# Patient Record
Sex: FEMALE | Race: WHITE | NOT HISPANIC OR LATINO | Employment: OTHER | URBAN - METROPOLITAN AREA
[De-identification: names, ages, dates, MRNs, and addresses within clinical notes are randomized per-mention and may not be internally consistent; named-entity substitution may affect disease eponyms.]

---

## 2017-01-23 ENCOUNTER — COMMUNICATION - HEALTHEAST (OUTPATIENT)
Dept: INTERNAL MEDICINE | Facility: CLINIC | Age: 82
End: 2017-01-23

## 2017-03-07 ENCOUNTER — RECORDS - HEALTHEAST (OUTPATIENT)
Dept: ADMINISTRATIVE | Facility: OTHER | Age: 82
End: 2017-03-07

## 2017-04-24 ENCOUNTER — COMMUNICATION - HEALTHEAST (OUTPATIENT)
Dept: INTERNAL MEDICINE | Facility: CLINIC | Age: 82
End: 2017-04-24

## 2017-05-24 ENCOUNTER — OFFICE VISIT - HEALTHEAST (OUTPATIENT)
Dept: INTERNAL MEDICINE | Facility: CLINIC | Age: 82
End: 2017-05-24

## 2017-05-24 DIAGNOSIS — R10.13 EPIGASTRIC PAIN: ICD-10-CM

## 2017-05-24 DIAGNOSIS — C91.10 CLL (CHRONIC LYMPHOCYTIC LEUKEMIA) (H): ICD-10-CM

## 2017-05-24 DIAGNOSIS — I48.21 ATRIAL FIBRILLATION, PERMANENT (H): ICD-10-CM

## 2017-05-24 DIAGNOSIS — E11.9 DIABETES MELLITUS TYPE 2 IN NONOBESE (H): ICD-10-CM

## 2017-05-24 LAB — HBA1C MFR BLD: 6.9 % (ref 3.5–6)

## 2017-05-24 ASSESSMENT — MIFFLIN-ST. JEOR: SCORE: 1442.63

## 2017-05-25 ENCOUNTER — COMMUNICATION - HEALTHEAST (OUTPATIENT)
Dept: NURSING | Facility: CLINIC | Age: 82
End: 2017-05-25

## 2017-05-25 DIAGNOSIS — I48.21 ATRIAL FIBRILLATION, PERMANENT (H): ICD-10-CM

## 2017-05-25 DIAGNOSIS — Z79.01 LONG TERM (CURRENT) USE OF ANTICOAGULANTS: ICD-10-CM

## 2017-05-27 ENCOUNTER — COMMUNICATION - HEALTHEAST (OUTPATIENT)
Dept: INTERNAL MEDICINE | Facility: CLINIC | Age: 82
End: 2017-05-27

## 2017-05-31 ENCOUNTER — COMMUNICATION - HEALTHEAST (OUTPATIENT)
Dept: NURSING | Facility: CLINIC | Age: 82
End: 2017-05-31

## 2017-06-05 ENCOUNTER — COMMUNICATION - HEALTHEAST (OUTPATIENT)
Dept: INTERNAL MEDICINE | Facility: CLINIC | Age: 82
End: 2017-06-05

## 2017-06-05 DIAGNOSIS — I48.21 ATRIAL FIBRILLATION, PERMANENT (H): ICD-10-CM

## 2017-06-09 ENCOUNTER — COMMUNICATION - HEALTHEAST (OUTPATIENT)
Dept: INTERNAL MEDICINE | Facility: CLINIC | Age: 82
End: 2017-06-09

## 2017-06-12 ENCOUNTER — COMMUNICATION - HEALTHEAST (OUTPATIENT)
Dept: INTERNAL MEDICINE | Facility: CLINIC | Age: 82
End: 2017-06-12

## 2017-06-12 DIAGNOSIS — I48.21 ATRIAL FIBRILLATION, PERMANENT (H): ICD-10-CM

## 2017-06-14 ENCOUNTER — RECORDS - HEALTHEAST (OUTPATIENT)
Dept: ADMINISTRATIVE | Facility: OTHER | Age: 82
End: 2017-06-14

## 2017-06-19 ENCOUNTER — COMMUNICATION - HEALTHEAST (OUTPATIENT)
Dept: NURSING | Facility: CLINIC | Age: 82
End: 2017-06-19

## 2017-06-19 DIAGNOSIS — I48.21 ATRIAL FIBRILLATION, PERMANENT (H): ICD-10-CM

## 2017-06-26 ENCOUNTER — RECORDS - HEALTHEAST (OUTPATIENT)
Dept: ADMINISTRATIVE | Facility: OTHER | Age: 82
End: 2017-06-26

## 2017-06-26 ENCOUNTER — COMMUNICATION - HEALTHEAST (OUTPATIENT)
Dept: INTERNAL MEDICINE | Facility: CLINIC | Age: 82
End: 2017-06-26

## 2017-06-26 DIAGNOSIS — I48.21 ATRIAL FIBRILLATION, PERMANENT (H): ICD-10-CM

## 2017-07-03 ENCOUNTER — COMMUNICATION - HEALTHEAST (OUTPATIENT)
Dept: NURSING | Facility: CLINIC | Age: 82
End: 2017-07-03

## 2017-07-03 DIAGNOSIS — I48.21 ATRIAL FIBRILLATION, PERMANENT (H): ICD-10-CM

## 2017-07-10 ENCOUNTER — COMMUNICATION - HEALTHEAST (OUTPATIENT)
Dept: NURSING | Facility: CLINIC | Age: 82
End: 2017-07-10

## 2017-07-10 DIAGNOSIS — I48.21 ATRIAL FIBRILLATION, PERMANENT (H): ICD-10-CM

## 2017-07-17 ENCOUNTER — COMMUNICATION - HEALTHEAST (OUTPATIENT)
Dept: INTERNAL MEDICINE | Facility: CLINIC | Age: 82
End: 2017-07-17

## 2017-07-17 DIAGNOSIS — I48.21 ATRIAL FIBRILLATION, PERMANENT (H): ICD-10-CM

## 2017-07-24 ENCOUNTER — COMMUNICATION - HEALTHEAST (OUTPATIENT)
Dept: NURSING | Facility: CLINIC | Age: 82
End: 2017-07-24

## 2017-07-24 DIAGNOSIS — I48.21 ATRIAL FIBRILLATION, PERMANENT (H): ICD-10-CM

## 2017-07-31 ENCOUNTER — COMMUNICATION - HEALTHEAST (OUTPATIENT)
Dept: NURSING | Facility: CLINIC | Age: 82
End: 2017-07-31

## 2017-07-31 DIAGNOSIS — I48.21 ATRIAL FIBRILLATION, PERMANENT (H): ICD-10-CM

## 2017-08-04 ENCOUNTER — COMMUNICATION - HEALTHEAST (OUTPATIENT)
Dept: INTERNAL MEDICINE | Facility: CLINIC | Age: 82
End: 2017-08-04

## 2017-08-07 ENCOUNTER — RECORDS - HEALTHEAST (OUTPATIENT)
Dept: ADMINISTRATIVE | Facility: OTHER | Age: 82
End: 2017-08-07

## 2017-08-07 ENCOUNTER — COMMUNICATION - HEALTHEAST (OUTPATIENT)
Dept: INTERNAL MEDICINE | Facility: CLINIC | Age: 82
End: 2017-08-07

## 2017-08-07 DIAGNOSIS — I48.21 ATRIAL FIBRILLATION, PERMANENT (H): ICD-10-CM

## 2017-08-14 ENCOUNTER — COMMUNICATION - HEALTHEAST (OUTPATIENT)
Dept: NURSING | Facility: CLINIC | Age: 82
End: 2017-08-14

## 2017-08-14 DIAGNOSIS — I48.21 ATRIAL FIBRILLATION, PERMANENT (H): ICD-10-CM

## 2017-08-21 ENCOUNTER — COMMUNICATION - HEALTHEAST (OUTPATIENT)
Dept: NURSING | Facility: CLINIC | Age: 82
End: 2017-08-21

## 2017-08-21 DIAGNOSIS — I48.21 ATRIAL FIBRILLATION, PERMANENT (H): ICD-10-CM

## 2017-08-23 ENCOUNTER — RECORDS - HEALTHEAST (OUTPATIENT)
Dept: ADMINISTRATIVE | Facility: OTHER | Age: 82
End: 2017-08-23

## 2017-08-28 ENCOUNTER — COMMUNICATION - HEALTHEAST (OUTPATIENT)
Dept: NURSING | Facility: CLINIC | Age: 82
End: 2017-08-28

## 2017-08-28 DIAGNOSIS — I48.21 ATRIAL FIBRILLATION, PERMANENT (H): ICD-10-CM

## 2017-09-01 ENCOUNTER — COMMUNICATION - HEALTHEAST (OUTPATIENT)
Dept: INTERNAL MEDICINE | Facility: CLINIC | Age: 82
End: 2017-09-01

## 2017-09-05 ENCOUNTER — COMMUNICATION - HEALTHEAST (OUTPATIENT)
Dept: NURSING | Facility: CLINIC | Age: 82
End: 2017-09-05

## 2017-09-05 DIAGNOSIS — I48.21 ATRIAL FIBRILLATION, PERMANENT (H): ICD-10-CM

## 2017-09-11 ENCOUNTER — COMMUNICATION - HEALTHEAST (OUTPATIENT)
Dept: NURSING | Facility: CLINIC | Age: 82
End: 2017-09-11

## 2017-09-11 DIAGNOSIS — I48.21 ATRIAL FIBRILLATION, PERMANENT (H): ICD-10-CM

## 2017-09-12 ENCOUNTER — COMMUNICATION - HEALTHEAST (OUTPATIENT)
Dept: INTERNAL MEDICINE | Facility: CLINIC | Age: 82
End: 2017-09-12

## 2017-09-12 DIAGNOSIS — K21.9 GERD (GASTROESOPHAGEAL REFLUX DISEASE): ICD-10-CM

## 2017-09-18 ENCOUNTER — COMMUNICATION - HEALTHEAST (OUTPATIENT)
Dept: NURSING | Facility: CLINIC | Age: 82
End: 2017-09-18

## 2017-09-18 DIAGNOSIS — I48.21 ATRIAL FIBRILLATION, PERMANENT (H): ICD-10-CM

## 2017-09-25 ENCOUNTER — COMMUNICATION - HEALTHEAST (OUTPATIENT)
Dept: NURSING | Facility: CLINIC | Age: 82
End: 2017-09-25

## 2017-09-25 DIAGNOSIS — I48.21 ATRIAL FIBRILLATION, PERMANENT (H): ICD-10-CM

## 2017-10-02 ENCOUNTER — COMMUNICATION - HEALTHEAST (OUTPATIENT)
Dept: NURSING | Facility: CLINIC | Age: 82
End: 2017-10-02

## 2017-10-02 DIAGNOSIS — I48.21 ATRIAL FIBRILLATION, PERMANENT (H): ICD-10-CM

## 2017-10-09 ENCOUNTER — COMMUNICATION - HEALTHEAST (OUTPATIENT)
Dept: NURSING | Facility: CLINIC | Age: 82
End: 2017-10-09

## 2017-10-09 DIAGNOSIS — I48.21 ATRIAL FIBRILLATION, PERMANENT (H): ICD-10-CM

## 2017-10-18 ENCOUNTER — COMMUNICATION - HEALTHEAST (OUTPATIENT)
Dept: NURSING | Facility: CLINIC | Age: 82
End: 2017-10-18

## 2017-10-18 DIAGNOSIS — I48.21 ATRIAL FIBRILLATION, PERMANENT (H): ICD-10-CM

## 2017-10-23 ENCOUNTER — COMMUNICATION - HEALTHEAST (OUTPATIENT)
Dept: NURSING | Facility: CLINIC | Age: 82
End: 2017-10-23

## 2017-10-23 DIAGNOSIS — I48.21 ATRIAL FIBRILLATION, PERMANENT (H): ICD-10-CM

## 2017-10-30 ENCOUNTER — COMMUNICATION - HEALTHEAST (OUTPATIENT)
Dept: NURSING | Facility: CLINIC | Age: 82
End: 2017-10-30

## 2017-10-30 DIAGNOSIS — I48.21 ATRIAL FIBRILLATION, PERMANENT (H): ICD-10-CM

## 2017-11-06 ENCOUNTER — COMMUNICATION - HEALTHEAST (OUTPATIENT)
Dept: NURSING | Facility: CLINIC | Age: 82
End: 2017-11-06

## 2017-11-06 DIAGNOSIS — I48.21 ATRIAL FIBRILLATION, PERMANENT (H): ICD-10-CM

## 2017-11-13 ENCOUNTER — RECORDS - HEALTHEAST (OUTPATIENT)
Dept: ADMINISTRATIVE | Facility: OTHER | Age: 82
End: 2017-11-13

## 2017-11-20 ENCOUNTER — AMBULATORY - HEALTHEAST (OUTPATIENT)
Dept: INTERNAL MEDICINE | Facility: CLINIC | Age: 82
End: 2017-11-20

## 2017-11-20 ENCOUNTER — COMMUNICATION - HEALTHEAST (OUTPATIENT)
Dept: INTERNAL MEDICINE | Facility: CLINIC | Age: 82
End: 2017-11-20

## 2017-11-20 DIAGNOSIS — I48.21 ATRIAL FIBRILLATION, PERMANENT (H): ICD-10-CM

## 2017-11-22 ENCOUNTER — COMMUNICATION - HEALTHEAST (OUTPATIENT)
Dept: NURSING | Facility: CLINIC | Age: 82
End: 2017-11-22

## 2017-11-22 DIAGNOSIS — I48.21 ATRIAL FIBRILLATION, PERMANENT (H): ICD-10-CM

## 2017-11-24 ENCOUNTER — COMMUNICATION - HEALTHEAST (OUTPATIENT)
Dept: NURSING | Facility: CLINIC | Age: 82
End: 2017-11-24

## 2017-11-24 ENCOUNTER — OFFICE VISIT - HEALTHEAST (OUTPATIENT)
Dept: INTERNAL MEDICINE | Facility: CLINIC | Age: 82
End: 2017-11-24

## 2017-11-24 DIAGNOSIS — C91.10 CLL (CHRONIC LYMPHOCYTIC LEUKEMIA) (H): ICD-10-CM

## 2017-11-24 DIAGNOSIS — E11.9 DIABETES MELLITUS TYPE 2 IN NONOBESE (H): ICD-10-CM

## 2017-11-24 DIAGNOSIS — K80.70 CHOLELITHIASIS WITH CHOLEDOCHOLITHIASIS: ICD-10-CM

## 2017-11-24 DIAGNOSIS — I48.21 ATRIAL FIBRILLATION, PERMANENT (H): ICD-10-CM

## 2017-11-24 ASSESSMENT — MIFFLIN-ST. JEOR: SCORE: 1433.56

## 2017-11-27 ENCOUNTER — COMMUNICATION - HEALTHEAST (OUTPATIENT)
Dept: NURSING | Facility: CLINIC | Age: 82
End: 2017-11-27

## 2017-11-27 DIAGNOSIS — I48.21 ATRIAL FIBRILLATION, PERMANENT (H): ICD-10-CM

## 2017-12-01 ENCOUNTER — COMMUNICATION - HEALTHEAST (OUTPATIENT)
Dept: NURSING | Facility: CLINIC | Age: 82
End: 2017-12-01

## 2017-12-01 DIAGNOSIS — I48.21 ATRIAL FIBRILLATION, PERMANENT (H): ICD-10-CM

## 2017-12-05 ENCOUNTER — COMMUNICATION - HEALTHEAST (OUTPATIENT)
Dept: NURSING | Facility: CLINIC | Age: 82
End: 2017-12-05

## 2017-12-05 DIAGNOSIS — I48.21 ATRIAL FIBRILLATION, PERMANENT (H): ICD-10-CM

## 2017-12-11 ENCOUNTER — COMMUNICATION - HEALTHEAST (OUTPATIENT)
Dept: NURSING | Facility: CLINIC | Age: 82
End: 2017-12-11

## 2017-12-11 DIAGNOSIS — I48.21 ATRIAL FIBRILLATION, PERMANENT (H): ICD-10-CM

## 2017-12-14 ENCOUNTER — RECORDS - HEALTHEAST (OUTPATIENT)
Dept: ADMINISTRATIVE | Facility: OTHER | Age: 82
End: 2017-12-14

## 2017-12-15 ENCOUNTER — COMMUNICATION - HEALTHEAST (OUTPATIENT)
Dept: NURSING | Facility: CLINIC | Age: 82
End: 2017-12-15

## 2017-12-15 DIAGNOSIS — I48.21 ATRIAL FIBRILLATION, PERMANENT (H): ICD-10-CM

## 2017-12-18 ENCOUNTER — COMMUNICATION - HEALTHEAST (OUTPATIENT)
Dept: FAMILY MEDICINE | Facility: CLINIC | Age: 82
End: 2017-12-18

## 2017-12-18 DIAGNOSIS — I48.21 ATRIAL FIBRILLATION, PERMANENT (H): ICD-10-CM

## 2017-12-26 ENCOUNTER — COMMUNICATION - HEALTHEAST (OUTPATIENT)
Dept: NURSING | Facility: CLINIC | Age: 82
End: 2017-12-26

## 2017-12-26 DIAGNOSIS — I48.21 ATRIAL FIBRILLATION, PERMANENT (H): ICD-10-CM

## 2018-01-02 ENCOUNTER — COMMUNICATION - HEALTHEAST (OUTPATIENT)
Dept: NURSING | Facility: CLINIC | Age: 83
End: 2018-01-02

## 2018-01-02 DIAGNOSIS — I48.21 ATRIAL FIBRILLATION, PERMANENT (H): ICD-10-CM

## 2018-01-02 LAB — INR PPP: 2.3 (ref 0.9–1.1)

## 2018-01-08 ENCOUNTER — COMMUNICATION - HEALTHEAST (OUTPATIENT)
Dept: NURSING | Facility: CLINIC | Age: 83
End: 2018-01-08

## 2018-01-08 DIAGNOSIS — I48.21 ATRIAL FIBRILLATION, PERMANENT (H): ICD-10-CM

## 2018-01-08 LAB — INR PPP: 2.4 (ref 0.9–1.1)

## 2018-01-15 ENCOUNTER — COMMUNICATION - HEALTHEAST (OUTPATIENT)
Dept: NURSING | Facility: CLINIC | Age: 83
End: 2018-01-15

## 2018-01-15 DIAGNOSIS — I48.21 ATRIAL FIBRILLATION, PERMANENT (H): ICD-10-CM

## 2018-01-15 LAB — INR PPP: 2.5 (ref 0.9–1.1)

## 2018-01-22 ENCOUNTER — COMMUNICATION - HEALTHEAST (OUTPATIENT)
Dept: NURSING | Facility: CLINIC | Age: 83
End: 2018-01-22

## 2018-01-22 DIAGNOSIS — I48.21 ATRIAL FIBRILLATION, PERMANENT (H): ICD-10-CM

## 2018-01-22 LAB — INR PPP: 2.3 (ref 0.9–1.1)

## 2018-01-29 ENCOUNTER — COMMUNICATION - HEALTHEAST (OUTPATIENT)
Dept: NURSING | Facility: CLINIC | Age: 83
End: 2018-01-29

## 2018-01-29 DIAGNOSIS — I48.21 ATRIAL FIBRILLATION, PERMANENT (H): ICD-10-CM

## 2018-01-29 LAB — INR PPP: 2.1 (ref 0.9–1.1)

## 2018-02-01 ENCOUNTER — COMMUNICATION - HEALTHEAST (OUTPATIENT)
Dept: INTERNAL MEDICINE | Facility: CLINIC | Age: 83
End: 2018-02-01

## 2018-02-01 DIAGNOSIS — E11.9 DIABETES (H): ICD-10-CM

## 2018-02-05 ENCOUNTER — COMMUNICATION - HEALTHEAST (OUTPATIENT)
Dept: NURSING | Facility: CLINIC | Age: 83
End: 2018-02-05

## 2018-02-05 DIAGNOSIS — I48.21 ATRIAL FIBRILLATION, PERMANENT (H): ICD-10-CM

## 2018-02-05 LAB — INR PPP: 2.7 (ref 0.9–1.1)

## 2018-02-12 ENCOUNTER — COMMUNICATION - HEALTHEAST (OUTPATIENT)
Dept: NURSING | Facility: CLINIC | Age: 83
End: 2018-02-12

## 2018-02-12 DIAGNOSIS — I48.21 ATRIAL FIBRILLATION, PERMANENT (H): ICD-10-CM

## 2018-02-12 LAB — INR PPP: 2 (ref 0.9–1.1)

## 2018-02-13 ENCOUNTER — COMMUNICATION - HEALTHEAST (OUTPATIENT)
Dept: NURSING | Facility: CLINIC | Age: 83
End: 2018-02-13

## 2018-02-13 DIAGNOSIS — I48.21 ATRIAL FIBRILLATION, PERMANENT (H): ICD-10-CM

## 2018-02-19 ENCOUNTER — COMMUNICATION - HEALTHEAST (OUTPATIENT)
Dept: NURSING | Facility: CLINIC | Age: 83
End: 2018-02-19

## 2018-02-19 DIAGNOSIS — I48.21 ATRIAL FIBRILLATION, PERMANENT (H): ICD-10-CM

## 2018-02-19 LAB — INR PPP: 2.3 (ref 0.9–1.1)

## 2018-02-26 ENCOUNTER — COMMUNICATION - HEALTHEAST (OUTPATIENT)
Dept: NURSING | Facility: CLINIC | Age: 83
End: 2018-02-26

## 2018-02-26 DIAGNOSIS — I48.21 ATRIAL FIBRILLATION, PERMANENT (H): ICD-10-CM

## 2018-02-26 LAB — INR PPP: 2.3 (ref 0.9–1.1)

## 2018-03-05 ENCOUNTER — RECORDS - HEALTHEAST (OUTPATIENT)
Dept: ADMINISTRATIVE | Facility: OTHER | Age: 83
End: 2018-03-05

## 2018-03-08 ENCOUNTER — OFFICE VISIT - HEALTHEAST (OUTPATIENT)
Dept: INTERNAL MEDICINE | Facility: CLINIC | Age: 83
End: 2018-03-08

## 2018-03-08 ENCOUNTER — COMMUNICATION - HEALTHEAST (OUTPATIENT)
Dept: NURSING | Facility: CLINIC | Age: 83
End: 2018-03-08

## 2018-03-08 DIAGNOSIS — C91.10 CLL (CHRONIC LYMPHOCYTIC LEUKEMIA) (H): ICD-10-CM

## 2018-03-08 DIAGNOSIS — I48.21 ATRIAL FIBRILLATION, PERMANENT (H): ICD-10-CM

## 2018-03-08 DIAGNOSIS — E11.9 DIABETES MELLITUS TYPE 2 IN NONOBESE (H): ICD-10-CM

## 2018-03-08 DIAGNOSIS — G60.8 PERIPHERAL SENSORY NEUROPATHY: ICD-10-CM

## 2018-03-08 LAB
ANION GAP SERPL CALCULATED.3IONS-SCNC: 10 MMOL/L (ref 5–18)
BASOPHILS # BLD AUTO: 0 THOU/UL (ref 0–0.2)
BASOPHILS NFR BLD AUTO: 1 % (ref 0–2)
BUN SERPL-MCNC: 25 MG/DL (ref 8–28)
CALCIUM SERPL-MCNC: 9.8 MG/DL (ref 8.5–10.5)
CHLORIDE BLD-SCNC: 102 MMOL/L (ref 98–107)
CO2 SERPL-SCNC: 30 MMOL/L (ref 22–31)
CREAT SERPL-MCNC: 0.83 MG/DL (ref 0.6–1.1)
EOSINOPHIL # BLD AUTO: 0.1 THOU/UL (ref 0–0.4)
EOSINOPHIL NFR BLD AUTO: 2 % (ref 0–6)
ERYTHROCYTE [DISTWIDTH] IN BLOOD BY AUTOMATED COUNT: 15.2 % (ref 11–14.5)
GFR SERPL CREATININE-BSD FRML MDRD: >60 ML/MIN/1.73M2
GLUCOSE BLD-MCNC: 132 MG/DL (ref 70–125)
HBA1C MFR BLD: 7.4 % (ref 3.5–6)
HCT VFR BLD AUTO: 40.5 % (ref 35–47)
HGB BLD-MCNC: 12.9 G/DL (ref 12–16)
INR PPP: 2.2 (ref 0.9–1.1)
LYMPHOCYTES # BLD AUTO: 1.4 THOU/UL (ref 0.8–4.4)
LYMPHOCYTES NFR BLD AUTO: 19 % (ref 20–40)
MCH RBC QN AUTO: 28.4 PG (ref 27–34)
MCHC RBC AUTO-ENTMCNC: 31.9 G/DL (ref 32–36)
MCV RBC AUTO: 89 FL (ref 80–100)
MONOCYTES # BLD AUTO: 0.5 THOU/UL (ref 0–0.9)
MONOCYTES NFR BLD AUTO: 7 % (ref 2–10)
NEUTROPHILS # BLD AUTO: 5.4 THOU/UL (ref 2–7.7)
NEUTROPHILS NFR BLD AUTO: 72 % (ref 50–70)
PLATELET # BLD AUTO: 213 THOU/UL (ref 140–440)
PMV BLD AUTO: 8.6 FL (ref 7–10)
POTASSIUM BLD-SCNC: 4 MMOL/L (ref 3.5–5)
RBC # BLD AUTO: 4.55 MILL/UL (ref 3.8–5.4)
SODIUM SERPL-SCNC: 142 MMOL/L (ref 136–145)
VIT B12 SERPL-MCNC: 339 PG/ML (ref 213–816)
WBC: 7.4 THOU/UL (ref 4–11)

## 2018-03-08 ASSESSMENT — MIFFLIN-ST. JEOR: SCORE: 1437.55

## 2018-03-10 LAB — METHYLMALONATE SERPL-SCNC: 0.21 UMOL/L (ref 0–0.4)

## 2018-03-12 ENCOUNTER — COMMUNICATION - HEALTHEAST (OUTPATIENT)
Dept: NURSING | Facility: CLINIC | Age: 83
End: 2018-03-12

## 2018-03-12 ENCOUNTER — COMMUNICATION - HEALTHEAST (OUTPATIENT)
Dept: INTERNAL MEDICINE | Facility: CLINIC | Age: 83
End: 2018-03-12

## 2018-03-12 DIAGNOSIS — I48.21 ATRIAL FIBRILLATION, PERMANENT (H): ICD-10-CM

## 2018-03-12 LAB — INR PPP: 2.4 (ref 0.9–1.1)

## 2018-03-13 ENCOUNTER — COMMUNICATION - HEALTHEAST (OUTPATIENT)
Dept: INTERNAL MEDICINE | Facility: CLINIC | Age: 83
End: 2018-03-13

## 2018-03-13 DIAGNOSIS — I48.21 ATRIAL FIBRILLATION, PERMANENT (H): ICD-10-CM

## 2018-03-13 DIAGNOSIS — K21.9 GERD (GASTROESOPHAGEAL REFLUX DISEASE): ICD-10-CM

## 2018-03-13 DIAGNOSIS — L30.9 ECZEMA: ICD-10-CM

## 2018-03-13 DIAGNOSIS — E11.9 DIABETES (H): ICD-10-CM

## 2018-03-16 ENCOUNTER — COMMUNICATION - HEALTHEAST (OUTPATIENT)
Dept: INTERNAL MEDICINE | Facility: CLINIC | Age: 83
End: 2018-03-16

## 2018-03-19 ENCOUNTER — COMMUNICATION - HEALTHEAST (OUTPATIENT)
Dept: NURSING | Facility: CLINIC | Age: 83
End: 2018-03-19

## 2018-03-19 DIAGNOSIS — I48.21 ATRIAL FIBRILLATION, PERMANENT (H): ICD-10-CM

## 2018-03-19 LAB — INR PPP: 2.3 (ref 0.9–1.1)

## 2018-03-26 ENCOUNTER — COMMUNICATION - HEALTHEAST (OUTPATIENT)
Dept: NURSING | Facility: CLINIC | Age: 83
End: 2018-03-26

## 2018-03-26 DIAGNOSIS — I48.21 ATRIAL FIBRILLATION, PERMANENT (H): ICD-10-CM

## 2018-03-26 LAB — INR PPP: 2.2 (ref 0.9–1.1)

## 2018-04-02 ENCOUNTER — COMMUNICATION - HEALTHEAST (OUTPATIENT)
Dept: ANTICOAGULATION | Facility: CLINIC | Age: 83
End: 2018-04-02

## 2018-04-02 DIAGNOSIS — I48.21 ATRIAL FIBRILLATION, PERMANENT (H): ICD-10-CM

## 2018-04-02 LAB — INR PPP: 1.9 (ref 0.9–1.1)

## 2018-04-09 ENCOUNTER — COMMUNICATION - HEALTHEAST (OUTPATIENT)
Dept: ANTICOAGULATION | Facility: CLINIC | Age: 83
End: 2018-04-09

## 2018-04-09 DIAGNOSIS — I48.21 ATRIAL FIBRILLATION, PERMANENT (H): ICD-10-CM

## 2018-04-09 LAB — INR PPP: 2.2 (ref 0.9–1.1)

## 2018-04-16 ENCOUNTER — COMMUNICATION - HEALTHEAST (OUTPATIENT)
Dept: ANTICOAGULATION | Facility: CLINIC | Age: 83
End: 2018-04-16

## 2018-04-16 DIAGNOSIS — I48.21 ATRIAL FIBRILLATION, PERMANENT (H): ICD-10-CM

## 2018-04-16 LAB — INR PPP: 2 (ref 0.9–1.1)

## 2018-04-23 ENCOUNTER — COMMUNICATION - HEALTHEAST (OUTPATIENT)
Dept: INTERNAL MEDICINE | Facility: CLINIC | Age: 83
End: 2018-04-23

## 2018-04-23 DIAGNOSIS — I48.21 ATRIAL FIBRILLATION, PERMANENT (H): ICD-10-CM

## 2018-04-23 LAB — INR PPP: 4.8 (ref 0.9–1.1)

## 2018-04-30 ENCOUNTER — COMMUNICATION - HEALTHEAST (OUTPATIENT)
Dept: ANTICOAGULATION | Facility: CLINIC | Age: 83
End: 2018-04-30

## 2018-04-30 DIAGNOSIS — I48.21 ATRIAL FIBRILLATION, PERMANENT (H): ICD-10-CM

## 2018-04-30 LAB — INR PPP: 2 (ref 0.9–1.1)

## 2018-05-07 ENCOUNTER — COMMUNICATION - HEALTHEAST (OUTPATIENT)
Dept: ANTICOAGULATION | Facility: CLINIC | Age: 83
End: 2018-05-07

## 2018-05-07 DIAGNOSIS — I48.21 ATRIAL FIBRILLATION, PERMANENT (H): ICD-10-CM

## 2018-05-07 LAB — INR PPP: 1.9 (ref 0.9–1.1)

## 2018-05-14 ENCOUNTER — COMMUNICATION - HEALTHEAST (OUTPATIENT)
Dept: ANTICOAGULATION | Facility: CLINIC | Age: 83
End: 2018-05-14

## 2018-05-14 DIAGNOSIS — I48.21 ATRIAL FIBRILLATION, PERMANENT (H): ICD-10-CM

## 2018-05-14 LAB — INR PPP: 2.8 (ref 0.9–1.1)

## 2018-05-21 ENCOUNTER — COMMUNICATION - HEALTHEAST (OUTPATIENT)
Dept: ANTICOAGULATION | Facility: CLINIC | Age: 83
End: 2018-05-21

## 2018-05-21 DIAGNOSIS — I48.21 ATRIAL FIBRILLATION, PERMANENT (H): ICD-10-CM

## 2018-05-21 LAB — INR PPP: 2 (ref 0.9–1.1)

## 2018-05-29 ENCOUNTER — COMMUNICATION - HEALTHEAST (OUTPATIENT)
Dept: ANTICOAGULATION | Facility: CLINIC | Age: 83
End: 2018-05-29

## 2018-05-29 ENCOUNTER — COMMUNICATION - HEALTHEAST (OUTPATIENT)
Dept: INTERNAL MEDICINE | Facility: CLINIC | Age: 83
End: 2018-05-29

## 2018-05-29 DIAGNOSIS — E11.9 DIABETES (H): ICD-10-CM

## 2018-05-29 DIAGNOSIS — I48.21 ATRIAL FIBRILLATION, PERMANENT (H): ICD-10-CM

## 2018-05-29 LAB — INR PPP: 2.4 (ref 0.9–1.1)

## 2018-06-04 ENCOUNTER — COMMUNICATION - HEALTHEAST (OUTPATIENT)
Dept: ANTICOAGULATION | Facility: CLINIC | Age: 83
End: 2018-06-04

## 2018-06-04 DIAGNOSIS — I48.21 ATRIAL FIBRILLATION, PERMANENT (H): ICD-10-CM

## 2018-06-04 LAB — INR PPP: 2.6 (ref 0.9–1.1)

## 2018-06-07 ENCOUNTER — RECORDS - HEALTHEAST (OUTPATIENT)
Dept: ADMINISTRATIVE | Facility: OTHER | Age: 83
End: 2018-06-07

## 2018-06-11 ENCOUNTER — COMMUNICATION - HEALTHEAST (OUTPATIENT)
Dept: ANTICOAGULATION | Facility: CLINIC | Age: 83
End: 2018-06-11

## 2018-06-11 DIAGNOSIS — I48.21 ATRIAL FIBRILLATION, PERMANENT (H): ICD-10-CM

## 2018-06-11 LAB — INR PPP: 2.3 (ref 0.9–1.1)

## 2018-06-18 ENCOUNTER — COMMUNICATION - HEALTHEAST (OUTPATIENT)
Dept: ANTICOAGULATION | Facility: CLINIC | Age: 83
End: 2018-06-18

## 2018-06-18 ENCOUNTER — COMMUNICATION - HEALTHEAST (OUTPATIENT)
Dept: INTERNAL MEDICINE | Facility: CLINIC | Age: 83
End: 2018-06-18

## 2018-06-18 DIAGNOSIS — E11.9 DIABETES (H): ICD-10-CM

## 2018-06-18 DIAGNOSIS — I48.21 ATRIAL FIBRILLATION, PERMANENT (H): ICD-10-CM

## 2018-06-18 LAB — INR PPP: 2.7 (ref 0.9–1.1)

## 2018-06-19 ENCOUNTER — RECORDS - HEALTHEAST (OUTPATIENT)
Dept: ADMINISTRATIVE | Facility: OTHER | Age: 83
End: 2018-06-19

## 2018-06-25 ENCOUNTER — COMMUNICATION - HEALTHEAST (OUTPATIENT)
Dept: ANTICOAGULATION | Facility: CLINIC | Age: 83
End: 2018-06-25

## 2018-06-25 DIAGNOSIS — I48.21 ATRIAL FIBRILLATION, PERMANENT (H): ICD-10-CM

## 2018-06-25 LAB — INR PPP: 2.6 (ref 0.9–1.1)

## 2018-07-02 ENCOUNTER — COMMUNICATION - HEALTHEAST (OUTPATIENT)
Dept: ANTICOAGULATION | Facility: CLINIC | Age: 83
End: 2018-07-02

## 2018-07-02 ENCOUNTER — COMMUNICATION - HEALTHEAST (OUTPATIENT)
Dept: INTERNAL MEDICINE | Facility: CLINIC | Age: 83
End: 2018-07-02

## 2018-07-02 DIAGNOSIS — I48.21 ATRIAL FIBRILLATION, PERMANENT (H): ICD-10-CM

## 2018-07-02 DIAGNOSIS — E11.9 DIABETES (H): ICD-10-CM

## 2018-07-02 LAB — INR PPP: 1.8 (ref 0.9–1.1)

## 2018-07-09 ENCOUNTER — COMMUNICATION - HEALTHEAST (OUTPATIENT)
Dept: ANTICOAGULATION | Facility: CLINIC | Age: 83
End: 2018-07-09

## 2018-07-09 DIAGNOSIS — I48.21 ATRIAL FIBRILLATION, PERMANENT (H): ICD-10-CM

## 2018-07-09 LAB — INR PPP: 3.4 (ref 0.9–1.1)

## 2018-07-10 LAB — INR PPP: 2.4 (ref 0.9–1.1)

## 2018-07-11 ENCOUNTER — COMMUNICATION - HEALTHEAST (OUTPATIENT)
Dept: INTERNAL MEDICINE | Facility: CLINIC | Age: 83
End: 2018-07-11

## 2018-07-12 ENCOUNTER — COMMUNICATION - HEALTHEAST (OUTPATIENT)
Dept: ANTICOAGULATION | Facility: CLINIC | Age: 83
End: 2018-07-12

## 2018-07-12 ENCOUNTER — OFFICE VISIT - HEALTHEAST (OUTPATIENT)
Dept: INTERNAL MEDICINE | Facility: CLINIC | Age: 83
End: 2018-07-12

## 2018-07-12 DIAGNOSIS — L03.90 CELLULITIS: ICD-10-CM

## 2018-07-12 DIAGNOSIS — L08.9 TRAUMATIC HEMATOMA OF LOWER LEG WITH INFECTION, INITIAL ENCOUNTER: ICD-10-CM

## 2018-07-12 DIAGNOSIS — C91.10 CLL (CHRONIC LYMPHOCYTIC LEUKEMIA) (H): ICD-10-CM

## 2018-07-12 DIAGNOSIS — S80.10XA TRAUMATIC HEMATOMA OF LOWER LEG WITH INFECTION, INITIAL ENCOUNTER: ICD-10-CM

## 2018-07-12 DIAGNOSIS — E11.9 DIABETES MELLITUS TYPE 2 IN NONOBESE (H): ICD-10-CM

## 2018-07-12 DIAGNOSIS — I48.21 ATRIAL FIBRILLATION, PERMANENT (H): ICD-10-CM

## 2018-07-12 ASSESSMENT — MIFFLIN-ST. JEOR: SCORE: 1433.38

## 2018-07-16 ENCOUNTER — COMMUNICATION - HEALTHEAST (OUTPATIENT)
Dept: ANTICOAGULATION | Facility: CLINIC | Age: 83
End: 2018-07-16

## 2018-07-16 DIAGNOSIS — I48.21 ATRIAL FIBRILLATION, PERMANENT (H): ICD-10-CM

## 2018-07-16 LAB — INR PPP: 2.7 (ref 0.9–1.1)

## 2018-07-23 ENCOUNTER — COMMUNICATION - HEALTHEAST (OUTPATIENT)
Dept: ANTICOAGULATION | Facility: CLINIC | Age: 83
End: 2018-07-23

## 2018-07-23 DIAGNOSIS — I48.21 ATRIAL FIBRILLATION, PERMANENT (H): ICD-10-CM

## 2018-07-23 LAB — INR PPP: 2.6 (ref 0.9–1.1)

## 2018-07-24 ENCOUNTER — COMMUNICATION - HEALTHEAST (OUTPATIENT)
Dept: INTERNAL MEDICINE | Facility: CLINIC | Age: 83
End: 2018-07-24

## 2018-07-24 ENCOUNTER — RECORDS - HEALTHEAST (OUTPATIENT)
Dept: ADMINISTRATIVE | Facility: OTHER | Age: 83
End: 2018-07-24

## 2018-07-25 ENCOUNTER — COMMUNICATION - HEALTHEAST (OUTPATIENT)
Dept: INTERNAL MEDICINE | Facility: CLINIC | Age: 83
End: 2018-07-25

## 2018-07-25 ENCOUNTER — OFFICE VISIT - HEALTHEAST (OUTPATIENT)
Dept: INTERNAL MEDICINE | Facility: CLINIC | Age: 83
End: 2018-07-25

## 2018-07-25 DIAGNOSIS — I48.21 ATRIAL FIBRILLATION, PERMANENT (H): ICD-10-CM

## 2018-07-25 DIAGNOSIS — S80.10XA TRAUMATIC HEMATOMA OF LOWER LEG WITH INFECTION, INITIAL ENCOUNTER: ICD-10-CM

## 2018-07-25 DIAGNOSIS — L08.9 TRAUMATIC HEMATOMA OF LOWER LEG WITH INFECTION, INITIAL ENCOUNTER: ICD-10-CM

## 2018-07-25 DIAGNOSIS — R60.0 LEG EDEMA, RIGHT: ICD-10-CM

## 2018-07-25 LAB
ALBUMIN SERPL-MCNC: 3.9 G/DL (ref 3.5–5)
ANION GAP SERPL CALCULATED.3IONS-SCNC: 10 MMOL/L (ref 5–18)
BUN SERPL-MCNC: 18 MG/DL (ref 8–28)
CALCIUM SERPL-MCNC: 10.2 MG/DL (ref 8.5–10.5)
CHLORIDE BLD-SCNC: 101 MMOL/L (ref 98–107)
CO2 SERPL-SCNC: 32 MMOL/L (ref 22–31)
CREAT SERPL-MCNC: 0.81 MG/DL (ref 0.6–1.1)
GFR SERPL CREATININE-BSD FRML MDRD: >60 ML/MIN/1.73M2
GLUCOSE BLD-MCNC: 126 MG/DL (ref 70–125)
MAGNESIUM SERPL-MCNC: 2.2 MG/DL (ref 1.8–2.6)
PHOSPHATE SERPL-MCNC: 3.2 MG/DL (ref 2.5–4.5)
POTASSIUM BLD-SCNC: 3.6 MMOL/L (ref 3.5–5)
SODIUM SERPL-SCNC: 143 MMOL/L (ref 136–145)

## 2018-07-25 ASSESSMENT — MIFFLIN-ST. JEOR: SCORE: 1433.38

## 2018-07-30 ENCOUNTER — COMMUNICATION - HEALTHEAST (OUTPATIENT)
Dept: ANTICOAGULATION | Facility: CLINIC | Age: 83
End: 2018-07-30

## 2018-07-30 DIAGNOSIS — I48.21 ATRIAL FIBRILLATION, PERMANENT (H): ICD-10-CM

## 2018-07-30 LAB — INR PPP: 2.8 (ref 0.9–1.1)

## 2018-08-06 ENCOUNTER — RECORDS - HEALTHEAST (OUTPATIENT)
Dept: ADMINISTRATIVE | Facility: OTHER | Age: 83
End: 2018-08-06

## 2018-08-06 ENCOUNTER — COMMUNICATION - HEALTHEAST (OUTPATIENT)
Dept: INTERNAL MEDICINE | Facility: CLINIC | Age: 83
End: 2018-08-06

## 2018-08-06 ENCOUNTER — COMMUNICATION - HEALTHEAST (OUTPATIENT)
Dept: ANTICOAGULATION | Facility: CLINIC | Age: 83
End: 2018-08-06

## 2018-08-06 DIAGNOSIS — E11.9 DIABETES (H): ICD-10-CM

## 2018-08-06 DIAGNOSIS — I48.21 ATRIAL FIBRILLATION, PERMANENT (H): ICD-10-CM

## 2018-08-06 LAB — INR PPP: 2.5 (ref 0.9–1.1)

## 2018-08-13 ENCOUNTER — COMMUNICATION - HEALTHEAST (OUTPATIENT)
Dept: ANTICOAGULATION | Facility: CLINIC | Age: 83
End: 2018-08-13

## 2018-08-13 DIAGNOSIS — I48.21 ATRIAL FIBRILLATION, PERMANENT (H): ICD-10-CM

## 2018-08-13 LAB — INR PPP: 2.2 (ref 0.9–1.1)

## 2018-08-20 ENCOUNTER — COMMUNICATION - HEALTHEAST (OUTPATIENT)
Dept: ANTICOAGULATION | Facility: CLINIC | Age: 83
End: 2018-08-20

## 2018-08-20 DIAGNOSIS — I48.21 ATRIAL FIBRILLATION, PERMANENT (H): ICD-10-CM

## 2018-08-20 LAB — INR PPP: 2 (ref 0.9–1.1)

## 2018-08-24 ENCOUNTER — COMMUNICATION - HEALTHEAST (OUTPATIENT)
Dept: INTERNAL MEDICINE | Facility: CLINIC | Age: 83
End: 2018-08-24

## 2018-08-24 DIAGNOSIS — I48.21 ATRIAL FIBRILLATION, PERMANENT (H): ICD-10-CM

## 2018-08-27 ENCOUNTER — COMMUNICATION - HEALTHEAST (OUTPATIENT)
Dept: ANTICOAGULATION | Facility: CLINIC | Age: 83
End: 2018-08-27

## 2018-08-27 DIAGNOSIS — I48.21 ATRIAL FIBRILLATION, PERMANENT (H): ICD-10-CM

## 2018-08-27 LAB — INR PPP: 2.8 (ref 0.9–1.1)

## 2018-09-03 ENCOUNTER — COMMUNICATION - HEALTHEAST (OUTPATIENT)
Dept: INTERNAL MEDICINE | Facility: CLINIC | Age: 83
End: 2018-09-03

## 2018-09-03 DIAGNOSIS — I48.21 ATRIAL FIBRILLATION, PERMANENT (H): ICD-10-CM

## 2018-09-04 ENCOUNTER — COMMUNICATION - HEALTHEAST (OUTPATIENT)
Dept: INTERNAL MEDICINE | Facility: CLINIC | Age: 83
End: 2018-09-04

## 2018-09-04 DIAGNOSIS — E11.9 DIABETES (H): ICD-10-CM

## 2018-09-04 LAB — INR PPP: 2.6 (ref 0.9–1.1)

## 2018-09-05 ENCOUNTER — COMMUNICATION - HEALTHEAST (OUTPATIENT)
Dept: ANTICOAGULATION | Facility: CLINIC | Age: 83
End: 2018-09-05

## 2018-09-05 DIAGNOSIS — I48.21 ATRIAL FIBRILLATION, PERMANENT (H): ICD-10-CM

## 2018-09-07 ENCOUNTER — COMMUNICATION - HEALTHEAST (OUTPATIENT)
Dept: INTERNAL MEDICINE | Facility: CLINIC | Age: 83
End: 2018-09-07

## 2018-09-07 DIAGNOSIS — K21.9 GERD (GASTROESOPHAGEAL REFLUX DISEASE): ICD-10-CM

## 2018-09-10 ENCOUNTER — COMMUNICATION - HEALTHEAST (OUTPATIENT)
Dept: ANTICOAGULATION | Facility: CLINIC | Age: 83
End: 2018-09-10

## 2018-09-10 DIAGNOSIS — I48.21 ATRIAL FIBRILLATION, PERMANENT (H): ICD-10-CM

## 2018-09-10 LAB — INR PPP: 3 (ref 0.9–1.1)

## 2018-09-12 ENCOUNTER — AMBULATORY - HEALTHEAST (OUTPATIENT)
Dept: INTERNAL MEDICINE | Facility: CLINIC | Age: 83
End: 2018-09-12

## 2018-09-12 ENCOUNTER — OFFICE VISIT - HEALTHEAST (OUTPATIENT)
Dept: INTERNAL MEDICINE | Facility: CLINIC | Age: 83
End: 2018-09-12

## 2018-09-12 DIAGNOSIS — C91.10 CLL (CHRONIC LYMPHOCYTIC LEUKEMIA) (H): ICD-10-CM

## 2018-09-12 DIAGNOSIS — E78.5 HYPERLIPIDEMIA: ICD-10-CM

## 2018-09-12 DIAGNOSIS — M85.80 OSTEOPENIA: ICD-10-CM

## 2018-09-12 DIAGNOSIS — Z95.0 HISTORY OF PERMANENT CARDIAC PACEMAKER PLACEMENT: ICD-10-CM

## 2018-09-12 DIAGNOSIS — E11.9 DIABETES MELLITUS TYPE 2 IN NONOBESE (H): ICD-10-CM

## 2018-09-12 DIAGNOSIS — D50.9 ANEMIA, IRON DEFICIENCY: ICD-10-CM

## 2018-09-12 DIAGNOSIS — E11.9 DM TYPE 2 (DIABETES MELLITUS, TYPE 2) (H): ICD-10-CM

## 2018-09-12 DIAGNOSIS — Z00.00 MEDICARE ANNUAL WELLNESS VISIT, SUBSEQUENT: ICD-10-CM

## 2018-09-12 DIAGNOSIS — E04.1 THYROID NODULE: ICD-10-CM

## 2018-09-12 DIAGNOSIS — I48.21 ATRIAL FIBRILLATION, PERMANENT (H): ICD-10-CM

## 2018-09-12 DIAGNOSIS — M85.80 LOW BONE MASS: ICD-10-CM

## 2018-09-12 DIAGNOSIS — M89.9 DISORDER OF BONE: ICD-10-CM

## 2018-09-12 DIAGNOSIS — I10 BENIGN ESSENTIAL HYPERTENSION: ICD-10-CM

## 2018-09-12 DIAGNOSIS — I65.29 CAROTID STENOSIS: ICD-10-CM

## 2018-09-12 LAB
ALBUMIN SERPL-MCNC: 3.9 G/DL (ref 3.5–5)
ALBUMIN SERPL-MCNC: 3.9 G/DL (ref 3.5–5)
ALBUMIN UR-MCNC: NEGATIVE MG/DL
ALP SERPL-CCNC: 56 U/L (ref 45–120)
ALT SERPL W P-5'-P-CCNC: 15 U/L (ref 0–45)
ANION GAP SERPL CALCULATED.3IONS-SCNC: 10 MMOL/L (ref 5–18)
APPEARANCE UR: CLEAR
AST SERPL W P-5'-P-CCNC: 15 U/L (ref 0–40)
BACTERIA #/AREA URNS HPF: ABNORMAL HPF
BASOPHILS # BLD AUTO: 0 THOU/UL (ref 0–0.2)
BASOPHILS NFR BLD AUTO: 1 % (ref 0–2)
BILIRUB DIRECT SERPL-MCNC: 0.2 MG/DL
BILIRUB SERPL-MCNC: 0.9 MG/DL (ref 0–1)
BILIRUB UR QL STRIP: NEGATIVE
BUN SERPL-MCNC: 17 MG/DL (ref 8–28)
CALCIUM SERPL-MCNC: 9.9 MG/DL (ref 8.5–10.5)
CHLORIDE BLD-SCNC: 103 MMOL/L (ref 98–107)
CHOLEST SERPL-MCNC: 178 MG/DL
CO2 SERPL-SCNC: 29 MMOL/L (ref 22–31)
COLOR UR AUTO: YELLOW
CREAT SERPL-MCNC: 0.88 MG/DL (ref 0.6–1.1)
EOSINOPHIL # BLD AUTO: 0.1 THOU/UL (ref 0–0.4)
EOSINOPHIL NFR BLD AUTO: 2 % (ref 0–6)
ERYTHROCYTE [DISTWIDTH] IN BLOOD BY AUTOMATED COUNT: 16.3 % (ref 11–14.5)
ERYTHROCYTE [SEDIMENTATION RATE] IN BLOOD BY WESTERGREN METHOD: 16 MM/HR (ref 0–20)
FASTING STATUS PATIENT QL REPORTED: YES
FERRITIN SERPL-MCNC: 26 NG/ML (ref 10–130)
GFR SERPL CREATININE-BSD FRML MDRD: >60 ML/MIN/1.73M2
GLUCOSE BLD-MCNC: 152 MG/DL (ref 70–125)
GLUCOSE UR STRIP-MCNC: NEGATIVE MG/DL
HCT VFR BLD AUTO: 40.4 % (ref 35–47)
HDLC SERPL-MCNC: 49 MG/DL
HGB BLD-MCNC: 13.2 G/DL (ref 12–16)
HGB UR QL STRIP: NEGATIVE
IRON SATN MFR SERPL: 18 % (ref 20–50)
IRON SERPL-MCNC: 76 UG/DL (ref 42–175)
KETONES UR STRIP-MCNC: NEGATIVE MG/DL
LDLC SERPL CALC-MCNC: 109 MG/DL
LEUKOCYTE ESTERASE UR QL STRIP: ABNORMAL
LYMPHOCYTES # BLD AUTO: 1.3 THOU/UL (ref 0.8–4.4)
LYMPHOCYTES NFR BLD AUTO: 20 % (ref 20–40)
MCH RBC QN AUTO: 29.4 PG (ref 27–34)
MCHC RBC AUTO-ENTMCNC: 32.6 G/DL (ref 32–36)
MCV RBC AUTO: 90 FL (ref 80–100)
MONOCYTES # BLD AUTO: 0.4 THOU/UL (ref 0–0.9)
MONOCYTES NFR BLD AUTO: 6 % (ref 2–10)
NEUTROPHILS # BLD AUTO: 4.8 THOU/UL (ref 2–7.7)
NEUTROPHILS NFR BLD AUTO: 71 % (ref 50–70)
NITRATE UR QL: NEGATIVE
PH UR STRIP: 7 [PH] (ref 5–8)
PHOSPHATE SERPL-MCNC: 3.7 MG/DL (ref 2.5–4.5)
PLATELET # BLD AUTO: 206 THOU/UL (ref 140–440)
PMV BLD AUTO: 8.1 FL (ref 7–10)
POTASSIUM BLD-SCNC: 4.1 MMOL/L (ref 3.5–5)
PROT SERPL-MCNC: 6.9 G/DL (ref 6–8)
RBC # BLD AUTO: 4.48 MILL/UL (ref 3.8–5.4)
RBC #/AREA URNS AUTO: ABNORMAL HPF
SODIUM SERPL-SCNC: 142 MMOL/L (ref 136–145)
SP GR UR STRIP: 1.02 (ref 1–1.03)
SQUAMOUS #/AREA URNS AUTO: ABNORMAL LPF
TIBC SERPL-MCNC: 429 UG/DL (ref 313–563)
TRANSFERRIN SERPL-MCNC: 344 MG/DL (ref 212–360)
TRIGL SERPL-MCNC: 98 MG/DL
TSH SERPL DL<=0.005 MIU/L-ACNC: 1.28 UIU/ML (ref 0.3–5)
UROBILINOGEN UR STRIP-ACNC: ABNORMAL
WBC #/AREA URNS AUTO: ABNORMAL HPF
WBC: 6.8 THOU/UL (ref 4–11)

## 2018-09-12 ASSESSMENT — MIFFLIN-ST. JEOR: SCORE: 1455.31

## 2018-09-13 ENCOUNTER — COMMUNICATION - HEALTHEAST (OUTPATIENT)
Dept: INTERNAL MEDICINE | Facility: CLINIC | Age: 83
End: 2018-09-13

## 2018-09-13 LAB
25(OH)D3 SERPL-MCNC: 66.9 NG/ML (ref 30–80)
BACTERIA SPEC CULT: NO GROWTH

## 2018-09-17 ENCOUNTER — COMMUNICATION - HEALTHEAST (OUTPATIENT)
Dept: ANTICOAGULATION | Facility: CLINIC | Age: 83
End: 2018-09-17

## 2018-09-17 DIAGNOSIS — I48.21 ATRIAL FIBRILLATION, PERMANENT (H): ICD-10-CM

## 2018-09-17 LAB — INR PPP: 2.5 (ref 0.9–1.1)

## 2018-09-24 ENCOUNTER — COMMUNICATION - HEALTHEAST (OUTPATIENT)
Dept: ANTICOAGULATION | Facility: CLINIC | Age: 83
End: 2018-09-24

## 2018-09-24 DIAGNOSIS — I48.21 ATRIAL FIBRILLATION, PERMANENT (H): ICD-10-CM

## 2018-09-24 LAB — INR PPP: 1.7 (ref 0.9–1.1)

## 2018-09-25 ENCOUNTER — RECORDS - HEALTHEAST (OUTPATIENT)
Dept: ADMINISTRATIVE | Facility: OTHER | Age: 83
End: 2018-09-25

## 2018-09-27 ENCOUNTER — COMMUNICATION - HEALTHEAST (OUTPATIENT)
Dept: INTERNAL MEDICINE | Facility: CLINIC | Age: 83
End: 2018-09-27

## 2018-09-27 DIAGNOSIS — E11.9 DIABETES (H): ICD-10-CM

## 2018-10-01 ENCOUNTER — COMMUNICATION - HEALTHEAST (OUTPATIENT)
Dept: ANTICOAGULATION | Facility: CLINIC | Age: 83
End: 2018-10-01

## 2018-10-01 DIAGNOSIS — I48.21 ATRIAL FIBRILLATION, PERMANENT (H): ICD-10-CM

## 2018-10-01 LAB — INR PPP: 2.4 (ref 0.9–1.1)

## 2018-10-03 ENCOUNTER — AMBULATORY - HEALTHEAST (OUTPATIENT)
Dept: INTERNAL MEDICINE | Facility: CLINIC | Age: 83
End: 2018-10-03

## 2018-10-04 ENCOUNTER — COMMUNICATION - HEALTHEAST (OUTPATIENT)
Dept: INTERNAL MEDICINE | Facility: CLINIC | Age: 83
End: 2018-10-04

## 2018-10-04 DIAGNOSIS — E11.9 DIABETES (H): ICD-10-CM

## 2018-10-08 ENCOUNTER — COMMUNICATION - HEALTHEAST (OUTPATIENT)
Dept: ANTICOAGULATION | Facility: CLINIC | Age: 83
End: 2018-10-08

## 2018-10-08 DIAGNOSIS — I48.21 ATRIAL FIBRILLATION, PERMANENT (H): ICD-10-CM

## 2018-10-08 LAB — INR PPP: 3.3 (ref 0.9–1.1)

## 2018-10-15 ENCOUNTER — RECORDS - HEALTHEAST (OUTPATIENT)
Dept: ADMINISTRATIVE | Facility: OTHER | Age: 83
End: 2018-10-15

## 2018-10-15 ENCOUNTER — COMMUNICATION - HEALTHEAST (OUTPATIENT)
Dept: ANTICOAGULATION | Facility: CLINIC | Age: 83
End: 2018-10-15

## 2018-10-15 DIAGNOSIS — I48.21 ATRIAL FIBRILLATION, PERMANENT (H): ICD-10-CM

## 2018-10-15 LAB — INR PPP: 3.1 (ref 0.9–1.1)

## 2018-10-16 ENCOUNTER — COMMUNICATION - HEALTHEAST (OUTPATIENT)
Dept: INTERNAL MEDICINE | Facility: CLINIC | Age: 83
End: 2018-10-16

## 2018-10-22 ENCOUNTER — COMMUNICATION - HEALTHEAST (OUTPATIENT)
Dept: ANTICOAGULATION | Facility: CLINIC | Age: 83
End: 2018-10-22

## 2018-10-22 ENCOUNTER — AMBULATORY - HEALTHEAST (OUTPATIENT)
Dept: NURSING | Facility: CLINIC | Age: 83
End: 2018-10-22

## 2018-10-22 DIAGNOSIS — I48.21 ATRIAL FIBRILLATION, PERMANENT (H): ICD-10-CM

## 2018-10-22 LAB — INR PPP: 1.9 (ref 0.9–1.1)

## 2018-10-29 ENCOUNTER — COMMUNICATION - HEALTHEAST (OUTPATIENT)
Dept: ANTICOAGULATION | Facility: CLINIC | Age: 83
End: 2018-10-29

## 2018-10-29 DIAGNOSIS — I48.21 ATRIAL FIBRILLATION, PERMANENT (H): ICD-10-CM

## 2018-10-29 LAB — INR PPP: 3.2 (ref 0.9–1.1)

## 2018-10-30 ENCOUNTER — COMMUNICATION - HEALTHEAST (OUTPATIENT)
Dept: INTERNAL MEDICINE | Facility: CLINIC | Age: 83
End: 2018-10-30

## 2018-11-02 ENCOUNTER — RECORDS - HEALTHEAST (OUTPATIENT)
Dept: ADMINISTRATIVE | Facility: OTHER | Age: 83
End: 2018-11-02

## 2018-11-04 ENCOUNTER — COMMUNICATION - HEALTHEAST (OUTPATIENT)
Dept: INTERNAL MEDICINE | Facility: CLINIC | Age: 83
End: 2018-11-04

## 2018-11-04 DIAGNOSIS — E11.9 DIABETES (H): ICD-10-CM

## 2018-11-05 ENCOUNTER — COMMUNICATION - HEALTHEAST (OUTPATIENT)
Dept: ANTICOAGULATION | Facility: CLINIC | Age: 83
End: 2018-11-05

## 2018-11-05 DIAGNOSIS — I48.21 ATRIAL FIBRILLATION, PERMANENT (H): ICD-10-CM

## 2018-11-05 LAB — INR PPP: 3.7 (ref 0.9–1.1)

## 2018-11-07 ENCOUNTER — COMMUNICATION - HEALTHEAST (OUTPATIENT)
Dept: INTERNAL MEDICINE | Facility: CLINIC | Age: 83
End: 2018-11-07

## 2018-11-07 DIAGNOSIS — E11.9 DIABETES (H): ICD-10-CM

## 2018-11-12 ENCOUNTER — COMMUNICATION - HEALTHEAST (OUTPATIENT)
Dept: ANTICOAGULATION | Facility: CLINIC | Age: 83
End: 2018-11-12

## 2018-11-12 DIAGNOSIS — I48.21 ATRIAL FIBRILLATION, PERMANENT (H): ICD-10-CM

## 2018-11-12 LAB — INR PPP: 2.7 (ref 0.9–1.1)

## 2018-11-19 ENCOUNTER — COMMUNICATION - HEALTHEAST (OUTPATIENT)
Dept: ANTICOAGULATION | Facility: CLINIC | Age: 83
End: 2018-11-19

## 2018-11-19 DIAGNOSIS — I48.21 ATRIAL FIBRILLATION, PERMANENT (H): ICD-10-CM

## 2018-11-19 LAB — INR PPP: 2.9 (ref 0.9–1.1)

## 2018-11-26 ENCOUNTER — COMMUNICATION - HEALTHEAST (OUTPATIENT)
Dept: ANTICOAGULATION | Facility: CLINIC | Age: 83
End: 2018-11-26

## 2018-11-26 DIAGNOSIS — I48.21 ATRIAL FIBRILLATION, PERMANENT (H): ICD-10-CM

## 2018-11-26 LAB — INR PPP: 2.9 (ref 0.9–1.1)

## 2018-12-03 ENCOUNTER — COMMUNICATION - HEALTHEAST (OUTPATIENT)
Dept: ANTICOAGULATION | Facility: CLINIC | Age: 83
End: 2018-12-03

## 2018-12-03 DIAGNOSIS — I48.21 ATRIAL FIBRILLATION, PERMANENT (H): ICD-10-CM

## 2018-12-03 LAB — INR PPP: 2.5 (ref 0.9–1.1)

## 2018-12-10 ENCOUNTER — COMMUNICATION - HEALTHEAST (OUTPATIENT)
Dept: ANTICOAGULATION | Facility: CLINIC | Age: 83
End: 2018-12-10

## 2018-12-10 DIAGNOSIS — I48.21 ATRIAL FIBRILLATION, PERMANENT (H): ICD-10-CM

## 2018-12-10 LAB — INR PPP: 2.3 (ref 0.9–1.1)

## 2018-12-12 ENCOUNTER — COMMUNICATION - HEALTHEAST (OUTPATIENT)
Dept: INTERNAL MEDICINE | Facility: CLINIC | Age: 83
End: 2018-12-12

## 2018-12-12 DIAGNOSIS — I48.21 ATRIAL FIBRILLATION, PERMANENT (H): ICD-10-CM

## 2018-12-17 ENCOUNTER — COMMUNICATION - HEALTHEAST (OUTPATIENT)
Dept: ANTICOAGULATION | Facility: CLINIC | Age: 83
End: 2018-12-17

## 2018-12-17 DIAGNOSIS — I48.21 ATRIAL FIBRILLATION, PERMANENT (H): ICD-10-CM

## 2018-12-17 LAB — INR PPP: 2.2 (ref 0.9–1.1)

## 2018-12-18 ENCOUNTER — RECORDS - HEALTHEAST (OUTPATIENT)
Dept: ADMINISTRATIVE | Facility: OTHER | Age: 83
End: 2018-12-18

## 2018-12-24 ENCOUNTER — COMMUNICATION - HEALTHEAST (OUTPATIENT)
Dept: ANTICOAGULATION | Facility: CLINIC | Age: 83
End: 2018-12-24

## 2018-12-24 DIAGNOSIS — I48.21 ATRIAL FIBRILLATION, PERMANENT (H): ICD-10-CM

## 2018-12-24 LAB — INR PPP: 2.4 (ref 0.9–1.1)

## 2018-12-31 ENCOUNTER — COMMUNICATION - HEALTHEAST (OUTPATIENT)
Dept: ANTICOAGULATION | Facility: CLINIC | Age: 83
End: 2018-12-31

## 2018-12-31 DIAGNOSIS — I48.21 ATRIAL FIBRILLATION, PERMANENT (H): ICD-10-CM

## 2018-12-31 LAB — INR PPP: 3.1 (ref 0.9–1.1)

## 2019-01-07 ENCOUNTER — COMMUNICATION - HEALTHEAST (OUTPATIENT)
Dept: ANTICOAGULATION | Facility: CLINIC | Age: 84
End: 2019-01-07

## 2019-01-07 DIAGNOSIS — I48.21 ATRIAL FIBRILLATION, PERMANENT (H): ICD-10-CM

## 2019-01-07 LAB — INR PPP: 2.7 (ref 0.9–1.1)

## 2019-01-14 ENCOUNTER — COMMUNICATION - HEALTHEAST (OUTPATIENT)
Dept: ANTICOAGULATION | Facility: CLINIC | Age: 84
End: 2019-01-14

## 2019-01-14 DIAGNOSIS — I48.21 ATRIAL FIBRILLATION, PERMANENT (H): ICD-10-CM

## 2019-01-14 LAB — INR PPP: 2.3 (ref 0.9–1.1)

## 2019-01-17 ENCOUNTER — COMMUNICATION - HEALTHEAST (OUTPATIENT)
Dept: INTERNAL MEDICINE | Facility: CLINIC | Age: 84
End: 2019-01-17

## 2019-01-17 DIAGNOSIS — E11.9 DIABETES (H): ICD-10-CM

## 2019-01-18 ENCOUNTER — COMMUNICATION - HEALTHEAST (OUTPATIENT)
Dept: ANTICOAGULATION | Facility: CLINIC | Age: 84
End: 2019-01-18

## 2019-01-18 DIAGNOSIS — I48.21 ATRIAL FIBRILLATION, PERMANENT (H): ICD-10-CM

## 2019-01-21 ENCOUNTER — COMMUNICATION - HEALTHEAST (OUTPATIENT)
Dept: ANTICOAGULATION | Facility: CLINIC | Age: 84
End: 2019-01-21

## 2019-01-21 DIAGNOSIS — I48.21 ATRIAL FIBRILLATION, PERMANENT (H): ICD-10-CM

## 2019-01-21 LAB — INR PPP: 2.1 (ref 0.9–1.1)

## 2019-01-22 ENCOUNTER — RECORDS - HEALTHEAST (OUTPATIENT)
Dept: ADMINISTRATIVE | Facility: OTHER | Age: 84
End: 2019-01-22

## 2019-01-28 ENCOUNTER — COMMUNICATION - HEALTHEAST (OUTPATIENT)
Dept: ANTICOAGULATION | Facility: CLINIC | Age: 84
End: 2019-01-28

## 2019-01-28 DIAGNOSIS — I48.21 ATRIAL FIBRILLATION, PERMANENT (H): ICD-10-CM

## 2019-01-28 LAB — INR PPP: 2.8 (ref 0.9–1.1)

## 2019-02-04 ENCOUNTER — COMMUNICATION - HEALTHEAST (OUTPATIENT)
Dept: ANTICOAGULATION | Facility: CLINIC | Age: 84
End: 2019-02-04

## 2019-02-04 DIAGNOSIS — I48.21 ATRIAL FIBRILLATION, PERMANENT (H): ICD-10-CM

## 2019-02-04 LAB — INR PPP: 2.8 (ref 0.9–1.1)

## 2019-02-07 ENCOUNTER — RECORDS - HEALTHEAST (OUTPATIENT)
Dept: ADMINISTRATIVE | Facility: OTHER | Age: 84
End: 2019-02-07

## 2019-02-12 ENCOUNTER — COMMUNICATION - HEALTHEAST (OUTPATIENT)
Dept: ANTICOAGULATION | Facility: CLINIC | Age: 84
End: 2019-02-12

## 2019-02-12 DIAGNOSIS — I48.21 ATRIAL FIBRILLATION, PERMANENT (H): ICD-10-CM

## 2019-02-12 LAB — INR PPP: 2.4 (ref 0.9–1.1)

## 2019-02-18 ENCOUNTER — COMMUNICATION - HEALTHEAST (OUTPATIENT)
Dept: ANTICOAGULATION | Facility: CLINIC | Age: 84
End: 2019-02-18

## 2019-02-18 DIAGNOSIS — I48.21 ATRIAL FIBRILLATION, PERMANENT (H): ICD-10-CM

## 2019-02-18 LAB — INR PPP: 2.6 (ref 0.9–1.1)

## 2019-02-25 ENCOUNTER — COMMUNICATION - HEALTHEAST (OUTPATIENT)
Dept: ANTICOAGULATION | Facility: CLINIC | Age: 84
End: 2019-02-25

## 2019-02-25 DIAGNOSIS — I48.21 ATRIAL FIBRILLATION, PERMANENT (H): ICD-10-CM

## 2019-02-25 LAB — INR PPP: 1.8 (ref 0.9–1.1)

## 2019-02-27 ENCOUNTER — COMMUNICATION - HEALTHEAST (OUTPATIENT)
Dept: INTERNAL MEDICINE | Facility: CLINIC | Age: 84
End: 2019-02-27

## 2019-02-27 DIAGNOSIS — E11.9 DIABETES (H): ICD-10-CM

## 2019-02-28 ENCOUNTER — RECORDS - HEALTHEAST (OUTPATIENT)
Dept: ADMINISTRATIVE | Facility: OTHER | Age: 84
End: 2019-02-28

## 2019-02-28 ENCOUNTER — COMMUNICATION - HEALTHEAST (OUTPATIENT)
Dept: INTERNAL MEDICINE | Facility: CLINIC | Age: 84
End: 2019-02-28

## 2019-03-04 ENCOUNTER — COMMUNICATION - HEALTHEAST (OUTPATIENT)
Dept: ANTICOAGULATION | Facility: CLINIC | Age: 84
End: 2019-03-04

## 2019-03-04 DIAGNOSIS — I48.21 ATRIAL FIBRILLATION, PERMANENT (H): ICD-10-CM

## 2019-03-04 LAB — INR PPP: 1.9 (ref 0.9–1.1)

## 2019-03-11 ENCOUNTER — COMMUNICATION - HEALTHEAST (OUTPATIENT)
Dept: ANTICOAGULATION | Facility: CLINIC | Age: 84
End: 2019-03-11

## 2019-03-11 DIAGNOSIS — I48.21 ATRIAL FIBRILLATION, PERMANENT (H): ICD-10-CM

## 2019-03-11 LAB — INR PPP: 3 (ref 0.9–1.1)

## 2019-03-18 ENCOUNTER — COMMUNICATION - HEALTHEAST (OUTPATIENT)
Dept: ANTICOAGULATION | Facility: CLINIC | Age: 84
End: 2019-03-18

## 2019-03-18 DIAGNOSIS — I48.21 ATRIAL FIBRILLATION, PERMANENT (H): ICD-10-CM

## 2019-03-18 LAB — INR PPP: 2.6 (ref 0.9–1.1)

## 2019-03-20 ENCOUNTER — OFFICE VISIT - HEALTHEAST (OUTPATIENT)
Dept: INTERNAL MEDICINE | Facility: CLINIC | Age: 84
End: 2019-03-20

## 2019-03-20 DIAGNOSIS — I65.29 ASYMPTOMATIC CAROTID ARTERY STENOSIS, UNSPECIFIED LATERALITY: ICD-10-CM

## 2019-03-20 DIAGNOSIS — E11.9 DIABETES MELLITUS TYPE 2 IN NONOBESE (H): ICD-10-CM

## 2019-03-20 DIAGNOSIS — I48.21 ATRIAL FIBRILLATION, PERMANENT (H): ICD-10-CM

## 2019-03-20 DIAGNOSIS — Z95.0 HISTORY OF PERMANENT CARDIAC PACEMAKER PLACEMENT: ICD-10-CM

## 2019-03-20 DIAGNOSIS — J34.89 NASAL GRANULOMA: ICD-10-CM

## 2019-03-20 DIAGNOSIS — Z01.818 PREOPERATIVE EXAMINATION: ICD-10-CM

## 2019-03-20 LAB
ANION GAP SERPL CALCULATED.3IONS-SCNC: 12 MMOL/L (ref 5–18)
ATRIAL RATE - MUSE: 88 BPM
BASOPHILS # BLD AUTO: 0.1 THOU/UL (ref 0–0.2)
BASOPHILS NFR BLD AUTO: 1 % (ref 0–2)
BUN SERPL-MCNC: 22 MG/DL (ref 8–28)
CALCIUM SERPL-MCNC: 10.2 MG/DL (ref 8.5–10.5)
CHLORIDE BLD-SCNC: 98 MMOL/L (ref 98–107)
CHOLEST SERPL-MCNC: 199 MG/DL
CO2 SERPL-SCNC: 31 MMOL/L (ref 22–31)
CREAT SERPL-MCNC: 0.95 MG/DL (ref 0.6–1.1)
DIASTOLIC BLOOD PRESSURE - MUSE: NORMAL MMHG
EOSINOPHIL # BLD AUTO: 0.3 THOU/UL (ref 0–0.4)
EOSINOPHIL NFR BLD AUTO: 3 % (ref 0–6)
ERYTHROCYTE [DISTWIDTH] IN BLOOD BY AUTOMATED COUNT: 14.4 % (ref 11–14.5)
GFR SERPL CREATININE-BSD FRML MDRD: 56 ML/MIN/1.73M2
GLUCOSE BLD-MCNC: 156 MG/DL (ref 70–125)
HBA1C MFR BLD: 6.6 % (ref 3.5–6)
HCT VFR BLD AUTO: 42.9 % (ref 35–47)
HGB BLD-MCNC: 14.6 G/DL (ref 12–16)
INTERPRETATION ECG - MUSE: NORMAL
LYMPHOCYTES # BLD AUTO: 1.5 THOU/UL (ref 0.8–4.4)
LYMPHOCYTES NFR BLD AUTO: 18 % (ref 20–40)
MCH RBC QN AUTO: 30.4 PG (ref 27–34)
MCHC RBC AUTO-ENTMCNC: 34.1 G/DL (ref 32–36)
MCV RBC AUTO: 89 FL (ref 80–100)
MONOCYTES # BLD AUTO: 0.4 THOU/UL (ref 0–0.9)
MONOCYTES NFR BLD AUTO: 5 % (ref 2–10)
NEUTROPHILS # BLD AUTO: 5.9 THOU/UL (ref 2–7.7)
NEUTROPHILS NFR BLD AUTO: 73 % (ref 50–70)
P AXIS - MUSE: 79 DEGREES
PLATELET # BLD AUTO: 202 THOU/UL (ref 140–440)
PMV BLD AUTO: 8.6 FL (ref 7–10)
POTASSIUM BLD-SCNC: 3.6 MMOL/L (ref 3.5–5)
PR INTERVAL - MUSE: NORMAL MS
QRS DURATION - MUSE: 158 MS
QT - MUSE: 402 MS
QTC - MUSE: 489 MS
R AXIS - MUSE: -57 DEGREES
RBC # BLD AUTO: 4.81 MILL/UL (ref 3.8–5.4)
SODIUM SERPL-SCNC: 141 MMOL/L (ref 136–145)
SYSTOLIC BLOOD PRESSURE - MUSE: NORMAL MMHG
T AXIS - MUSE: 106 DEGREES
VENTRICULAR RATE- MUSE: 89 BPM
WBC: 8.1 THOU/UL (ref 4–11)

## 2019-03-20 ASSESSMENT — MIFFLIN-ST. JEOR: SCORE: 1437.53

## 2019-03-22 ENCOUNTER — COMMUNICATION - HEALTHEAST (OUTPATIENT)
Dept: INTERNAL MEDICINE | Facility: CLINIC | Age: 84
End: 2019-03-22

## 2019-03-22 DIAGNOSIS — I48.21 ATRIAL FIBRILLATION, PERMANENT (H): ICD-10-CM

## 2019-04-02 ENCOUNTER — COMMUNICATION - HEALTHEAST (OUTPATIENT)
Dept: INTERNAL MEDICINE | Facility: CLINIC | Age: 84
End: 2019-04-02

## 2019-04-02 DIAGNOSIS — I48.21 ATRIAL FIBRILLATION, PERMANENT (H): ICD-10-CM

## 2019-04-04 ENCOUNTER — RECORDS - HEALTHEAST (OUTPATIENT)
Dept: ADMINISTRATIVE | Facility: OTHER | Age: 84
End: 2019-04-04

## 2019-04-09 ENCOUNTER — RECORDS - HEALTHEAST (OUTPATIENT)
Dept: ADMINISTRATIVE | Facility: OTHER | Age: 84
End: 2019-04-09

## 2019-04-11 ENCOUNTER — RECORDS - HEALTHEAST (OUTPATIENT)
Dept: ADMINISTRATIVE | Facility: OTHER | Age: 84
End: 2019-04-11

## 2019-04-12 ENCOUNTER — COMMUNICATION - HEALTHEAST (OUTPATIENT)
Dept: INTERNAL MEDICINE | Facility: CLINIC | Age: 84
End: 2019-04-12

## 2019-04-12 DIAGNOSIS — I48.21 ATRIAL FIBRILLATION, PERMANENT (H): ICD-10-CM

## 2019-04-12 LAB — INR PPP: 1.3 (ref 0.9–1.1)

## 2019-04-14 ENCOUNTER — COMMUNICATION - HEALTHEAST (OUTPATIENT)
Dept: INTERNAL MEDICINE | Facility: CLINIC | Age: 84
End: 2019-04-14

## 2019-04-14 DIAGNOSIS — E11.9 DIABETES (H): ICD-10-CM

## 2019-04-18 ENCOUNTER — COMMUNICATION - HEALTHEAST (OUTPATIENT)
Dept: ANTICOAGULATION | Facility: CLINIC | Age: 84
End: 2019-04-18

## 2019-04-18 DIAGNOSIS — I48.21 ATRIAL FIBRILLATION, PERMANENT (H): ICD-10-CM

## 2019-04-18 LAB — INR PPP: 2 (ref 0.9–1.1)

## 2019-04-22 ENCOUNTER — COMMUNICATION - HEALTHEAST (OUTPATIENT)
Dept: ANTICOAGULATION | Facility: CLINIC | Age: 84
End: 2019-04-22

## 2019-04-22 DIAGNOSIS — I48.21 ATRIAL FIBRILLATION, PERMANENT (H): ICD-10-CM

## 2019-04-22 LAB — INR PPP: 2 (ref 0.9–1.1)

## 2019-04-23 ENCOUNTER — RECORDS - HEALTHEAST (OUTPATIENT)
Dept: ADMINISTRATIVE | Facility: OTHER | Age: 84
End: 2019-04-23

## 2019-04-29 ENCOUNTER — COMMUNICATION - HEALTHEAST (OUTPATIENT)
Dept: ANTICOAGULATION | Facility: CLINIC | Age: 84
End: 2019-04-29

## 2019-04-29 DIAGNOSIS — I48.21 ATRIAL FIBRILLATION, PERMANENT (H): ICD-10-CM

## 2019-04-29 LAB — INR PPP: 2.8 (ref 0.9–1.1)

## 2019-05-05 ENCOUNTER — COMMUNICATION - HEALTHEAST (OUTPATIENT)
Dept: INTERNAL MEDICINE | Facility: CLINIC | Age: 84
End: 2019-05-05

## 2019-05-05 DIAGNOSIS — I48.21 ATRIAL FIBRILLATION, PERMANENT (H): ICD-10-CM

## 2019-05-06 LAB — INR PPP: 2.9 (ref 0.9–1.1)

## 2019-05-07 ENCOUNTER — COMMUNICATION - HEALTHEAST (OUTPATIENT)
Dept: ANTICOAGULATION | Facility: CLINIC | Age: 84
End: 2019-05-07

## 2019-05-07 ENCOUNTER — RECORDS - HEALTHEAST (OUTPATIENT)
Dept: ADMINISTRATIVE | Facility: OTHER | Age: 84
End: 2019-05-07

## 2019-05-07 DIAGNOSIS — I48.21 ATRIAL FIBRILLATION, PERMANENT (H): ICD-10-CM

## 2019-05-13 ENCOUNTER — COMMUNICATION - HEALTHEAST (OUTPATIENT)
Dept: ANTICOAGULATION | Facility: CLINIC | Age: 84
End: 2019-05-13

## 2019-05-13 DIAGNOSIS — I48.21 ATRIAL FIBRILLATION, PERMANENT (H): ICD-10-CM

## 2019-05-13 LAB — INR PPP: 3.5 (ref 0.9–1.1)

## 2019-05-20 ENCOUNTER — COMMUNICATION - HEALTHEAST (OUTPATIENT)
Dept: ANTICOAGULATION | Facility: CLINIC | Age: 84
End: 2019-05-20

## 2019-05-20 DIAGNOSIS — I48.21 ATRIAL FIBRILLATION, PERMANENT (H): ICD-10-CM

## 2019-05-20 LAB — INR PPP: 3.1 (ref 0.9–1.1)

## 2019-05-21 ENCOUNTER — RECORDS - HEALTHEAST (OUTPATIENT)
Dept: ADMINISTRATIVE | Facility: OTHER | Age: 84
End: 2019-05-21

## 2019-05-28 ENCOUNTER — COMMUNICATION - HEALTHEAST (OUTPATIENT)
Dept: ANTICOAGULATION | Facility: CLINIC | Age: 84
End: 2019-05-28

## 2019-05-28 DIAGNOSIS — I48.21 ATRIAL FIBRILLATION, PERMANENT (H): ICD-10-CM

## 2019-05-28 LAB — INR PPP: 3 (ref 0.9–1.1)

## 2019-06-03 ENCOUNTER — COMMUNICATION - HEALTHEAST (OUTPATIENT)
Dept: ANTICOAGULATION | Facility: CLINIC | Age: 84
End: 2019-06-03

## 2019-06-03 DIAGNOSIS — I48.21 ATRIAL FIBRILLATION, PERMANENT (H): ICD-10-CM

## 2019-06-03 LAB — INR PPP: 2.5 (ref 0.9–1.1)

## 2019-06-10 ENCOUNTER — COMMUNICATION - HEALTHEAST (OUTPATIENT)
Dept: ANTICOAGULATION | Facility: CLINIC | Age: 84
End: 2019-06-10

## 2019-06-10 DIAGNOSIS — I48.21 ATRIAL FIBRILLATION, PERMANENT (H): ICD-10-CM

## 2019-06-10 LAB — INR PPP: 2.8 (ref 0.9–1.1)

## 2019-06-17 ENCOUNTER — COMMUNICATION - HEALTHEAST (OUTPATIENT)
Dept: ANTICOAGULATION | Facility: CLINIC | Age: 84
End: 2019-06-17

## 2019-06-17 DIAGNOSIS — I48.21 ATRIAL FIBRILLATION, PERMANENT (H): ICD-10-CM

## 2019-06-17 LAB — INR PPP: 2.7 (ref 0.9–1.1)

## 2019-06-24 ENCOUNTER — COMMUNICATION - HEALTHEAST (OUTPATIENT)
Dept: ANTICOAGULATION | Facility: CLINIC | Age: 84
End: 2019-06-24

## 2019-06-24 DIAGNOSIS — I48.21 ATRIAL FIBRILLATION, PERMANENT (H): ICD-10-CM

## 2019-06-24 LAB — INR PPP: 2.3 (ref 0.9–1.1)

## 2019-06-26 ENCOUNTER — COMMUNICATION - HEALTHEAST (OUTPATIENT)
Dept: INTERNAL MEDICINE | Facility: CLINIC | Age: 84
End: 2019-06-26

## 2019-06-26 ENCOUNTER — OFFICE VISIT - HEALTHEAST (OUTPATIENT)
Dept: INTERNAL MEDICINE | Facility: CLINIC | Age: 84
End: 2019-06-26

## 2019-06-26 DIAGNOSIS — I65.29 ASYMPTOMATIC CAROTID ARTERY STENOSIS, UNSPECIFIED LATERALITY: ICD-10-CM

## 2019-06-26 DIAGNOSIS — I48.21 ATRIAL FIBRILLATION, PERMANENT (H): ICD-10-CM

## 2019-06-26 DIAGNOSIS — E11.9 DIABETES MELLITUS TYPE 2 IN NONOBESE (H): ICD-10-CM

## 2019-06-26 DIAGNOSIS — D18.01 NASAL HEMANGIOMA: ICD-10-CM

## 2019-06-26 DIAGNOSIS — Z95.0 HISTORY OF PERMANENT CARDIAC PACEMAKER PLACEMENT: ICD-10-CM

## 2019-06-26 LAB
ANION GAP SERPL CALCULATED.3IONS-SCNC: 10 MMOL/L (ref 5–18)
BUN SERPL-MCNC: 16 MG/DL (ref 8–28)
CALCIUM SERPL-MCNC: 10.7 MG/DL (ref 8.5–10.5)
CHLORIDE BLD-SCNC: 99 MMOL/L (ref 98–107)
CO2 SERPL-SCNC: 32 MMOL/L (ref 22–31)
CREAT SERPL-MCNC: 0.86 MG/DL (ref 0.6–1.1)
CREAT UR-MCNC: 8.9 MG/DL
GFR SERPL CREATININE-BSD FRML MDRD: >60 ML/MIN/1.73M2
GLUCOSE BLD-MCNC: 121 MG/DL (ref 70–125)
HBA1C MFR BLD: 7.8 % (ref 3.5–6)
MICROALBUMIN UR-MCNC: 0.59 MG/DL (ref 0–1.99)
MICROALBUMIN/CREAT UR: 66.3 MG/G
POTASSIUM BLD-SCNC: 4.1 MMOL/L (ref 3.5–5)
SODIUM SERPL-SCNC: 141 MMOL/L (ref 136–145)

## 2019-06-26 ASSESSMENT — MIFFLIN-ST. JEOR: SCORE: 1432.81

## 2019-07-01 ENCOUNTER — COMMUNICATION - HEALTHEAST (OUTPATIENT)
Dept: ANTICOAGULATION | Facility: CLINIC | Age: 84
End: 2019-07-01

## 2019-07-01 DIAGNOSIS — I48.21 ATRIAL FIBRILLATION, PERMANENT (H): ICD-10-CM

## 2019-07-01 LAB — INR PPP: 2.1 (ref 0.9–1.1)

## 2019-07-08 ENCOUNTER — COMMUNICATION - HEALTHEAST (OUTPATIENT)
Dept: ANTICOAGULATION | Facility: CLINIC | Age: 84
End: 2019-07-08

## 2019-07-08 DIAGNOSIS — I48.21 ATRIAL FIBRILLATION, PERMANENT (H): ICD-10-CM

## 2019-07-08 LAB — INR PPP: 2.5 (ref 0.9–1.1)

## 2019-07-11 ENCOUNTER — RECORDS - HEALTHEAST (OUTPATIENT)
Dept: ADMINISTRATIVE | Facility: OTHER | Age: 84
End: 2019-07-11

## 2019-07-15 ENCOUNTER — COMMUNICATION - HEALTHEAST (OUTPATIENT)
Dept: ANTICOAGULATION | Facility: CLINIC | Age: 84
End: 2019-07-15

## 2019-07-15 DIAGNOSIS — I48.21 ATRIAL FIBRILLATION, PERMANENT (H): ICD-10-CM

## 2019-07-15 LAB — INR PPP: 2.4 (ref 0.9–1.1)

## 2019-07-22 ENCOUNTER — COMMUNICATION - HEALTHEAST (OUTPATIENT)
Dept: ANTICOAGULATION | Facility: CLINIC | Age: 84
End: 2019-07-22

## 2019-07-22 DIAGNOSIS — I48.21 ATRIAL FIBRILLATION, PERMANENT (H): ICD-10-CM

## 2019-07-22 LAB — INR PPP: 3.2 (ref 0.9–1.1)

## 2019-07-25 ENCOUNTER — RECORDS - HEALTHEAST (OUTPATIENT)
Dept: ADMINISTRATIVE | Facility: OTHER | Age: 84
End: 2019-07-25

## 2019-07-30 ENCOUNTER — COMMUNICATION - HEALTHEAST (OUTPATIENT)
Dept: ANTICOAGULATION | Facility: CLINIC | Age: 84
End: 2019-07-30

## 2019-07-30 DIAGNOSIS — I48.21 ATRIAL FIBRILLATION, PERMANENT (H): ICD-10-CM

## 2019-07-30 LAB — INR PPP: 2.7 (ref 0.9–1.1)

## 2019-08-05 ENCOUNTER — COMMUNICATION - HEALTHEAST (OUTPATIENT)
Dept: ANTICOAGULATION | Facility: CLINIC | Age: 84
End: 2019-08-05

## 2019-08-05 DIAGNOSIS — I48.21 ATRIAL FIBRILLATION, PERMANENT (H): ICD-10-CM

## 2019-08-05 LAB — INR PPP: 2.1 (ref 0.9–1.1)

## 2019-08-12 ENCOUNTER — COMMUNICATION - HEALTHEAST (OUTPATIENT)
Dept: ANTICOAGULATION | Facility: CLINIC | Age: 84
End: 2019-08-12

## 2019-08-12 DIAGNOSIS — I48.21 ATRIAL FIBRILLATION, PERMANENT (H): ICD-10-CM

## 2019-08-12 LAB — INR PPP: 2.7 (ref 0.9–1.1)

## 2019-08-19 ENCOUNTER — COMMUNICATION - HEALTHEAST (OUTPATIENT)
Dept: ANTICOAGULATION | Facility: CLINIC | Age: 84
End: 2019-08-19

## 2019-08-19 DIAGNOSIS — I48.21 ATRIAL FIBRILLATION, PERMANENT (H): ICD-10-CM

## 2019-08-19 LAB — INR PPP: 2.5 (ref 0.9–1.1)

## 2019-08-26 ENCOUNTER — COMMUNICATION - HEALTHEAST (OUTPATIENT)
Dept: ANTICOAGULATION | Facility: CLINIC | Age: 84
End: 2019-08-26

## 2019-08-26 DIAGNOSIS — I48.21 ATRIAL FIBRILLATION, PERMANENT (H): ICD-10-CM

## 2019-08-26 LAB — INR PPP: 2.4 (ref 0.9–1.1)

## 2019-09-02 ENCOUNTER — COMMUNICATION - HEALTHEAST (OUTPATIENT)
Dept: INTERNAL MEDICINE | Facility: CLINIC | Age: 84
End: 2019-09-02

## 2019-09-02 DIAGNOSIS — I48.21 ATRIAL FIBRILLATION, PERMANENT (H): ICD-10-CM

## 2019-09-02 DIAGNOSIS — K21.9 GERD (GASTROESOPHAGEAL REFLUX DISEASE): ICD-10-CM

## 2019-09-02 DIAGNOSIS — E11.9 DIABETES (H): ICD-10-CM

## 2019-09-02 LAB — INR PPP: 2.8 (ref 0.9–1.1)

## 2019-09-03 ENCOUNTER — COMMUNICATION - HEALTHEAST (OUTPATIENT)
Dept: ANTICOAGULATION | Facility: CLINIC | Age: 84
End: 2019-09-03

## 2019-09-03 DIAGNOSIS — I48.21 ATRIAL FIBRILLATION, PERMANENT (H): ICD-10-CM

## 2019-09-09 ENCOUNTER — COMMUNICATION - HEALTHEAST (OUTPATIENT)
Dept: ANTICOAGULATION | Facility: CLINIC | Age: 84
End: 2019-09-09

## 2019-09-09 DIAGNOSIS — I48.21 ATRIAL FIBRILLATION, PERMANENT (H): ICD-10-CM

## 2019-09-09 LAB — INR PPP: 2.2 (ref 0.9–1.1)

## 2019-09-16 ENCOUNTER — COMMUNICATION - HEALTHEAST (OUTPATIENT)
Dept: ANTICOAGULATION | Facility: CLINIC | Age: 84
End: 2019-09-16

## 2019-09-16 DIAGNOSIS — I48.21 ATRIAL FIBRILLATION, PERMANENT (H): ICD-10-CM

## 2019-09-16 LAB — INR PPP: 2.6 (ref 0.9–1.1)

## 2019-09-23 ENCOUNTER — COMMUNICATION - HEALTHEAST (OUTPATIENT)
Dept: ANTICOAGULATION | Facility: CLINIC | Age: 84
End: 2019-09-23

## 2019-09-23 DIAGNOSIS — I48.21 ATRIAL FIBRILLATION, PERMANENT (H): ICD-10-CM

## 2019-09-23 LAB — INR PPP: 3.1 (ref 0.9–1.1)

## 2019-09-30 ENCOUNTER — COMMUNICATION - HEALTHEAST (OUTPATIENT)
Dept: ANTICOAGULATION | Facility: CLINIC | Age: 84
End: 2019-09-30

## 2019-09-30 DIAGNOSIS — I48.21 ATRIAL FIBRILLATION, PERMANENT (H): ICD-10-CM

## 2019-09-30 LAB — INR PPP: 2.7 (ref 0.9–1.1)

## 2019-10-07 ENCOUNTER — COMMUNICATION - HEALTHEAST (OUTPATIENT)
Dept: ANTICOAGULATION | Facility: CLINIC | Age: 84
End: 2019-10-07

## 2019-10-07 DIAGNOSIS — I48.21 ATRIAL FIBRILLATION, PERMANENT (H): ICD-10-CM

## 2019-10-07 LAB — INR PPP: 3.1 (ref 0.9–1.1)

## 2019-10-14 ENCOUNTER — COMMUNICATION - HEALTHEAST (OUTPATIENT)
Dept: ANTICOAGULATION | Facility: CLINIC | Age: 84
End: 2019-10-14

## 2019-10-14 DIAGNOSIS — I48.21 ATRIAL FIBRILLATION, PERMANENT (H): ICD-10-CM

## 2019-10-14 LAB — INR PPP: 2.7 (ref 0.9–1.1)

## 2019-10-21 ENCOUNTER — COMMUNICATION - HEALTHEAST (OUTPATIENT)
Dept: ANTICOAGULATION | Facility: CLINIC | Age: 84
End: 2019-10-21

## 2019-10-21 DIAGNOSIS — I48.21 ATRIAL FIBRILLATION, PERMANENT (H): ICD-10-CM

## 2019-10-21 LAB — INR PPP: 2.6 (ref 0.9–1.1)

## 2019-11-05 ENCOUNTER — COMMUNICATION - HEALTHEAST (OUTPATIENT)
Dept: ANTICOAGULATION | Facility: CLINIC | Age: 84
End: 2019-11-05

## 2019-11-05 DIAGNOSIS — I48.21 ATRIAL FIBRILLATION, PERMANENT (H): ICD-10-CM

## 2019-11-05 LAB — INR PPP: 2.7 (ref 0.9–1.1)

## 2019-11-11 ENCOUNTER — COMMUNICATION - HEALTHEAST (OUTPATIENT)
Dept: ANTICOAGULATION | Facility: CLINIC | Age: 84
End: 2019-11-11

## 2019-11-11 DIAGNOSIS — I48.21 ATRIAL FIBRILLATION, PERMANENT (H): ICD-10-CM

## 2019-11-11 LAB — INR PPP: 2.1 (ref 0.9–1.1)

## 2019-11-18 ENCOUNTER — COMMUNICATION - HEALTHEAST (OUTPATIENT)
Dept: ANTICOAGULATION | Facility: CLINIC | Age: 84
End: 2019-11-18

## 2019-11-18 DIAGNOSIS — I48.21 ATRIAL FIBRILLATION, PERMANENT (H): ICD-10-CM

## 2019-11-18 LAB — INR PPP: 2.2 (ref 0.9–1.1)

## 2019-11-25 ENCOUNTER — COMMUNICATION - HEALTHEAST (OUTPATIENT)
Dept: ANTICOAGULATION | Facility: CLINIC | Age: 84
End: 2019-11-25

## 2019-11-25 DIAGNOSIS — I48.21 ATRIAL FIBRILLATION, PERMANENT (H): ICD-10-CM

## 2019-11-25 LAB — INR PPP: 2.5 (ref 0.9–1.1)

## 2019-12-02 ENCOUNTER — COMMUNICATION - HEALTHEAST (OUTPATIENT)
Dept: ANTICOAGULATION | Facility: CLINIC | Age: 84
End: 2019-12-02

## 2019-12-02 DIAGNOSIS — I48.21 ATRIAL FIBRILLATION, PERMANENT (H): ICD-10-CM

## 2019-12-02 LAB — INR PPP: 3.2 (ref 0.9–1.1)

## 2019-12-03 ENCOUNTER — COMMUNICATION - HEALTHEAST (OUTPATIENT)
Dept: INTERNAL MEDICINE | Facility: CLINIC | Age: 84
End: 2019-12-03

## 2019-12-03 DIAGNOSIS — I48.21 ATRIAL FIBRILLATION, PERMANENT (H): ICD-10-CM

## 2019-12-09 ENCOUNTER — COMMUNICATION - HEALTHEAST (OUTPATIENT)
Dept: ANTICOAGULATION | Facility: CLINIC | Age: 84
End: 2019-12-09

## 2019-12-09 ENCOUNTER — COMMUNICATION - HEALTHEAST (OUTPATIENT)
Dept: INTERNAL MEDICINE | Facility: CLINIC | Age: 84
End: 2019-12-09

## 2019-12-09 DIAGNOSIS — I48.21 ATRIAL FIBRILLATION, PERMANENT (H): ICD-10-CM

## 2019-12-09 LAB — INR PPP: 3.4 (ref 0.9–1.1)

## 2019-12-15 ENCOUNTER — COMMUNICATION - HEALTHEAST (OUTPATIENT)
Dept: INTERNAL MEDICINE | Facility: CLINIC | Age: 84
End: 2019-12-15

## 2019-12-15 DIAGNOSIS — E11.9 DIABETES (H): ICD-10-CM

## 2019-12-16 ENCOUNTER — COMMUNICATION - HEALTHEAST (OUTPATIENT)
Dept: ANTICOAGULATION | Facility: CLINIC | Age: 84
End: 2019-12-16

## 2019-12-16 DIAGNOSIS — I48.21 ATRIAL FIBRILLATION, PERMANENT (H): ICD-10-CM

## 2019-12-16 LAB — INR PPP: 2.3 (ref 0.9–1.1)

## 2019-12-23 ENCOUNTER — COMMUNICATION - HEALTHEAST (OUTPATIENT)
Dept: ANTICOAGULATION | Facility: CLINIC | Age: 84
End: 2019-12-23

## 2019-12-23 DIAGNOSIS — I48.21 ATRIAL FIBRILLATION, PERMANENT (H): ICD-10-CM

## 2019-12-23 LAB — INR PPP: 2 (ref 0.9–1.1)

## 2019-12-26 ENCOUNTER — COMMUNICATION - HEALTHEAST (OUTPATIENT)
Dept: ANTICOAGULATION | Facility: CLINIC | Age: 84
End: 2019-12-26

## 2019-12-26 DIAGNOSIS — I48.21 ATRIAL FIBRILLATION, PERMANENT (H): ICD-10-CM

## 2019-12-27 ENCOUNTER — OFFICE VISIT - HEALTHEAST (OUTPATIENT)
Dept: INTERNAL MEDICINE | Facility: CLINIC | Age: 84
End: 2019-12-27

## 2019-12-27 DIAGNOSIS — C91.10 CLL (CHRONIC LYMPHOCYTIC LEUKEMIA) (H): ICD-10-CM

## 2019-12-27 DIAGNOSIS — N76.4 ABSCESS OF RIGHT GENITAL LABIA: ICD-10-CM

## 2019-12-27 DIAGNOSIS — L02.215 PERINEAL ABSCESS: ICD-10-CM

## 2019-12-27 DIAGNOSIS — E11.9 DIABETES MELLITUS TYPE 2 IN NONOBESE (H): ICD-10-CM

## 2019-12-27 ASSESSMENT — MIFFLIN-ST. JEOR: SCORE: 1423.74

## 2019-12-28 ENCOUNTER — RECORDS - HEALTHEAST (OUTPATIENT)
Dept: ADMINISTRATIVE | Facility: OTHER | Age: 84
End: 2019-12-28
Payer: MEDICARE

## 2019-12-30 ENCOUNTER — COMMUNICATION - HEALTHEAST (OUTPATIENT)
Dept: SCHEDULING | Facility: CLINIC | Age: 84
End: 2019-12-30

## 2020-01-01 LAB — INR PPP: 2 (ref 0.9–1.1)

## 2020-01-02 ENCOUNTER — COMMUNICATION - HEALTHEAST (OUTPATIENT)
Dept: ANTICOAGULATION | Facility: CLINIC | Age: 85
End: 2020-01-02

## 2020-01-02 DIAGNOSIS — I48.21 ATRIAL FIBRILLATION, PERMANENT (H): ICD-10-CM

## 2020-01-03 ENCOUNTER — COMMUNICATION - HEALTHEAST (OUTPATIENT)
Dept: INTERNAL MEDICINE | Facility: CLINIC | Age: 85
End: 2020-01-03

## 2020-01-03 ENCOUNTER — OFFICE VISIT - HEALTHEAST (OUTPATIENT)
Dept: INTERNAL MEDICINE | Facility: CLINIC | Age: 85
End: 2020-01-03

## 2020-01-03 ENCOUNTER — COMMUNICATION - HEALTHEAST (OUTPATIENT)
Dept: ANTICOAGULATION | Facility: CLINIC | Age: 85
End: 2020-01-03

## 2020-01-03 ENCOUNTER — RECORDS - HEALTHEAST (OUTPATIENT)
Dept: ANTICOAGULATION | Facility: CLINIC | Age: 85
End: 2020-01-03

## 2020-01-03 DIAGNOSIS — R60.9 EDEMA, UNSPECIFIED TYPE: ICD-10-CM

## 2020-01-03 DIAGNOSIS — I48.21 ATRIAL FIBRILLATION, PERMANENT (H): ICD-10-CM

## 2020-01-03 DIAGNOSIS — K59.01 SLOW TRANSIT CONSTIPATION: ICD-10-CM

## 2020-01-03 DIAGNOSIS — L02.215 ABSCESS OF PERINEUM: ICD-10-CM

## 2020-01-03 DIAGNOSIS — N76.2: ICD-10-CM

## 2020-01-03 LAB
ANION GAP SERPL CALCULATED.3IONS-SCNC: 12 MMOL/L (ref 5–18)
BASOPHILS # BLD AUTO: 0.1 THOU/UL (ref 0–0.2)
BASOPHILS NFR BLD AUTO: 1 % (ref 0–2)
BUN SERPL-MCNC: 15 MG/DL (ref 8–28)
CALCIUM SERPL-MCNC: 10.4 MG/DL (ref 8.5–10.5)
CHLORIDE BLD-SCNC: 98 MMOL/L (ref 98–107)
CO2 SERPL-SCNC: 31 MMOL/L (ref 22–31)
CREAT SERPL-MCNC: 0.85 MG/DL (ref 0.6–1.1)
EOSINOPHIL # BLD AUTO: 0.3 THOU/UL (ref 0–0.4)
EOSINOPHIL NFR BLD AUTO: 3 % (ref 0–6)
ERYTHROCYTE [DISTWIDTH] IN BLOOD BY AUTOMATED COUNT: 13.9 % (ref 11–14.5)
GFR SERPL CREATININE-BSD FRML MDRD: >60 ML/MIN/1.73M2
GLUCOSE BLD-MCNC: 137 MG/DL (ref 70–125)
HCT VFR BLD AUTO: 38.3 % (ref 35–47)
HGB BLD-MCNC: 12.8 G/DL (ref 12–16)
INR PPP: 1.8 (ref 0.9–1.1)
LYMPHOCYTES # BLD AUTO: 1.8 THOU/UL (ref 0.8–4.4)
LYMPHOCYTES NFR BLD AUTO: 17 % (ref 20–40)
MCH RBC QN AUTO: 31 PG (ref 27–34)
MCHC RBC AUTO-ENTMCNC: 33.3 G/DL (ref 32–36)
MCV RBC AUTO: 93 FL (ref 80–100)
MONOCYTES # BLD AUTO: 0.6 THOU/UL (ref 0–0.9)
MONOCYTES NFR BLD AUTO: 5 % (ref 2–10)
NEUTROPHILS # BLD AUTO: 7.9 THOU/UL (ref 2–7.7)
NEUTROPHILS NFR BLD AUTO: 74 % (ref 50–70)
PLATELET # BLD AUTO: 350 THOU/UL (ref 140–440)
PMV BLD AUTO: 8.1 FL (ref 7–10)
POTASSIUM BLD-SCNC: 3.9 MMOL/L (ref 3.5–5)
RBC # BLD AUTO: 4.11 MILL/UL (ref 3.8–5.4)
SODIUM SERPL-SCNC: 141 MMOL/L (ref 136–145)
WBC: 10.7 THOU/UL (ref 4–11)

## 2020-01-03 ASSESSMENT — MIFFLIN-ST. JEOR: SCORE: 1427.91

## 2020-01-06 ENCOUNTER — COMMUNICATION - HEALTHEAST (OUTPATIENT)
Dept: ANTICOAGULATION | Facility: CLINIC | Age: 85
End: 2020-01-06

## 2020-01-06 DIAGNOSIS — I48.21 ATRIAL FIBRILLATION, PERMANENT (H): ICD-10-CM

## 2020-01-06 LAB — INR PPP: 3.1 (ref 0.9–1.1)

## 2020-01-10 ENCOUNTER — COMMUNICATION - HEALTHEAST (OUTPATIENT)
Dept: ANTICOAGULATION | Facility: CLINIC | Age: 85
End: 2020-01-10

## 2020-01-10 DIAGNOSIS — I48.21 ATRIAL FIBRILLATION, PERMANENT (H): ICD-10-CM

## 2020-01-10 LAB — INR PPP: 2.3 (ref 0.9–1.1)

## 2020-01-13 ENCOUNTER — AMBULATORY - HEALTHEAST (OUTPATIENT)
Dept: INTERNAL MEDICINE | Facility: CLINIC | Age: 85
End: 2020-01-13

## 2020-01-13 ENCOUNTER — COMMUNICATION - HEALTHEAST (OUTPATIENT)
Dept: SCHEDULING | Facility: CLINIC | Age: 85
End: 2020-01-13

## 2020-01-13 ENCOUNTER — COMMUNICATION - HEALTHEAST (OUTPATIENT)
Dept: ANTICOAGULATION | Facility: CLINIC | Age: 85
End: 2020-01-13

## 2020-01-13 DIAGNOSIS — L02.215 ABSCESS OF PERINEUM: ICD-10-CM

## 2020-01-13 DIAGNOSIS — K61.2 ABSCESS OF ANAL AND RECTAL REGIONS: ICD-10-CM

## 2020-01-13 DIAGNOSIS — I48.21 ATRIAL FIBRILLATION, PERMANENT (H): ICD-10-CM

## 2020-01-13 LAB — INR PPP: 3 (ref 0.9–1.1)

## 2020-01-16 ENCOUNTER — COMMUNICATION - HEALTHEAST (OUTPATIENT)
Dept: ANTICOAGULATION | Facility: CLINIC | Age: 85
End: 2020-01-16

## 2020-01-16 DIAGNOSIS — I48.21 ATRIAL FIBRILLATION, PERMANENT (H): ICD-10-CM

## 2020-01-16 LAB — INR PPP: 2.5 (ref 0.9–1.1)

## 2020-01-23 ENCOUNTER — COMMUNICATION - HEALTHEAST (OUTPATIENT)
Dept: ANTICOAGULATION | Facility: CLINIC | Age: 85
End: 2020-01-23

## 2020-01-23 DIAGNOSIS — I48.21 ATRIAL FIBRILLATION, PERMANENT (H): ICD-10-CM

## 2020-01-23 LAB — INR PPP: 2.4 (ref 0.9–1.1)

## 2020-01-24 ENCOUNTER — RECORDS - HEALTHEAST (OUTPATIENT)
Dept: ADMINISTRATIVE | Facility: OTHER | Age: 85
End: 2020-01-24

## 2020-01-27 ENCOUNTER — COMMUNICATION - HEALTHEAST (OUTPATIENT)
Dept: ANTICOAGULATION | Facility: CLINIC | Age: 85
End: 2020-01-27

## 2020-01-27 DIAGNOSIS — I48.21 ATRIAL FIBRILLATION, PERMANENT (H): ICD-10-CM

## 2020-01-27 LAB — INR PPP: 2.2 (ref 0.9–1.1)

## 2020-02-03 ENCOUNTER — COMMUNICATION - HEALTHEAST (OUTPATIENT)
Dept: ANTICOAGULATION | Facility: CLINIC | Age: 85
End: 2020-02-03

## 2020-02-03 DIAGNOSIS — I48.21 ATRIAL FIBRILLATION, PERMANENT (H): ICD-10-CM

## 2020-02-03 LAB — INR PPP: 2.6 (ref 0.9–1.1)

## 2020-02-10 ENCOUNTER — COMMUNICATION - HEALTHEAST (OUTPATIENT)
Dept: ANTICOAGULATION | Facility: CLINIC | Age: 85
End: 2020-02-10

## 2020-02-10 ENCOUNTER — COMMUNICATION - HEALTHEAST (OUTPATIENT)
Dept: INTERNAL MEDICINE | Facility: CLINIC | Age: 85
End: 2020-02-10

## 2020-02-10 DIAGNOSIS — I48.21 ATRIAL FIBRILLATION, PERMANENT (H): ICD-10-CM

## 2020-02-10 LAB — INR PPP: 2.8 (ref 0.9–1.1)

## 2020-02-17 ENCOUNTER — COMMUNICATION - HEALTHEAST (OUTPATIENT)
Dept: ANTICOAGULATION | Facility: CLINIC | Age: 85
End: 2020-02-17

## 2020-02-17 DIAGNOSIS — I48.21 ATRIAL FIBRILLATION, PERMANENT (H): ICD-10-CM

## 2020-02-17 LAB — INR PPP: 2.7 (ref 0.9–1.1)

## 2020-02-21 ENCOUNTER — COMMUNICATION - HEALTHEAST (OUTPATIENT)
Dept: INTERNAL MEDICINE | Facility: CLINIC | Age: 85
End: 2020-02-21

## 2020-03-09 ENCOUNTER — COMMUNICATION - HEALTHEAST (OUTPATIENT)
Dept: ANTICOAGULATION | Facility: CLINIC | Age: 85
End: 2020-03-09

## 2020-03-10 ENCOUNTER — COMMUNICATION - HEALTHEAST (OUTPATIENT)
Dept: INTERNAL MEDICINE | Facility: CLINIC | Age: 85
End: 2020-03-10

## 2020-03-10 DIAGNOSIS — I48.21 ATRIAL FIBRILLATION, PERMANENT (H): ICD-10-CM

## 2020-03-10 LAB — INR PPP: 1.4 (ref 0.9–1.1)

## 2020-03-16 ENCOUNTER — COMMUNICATION - HEALTHEAST (OUTPATIENT)
Dept: ANTICOAGULATION | Facility: CLINIC | Age: 85
End: 2020-03-16

## 2020-03-16 ENCOUNTER — COMMUNICATION - HEALTHEAST (OUTPATIENT)
Dept: INTERNAL MEDICINE | Facility: CLINIC | Age: 85
End: 2020-03-16

## 2020-03-16 DIAGNOSIS — I48.21 ATRIAL FIBRILLATION, PERMANENT (H): ICD-10-CM

## 2020-03-16 LAB — INR PPP: 1.4 (ref 0.9–1.1)

## 2020-03-23 ENCOUNTER — COMMUNICATION - HEALTHEAST (OUTPATIENT)
Dept: ANTICOAGULATION | Facility: CLINIC | Age: 85
End: 2020-03-23

## 2020-03-23 DIAGNOSIS — I48.21 ATRIAL FIBRILLATION, PERMANENT (H): ICD-10-CM

## 2020-03-23 LAB — INR PPP: 2.3 (ref 0.9–1.1)

## 2020-03-30 ENCOUNTER — COMMUNICATION - HEALTHEAST (OUTPATIENT)
Dept: ANTICOAGULATION | Facility: CLINIC | Age: 85
End: 2020-03-30

## 2020-03-30 DIAGNOSIS — I48.21 ATRIAL FIBRILLATION, PERMANENT (H): ICD-10-CM

## 2020-03-30 LAB — INR PPP: 3.4 (ref 0.9–1.1)

## 2020-04-03 ENCOUNTER — OFFICE VISIT - HEALTHEAST (OUTPATIENT)
Dept: INTERNAL MEDICINE | Facility: CLINIC | Age: 85
End: 2020-04-03

## 2020-04-03 DIAGNOSIS — K21.9 GERD (GASTROESOPHAGEAL REFLUX DISEASE): ICD-10-CM

## 2020-04-03 DIAGNOSIS — E11.9 DIABETES (H): ICD-10-CM

## 2020-04-03 DIAGNOSIS — I48.21 ATRIAL FIBRILLATION, PERMANENT (H): ICD-10-CM

## 2020-04-03 DIAGNOSIS — K80.50 CHOLEDOCHOLITHIASIS: ICD-10-CM

## 2020-04-06 ENCOUNTER — COMMUNICATION - HEALTHEAST (OUTPATIENT)
Dept: ANTICOAGULATION | Facility: CLINIC | Age: 85
End: 2020-04-06

## 2020-04-06 DIAGNOSIS — I48.21 ATRIAL FIBRILLATION, PERMANENT (H): ICD-10-CM

## 2020-04-06 LAB — INR PPP: 3 (ref 0.9–1.1)

## 2020-04-13 ENCOUNTER — COMMUNICATION - HEALTHEAST (OUTPATIENT)
Dept: ANTICOAGULATION | Facility: CLINIC | Age: 85
End: 2020-04-13

## 2020-04-13 DIAGNOSIS — I48.21 ATRIAL FIBRILLATION, PERMANENT (H): ICD-10-CM

## 2020-04-13 LAB — INR PPP: 3.3 (ref 0.9–1.1)

## 2020-04-20 ENCOUNTER — COMMUNICATION - HEALTHEAST (OUTPATIENT)
Dept: ANTICOAGULATION | Facility: CLINIC | Age: 85
End: 2020-04-20

## 2020-04-20 DIAGNOSIS — I48.21 ATRIAL FIBRILLATION, PERMANENT (H): ICD-10-CM

## 2020-04-20 LAB — INR PPP: 2.3 (ref 0.9–1.1)

## 2020-04-21 ENCOUNTER — COMMUNICATION - HEALTHEAST (OUTPATIENT)
Dept: SCHEDULING | Facility: CLINIC | Age: 85
End: 2020-04-21

## 2020-04-21 ENCOUNTER — OFFICE VISIT - HEALTHEAST (OUTPATIENT)
Dept: INTERNAL MEDICINE | Facility: CLINIC | Age: 85
End: 2020-04-21

## 2020-04-21 DIAGNOSIS — L72.3 SEBACEOUS CYST: ICD-10-CM

## 2020-04-21 DIAGNOSIS — E11.9 DIABETES MELLITUS TYPE 2 IN NONOBESE (H): ICD-10-CM

## 2020-04-21 DIAGNOSIS — I10 ESSENTIAL HYPERTENSION: ICD-10-CM

## 2020-04-24 ENCOUNTER — RECORDS - HEALTHEAST (OUTPATIENT)
Dept: ADMINISTRATIVE | Facility: OTHER | Age: 85
End: 2020-04-24

## 2020-04-25 ENCOUNTER — RECORDS - HEALTHEAST (OUTPATIENT)
Dept: ADMINISTRATIVE | Facility: OTHER | Age: 85
End: 2020-04-25

## 2020-04-27 ENCOUNTER — COMMUNICATION - HEALTHEAST (OUTPATIENT)
Dept: INTERNAL MEDICINE | Facility: CLINIC | Age: 85
End: 2020-04-27

## 2020-04-27 ENCOUNTER — COMMUNICATION - HEALTHEAST (OUTPATIENT)
Dept: ANTICOAGULATION | Facility: CLINIC | Age: 85
End: 2020-04-27

## 2020-04-27 DIAGNOSIS — I48.21 ATRIAL FIBRILLATION, PERMANENT (H): ICD-10-CM

## 2020-04-27 LAB — INR PPP: 1.1 (ref 0.9–1.1)

## 2020-04-28 ENCOUNTER — OFFICE VISIT - HEALTHEAST (OUTPATIENT)
Dept: INTERNAL MEDICINE | Facility: CLINIC | Age: 85
End: 2020-04-28

## 2020-04-28 DIAGNOSIS — C91.10 CLL (CHRONIC LYMPHOCYTIC LEUKEMIA) (H): ICD-10-CM

## 2020-04-28 DIAGNOSIS — I48.21 ATRIAL FIBRILLATION, PERMANENT (H): ICD-10-CM

## 2020-04-28 DIAGNOSIS — E11.9 DIABETES MELLITUS TYPE 2 IN NONOBESE (H): ICD-10-CM

## 2020-04-28 DIAGNOSIS — L02.214 ABSCESS OF GROIN: ICD-10-CM

## 2020-04-30 ENCOUNTER — COMMUNICATION - HEALTHEAST (OUTPATIENT)
Dept: INTERNAL MEDICINE | Facility: CLINIC | Age: 85
End: 2020-04-30

## 2020-04-30 DIAGNOSIS — I48.21 ATRIAL FIBRILLATION, PERMANENT (H): ICD-10-CM

## 2020-04-30 LAB — INR PPP: 1.3 (ref 0.9–1.1)

## 2020-05-04 ENCOUNTER — COMMUNICATION - HEALTHEAST (OUTPATIENT)
Dept: ANTICOAGULATION | Facility: CLINIC | Age: 85
End: 2020-05-04

## 2020-05-04 DIAGNOSIS — I48.21 ATRIAL FIBRILLATION, PERMANENT (H): ICD-10-CM

## 2020-05-04 LAB — INR PPP: 1.8 (ref 0.9–1.1)

## 2020-05-05 ENCOUNTER — RECORDS - HEALTHEAST (OUTPATIENT)
Dept: ADMINISTRATIVE | Facility: OTHER | Age: 85
End: 2020-05-05

## 2020-05-06 ENCOUNTER — OFFICE VISIT - HEALTHEAST (OUTPATIENT)
Dept: INTERNAL MEDICINE | Facility: CLINIC | Age: 85
End: 2020-05-06

## 2020-05-06 ENCOUNTER — COMMUNICATION - HEALTHEAST (OUTPATIENT)
Dept: INTERNAL MEDICINE | Facility: CLINIC | Age: 85
End: 2020-05-06

## 2020-05-06 DIAGNOSIS — E11.9 DIABETES MELLITUS TYPE 2 IN NONOBESE (H): ICD-10-CM

## 2020-05-06 DIAGNOSIS — L02.214 GROIN ABSCESS: ICD-10-CM

## 2020-05-06 DIAGNOSIS — C91.10 CLL (CHRONIC LYMPHOCYTIC LEUKEMIA) (H): ICD-10-CM

## 2020-05-07 ENCOUNTER — COMMUNICATION - HEALTHEAST (OUTPATIENT)
Dept: INTERNAL MEDICINE | Facility: CLINIC | Age: 85
End: 2020-05-07

## 2020-05-07 ENCOUNTER — AMBULATORY - HEALTHEAST (OUTPATIENT)
Dept: INTERNAL MEDICINE | Facility: CLINIC | Age: 85
End: 2020-05-07

## 2020-05-07 DIAGNOSIS — L02.214 ABSCESS OF GROIN: ICD-10-CM

## 2020-05-08 ENCOUNTER — COMMUNICATION - HEALTHEAST (OUTPATIENT)
Dept: ADMINISTRATIVE | Facility: CLINIC | Age: 85
End: 2020-05-08

## 2020-05-11 ENCOUNTER — COMMUNICATION - HEALTHEAST (OUTPATIENT)
Dept: ANTICOAGULATION | Facility: CLINIC | Age: 85
End: 2020-05-11

## 2020-05-11 DIAGNOSIS — I48.21 ATRIAL FIBRILLATION, PERMANENT (H): ICD-10-CM

## 2020-05-11 LAB — INR PPP: 2.3 (ref 0.9–1.1)

## 2020-05-13 ENCOUNTER — OFFICE VISIT - HEALTHEAST (OUTPATIENT)
Dept: INFECTIOUS DISEASES | Facility: CLINIC | Age: 85
End: 2020-05-13

## 2020-05-13 DIAGNOSIS — A42.9 ACTINOMYCOSIS: ICD-10-CM

## 2020-05-18 ENCOUNTER — COMMUNICATION - HEALTHEAST (OUTPATIENT)
Dept: ANTICOAGULATION | Facility: CLINIC | Age: 85
End: 2020-05-18

## 2020-05-18 DIAGNOSIS — I48.21 ATRIAL FIBRILLATION, PERMANENT (H): ICD-10-CM

## 2020-05-18 LAB — INR PPP: 2.3 (ref 0.9–1.1)

## 2020-05-26 ENCOUNTER — COMMUNICATION - HEALTHEAST (OUTPATIENT)
Dept: ANTICOAGULATION | Facility: CLINIC | Age: 85
End: 2020-05-26

## 2020-05-26 DIAGNOSIS — I48.21 ATRIAL FIBRILLATION, PERMANENT (H): ICD-10-CM

## 2020-05-26 LAB — INR PPP: 2.8 (ref 0.9–1.1)

## 2020-06-01 ENCOUNTER — COMMUNICATION - HEALTHEAST (OUTPATIENT)
Dept: ANTICOAGULATION | Facility: CLINIC | Age: 85
End: 2020-06-01

## 2020-06-01 DIAGNOSIS — I48.21 ATRIAL FIBRILLATION, PERMANENT (H): ICD-10-CM

## 2020-06-01 LAB — INR PPP: 2.5 (ref 0.9–1.1)

## 2020-06-02 ENCOUNTER — RECORDS - HEALTHEAST (OUTPATIENT)
Dept: ADMINISTRATIVE | Facility: OTHER | Age: 85
End: 2020-06-02

## 2020-06-02 ENCOUNTER — COMMUNICATION - HEALTHEAST (OUTPATIENT)
Dept: INFECTIOUS DISEASES | Facility: CLINIC | Age: 85
End: 2020-06-02

## 2020-06-08 ENCOUNTER — COMMUNICATION - HEALTHEAST (OUTPATIENT)
Dept: ANTICOAGULATION | Facility: CLINIC | Age: 85
End: 2020-06-08

## 2020-06-08 DIAGNOSIS — I48.21 ATRIAL FIBRILLATION, PERMANENT (H): ICD-10-CM

## 2020-06-08 LAB — INR PPP: 2.6 (ref 0.9–1.1)

## 2020-06-15 ENCOUNTER — COMMUNICATION - HEALTHEAST (OUTPATIENT)
Dept: ANTICOAGULATION | Facility: CLINIC | Age: 85
End: 2020-06-15

## 2020-06-15 DIAGNOSIS — I48.21 ATRIAL FIBRILLATION, PERMANENT (H): ICD-10-CM

## 2020-06-15 LAB
INR PPP: 3 (ref 0.9–1.1)
INR PPP: 3 (ref 0.9–1.1)

## 2020-06-19 ENCOUNTER — COMMUNICATION - HEALTHEAST (OUTPATIENT)
Dept: INTERNAL MEDICINE | Facility: CLINIC | Age: 85
End: 2020-06-19

## 2020-06-19 DIAGNOSIS — E11.9 DIABETES (H): ICD-10-CM

## 2020-06-22 ENCOUNTER — COMMUNICATION - HEALTHEAST (OUTPATIENT)
Dept: ANTICOAGULATION | Facility: CLINIC | Age: 85
End: 2020-06-22

## 2020-06-22 DIAGNOSIS — I48.21 ATRIAL FIBRILLATION, PERMANENT (H): ICD-10-CM

## 2020-06-22 LAB — INR PPP: 3.4 (ref 0.9–1.1)

## 2020-06-24 ENCOUNTER — TRANSFERRED RECORDS (OUTPATIENT)
Dept: HEALTH INFORMATION MANAGEMENT | Facility: CLINIC | Age: 85
End: 2020-06-24

## 2020-06-24 ENCOUNTER — RECORDS - HEALTHEAST (OUTPATIENT)
Dept: ADMINISTRATIVE | Facility: OTHER | Age: 85
End: 2020-06-24

## 2020-06-24 LAB
ALT SERPL W/O P-5'-P-CCNC: 14 U/L (ref 7–40)
AST SERPL-CCNC: 18 U/L (ref 13–40)
CREAT SERPL-MCNC: 0.83 MG/DL (ref 0.5–1.2)
GFR ESTIMATE EXT - HISTORICAL: 64.5 ML/MIN/1.73M^2

## 2020-06-25 ENCOUNTER — COMMUNICATION - HEALTHEAST (OUTPATIENT)
Dept: SCHEDULING | Facility: CLINIC | Age: 85
End: 2020-06-25

## 2020-06-29 ENCOUNTER — COMMUNICATION - HEALTHEAST (OUTPATIENT)
Dept: ANTICOAGULATION | Facility: CLINIC | Age: 85
End: 2020-06-29

## 2020-06-29 DIAGNOSIS — I48.21 ATRIAL FIBRILLATION, PERMANENT (H): ICD-10-CM

## 2020-06-29 LAB — INR PPP: 2.9 (ref 0.9–1.1)

## 2020-07-06 ENCOUNTER — COMMUNICATION - HEALTHEAST (OUTPATIENT)
Dept: ANTICOAGULATION | Facility: CLINIC | Age: 85
End: 2020-07-06

## 2020-07-06 DIAGNOSIS — I48.21 ATRIAL FIBRILLATION, PERMANENT (H): ICD-10-CM

## 2020-07-06 LAB — INR PPP: 2.6 (ref 0.9–1.1)

## 2020-07-08 ENCOUNTER — OFFICE VISIT - HEALTHEAST (OUTPATIENT)
Dept: INFECTIOUS DISEASES | Facility: CLINIC | Age: 85
End: 2020-07-08

## 2020-07-08 DIAGNOSIS — A42.9 ACTINOMYCOSIS: ICD-10-CM

## 2020-07-13 ENCOUNTER — COMMUNICATION - HEALTHEAST (OUTPATIENT)
Dept: ANTICOAGULATION | Facility: CLINIC | Age: 85
End: 2020-07-13

## 2020-07-13 DIAGNOSIS — I48.21 ATRIAL FIBRILLATION, PERMANENT (H): ICD-10-CM

## 2020-07-13 LAB — INR PPP: 2.4 (ref 0.9–1.1)

## 2020-07-15 ENCOUNTER — RECORDS - HEALTHEAST (OUTPATIENT)
Dept: HEALTH INFORMATION MANAGEMENT | Facility: CLINIC | Age: 85
End: 2020-07-15

## 2020-07-20 ENCOUNTER — COMMUNICATION - HEALTHEAST (OUTPATIENT)
Dept: ANTICOAGULATION | Facility: CLINIC | Age: 85
End: 2020-07-20

## 2020-07-20 DIAGNOSIS — I48.21 ATRIAL FIBRILLATION, PERMANENT (H): ICD-10-CM

## 2020-07-20 LAB — INR PPP: 2.2 (ref 0.9–1.1)

## 2020-07-27 ENCOUNTER — RECORDS - HEALTHEAST (OUTPATIENT)
Dept: ADMINISTRATIVE | Facility: OTHER | Age: 85
End: 2020-07-27

## 2020-07-27 ENCOUNTER — COMMUNICATION - HEALTHEAST (OUTPATIENT)
Dept: ANTICOAGULATION | Facility: CLINIC | Age: 85
End: 2020-07-27

## 2020-07-27 DIAGNOSIS — I48.21 ATRIAL FIBRILLATION, PERMANENT (H): ICD-10-CM

## 2020-07-27 LAB — INR PPP: 2.2 (ref 0.9–1.1)

## 2020-08-03 ENCOUNTER — COMMUNICATION - HEALTHEAST (OUTPATIENT)
Dept: ANTICOAGULATION | Facility: CLINIC | Age: 85
End: 2020-08-03

## 2020-08-03 ENCOUNTER — RECORDS - HEALTHEAST (OUTPATIENT)
Dept: ADMINISTRATIVE | Facility: OTHER | Age: 85
End: 2020-08-03

## 2020-08-03 DIAGNOSIS — I48.21 ATRIAL FIBRILLATION, PERMANENT (H): ICD-10-CM

## 2020-08-03 LAB — INR PPP: 1.9 (ref 0.9–1.1)

## 2020-08-10 ENCOUNTER — RECORDS - HEALTHEAST (OUTPATIENT)
Dept: ADMINISTRATIVE | Facility: OTHER | Age: 85
End: 2020-08-10

## 2020-08-10 ENCOUNTER — COMMUNICATION - HEALTHEAST (OUTPATIENT)
Dept: ANTICOAGULATION | Facility: CLINIC | Age: 85
End: 2020-08-10

## 2020-08-10 DIAGNOSIS — I48.21 ATRIAL FIBRILLATION, PERMANENT (H): ICD-10-CM

## 2020-08-10 LAB — INR PPP: 2.8 (ref 0.9–1.1)

## 2020-08-14 ENCOUNTER — RECORDS - HEALTHEAST (OUTPATIENT)
Dept: ADMINISTRATIVE | Facility: OTHER | Age: 85
End: 2020-08-14

## 2020-08-17 ENCOUNTER — COMMUNICATION - HEALTHEAST (OUTPATIENT)
Dept: ANTICOAGULATION | Facility: CLINIC | Age: 85
End: 2020-08-17

## 2020-08-17 DIAGNOSIS — I48.21 ATRIAL FIBRILLATION, PERMANENT (H): ICD-10-CM

## 2020-08-17 LAB — INR PPP: 3.1 (ref 0.9–1.1)

## 2020-08-24 ENCOUNTER — COMMUNICATION - HEALTHEAST (OUTPATIENT)
Dept: ANTICOAGULATION | Facility: CLINIC | Age: 85
End: 2020-08-24

## 2020-08-24 ENCOUNTER — RECORDS - HEALTHEAST (OUTPATIENT)
Dept: ADMINISTRATIVE | Facility: OTHER | Age: 85
End: 2020-08-24

## 2020-08-24 DIAGNOSIS — I48.21 ATRIAL FIBRILLATION, PERMANENT (H): ICD-10-CM

## 2020-08-24 LAB — INR PPP: 2.3 (ref 0.9–1.1)

## 2020-08-31 ENCOUNTER — RECORDS - HEALTHEAST (OUTPATIENT)
Dept: ADMINISTRATIVE | Facility: OTHER | Age: 85
End: 2020-08-31

## 2020-09-07 ENCOUNTER — RECORDS - HEALTHEAST (OUTPATIENT)
Dept: ADMINISTRATIVE | Facility: OTHER | Age: 85
End: 2020-09-07

## 2020-09-08 ENCOUNTER — COMMUNICATION - HEALTHEAST (OUTPATIENT)
Dept: ANTICOAGULATION | Facility: CLINIC | Age: 85
End: 2020-09-08

## 2020-09-08 DIAGNOSIS — I48.21 ATRIAL FIBRILLATION, PERMANENT (H): ICD-10-CM

## 2020-09-08 LAB — INR PPP: 2.3 (ref 0.9–1.1)

## 2020-09-09 ENCOUNTER — OFFICE VISIT - HEALTHEAST (OUTPATIENT)
Dept: INFECTIOUS DISEASES | Facility: CLINIC | Age: 85
End: 2020-09-09

## 2020-09-09 DIAGNOSIS — A42.9 ACTINOMYCOSIS: ICD-10-CM

## 2020-09-14 ENCOUNTER — RECORDS - HEALTHEAST (OUTPATIENT)
Dept: ADMINISTRATIVE | Facility: OTHER | Age: 85
End: 2020-09-14

## 2020-09-14 ENCOUNTER — COMMUNICATION - HEALTHEAST (OUTPATIENT)
Dept: ANTICOAGULATION | Facility: CLINIC | Age: 85
End: 2020-09-14

## 2020-09-14 ENCOUNTER — AMBULATORY - HEALTHEAST (OUTPATIENT)
Dept: LAB | Facility: CLINIC | Age: 85
End: 2020-09-14

## 2020-09-14 DIAGNOSIS — A42.9 ACTINOMYCOSIS: ICD-10-CM

## 2020-09-14 DIAGNOSIS — I48.21 ATRIAL FIBRILLATION, PERMANENT (H): ICD-10-CM

## 2020-09-14 LAB
ALT SERPL W P-5'-P-CCNC: 13 U/L (ref 0–45)
BASOPHILS # BLD AUTO: 0.1 THOU/UL (ref 0–0.2)
BASOPHILS NFR BLD AUTO: 1 % (ref 0–2)
C REACTIVE PROTEIN LHE: 0.3 MG/DL (ref 0–0.8)
CREAT SERPL-MCNC: 0.94 MG/DL (ref 0.6–1.1)
EOSINOPHIL # BLD AUTO: 0.2 THOU/UL (ref 0–0.4)
EOSINOPHIL NFR BLD AUTO: 2 % (ref 0–6)
ERYTHROCYTE [DISTWIDTH] IN BLOOD BY AUTOMATED COUNT: 14.5 % (ref 11–14.5)
GFR SERPL CREATININE-BSD FRML MDRD: 57 ML/MIN/1.73M2
HCT VFR BLD AUTO: 44.4 % (ref 35–47)
HGB BLD-MCNC: 14.5 G/DL (ref 12–16)
INR PPP: 2.7 (ref 0.9–1.1)
LYMPHOCYTES # BLD AUTO: 2.1 THOU/UL (ref 0.8–4.4)
LYMPHOCYTES NFR BLD AUTO: 23 % (ref 20–40)
MCH RBC QN AUTO: 29.5 PG (ref 27–34)
MCHC RBC AUTO-ENTMCNC: 32.6 G/DL (ref 32–36)
MCV RBC AUTO: 91 FL (ref 80–100)
MONOCYTES # BLD AUTO: 0.5 THOU/UL (ref 0–0.9)
MONOCYTES NFR BLD AUTO: 5 % (ref 2–10)
NEUTROPHILS # BLD AUTO: 6.5 THOU/UL (ref 2–7.7)
NEUTROPHILS NFR BLD AUTO: 70 % (ref 50–70)
PLATELET # BLD AUTO: 213 THOU/UL (ref 140–440)
PMV BLD AUTO: 8.5 FL (ref 7–10)
RBC # BLD AUTO: 4.9 MILL/UL (ref 3.8–5.4)
WBC: 9.2 THOU/UL (ref 4–11)

## 2020-09-21 ENCOUNTER — RECORDS - HEALTHEAST (OUTPATIENT)
Dept: ADMINISTRATIVE | Facility: OTHER | Age: 85
End: 2020-09-21

## 2020-09-21 ENCOUNTER — COMMUNICATION - HEALTHEAST (OUTPATIENT)
Dept: ANTICOAGULATION | Facility: CLINIC | Age: 85
End: 2020-09-21

## 2020-09-21 DIAGNOSIS — I48.21 ATRIAL FIBRILLATION, PERMANENT (H): ICD-10-CM

## 2020-09-21 LAB — INR PPP: 2.3 (ref 0.9–1.1)

## 2020-09-28 ENCOUNTER — COMMUNICATION - HEALTHEAST (OUTPATIENT)
Dept: ANTICOAGULATION | Facility: CLINIC | Age: 85
End: 2020-09-28

## 2020-09-28 ENCOUNTER — RECORDS - HEALTHEAST (OUTPATIENT)
Dept: ADMINISTRATIVE | Facility: OTHER | Age: 85
End: 2020-09-28

## 2020-09-28 DIAGNOSIS — I48.21 ATRIAL FIBRILLATION, PERMANENT (H): ICD-10-CM

## 2020-09-28 LAB — INR PPP: 2.9 (ref 0.9–1.1)

## 2020-10-05 ENCOUNTER — COMMUNICATION - HEALTHEAST (OUTPATIENT)
Dept: ANTICOAGULATION | Facility: CLINIC | Age: 85
End: 2020-10-05

## 2020-10-05 DIAGNOSIS — I48.21 ATRIAL FIBRILLATION, PERMANENT (H): ICD-10-CM

## 2020-10-05 LAB — INR PPP: 3 (ref 0.9–1.1)

## 2020-10-06 ENCOUNTER — COMMUNICATION - HEALTHEAST (OUTPATIENT)
Dept: INFECTIOUS DISEASES | Facility: CLINIC | Age: 85
End: 2020-10-06

## 2020-10-12 ENCOUNTER — COMMUNICATION - HEALTHEAST (OUTPATIENT)
Dept: INTERNAL MEDICINE | Facility: CLINIC | Age: 85
End: 2020-10-12

## 2020-10-12 ENCOUNTER — COMMUNICATION - HEALTHEAST (OUTPATIENT)
Dept: ANTICOAGULATION | Facility: CLINIC | Age: 85
End: 2020-10-12

## 2020-10-12 DIAGNOSIS — I48.21 ATRIAL FIBRILLATION, PERMANENT (H): ICD-10-CM

## 2020-10-12 LAB — INR PPP: 2.6 (ref 0.9–1.1)

## 2020-10-19 ENCOUNTER — COMMUNICATION - HEALTHEAST (OUTPATIENT)
Dept: ANTICOAGULATION | Facility: CLINIC | Age: 85
End: 2020-10-19

## 2020-10-19 DIAGNOSIS — I48.21 ATRIAL FIBRILLATION, PERMANENT (H): ICD-10-CM

## 2020-10-19 LAB — INR PPP: 2.7 (ref 0.9–1.1)

## 2020-10-26 ENCOUNTER — COMMUNICATION - HEALTHEAST (OUTPATIENT)
Dept: ANTICOAGULATION | Facility: CLINIC | Age: 85
End: 2020-10-26

## 2020-10-26 DIAGNOSIS — I48.21 ATRIAL FIBRILLATION, PERMANENT (H): ICD-10-CM

## 2020-10-26 LAB — INR PPP: 2.8 (ref 0.9–1.1)

## 2020-11-02 ENCOUNTER — COMMUNICATION - HEALTHEAST (OUTPATIENT)
Dept: ANTICOAGULATION | Facility: CLINIC | Age: 85
End: 2020-11-02

## 2020-11-02 DIAGNOSIS — I48.21 ATRIAL FIBRILLATION, PERMANENT (H): ICD-10-CM

## 2020-11-02 LAB — INR PPP: 2.5 (ref 0.9–1.1)

## 2020-11-09 ENCOUNTER — COMMUNICATION - HEALTHEAST (OUTPATIENT)
Dept: ANTICOAGULATION | Facility: CLINIC | Age: 85
End: 2020-11-09

## 2020-11-09 DIAGNOSIS — I48.21 ATRIAL FIBRILLATION, PERMANENT (H): ICD-10-CM

## 2020-11-09 LAB — INR PPP: 2.7 (ref 0.9–1.1)

## 2020-11-16 ENCOUNTER — COMMUNICATION - HEALTHEAST (OUTPATIENT)
Dept: ANTICOAGULATION | Facility: CLINIC | Age: 85
End: 2020-11-16

## 2020-11-16 ENCOUNTER — RECORDS - HEALTHEAST (OUTPATIENT)
Dept: ADMINISTRATIVE | Facility: OTHER | Age: 85
End: 2020-11-16

## 2020-11-16 DIAGNOSIS — I48.21 ATRIAL FIBRILLATION, PERMANENT (H): ICD-10-CM

## 2020-11-16 LAB — INR PPP: 2.3 (ref 0.9–1.1)

## 2020-11-18 ENCOUNTER — OFFICE VISIT - HEALTHEAST (OUTPATIENT)
Dept: INFECTIOUS DISEASES | Facility: CLINIC | Age: 85
End: 2020-11-18

## 2020-11-18 DIAGNOSIS — A42.9 ACTINOMYCOSIS: ICD-10-CM

## 2020-11-23 ENCOUNTER — COMMUNICATION - HEALTHEAST (OUTPATIENT)
Dept: ANTICOAGULATION | Facility: CLINIC | Age: 85
End: 2020-11-23

## 2020-11-23 ENCOUNTER — RECORDS - HEALTHEAST (OUTPATIENT)
Dept: ADMINISTRATIVE | Facility: OTHER | Age: 85
End: 2020-11-23

## 2020-11-23 DIAGNOSIS — I48.21 ATRIAL FIBRILLATION, PERMANENT (H): ICD-10-CM

## 2020-11-23 LAB — INR PPP: 1.9 (ref 0.9–1.1)

## 2020-11-30 ENCOUNTER — RECORDS - HEALTHEAST (OUTPATIENT)
Dept: ADMINISTRATIVE | Facility: OTHER | Age: 85
End: 2020-11-30

## 2020-11-30 ENCOUNTER — COMMUNICATION - HEALTHEAST (OUTPATIENT)
Dept: ANTICOAGULATION | Facility: CLINIC | Age: 85
End: 2020-11-30

## 2020-11-30 DIAGNOSIS — I48.21 ATRIAL FIBRILLATION, PERMANENT (H): ICD-10-CM

## 2020-11-30 LAB — INR PPP: 3.1 (ref 0.9–1.1)

## 2020-12-07 ENCOUNTER — COMMUNICATION - HEALTHEAST (OUTPATIENT)
Dept: ANTICOAGULATION | Facility: CLINIC | Age: 85
End: 2020-12-07

## 2020-12-07 ENCOUNTER — RECORDS - HEALTHEAST (OUTPATIENT)
Dept: ADMINISTRATIVE | Facility: OTHER | Age: 85
End: 2020-12-07

## 2020-12-07 DIAGNOSIS — I48.21 ATRIAL FIBRILLATION, PERMANENT (H): ICD-10-CM

## 2020-12-07 LAB — INR PPP: 3.3 (ref 0.9–1.1)

## 2020-12-14 ENCOUNTER — RECORDS - HEALTHEAST (OUTPATIENT)
Dept: ADMINISTRATIVE | Facility: OTHER | Age: 85
End: 2020-12-14

## 2020-12-14 ENCOUNTER — COMMUNICATION - HEALTHEAST (OUTPATIENT)
Dept: ANTICOAGULATION | Facility: CLINIC | Age: 85
End: 2020-12-14

## 2020-12-14 DIAGNOSIS — I48.21 ATRIAL FIBRILLATION, PERMANENT (H): ICD-10-CM

## 2020-12-14 LAB — INR PPP: 2.1 (ref 0.9–1.1)

## 2020-12-17 ENCOUNTER — COMMUNICATION - HEALTHEAST (OUTPATIENT)
Dept: ANTICOAGULATION | Facility: CLINIC | Age: 85
End: 2020-12-17

## 2020-12-17 DIAGNOSIS — I48.21 ATRIAL FIBRILLATION, PERMANENT (H): ICD-10-CM

## 2020-12-17 DIAGNOSIS — Z79.01 LONG TERM (CURRENT) USE OF ANTICOAGULANTS: ICD-10-CM

## 2020-12-21 ENCOUNTER — COMMUNICATION - HEALTHEAST (OUTPATIENT)
Dept: ANTICOAGULATION | Facility: CLINIC | Age: 85
End: 2020-12-21

## 2020-12-21 DIAGNOSIS — I48.21 ATRIAL FIBRILLATION, PERMANENT (H): ICD-10-CM

## 2020-12-21 LAB — INR PPP: 2.2 (ref 0.9–1.1)

## 2020-12-28 ENCOUNTER — COMMUNICATION - HEALTHEAST (OUTPATIENT)
Dept: ANTICOAGULATION | Facility: CLINIC | Age: 85
End: 2020-12-28

## 2020-12-28 DIAGNOSIS — I48.21 ATRIAL FIBRILLATION, PERMANENT (H): ICD-10-CM

## 2020-12-28 LAB — INR PPP: 2.3 (ref 0.9–1.1)

## 2020-12-31 ENCOUNTER — RECORDS - HEALTHEAST (OUTPATIENT)
Dept: ADMINISTRATIVE | Facility: OTHER | Age: 85
End: 2020-12-31

## 2021-01-04 ENCOUNTER — RECORDS - HEALTHEAST (OUTPATIENT)
Dept: ADMINISTRATIVE | Facility: OTHER | Age: 86
End: 2021-01-04

## 2021-01-04 ENCOUNTER — COMMUNICATION - HEALTHEAST (OUTPATIENT)
Dept: ANTICOAGULATION | Facility: CLINIC | Age: 86
End: 2021-01-04

## 2021-01-04 DIAGNOSIS — I48.21 ATRIAL FIBRILLATION, PERMANENT (H): ICD-10-CM

## 2021-01-04 LAB — INR PPP: 2.3 (ref 0.9–1.1)

## 2021-01-05 ENCOUNTER — TRANSFERRED RECORDS (OUTPATIENT)
Dept: HEALTH INFORMATION MANAGEMENT | Facility: CLINIC | Age: 86
End: 2021-01-05

## 2021-01-11 ENCOUNTER — COMMUNICATION - HEALTHEAST (OUTPATIENT)
Dept: ANTICOAGULATION | Facility: CLINIC | Age: 86
End: 2021-01-11

## 2021-01-11 DIAGNOSIS — I48.21 ATRIAL FIBRILLATION, PERMANENT (H): ICD-10-CM

## 2021-01-11 LAB — INR PPP: 2.3 (ref 0.9–1.1)

## 2021-01-18 ENCOUNTER — COMMUNICATION - HEALTHEAST (OUTPATIENT)
Dept: ANTICOAGULATION | Facility: CLINIC | Age: 86
End: 2021-01-18

## 2021-01-18 DIAGNOSIS — I48.21 ATRIAL FIBRILLATION, PERMANENT (H): ICD-10-CM

## 2021-01-18 LAB — INR PPP: 2.2 (ref 0.9–1.1)

## 2021-01-25 ENCOUNTER — COMMUNICATION - HEALTHEAST (OUTPATIENT)
Dept: ANTICOAGULATION | Facility: CLINIC | Age: 86
End: 2021-01-25

## 2021-01-25 DIAGNOSIS — I48.21 ATRIAL FIBRILLATION, PERMANENT (H): ICD-10-CM

## 2021-01-25 LAB — INR PPP: 2 (ref 0.9–1.1)

## 2021-02-08 ENCOUNTER — COMMUNICATION - HEALTHEAST (OUTPATIENT)
Dept: ANTICOAGULATION | Facility: CLINIC | Age: 86
End: 2021-02-08

## 2021-02-08 DIAGNOSIS — I48.21 ATRIAL FIBRILLATION, PERMANENT (H): ICD-10-CM

## 2021-04-24 ENCOUNTER — COMMUNICATION - HEALTHEAST (OUTPATIENT)
Dept: INTERNAL MEDICINE | Facility: CLINIC | Age: 86
End: 2021-04-24

## 2021-04-24 DIAGNOSIS — I48.21 ATRIAL FIBRILLATION, PERMANENT (H): ICD-10-CM

## 2021-05-26 NOTE — TELEPHONE ENCOUNTER
Dr. Frances,  [facility] returned patient's call and she wanted to confirm what she needs to do with her warfarin prior to procedure. Updated that per PCP pre op note on 3/20 patient will hold warfarin 5 days pre op without bridging.    Patient will start holding warfarin on 3/24 thru 3/28.  Instructed to restart current warfarin doses on 3/29 after the procedure if given okay by Dr. Hunt.  Recheck INR with home monitor,one week after restarting warfarin doses.    Patient verbalized understanding and agrees to plan.    Crys Fontanez RN

## 2021-05-26 NOTE — TELEPHONE ENCOUNTER
Who is calling:  patient  Reason for Call:  Patient was calling to discuss her Warfarin.  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 Hunt from [facility].  She spoke with his staff today and was told the decision to hold her warfarin needs to come from [facility].  However she was told that [facility] normally has patient's hold for five days prior.    Patient is looking for directions from [facility]  Date of last appointment with primary care: 03/20/19 pre-op   Okay to leave a detailed message: No

## 2021-05-27 NOTE — TELEPHONE ENCOUNTER
FYI - Status Update  Who is Calling: Patient  Update: Dr. Hunt would like Reba to go back on her Warfarin  Tonight but only one pill as he may need to do another procedure on Tuesday. 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

## 2021-05-27 NOTE — TELEPHONE ENCOUNTER
Dr. Juan Daniel GIL:    ACN called and spoke with patient and she reported that she has been off warfarin   5 days prior to procedure ( 3/24-3/28), then after procedure:  excision of right intranasal mass, nasal endoscopy,nasal vestibular right lateral nasal wall reconstruction due to bleeding   - she stated that she took 2.5 mg on 3/31 only then was told by Dr Hunt to hold warfarin doses until her follow up appointment with him on 4/4    Instructed patient to updated ACN on 4/4 after her appointment with Dr. Hunt.    Crys Fontanez RN

## 2021-05-27 NOTE — TELEPHONE ENCOUNTER
ANTICOAGULATION  MANAGEMENT    Assessment     Today's INR result of 1.3 is Subtherapeutic (goal INR of 2.0-3.0)        Patient was holding warfarin for 5 days prior to procedure  done on 3/29 then had an ED visit on that day due to bleeding.    Per patient she was instructed by Dr Hunt to resume warfarin on 4/4 but take lower dose of 2.5 mg daily- she has been taking 2.5 mg daily from 4/4 up to yesterday.She only had 17.5 mg this past week versus planned 25 mg/week.     Per patient she will not have any another procedures done after follow up visit with Dr Hunt and that she can go back to her previous warfarin doses now.    No new diet changes affecting INR    No new medication/supplements affecting INR    Continues to tolerate warfarin with no reported s/s of  thromboembolism     Previous INR was Therapeutic    Plan:     Spoke with Reba regarding INR result and instructed:     Warfarin Dosing Instructions:  take one time booster dose of 5 mg today then continue current warfarin dose    2.5 mg every Thu; 3.75 mg all other days      (0 % change)    Instructed patient to follow up no later than: 4/18 with home monitor    Education provided: importance of therapeutic range, target INR goal and significance of current INR result and monitoring for clotting signs and symptoms    Reba verbalizes understanding and agrees to warfarin dosing plan.    Instructed to call the [facility] for any changes, questions or concerns. (#[phone])   ?   Crys Fontanez RN    Subjective/Objective:      Reba Calderon, a 83 y.o. female is on warfarin.     Reba reports:     Home warfarin dose: See above     Missed doses: Yes: see above     Medication changes:  No     S/S of bleeding or thromboembolism:  Per patient no recurrence of bleeding since ED visit on 3/29/19.     New Injury or illness:  No     Changes in diet or alcohol consumption:  No     Upcoming surgery, procedure or cardioversion:  No    Anticoagulation Episode Summary      Current INR goal:   2.0-3.0   TTR:   82.6 % (1.9 y)   Next INR check:   4/18/2019   INR from last check:   1.30! (4/12/2019)   Weekly max warfarin dose:      Target end date:   Indefinite   INR check location:      Preferred lab:      Send INR reminders to:   ANTICOAGULATION POOL C (DTN,VAD,CGR,GAV)    Indications    Atrial fibrillation  permanent (H) [I48.2]           Comments:            Anticoagulation Care Providers     Provider Role Specialty Phone number    Bertin Frances MD Referring Internal Medicine [phone]

## 2021-05-27 NOTE — TELEPHONE ENCOUNTER
ACN called and spoke with patient and she reported that Dr Hunt instructed her to restart warfarin  today but only  to take 2.5 mg daily due to she might have another procedure done after 4/9/follow up visit.    Anticoagulation calendar updated. Instructed patient to call and update ACN on Tuesday after her appointment for update.    Crys Fontanez RN

## 2021-05-27 NOTE — TELEPHONE ENCOUNTER
FYI - Status Update  Who is Calling: Patient  Update: Patient states she needs to talk to her ACN about her recent surgery. She said she needs to be off her medications longer due to complications   Okay to leave a detailed message?:  Yes

## 2021-05-27 NOTE — TELEPHONE ENCOUNTER
FYI - Status Update  Who is Calling: WISETIVI  Update: 1.3  Okay to leave a detailed message?:  No return call needed    No other info is known.

## 2021-05-27 NOTE — TELEPHONE ENCOUNTER
Refill Approved    Rx renewed per Medication Renewal Policy. Medication was last renewed on 3/20/19.    Radha Ba, [facility] Triage/Med Refill 4/16/2019     Requested Prescriptions   Pending Prescriptions Disp Refills     blood glucose test (CONTOUR TEST STRIPS) strips [Pharmacy Med Name: CONTOUR TEST STRIP] 100 strip 0     Sig: TEST ONCE DAILY AS DIRECTED       Diabetic Supplies Refill Protocol Passed - 4/14/2019 11:36 AM        Passed - Visit with PCP or prescribing provider visit in last 6 months     Last office visit with prescriber/PCP: 7/25/2018 Bertin Frances MD OR same dept: 7/25/2018 Bertin Frances MD OR same specialty: 7/25/2018 Bertin Frances MD  Last physical: 3/20/2019 Last MTM visit: Visit date not found   Next visit within 3 mo: Visit date not found  Next physical within 3 mo: Visit date not found  Prescriber OR PCP: Bertin Frances MD  Last diagnosis associated with med order: 1. Diabetes (H)  - CONTOUR TEST STRIPS strips [Pharmacy Med Name: CONTOUR TEST STRIP]; TEST ONCE DAILY AS DIRECTED  Dispense: 100 strip; Refill: 0    If protocol passes may refill for 12 months if within 3 months of last provider visit (or a total of 15 months).             Passed - A1C in last 6 months     Hemoglobin A1c   Date Value Ref Range Status   03/20/2019 6.6 (H) 3.5 - 6.0 % Final

## 2021-05-27 NOTE — TELEPHONE ENCOUNTER
Received a faxed INR result for Reba Calderon  From [facility] [facility]    INR result dated 4/18/2019 is 2.0

## 2021-05-27 NOTE — TELEPHONE ENCOUNTER
ANTICOAGULATION  MANAGEMENT    Assessment     Today's INR result of 2.0 is Therapeutic (goal INR of 2.0-3.0)        Warfarin taken as previously instructed    No new diet changes affecting INR    No new medication/supplements affecting INR    Continues to tolerate warfarin with no reported s/s of bleeding or thromboembolism     Previous INR was Subtherapeutic - patient confirmed that she took the 5 mg booster dose as instructed on 4/12/19.    Patient reported that she has a follow up appointment with ENT on 4/23/19.    Plan:     Spoke with Reba regarding INR result and instructed:     Warfarin Dosing Instructions:  Continue current warfarin dose    2.5 mg every Thu; 3.75 mg all other days        (0 % change)    Instructed patient to follow up no later than: patient is planning to go back checking INR's on Mondays with home monitor.    Education provided: importance of therapeutic range and target INR goal and significance of current INR result    Reba verbalizes understanding and agrees to warfarin dosing plan.    Instructed to call the [facility] for any changes, questions or concerns. (#[phone])   ?   Crys Fontanez RN    Subjective/Objective:      Reba Wilsonon, a 83 y.o. female is on warfarin.     Reba reports:     Home warfarin dose: verbally confirmed home dose with Reba and updated on anticoagulation calendar     Missed doses: No     Medication changes:  No     S/S of bleeding or thromboembolism:  No     New Injury or illness:  No.     Changes in diet or alcohol consumption:  No     Upcoming surgery, procedure or cardioversion:  No    Anticoagulation Episode Summary     Current INR goal:   2.0-3.0   TTR:   81.9 % (1.9 y)   Next INR check:   4/26/2019   INR from last check:   2.00 (4/18/2019)   Weekly max warfarin dose:      Target end date:   Indefinite   INR check location:      Preferred lab:      Send INR reminders to:   ANTICOAGULATION POOL C (DTN,VAD,CGR,GAV)    Indications    Atrial  fibrillation  permanent (H) [I48.2]           Comments:            Anticoagulation Care Providers     Provider Role Specialty Phone number    Bertin Frances MD Referring Internal Medicine [phone]

## 2021-05-28 NOTE — TELEPHONE ENCOUNTER
Received a faxed INR result for Reba Calderon  From [facility]    INR result dated 5/6/2019 is 2.9

## 2021-05-28 NOTE — TELEPHONE ENCOUNTER
ANTICOAGULATION  MANAGEMENT-Patient Home Monitoring Result    Assessment     Therapeutic INR result of 2.9 (goal INR of 2.0-3.0) received via fax from [employer] home INR monitoring company.        Previous INR was Therapeutic    Reba Calderon last contacted by phone: 4/18/19    Plan     Per home monitoring agreement with patient, patient was NOT contacted regarding therapeutic result today.  Patient is to continue current dose and continue to checking INR with home monitor every 1 week(s) per protocol.  ?   Crys Fontanez RN    Subjective/Objective:      Reba Calderon, a 83 y.o. female  is established on warfarin.     Reba Calderon is using a home INR monitor. Home INR result received via fax today.     Anticoagulation Episode Summary     Current INR goal:   2.0-3.0   TTR:   82.4 % (1.9 y)   Next INR check:   5/13/2019   INR from last check:   2.90 (5/6/2019)   Weekly max warfarin dose:      Target end date:   Indefinite   INR check location:      Preferred lab:      Send INR reminders to:   ANTICOAGULATION POOL C (DTN,VAD,CGR,GAV)    Indications    Atrial fibrillation  permanent (H) [I48.2]           Comments:            Anticoagulation Care Providers     Provider Role Specialty Phone number    Bertin Frances MD Referring Internal Medicine [phone]

## 2021-05-28 NOTE — TELEPHONE ENCOUNTER
Refill Approved: [facility]    Rx renewed per Medication Renewal Policy. Medication was last renewed on 9/4/2018 with 2 refills.   Last office visit: 3/20/2019 with PCP Dr BUTCH Frances.    Melvi Patel, [facility] Triage/Med Refill 5/5/2019     Requested Prescriptions   Pending Prescriptions Disp Refills     warfarin (COUMADIN/JANTOVEN) 2.5 MG tablet [Pharmacy Med Name: WARFARIN SODIUM 2.5 MG TABLET] 140 tablet 1     Sig: TAKE 1-1.5 TABLETS BY MOUTH DAILY AS DIRECTED. ADJUST DOSE PER INR RESULTS AS INSTRUCTED.       Warfarin Refill Protocol  Failed - 5/5/2019  3:11 PM        Failed -  Route to appropriate pool/provider     Last Anticoagulation Summary:   Anticoagulation Episode Summary     Current INR goal:   2.0-3.0   TTR:   82.2 % (1.9 y)   Next INR check:   5/6/2019   INR from last check:   2.80 (4/29/2019)   Weekly max warfarin dose:      Target end date:   Indefinite   INR check location:      Preferred lab:      Send INR reminders to:   ANTICOAGULATION POOL C (DTN,VAD,CGR,GAV)    Indications    Atrial fibrillation  permanent (H) [I48.2]           Comments:            Anticoagulation Care Providers     Provider Role Specialty Phone number    Bertin Frances MD Referring Internal Medicine [phone]                Passed - Provider visit in last year     Last office visit with prescriber/PCP: 7/25/2018 Bertin Frances MD OR same dept: 7/25/2018 Bertin Frances MD OR same specialty: 7/25/2018 Bertin Frances MD  Last physical: 3/20/2019 Last MTM visit: Visit date not found    Next appt within 3 mo: Visit date not found Next physical within 3 mo: Visit date not found  Prescriber OR PCP: Bertin Frances MD  Last diagnosis associated with med order: 1. Atrial fibrillation, permanent (H)  - warfarin (COUMADIN/JANTOVEN) 2.5 MG tablet [Pharmacy Med Name: WARFARIN SODIUM 2.5 MG TABLET]; TAKE 1-1.5 TABLETS BY MOUTH DAILY AS DIRECTED. ADJUST DOSE PER INR RESULTS AS INSTRUCTED.  Dispense: 140 tablet; Refill: 1  -  KLOR-CON 10 10 mEq CR tablet [Pharmacy Med Name: KLOR-CON 10 MEQ TABLET]; TAKE 1 TABLET (10 MEQ TOTAL) BY MOUTH DAILY.  Dispense: 90 tablet; Refill: 2    If protocol passes may refill for 6 months if within 3 months of last provider visit (or a total of 9 months).          KLOR-CON 10 10 mEq CR tablet [Pharmacy Med Name: KLOR-CON 10 MEQ TABLET] 90 tablet 2     Sig: TAKE 1 TABLET (10 MEQ TOTAL) BY MOUTH DAILY.       Potassium Supplements Refill Protocol Passed - 5/5/2019  3:11 PM        Passed - PCP or prescribing provider visit in past 12 months       Last office visit with prescriber/PCP: 7/25/2018 Bertin Frances MD OR same dept: 7/25/2018 Bertin Frances MD OR same specialty: 7/25/2018 Bertin Frances MD  Last physical: 3/20/2019 Last MTM visit: Visit date not found   Next visit within 3 mo: Visit date not found  Next physical within 3 mo: Visit date not found  Prescriber OR PCP: Bertin Frances MD  Last diagnosis associated with med order: 1. Atrial fibrillation, permanent (H)  - warfarin (COUMADIN/JANTOVEN) 2.5 MG tablet [Pharmacy Med Name: WARFARIN SODIUM 2.5 MG TABLET]; TAKE 1-1.5 TABLETS BY MOUTH DAILY AS DIRECTED. ADJUST DOSE PER INR RESULTS AS INSTRUCTED.  Dispense: 140 tablet; Refill: 1  - KLOR-CON 10 10 mEq CR tablet [Pharmacy Med Name: KLOR-CON 10 MEQ TABLET]; TAKE 1 TABLET (10 MEQ TOTAL) BY MOUTH DAILY.  Dispense: 90 tablet; Refill: 2    If protocol passes may refill for 12 months if within 3 months of last provider visit (or a total of 15 months).             Passed - Potassium level in last 12 months     Lab Results   Component Value Date    Potassium 3.6 03/20/2019

## 2021-05-28 NOTE — TELEPHONE ENCOUNTER
ANTICOAGULATION  MANAGEMENT-Patient Home Monitoring Result    Assessment     Therapeutic INR result of 2.8 (goal INR of 2.0-3.0) received via fax from [employer] home INR monitoring company.        Previous INR was Therapeutic    Reba Calderon last contacted by phone: 4/18/19    Plan     Per home monitoring agreement with patient, patient was NOT contacted regarding therapeutic result today.  Patient is to continue current dose and continue to checking INR with home monitor every 1 week(s) per protocol.  ?   Crys Fontanez RN    Subjective/Objective:      Reba Calderon, a 83 y.o. female  is established on warfarin.     Reba Calderon is using a home INR monitor. Home INR result received via fax today.     Anticoagulation Episode Summary     Current INR goal:   2.0-3.0   TTR:   82.2 % (1.9 y)   Next INR check:   5/6/2019   INR from last check:   2.80 (4/29/2019)   Weekly max warfarin dose:      Target end date:   Indefinite   INR check location:      Preferred lab:      Send INR reminders to:   ANTICOAGULATION POOL C (DTN,VAD,CGR,GAV)    Indications    Atrial fibrillation  permanent (H) [I48.2]           Comments:            Anticoagulation Care Providers     Provider Role Specialty Phone number    Bertin Frances MD Referring Internal Medicine [phone]

## 2021-05-28 NOTE — TELEPHONE ENCOUNTER
Incoming fax from [employer] include 234 beds at the [facility] home monitoring     INR date: 4/22    See attached document

## 2021-05-28 NOTE — TELEPHONE ENCOUNTER
ANTICOAGULATION  MANAGEMENT    Assessment     Today's INR result of 3.5 is Supratherapeutic (goal INR of 2.0-3.0)        Warfarin taken as previously instructed    Busy schedule the past week  may be affecting diet and INR    No new medication/supplements affecting INR    Continues to tolerate warfarin with no reported s/s of bleeding or thromboembolism     Previous INR was Therapeutic    Plan:     Spoke with Reba regarding INR result and instructed:     Warfarin Dosing Instructions:  take 2.5 mg dose today then continue current warfarin dose    2.5 mg every Thu; 3.75 mg all other days     (0 % change)    Instructed patient to follow up no later than: one week with home monitor.    Education provided: importance of consistent vitamin K intake, impact of vitamin K foods on INR, importance of therapeutic range and target INR goal and significance of current INR result    Reba verbalizes understanding and agrees to warfarin dosing plan.    Instructed to call the [facility] for any changes, questions or concerns. (#[phone])   ?   Crys Fontanez RN    Subjective/Objective:      Reba Calderon, a 83 y.o. female is on warfarin.     Reba reports:     Home warfarin dose: verbally confirmed home dose with Reba and updated on anticoagulation calendar     Missed doses: No     Medication changes:  No     S/S of bleeding or thromboembolism:  No     New Injury or illness:  No     Changes in diet or alcohol consumption:  Yes: Too much going on the past week may be affecting INR.     Upcoming surgery, procedure or cardioversion:  No    Anticoagulation Episode Summary     Current INR goal:   2.0-3.0   TTR:   81.7 % (1.9 y)   Next INR check:   5/20/2019   INR from last check:   3.50! (5/13/2019)   Weekly max warfarin dose:      Target end date:   Indefinite   INR check location:      Preferred lab:      Send INR reminders to:   ANTICOAGULATION POOL C (DTN,VAD,CGR,GAV)    Indications    Atrial fibrillation  permanent (H)  [I48.2]           Comments:            Anticoagulation Care Providers     Provider Role Specialty Phone number    Bertin Frances MD Referring Internal Medicine [phone]

## 2021-05-28 NOTE — TELEPHONE ENCOUNTER
Incoming fax from [employer] [facility] home monitoring     INR date: 4/29    See attached document

## 2021-05-28 NOTE — TELEPHONE ENCOUNTER
Received a faxed INR result for Reba Calderon  From "[employer] [facility]    INR result dated 5/20/2019 is 3.1

## 2021-05-28 NOTE — TELEPHONE ENCOUNTER
ANTICOAGULATION  MANAGEMENT    Assessment     Today's INR result of 3.1 is Supratherapeutic (goal INR of 2.0-3.0)        Warfarin taken as previously instructed    Decreased greens/vitamin K intake may be affecting INR    No new medication/supplements affecting INR    Continues to tolerate warfarin with no reported s/s of bleeding or thromboembolism     Previous INR was Supratherapeutic    Plan:     Spoke with Reba regarding INR result and instructed:     Warfarin Dosing Instructions:  Continue current warfarin dose    2.5 mg every Thu; 3.75 mg all other days     (0 % change)    Instructed patient to follow up no later than: 5/28 with home monitor due to [facility] is closed on 5/27/19.    Education provided: importance of consistent vitamin K intake, impact of vitamin K foods on INR, importance of therapeutic range and target INR goal and significance of current INR result    Reba verbalizes understanding and agrees to warfarin dosing plan.    Instructed to call the [facility] for any changes, questions or concerns. (#[phone])   ?   Crys Fontanez RN    Subjective/Objective:      Reba Calderon, a 83 y.o. female is on warfarin.     Reba reports:     Home warfarin dose: verbally confirmed home dose with Reba and updated on anticoagulation calendar     Missed doses: No     Medication changes:  No     S/S of bleeding or thromboembolism:  No     New Injury or illness:  No     Changes in diet or alcohol consumption:  Yes: less greens/vit k stated that she normally eats salad on Sat but did not have it this past weekend.     Upcoming surgery, procedure or cardioversion:  No    Anticoagulation Episode Summary     Current INR goal:   2.0-3.0   TTR:   80.9 % (2 y)   Next INR check:   5/28/2019   INR from last check:   3.10! (5/20/2019)   Weekly max warfarin dose:      Target end date:   Indefinite   INR check location:      Preferred lab:      Send INR reminders to:   [facility]    Indications    Atrial  fibrillation  permanent (H) [I48.2]           Comments:            Anticoagulation Care Providers     Provider Role Specialty Phone number    Bertin Frances MD Referring Internal Medicine [phone]

## 2021-05-28 NOTE — TELEPHONE ENCOUNTER
ANTICOAGULATION  MANAGEMENT-Patient Home Monitoring Result    Assessment     Therapeutic INR result of 2.0 (goal INR of 2.0-3.0) received via fax from [employer] home INR monitoring company.        Previous INR was Therapeutic    Reba Calderon last contacted by phone: 4/18/19    Plan     Per home monitoring agreement with patient, patient was NOT contacted regarding therapeutic result today.  Patient is to continue current dose and continue to checking INR with home monitor every 1 week(s) per protocol.  ?   Crys Fontanez RN    Subjective/Objective:      Reba Calderon, a 83 y.o. female  is established on warfarin.     Reba Calderon is using a home INR monitor. Home INR result received via fax today.     Anticoagulation Episode Summary     Current INR goal:   2.0-3.0   TTR:   82.0 % (1.9 y)   Next INR check:   4/29/2019   INR from last check:   2.00 (4/22/2019)   Weekly max warfarin dose:      Target end date:   Indefinite   INR check location:      Preferred lab:      Send INR reminders to:   ANTICOAGULATION POOL C (DTN,VAD,CGR,GAV)    Indications    Atrial fibrillation  permanent (H) [I48.2]           Comments:            Anticoagulation Care Providers     Provider Role Specialty Phone number    Bertin Frances MD Referring Internal Medicine [phone]

## 2021-05-28 NOTE — TELEPHONE ENCOUNTER
Received a faxed INR result for Reba Calderon  From [facility]  INR result dated 5/13/2019 is 3.5

## 2021-05-29 NOTE — TELEPHONE ENCOUNTER
Received a faxed INR result for Reba Calderon  From [facility]    INR result dated 6/3/2019 is 2.5

## 2021-05-29 NOTE — TELEPHONE ENCOUNTER
ANTICOAGULATION  MANAGEMENT-Patient Home Monitoring Result    Assessment     Therapeutic INR result of 2.8 (goal INR of 2.0-3.0) received via fax from [employer] home INR monitoring company.        Previous INR was Therapeutic    Reba Calderon last contacted by phone: 5/28/19    Plan     Per home monitoring agreement with patient, patient was NOT contacted regarding therapeutic result today.  Patient is to continue current dose and continue to check INR with home monitor per protocol.  ?   Crys Fontanez RN    Subjective/Objective:      Reba Calderon, a 83 y.o. female  is established on warfarin using a home INR monitor.    Anticoagulation Episode Summary     Current INR goal:   2.0-3.0   TTR:   80.4 % (2 y)   Next INR check:   6/17/2019   INR from last check:   2.80 (6/10/2019)   Weekly max warfarin dose:      Target end date:   Indefinite   INR check location:      Preferred lab:      Send INR reminders to:   [facility]    Indications    Atrial fibrillation  permanent (H) [I48.2]           Comments:            Anticoagulation Care Providers     Provider Role Specialty Phone number    Bertin Frances MD Referring Internal Medicine [phone]

## 2021-05-29 NOTE — TELEPHONE ENCOUNTER
ANTICOAGULATION  MANAGEMENT    Assessment     Today's INR result of 3.0 is Therapeutic (goal INR of 2.0-3.0)        Warfarin taken as previously instructed    No new diet changes affecting INR    No new medication/supplements affecting INR    Continues to tolerate warfarin with no reported s/s of bleeding or thromboembolism     Previous INR was Supratherapeutic    Plan:     Spoke with Reba regarding INR result and instructed:     Warfarin Dosing Instructions:  Continue current warfarin dose    2.5 mg every Thu; 3.75 mg all other days      (0 % change)    Instructed patient to follow up no later than: one week with home monitor.    Education provided: importance of consistent vitamin K intake, importance of therapeutic range and target INR goal and significance of current INR result    Reba verbalizes understanding and agrees to warfarin dosing plan.    Instructed to call the [facility] for any changes, questions or concerns. (#[phone])   ?   Crys Fontanez RN    Subjective/Objective:      Reba PRABHAKAR Calderon, a 83 y.o. female is on warfarin.     Reba reports:     Home warfarin dose: verbally confirmed home dose with Reba and updated on anticoagulation calendar     Missed doses: No     Medication changes:  No     S/S of bleeding or thromboembolism:  No     New Injury or illness:  No     Changes in diet or alcohol consumption:  No     Upcoming surgery, procedure or cardioversion:  No    Anticoagulation Episode Summary     Current INR goal:   2.0-3.0   TTR:   80.0 % (2 y)   Next INR check:   6/3/2019   INR from last check:   3.00 (5/28/2019)   Weekly max warfarin dose:      Target end date:   Indefinite   INR check location:      Preferred lab:      Send INR reminders to:   [facility]    Indications    Atrial fibrillation  permanent (H) [I48.2]           Comments:            Anticoagulation Care Providers     Provider Role Specialty Phone number    Bertin Frances MD Referring Internal Medicine  [phone]

## 2021-05-29 NOTE — TELEPHONE ENCOUNTER
ANTICOAGULATION  MANAGEMENT-Patient Home Monitoring Result    Assessment     Therapeutic INR result of 2.7 (goal INR of 2.0-3.0) received via fax from [employer] home INR monitoring company.        Previous INR was Therapeutic    Reba Calderon last contacted by phone: 5/28/19.    Plan     Per home monitoring agreement with patient, patient was NOT contacted regarding therapeutic result today.  Patient is to continue current dose and continue to check INR with home monitor per protocol.  ?   Crys Fontanez RN    Subjective/Objective:      Reba Calderon, a 83 y.o. female  is established on warfarin using a home INR monitor.    Anticoagulation Episode Summary     Current INR goal:   2.0-3.0   TTR:   80.6 % (2 y)   Next INR check:   6/24/2019   INR from last check:   2.70 (6/17/2019)   Weekly max warfarin dose:      Target end date:   Indefinite   INR check location:      Preferred lab:      Send INR reminders to:   [facility]    Indications    Atrial fibrillation  permanent (H) [I48.2]           Comments:            Anticoagulation Care Providers     Provider Role Specialty Phone number    Bertin Frances MD Referring Internal Medicine [phone]

## 2021-05-29 NOTE — TELEPHONE ENCOUNTER
Received a faxed [facility] for Reba Calderon  [facility]    INR result dated 6/24/2019 is 2.3

## 2021-05-29 NOTE — TELEPHONE ENCOUNTER
ANTICOAGULATION  MANAGEMENT-Patient Home Monitoring Result    Assessment     Therapeutic INR result of 2.5 (goal INR of 2.0-3.0) received via fax from [employer]   home INR monitoring company.        Previous INR was Therapeutic    Reba Calderon last contacted by phone: 5/28/19    Plan     Per home monitoring agreement with patient, patient was NOT contacted regarding therapeutic result today.  Patient is to continue current dose and continue to check INR with home monitor per protocol.  ?   Crys Fontanez RN    Subjective/Objective:      Reba Calderon, a 83 y.o. female  is established on warfarin using a home INR monitor.    Anticoagulation Episode Summary     Current INR goal:   2.0-3.0   TTR:   80.2 % (2 y)   Next INR check:   6/10/2019   INR from last check:   2.50 (6/3/2019)   Weekly max warfarin dose:      Target end date:   Indefinite   INR check location:      Preferred lab:      Send INR reminders to:   [facility]    Indications    Atrial fibrillation  permanent (H) [I48.2]           Comments:            Anticoagulation Care Providers     Provider Role Specialty Phone number    Bertin Frances MD Referring Internal Medicine [phone]

## 2021-05-29 NOTE — TELEPHONE ENCOUNTER
Received [facility] for [facility]ba Calder[facility]  [facility]    INR result dated 6/17/2019 is 2.7

## 2021-05-29 NOTE — TELEPHONE ENCOUNTER
ANTICOAGULATION  MANAGEMENT-Patient Home Monitoring Result    Assessment     Therapeutic INR result of 2.3 (goal INR of 2.0-3.0) received via fax from "[employer] home INR monitoring company.        Previous INR was Therapeutic    Reba Calderon last contacted by phone: 5/28/19    Plan     Per home monitoring agreement with patient, patient was NOT contacted regarding therapeutic result today.  Patient is to continue current dose and continue to check INR with home monitor per protocol.  ?   Crys Fontanez RN    Subjective/Objective:      Reba Calderon, a 83 y.o. female  is established on warfarin using a home INR monitor.    Anticoagulation Episode Summary     Current INR goal:   2.0-3.0   TTR:   80.7 % (2.1 y)   Next INR check:   7/1/2019   INR from last check:   2.30 (6/24/2019)   Weekly max warfarin dose:      Target end date:   Indefinite   INR check location:      Preferred lab:      Send INR reminders to:   [facility]    Indications    Atrial fibrillation  permanent (H) [I48.2]           Comments:            Anticoagulation Care Providers     Provider Role Specialty Phone number    Bertin Frances MD Referring Internal Medicine [phone]

## 2021-05-29 NOTE — TELEPHONE ENCOUNTER
Received a faxed INR result for Reba Calderon  From [facility]    INR result dated 6/10/2019 is 2.8

## 2021-05-29 NOTE — TELEPHONE ENCOUNTER
Received a faxed INR result for Reba Calderon  From [facility]  INR result dated 5/28/2019 is 3.0

## 2021-05-30 NOTE — TELEPHONE ENCOUNTER
Received a faxed INR result for Reba Calderon  From [facility]    INR result dated 7/8/2019 is 2.5

## 2021-05-30 NOTE — TELEPHONE ENCOUNTER
ANTICOAGULATION  MANAGEMENT-Patient Home Monitoring Result    Assessment     Therapeutic INR result of 2.5 (goal INR of 2.0-3.0) received via fax from [employer] home INR monitoring company.        Previous INR was Therapeutic    Reba Calderon last contacted by phone: 5/28/19    Plan     Per home monitoring agreement with patient, patient was NOT contacted regarding therapeutic result today.  Patient is to continue current dose and continue to check INR with home monitor per protocol.  ?   Crys Fontanez RN    Subjective/Objective:      Reba Calderon, a 83 y.o. female  is established on warfarin using a home INR monitor.    Anticoagulation Episode Summary     Current INR goal:   2.0-3.0   TTR:   81.1 % (2.1 y)   Next INR check:   7/15/2019   INR from last check:   2.50 (7/8/2019)   Weekly max warfarin dose:      Target end date:   Indefinite   INR check location:      Preferred lab:      Send INR reminders to:   [facility]    Indications    Atrial fibrillation  permanent (H) [I48.2]           Comments:            Anticoagulation Care Providers     Provider Role Specialty Phone number    Bertin Frances MD Referring Internal Medicine [phone]

## 2021-05-30 NOTE — TELEPHONE ENCOUNTER
[facility]    INR result dated 7/15/2019 is 2.4

## 2021-05-30 NOTE — TELEPHONE ENCOUNTER
ANTICOAGULATION  MANAGEMENT    Assessment     Today's INR result of 2.7 is Therapeutic (goal INR of 2.0-3.0)        Warfarin taken as previously instructed    No new diet changes affecting INR    No new medication/supplements affecting INR    Continues to tolerate warfarin with no reported s/s of bleeding or thromboembolism     Previous INR was Supratherapeutic    Plan:     Spoke with Reba regarding INR result and instructed:     Warfarin Dosing Instructions:  Continue current warfarin dose 2.5 mg daily on Thu; and 3.75 mg daily rest of week  (0 % change)    Instructed patient to follow up no later than: 1 week    Education provided: target INR goal and significance of current INR result, importance of notifying clinic for changes in medications and importance of notifying clinic for diarrhea, nausea/vomiting, reduced intake and/or illness    Reba verbalizes understanding and agrees to warfarin dosing plan.    Instructed to call the [facility] for any changes, questions or concerns. (#[phone])   ?   Maine Danielson RN    Subjective/Objective:      Reba Calderon, a 83 y.o. female is on warfarin.     Reba reports:     Home warfarin dose: verbally confirmed home dose with Reba and updated on anticoagulation calendar     Missed doses: No     Medication changes:  No     S/S of bleeding or thromboembolism:  No     New Injury or illness:  No     Changes in diet or alcohol consumption:  No     Upcoming surgery, procedure or cardioversion:  No    Anticoagulation Episode Summary     Current INR goal:   2.0-3.0   TTR:   81.0 % (2.2 y)   Next INR check:   8/6/2019   INR from last check:   2.70 (7/30/2019)   Weekly max warfarin dose:      Target end date:   Indefinite   INR check location:      Preferred lab:      Send INR reminders to:   [facility]    Indications    Atrial fibrillation  permanent (H) [I48.2]           Comments:            Anticoagulation Care Providers     Provider Role Specialty Phone  number    Bertin Frances MD [facility] Internal Medicine [phone]

## 2021-05-30 NOTE — PROGRESS NOTES
OFFICE VISIT NOTE  Reba Calderon   83 y.o. female            Assessment/Plan for  Reba Calderon is a 83 y.o. female.  No Patient Care Coordination Note on file.       1. Atrial fibrillation, permanent (H)  Doing well.  Rate control strategy.  No bleeding issues on Coumadin    2. Diabetes mellitus type 2 in nonobese (H)  Good control.  Recent episodes of higher sugars due to stress-dealing with her Sister Ayla's dementia/illness/insurance troubles    3. Nasal hemangioma  With crusting and bleeding.  Obstruction resolved.  Followed by Dr. Ta Hunt at ENT    4. Asymptomatic carotid artery stenosis, unspecified laterality  Doing well.  Is on statin Coumadin    5. History of permanent cardiac pacemaker placement  Functioning well    6.  History of non-Hodgkin's lymphoma without evidence of recurrence    Plan:  Continue current Rx  BMP UA microalbumin, A1c  His nose throat follow-up   clinic follow-up 6 months  Routine cardiology follow-up      There are no Patient Instructions on file for this visit.    Diagnoses and all orders for this visit:    Atrial fibrillation, permanent (H)    Diabetes mellitus type 2 in nonobese (H)    Nasal hemangioma    Asymptomatic carotid artery stenosis, unspecified laterality    History of permanent cardiac pacemaker placement        Medications after visit  Current Outpatient Medications   Medication Sig Dispense Refill     ammonium lactate (AMLACTIN) 12 % cream Apply topically as needed for dry skin.       ascorbic acid/vitamin E/biotin (HAIR, SKIN, NAILS WITH BIOTIN ORAL) Take 2,500 mcg by mouth every morning.       atorvastatin (LIPITOR) 10 MG tablet TAKE 1 TABLET BY MOUTH EVERY DAY 90 tablet 1     b complex vitamins capsule Take 1 capsule by mouth daily. 1000mcg       blood glucose test (CONTOUR TEST STRIPS) strips TEST ONCE DAILY AS DIRECTED 100 strip 3     calcium carbonate-vitamin D2 500 mg(1,250mg) -200 unit tablet Take 1 tablet by mouth daily.       cholecalciferol,  vitamin D3, 1,000 unit tablet Take 2,000 Units by mouth daily.       folic acid (FOLVITE) 1 MG tablet Take 1 mg by mouth daily.       glucosamine-chondroitin 500-400 mg tablet Take 1 tablet by mouth 2 (two) times a day.       lisinopril (PRINIVIL,ZESTRIL) 10 MG tablet TAKE 1 TABLET BY MOUTH EVERY DAY 90 tablet 3     magnesium chloride (SLOW-MAG) 64 mg TbEC delayed-release tablet Take 64 mg by mouth daily.       magnesium oxide (TAYLOR) 500 mg Tab Take by mouth at bedtime.       metFORMIN (GLUCOPHAGE) 500 MG tablet Take 1 tablet (500 mg total) by mouth 2 (two) times a day. 180 tablet 3     nitroglycerin (NITROSTAT) 0.4 MG SL tablet Place 0.4 mg under the tongue every 5 (five) minutes as needed for chest pain.       pantoprazole (PROTONIX) 40 MG tablet Take 1 tablet (40 mg total) by mouth daily. 90 tablet 3     potassium chloride (KLOR-CON 10) 10 MEQ CR tablet Take 1 tablet (10 mEq total) by mouth daily. 90 tablet 3     warfarin (COUMADIN/JANTOVEN) 2.5 MG tablet TAKE 1-1.5 TABLETS BY MOUTH DAILY AS DIRECTED. ADJUST DOSE PER INR RESULTS AS INSTRUCTED. 140 tablet 1     furosemide (LASIX) 40 MG tablet Take 2 tablets (80 mg total) by mouth daily. (Patient taking differently: Take 40 mg by mouth daily.       ) 180 tablet 3     No current facility-administered medications for this visit.                       Bertin Frances MD  Internal medicine  [facility]  [phone]  [email or web address]    Much or all of the text in this note was generated through the use of Dragon Dictate voice-to-text software. Errors in spelling or words which seem out of context are unintentional.   Sound alike errors, in particular, may have escaped editing.                 Subjective:   Chief Complaint:  Diabetes; Hypertension; Follow-up (Routine 3m visit); and Medication Question Or Clarification (Discuss furosemide dose/directions)    Patient is stressed  Sister is ill  Worsening dementia  At assisted  living unable to really care for self  There are insurance issues which she and her daughter now are dealing with  Very stressful  Sugars are up in the 140s to 160s now back down to 120s with her daughter helping out.    No polydipsia polyuria  Last A1c no clinical hypoglycemia    Atrial Fibrillation    -patient has no cardiovascular symptoms such as palpitations, PND, orthopnea, dyspnea, dizziness, syncope.  Patient, weakness, hemiparesis, dysarthria, ataxia.  No embolic symptomatology such as leg pain or cardiac/neurologic symptoms.  Coumadin monitoring-the patient is compliant with taking their Coumadin as directed.  There is no problem with bleeding. There is no excessive bruising, epistaxis, gum bleeding, hemoptysis, melena, hematochezia, hematuria.  The schedule of Coumadin is controlled through ambulatory anticoagulation clinic.  INR is noted in the chart and reviewed      Crusting irritation from removal of right nares hemangioma.  No more obstructive issues.  Breathing okay.  Does see ENT for debridement.  She is applying moisturizing cream.    Review of Systems:     Extensive 10-point review of systems was performed. Please see the HPI for problem specific pertinent review of systems.     Patient does note her appetite is good.  Digestion is normal      Otherwise, the following systems are noncontributory including constitutional, eyes, ears, nose and throat, cardiovascular, respiratory, gastrointestinal, genitourinary, musculoskeletal,neurological, skin and/or breast, endocrine, hematologic/lymph, allergic/immunologic and psychiatric.              Medications:  Current Outpatient Medications on File Prior to Visit   Medication Sig     ammonium lactate (AMLACTIN) 12 % cream Apply topically as needed for dry skin.     ascorbic acid/vitamin E/biotin (HAIR, SKIN, NAILS WITH BIOTIN ORAL) Take 2,500 mcg by mouth every morning.     atorvastatin (LIPITOR) 10 MG tablet TAKE 1 TABLET BY MOUTH EVERY DAY     b  complex vitamins capsule Take 1 capsule by mouth daily. 1000mcg     blood glucose test (CONTOUR TEST STRIPS) strips TEST ONCE DAILY AS DIRECTED     calcium carbonate-vitamin D2 500 mg(1,250mg) -200 unit tablet Take 1 tablet by mouth daily.     cholecalciferol, vitamin D3, 1,000 unit tablet Take 2,000 Units by mouth daily.     folic acid (FOLVITE) 1 MG tablet Take 1 mg by mouth daily.     glucosamine-chondroitin 500-400 mg tablet Take 1 tablet by mouth 2 (two) times a day.     lisinopril (PRINIVIL,ZESTRIL) 10 MG tablet TAKE 1 TABLET BY MOUTH EVERY DAY     magnesium chloride (SLOW-MAG) 64 mg TbEC delayed-release tablet Take 64 mg by mouth daily.     magnesium oxide (TAYLOR) 500 mg Tab Take by mouth at bedtime.     metFORMIN (GLUCOPHAGE) 500 MG tablet Take 1 tablet (500 mg total) by mouth 2 (two) times a day.     nitroglycerin (NITROSTAT) 0.4 MG SL tablet Place 0.4 mg under the tongue every 5 (five) minutes as needed for chest pain.     pantoprazole (PROTONIX) 40 MG tablet Take 1 tablet (40 mg total) by mouth daily.     potassium chloride (KLOR-CON 10) 10 MEQ CR tablet Take 1 tablet (10 mEq total) by mouth daily.     warfarin (COUMADIN/JANTOVEN) 2.5 MG tablet TAKE 1-1.5 TABLETS BY MOUTH DAILY AS DIRECTED. ADJUST DOSE PER INR RESULTS AS INSTRUCTED.     furosemide (LASIX) 40 MG tablet Take 2 tablets (80 mg total) by mouth daily. (Patient taking differently: Take 40 mg by mouth daily.       )     No current facility-administered medications on file prior to visit.             Allergies:  Allergies   Allergen Reactions     Amiodarone Unknown     Dyspnea, O2 dependency     Beta-Blockers (Beta-Adrenergic Blocking Agts) Unknown     Lower extremity edema     Oxycodone-Acetaminophen Anaphylaxis     Atenolol Other (See Comments)     adverse reaction, ,      Meperidine Nausea And Vomiting     Metoprolol Succinate Unknown     SOB and joint pain     Pioglitazone Hcl Unknown     Legs swell, Comment: intolerance, Description:  "Actos, Comment: intolerance, Description: Actos       PSFHx: Tobacco Status:  She  reports that she has never smoked. She has never used smokeless tobacco.   Alcohol Status:    Social History     Substance and Sexual Activity   Alcohol Use Yes    Comment: occasional       reports that she has never smoked. She has never used smokeless tobacco. She reports that she drinks alcohol. Her drug history is not on file.    Objective:    /76   Pulse 83   Ht 5' 6\" (1.676 m)   Wt 214 lb 1.3 oz (97.1 kg)   LMP 08/24/1994 (Exact Date)   SpO2 97%   Breastfeeding? No   BMI 34.55 kg/m    Weight:   Wt Readings from Last 3 Encounters:   06/26/19 214 lb 1.3 oz (97.1 kg)   03/20/19 215 lb 1.9 oz (97.6 kg)   09/12/18 219 lb 0.6 oz (99.4 kg)     BP Readings from Last 10 Encounters:   06/26/19 160/76   03/20/19 126/70   09/12/18 150/70   07/25/18 138/62   07/12/18 126/66   03/08/18 138/80   11/24/17 116/60   05/24/17 132/72   08/24/16 118/64     Vitals:    06/26/19 1059 06/26/19 1128   BP: 146/70 160/76   Patient Site: Left Arm    Patient Position: Sitting    Cuff Size: Adult Regular    Pulse: 83    SpO2: 97%    Weight: 214 lb 1.3 oz (97.1 kg)    Height: 5' 6\" (1.676 m)          General-appears well, no acute distress.  Pulse irregular  Lungs clear  Nontender pacemaker device left infraclavicular  Lungs clear  Cardiac irregular  No murmur        Diabetic foot exam-unremarkable      Review of clinical lab tests  Lab Results   Component Value Date    WBC 8.1 03/20/2019    HGB 14.6 03/20/2019    HCT 42.9 03/20/2019     03/20/2019    CHOL 199 03/20/2019    TRIG 98 09/12/2018    HDL 49 (L) 09/12/2018    ALT 15 09/12/2018    AST 15 09/12/2018     03/20/2019    K 3.6 03/20/2019    CL 98 03/20/2019    CREATININE 0.95 03/20/2019    BUN 22 03/20/2019    CO2 31 03/20/2019    TSH 1.28 09/12/2018    INR 2.30 (!) 06/24/2019    HGBA1C 6.6 (H) 03/20/2019       Glucose   Date/Time Value Ref Range Status   03/20/2019 09:28  " (H) 70 - 125 mg/dL Final   09/12/2018 10:02  (H) 70 - 125 mg/dL Final   07/25/2018 03:30  (H) 70 - 125 mg/dL Final   03/08/2018 10:21  (H) 70 - 125 mg/dL Final   11/24/2017 09:08  (H) 70 - 125 mg/dL Final   05/24/2017 03:05  (H) 70 - 125 mg/dL Final     No results found for this or any previous visit (from the past 24 hour(s)).    RADIOLOGY: No results found.

## 2021-05-30 NOTE — TELEPHONE ENCOUNTER
ANTICOAGULATION  MANAGEMENT-Patient Home Monitoring Result    Assessment     Therapeutic INR result of 2.4 (goal INR of 2.0-3.0) received via fax from [employer]   home INR monitoring company.        Previous INR was Therapeutic    Reba Calderon last contacted by phone: 5/28/19    Plan     Per home monitoring agreement with patient, patient was NOT contacted regarding therapeutic result today.  Patient is to continue current dose and continue to check INR with home monitor per protocol.  ?   Crys Fontanez RN    Subjective/Objective:      Reba Calderon, a 83 y.o. female  is established on warfarin using a home INR monitor.    Anticoagulation Episode Summary     Current INR goal:   2.0-3.0   TTR:   81.3 % (2.1 y)   Next INR check:   7/15/2019   INR from last check:   2.40 (7/15/2019)   Weekly max warfarin dose:      Target end date:   Indefinite   INR check location:      Preferred lab:      Send INR reminders to:   [facility]    Indications    Atrial fibrillation  permanent (H) [I48.2]           Comments:            Anticoagulation Care Providers     Provider Role Specialty Phone number    Bertin Frances MD Referring Internal Medicine [phone]

## 2021-05-30 NOTE — TELEPHONE ENCOUNTER
Received a [facility] IN[facility] for Reba Calderon  [facility]    INR result dated 7/22/2019 is 3.2

## 2021-05-30 NOTE — TELEPHONE ENCOUNTER
ANTICOAGULATION  MANAGEMENT-Patient Home Monitoring Result    Assessment     Therapeutic INR result of 2.1 (goal INR of 2.0-3.0) received via fax from [employer]   home INR monitoring company.        Previous INR was Therapeutic    Reba Calderon last contacted by phone: 5/28/19    Plan     Per home monitoring agreement with patient, patient was NOT contacted regarding therapeutic result today.  Patient is to continue current dose and continue to check INR with home monitor per protocol.  ?   Crys Fontanez RN    Subjective/Objective:      Reba Calderon, a 83 y.o. female  is established on warfarin using a home INR monitor.    Anticoagulation Episode Summary     Current INR goal:   2.0-3.0   TTR:   81.1 % (2.1 y)   Next INR check:   7/15/2019   INR from last check:   2.50 (7/8/2019)   Weekly max warfarin dose:      Target end date:   Indefinite   INR check location:      Preferred lab:      Send INR reminders to:   [facility]    Indications    Atrial fibrillation  permanent (H) [I48.2]           Comments:            Anticoagulation Care Providers     Provider Role Specialty Phone number    Bertin Frances MD Referring Internal Medicine [phone]

## 2021-05-30 NOTE — TELEPHONE ENCOUNTER
ANTICOAGULATION  MANAGEMENT    Assessment     Today's INR result of 3.2 is Supratherapeutic (goal INR of 2.0-3.0)        Warfarin taken as previously instructed    Sister past away  may be affecting diet and INR    No new medication/supplements affecting INR    Continues to tolerate warfarin with no reported s/s of bleeding or thromboembolism     Previous INR was Therapeutic    Plan:     Spoke with Reba regarding INR result and instructed:     Warfarin Dosing Instructions:  take one time lower dose of 2.5 mg dose today then continue current warfarin dose    2.5 mg every Thu; 3.75 mg all other days     (0 % change)    Instructed patient to follow up no later than: one week with home monitor.    Education provided: importance of therapeutic range and target INR goal and significance of current INR result    Reba verbalizes understanding and agrees to warfarin dosing plan.    Instructed to call the [facility] for any changes, questions or concerns. (#[phone])   ?   Crys Fontanez RN    Subjective/Objective:      Reba Calderon, a 83 y.o. female is on warfarin.     Reba reports:     Home warfarin dose: verbally confirmed home dose with Reba and updated on anticoagulation calendar     Missed doses: No     Medication changes:  No     S/S of bleeding or thromboembolism:  No     New Injury or illness:  No     Changes in diet or alcohol consumption:  Yes: stress maybe affecting diet.     Upcoming surgery, procedure or cardioversion:  No    Anticoagulation Episode Summary     Current INR goal:   2.0-3.0   TTR:   81.2 % (2.1 y)   Next INR check:   7/29/2019   INR from last check:   3.20! (7/22/2019)   Weekly max warfarin dose:      Target end date:   Indefinite   INR check location:      Preferred lab:      Send INR reminders to:   [facility]    Indications    Atrial fibrillation  permanent (H) [I48.2]           Comments:            Anticoagulation Care Providers     Provider Role Specialty Phone  number    Bertin Frances MD [facility] Internal Medicine [phone]

## 2021-05-30 NOTE — TELEPHONE ENCOUNTER
Received a faxed INR result for Reba Calderon  From [employer] [facility]    INR result dated 7/1/2019 is 2.1

## 2021-05-31 VITALS — BODY MASS INDEX: 34.57 KG/M2 | WEIGHT: 215.12 LBS | HEIGHT: 66 IN

## 2021-05-31 VITALS — HEIGHT: 66 IN | BODY MASS INDEX: 34.89 KG/M2 | WEIGHT: 217.12 LBS

## 2021-05-31 NOTE — TELEPHONE ENCOUNTER
ANTICOAGULATION  MANAGEMENT-Patient Home Monitoring Result    Assessment     Therapeutic INR result of 2.5 (goal INR of 2.0-3.0) received via fax from [employer] home INR monitoring company.        Previous INR was Therapeutic    Reba Calderon last contacted by phone: 7/30/19    Plan     Per home monitoring agreement with patient, patient was NOT contacted regarding therapeutic result today.  Patient is to continue current dose and continue to check INR with home monitor per protocol.  ?   Crys Fontanez RN    Subjective/Objective:      Reba Calderon, a 83 y.o. female  is established on warfarin using a home INR monitor.    Anticoagulation Episode Summary     Current INR goal:   2.0-3.0   TTR:   81.5 % (2.2 y)   Next INR check:   8/26/2019   INR from last check:   2.50 (8/19/2019)   Weekly max warfarin dose:      Target end date:   Indefinite   INR check location:      Preferred lab:      Send INR reminders to:   [facility]    Indications    Atrial fibrillation  permanent (H) [I48.2]           Comments:            Anticoagulation Care Providers     Provider Role Specialty Phone number    Bertin Frances MD Referring Internal Medicine [phone]

## 2021-05-31 NOTE — TELEPHONE ENCOUNTER
Received a faxed INR result for Reba Calderon  From [employer] [facility]    INR result dated 8/19/2019 is 2.5

## 2021-05-31 NOTE — TELEPHONE ENCOUNTER
ANTICOAGULATION  MANAGEMENT-Patient Home Monitoring Result    Assessment     Therapeutic INR result of 2.8 (goal INR of 2.0-3.0) received via fax from [employer] home INR monitoring company.        Previous INR was Therapeutic    Reba Calderon last contacted by phone: 7/30/19    Plan     Per home monitoring agreement with patient, patient was NOT contacted regarding therapeutic result today.  Patient is to continue current dose and continue to check INR with home monitor per protocol.  ?   Crys Fontanez RN    Subjective/Objective:      Reba Calderon, a 83 y.o. female  is established on warfarin using a home INR monitor.    Anticoagulation Episode Summary     Current INR goal:   2.0-3.0   TTR:   76.1 %   Next INR check:   9/9/2019   INR from last check:   2.80 (9/2/2019)   Weekly max warfarin dose:      Target end date:   Indefinite   INR check location:      Preferred lab:      Send INR reminders to:   [facility]    Indications    Atrial fibrillation  permanent (H) [I48.2]           Comments:            Anticoagulation Care Providers     Provider Role Specialty Phone number    Bertin Frances MD Referring Internal Medicine [phone]

## 2021-05-31 NOTE — TELEPHONE ENCOUNTER
ANTICOAGULATION  MANAGEMENT-Patient Home Monitoring Result    Assessment     Therapeutic INR result of 2.4 (goal INR of 2.0-3.0) received via fax from [employer] home INR monitoring company.        Previous INR was Therapeutic    Reba Calderon last contacted by phone: 7/30    Plan     Per home monitoring agreement with patient, patient was NOT contacted regarding therapeutic result today.  Patient is to continue current dose and continue to check INR with home monitor per protocol.  ?   Monica Sanford RN    Subjective/Objective:      Reba Calderon, a 83 y.o. female  is established on warfarin using a home INR monitor.    Anticoagulation Episode Summary     Current INR goal:   2.0-3.0   TTR:   81.6 % (2.2 y)   Next INR check:   9/3/2019   INR from last check:   2.40 (8/26/2019)   Weekly max warfarin dose:      Target end date:   Indefinite   INR check location:      Preferred lab:      Send INR reminders to:   [facility]    Indications    Atrial fibrillation  permanent (H) [I48.2]           Comments:            Anticoagulation Care Providers     Provider Role Specialty Phone number    Bertin Frances MD Referring Internal Medicine [phone]

## 2021-05-31 NOTE — TELEPHONE ENCOUNTER
Received a faxed INR result for Reba Calderon  From [facility]    INR result dated 9/2/2019 is 2.8

## 2021-05-31 NOTE — TELEPHONE ENCOUNTER
Refill Approved    Rx renewed per Medication Renewal Policy. Medication was last renewed on 8/24/2018.         Brennen Mensah, [facility] Connection Triage/Med Refill 9/2/2019     Requested Prescriptions   Pending Prescriptions Disp Refills     lisinopril (PRINIVIL,ZESTRIL) 10 MG tablet [Pharmacy Med Name: LISINOPRIL 10 MG TABLET] 90 tablet 3     Sig: TAKE 1 TABLET BY MOUTH EVERY DAY       Ace Inhibitors Refill Protocol Passed - 9/2/2019 12:57 AM        Passed - PCP or prescribing provider visit in past 12 months       Last office visit with prescriber/PCP: 6/26/2019 Bertin Frances MD OR same dept: 6/26/2019 Bertin Frances MD OR same specialty: 6/26/2019 Bertin Frances MD  Last physical: 3/20/2019 Last MTM visit: Visit date not found   Next visit within 3 mo: Visit date not found  Next physical within 3 mo: Visit date not found  Prescriber OR PCP: Bertin Frances MD  Last diagnosis associated with med order: 1. Atrial fibrillation, permanent (H)  - lisinopril (PRINIVIL,ZESTRIL) 10 MG tablet [Pharmacy Med Name: LISINOPRIL 10 MG TABLET]; TAKE 1 TABLET BY MOUTH EVERY DAY  Dispense: 90 tablet; Refill: 3  - furosemide (LASIX) 40 MG tablet [Pharmacy Med Name: FUROSEMIDE 40 MG TABLET]; Take 2 tablets (80 mg total) by mouth daily.  Dispense: 180 tablet; Refill: 3    2. GERD (gastroesophageal reflux disease)  - pantoprazole (PROTONIX) 40 MG tablet [Pharmacy Med Name: PANTOPRAZOLE SOD DR 40 MG TAB]; TAKE 1 TABLET BY MOUTH EVERY DAY  Dispense: 90 tablet; Refill: 3    3. Diabetes (H)  - atorvastatin (LIPITOR) 10 MG tablet [Pharmacy Med Name: ATORVASTATIN 10 MG TABLET]; TAKE 1 TABLET BY MOUTH EVERY DAY  Dispense: 90 tablet; Refill: 1    If protocol passes may refill for 12 months if within 3 months of last provider visit (or a total of 15 months).             Passed - Serum Potassium in past 12 months     Lab Results   Component Value Date    Potassium 4.1 06/26/2019             Passed - Blood pressure filed in past 12 months      BP Readings from Last 1 Encounters:   06/26/19 160/76             Passed - Serum Creatinine in past 12 months     Creatinine   Date Value Ref Range Status   06/26/2019 0.86 0.60 - 1.10 mg/dL Final             furosemide (LASIX) 40 MG tablet [Pharmacy Med Name: FUROSEMIDE 40 MG TABLET] 180 tablet 3     Sig: TAKE 2 TABLETS (80 MG TOTAL) BY MOUTH DAILY.       Diuretics/Combination Diuretics Refill Protocol  Passed - 9/2/2019 12:57 AM        Passed - Visit with PCP or prescribing provider visit in past 12 months     Last office visit with prescriber/PCP: 6/26/2019 Bertin Frances MD OR same dept: 6/26/2019 Bertin Frances MD OR same specialty: 6/26/2019 Bertin Frances MD  Last physical: 3/20/2019 Last MTM visit: Visit date not found   Next visit within 3 mo: Visit date not found  Next physical within 3 mo: Visit date not found  Prescriber OR PCP: Bertin Frances MD  Last diagnosis associated with med order: 1. Atrial fibrillation, permanent (H)  - lisinopril (PRINIVIL,ZESTRIL) 10 MG tablet [Pharmacy Med Name: LISINOPRIL 10 MG TABLET]; TAKE 1 TABLET BY MOUTH EVERY DAY  Dispense: 90 tablet; Refill: 3  - furosemide (LASIX) 40 MG tablet [Pharmacy Med Name: FUROSEMIDE 40 MG TABLET]; Take 2 tablets (80 mg total) by mouth daily.  Dispense: 180 tablet; Refill: 3    2. GERD (gastroesophageal reflux disease)  - pantoprazole (PROTONIX) 40 MG tablet [Pharmacy Med Name: PANTOPRAZOLE SOD DR 40 MG TAB]; TAKE 1 TABLET BY MOUTH EVERY DAY  Dispense: 90 tablet; Refill: 3    3. Diabetes (H)  - atorvastatin (LIPITOR) 10 MG tablet [Pharmacy Med Name: ATORVASTATIN 10 MG TABLET]; TAKE 1 TABLET BY MOUTH EVERY DAY  Dispense: 90 tablet; Refill: 1    If protocol passes may refill for 12 months if within 3 months of last provider visit (or a total of 15 months).             Passed - Serum Potassium in past 12 months      Lab Results   Component Value Date    Potassium 4.1 06/26/2019             Passed - Serum Sodium in past 12 months       Lab Results   Component Value Date    Sodium 141 06/26/2019             Passed - Blood pressure on file in past 12 months     BP Readings from Last 1 Encounters:   06/26/19 160/76             Passed - Serum Creatinine in past 12 months      Creatinine   Date Value Ref Range Status   06/26/2019 0.86 0.60 - 1.10 mg/dL Final             pantoprazole (PROTONIX) 40 MG tablet [Pharmacy Med Name: PANTOPRAZOLE SOD DR 40 MG TAB] 90 tablet 3     Sig: TAKE 1 TABLET BY MOUTH EVERY DAY       GI Medications Refill Protocol Passed - 9/2/2019 12:57 AM        Passed - PCP or prescribing provider visit in last 12 or next 3 months.     Last office visit with prescriber/PCP: 6/26/2019 Bertin Frances MD OR same dept: 6/26/2019 Bertin Frances MD OR same specialty: 6/26/2019 Bertin Frances MD  Last physical: 3/20/2019 Last MTM visit: Visit date not found   Next visit within 3 mo: Visit date not found  Next physical within 3 mo: Visit date not found  Prescriber OR PCP: Bertin Frances MD  Last diagnosis associated with med order: 1. Atrial fibrillation, permanent (H)  - lisinopril (PRINIVIL,ZESTRIL) 10 MG tablet [Pharmacy Med Name: LISINOPRIL 10 MG TABLET]; TAKE 1 TABLET BY MOUTH EVERY DAY  Dispense: 90 tablet; Refill: 3  - furosemide (LASIX) 40 MG tablet [Pharmacy Med Name: FUROSEMIDE 40 MG TABLET]; Take 2 tablets (80 mg total) by mouth daily.  Dispense: 180 tablet; Refill: 3    2. GERD (gastroesophageal reflux disease)  - pantoprazole (PROTONIX) 40 MG tablet [Pharmacy Med Name: PANTOPRAZOLE SOD DR 40 MG TAB]; TAKE 1 TABLET BY MOUTH EVERY DAY  Dispense: 90 tablet; Refill: 3    3. Diabetes (H)  - atorvastatin (LIPITOR) 10 MG tablet [Pharmacy Med Name: ATORVASTATIN 10 MG TABLET]; TAKE 1 TABLET BY MOUTH EVERY DAY  Dispense: 90 tablet; Refill: 1    If protocol passes may refill for 12 months if within 3 months of last provider visit (or a total of 15 months).             atorvastatin (LIPITOR) 10 MG tablet [Pharmacy Med Name:  ATORVASTATIN 10 MG TABLET] 90 tablet 1     Sig: TAKE 1 TABLET BY MOUTH EVERY DAY       Statins Refill Protocol (Hmg CoA Reductase Inhibitors) Passed - 9/2/2019 12:57 AM        Passed - PCP or prescribing provider visit in past 12 months      Last office visit with prescriber/PCP: 6/26/2019 Bertin Frances MD OR same dept: 6/26/2019 Bertin Frances MD OR same specialty: 6/26/2019 Bertin Frances MD  Last physical: 3/20/2019 Last MTM visit: Visit date not found   Next visit within 3 mo: Visit date not found  Next physical within 3 mo: Visit date not found  Prescriber OR PCP: Bertin Frances MD  Last diagnosis associated with med order: 1. Atrial fibrillation, permanent (H)  - lisinopril (PRINIVIL,ZESTRIL) 10 MG tablet [Pharmacy Med Name: LISINOPRIL 10 MG TABLET]; TAKE 1 TABLET BY MOUTH EVERY DAY  Dispense: 90 tablet; Refill: 3  - furosemide (LASIX) 40 MG tablet [Pharmacy Med Name: FUROSEMIDE 40 MG TABLET]; Take 2 tablets (80 mg total) by mouth daily.  Dispense: 180 tablet; Refill: 3    2. GERD (gastroesophageal reflux disease)  - pantoprazole (PROTONIX) 40 MG tablet [Pharmacy Med Name: PANTOPRAZOLE SOD DR 40 MG TAB]; TAKE 1 TABLET BY MOUTH EVERY DAY  Dispense: 90 tablet; Refill: 3    3. Diabetes (H)  - atorvastatin (LIPITOR) 10 MG tablet [Pharmacy Med Name: ATORVASTATIN 10 MG TABLET]; TAKE 1 TABLET BY MOUTH EVERY DAY  Dispense: 90 tablet; Refill: 1    If protocol passes may refill for 12 months if within 3 months of last provider visit (or a total of 15 months).

## 2021-05-31 NOTE — TELEPHONE ENCOUNTER
RN cannot approve Refill Request    RN can NOT refill this medication  medication     . Last office visit: 2019 Bertin Frances MD Last Physical: 3/20/2019 Last MTM visit: Visit date not found Last visit same specialty: 2019 Bertin Frances MD.  Next visit within 3 mo: Visit date not found  Next physical within 3 mo: Visit date not found      Brennen Mensah, [facility] Connection Triage/Med Refill 2019    Requested Prescriptions   Pending Prescriptions Disp Refills     furosemide (LASIX) 40 MG tablet [Pharmacy Med Name: FUROSEMIDE 40 MG TABLET] 180 tablet 3     Sig: TAKE 2 TABLETS (80 MG TOTAL) BY MOUTH DAILY.       Diuretics/Combination Diuretics Refill Protocol  Passed - 2019 12:57 AM        Passed - Visit with PCP or prescribing provider visit in past 12 months     Last office visit with prescriber/PCP: 2019 Bertin Frances MD OR same dept: 2019 Bertin Frances MD OR same specialty: 2019 Bertin Frances MD  Last physical: 3/20/2019 Last MTM visit: Visit date not found   Next visit within 3 mo: Visit date not found  Next physical within 3 mo: Visit date not found  Prescriber OR PCP: Bertin Frances MD  Last diagnosis associated with med order: 1. Atrial fibrillation, permanent (H)  - lisinopril (PRINIVIL,ZESTRIL) 10 MG tablet; TAKE 1 TABLET BY MOUTH EVERY DAY  Dispense: 90 tablet; Refill: 3  - furosemide (LASIX) 40 MG tablet [Pharmacy Med Name: FUROSEMIDE 40 MG TABLET]; TAKE 2 TABLETS (80 MG TOTAL) BY MOUTH DAILY.  Dispense: 180 tablet; Refill: 3    2. GERD (gastroesophageal reflux disease)  - pantoprazole (PROTONIX) 40 MG tablet; TAKE 1 TABLET BY MOUTH EVERY DAY  Dispense: 90 tablet; Refill: 3    3. Diabetes (H)  - atorvastatin (LIPITOR) 10 MG tablet; TAKE 1 TABLET BY MOUTH EVERY DAY  Dispense: 90 tablet; Refill: 3    If protocol passes may refill for 12 months if within 3 months of last provider visit (or a total of 15 months).             Passed - Serum Potassium in past  12 months      Lab Results   Component Value Date    Potassium 4.1 06/26/2019             Passed - Serum Sodium in past 12 months      Lab Results   Component Value Date    Sodium 141 06/26/2019             Passed - Blood pressure on file in past 12 months     BP Readings from Last 1 Encounters:   06/26/19 160/76             Passed - Serum Creatinine in past 12 months      Creatinine   Date Value Ref Range Status   06/26/2019 0.86 0.60 - 1.10 mg/dL Final           Signed Prescriptions Disp Refills    lisinopril (PRINIVIL,ZESTRIL) 10 MG tablet 90 tablet 3     Sig: TAKE 1 TABLET BY MOUTH EVERY DAY       Ace Inhibitors Refill Protocol Passed - 9/2/2019 12:57 AM        Passed - PCP or prescribing provider visit in past 12 months       Last office visit with prescriber/PCP: 6/26/2019 Bertin Frances MD OR same dept: 6/26/2019 Bertin Frances MD OR same specialty: 6/26/2019 Bertin Frances MD  Last physical: 3/20/2019 Last MTM visit: Visit date not found   Next visit within 3 mo: Visit date not found  Next physical within 3 mo: Visit date not found  Prescriber OR PCP: Bertin Frances MD  Last diagnosis associated with med order: 1. Atrial fibrillation, permanent (H)  - lisinopril (PRINIVIL,ZESTRIL) 10 MG tablet; TAKE 1 TABLET BY MOUTH EVERY DAY  Dispense: 90 tablet; Refill: 3  - furosemide (LASIX) 40 MG tablet [Pharmacy Med Name: FUROSEMIDE 40 MG TABLET]; TAKE 2 TABLETS (80 MG TOTAL) BY MOUTH DAILY.  Dispense: 180 tablet; Refill: 3    2. GERD (gastroesophageal reflux disease)  - pantoprazole (PROTONIX) 40 MG tablet; TAKE 1 TABLET BY MOUTH EVERY DAY  Dispense: 90 tablet; Refill: 3    3. Diabetes (H)  - atorvastatin (LIPITOR) 10 MG tablet; TAKE 1 TABLET BY MOUTH EVERY DAY  Dispense: 90 tablet; Refill: 3    If protocol passes may refill for 12 months if within 3 months of last provider visit (or a total of 15 months).             Passed - Serum Potassium in past 12 months     Lab Results   Component Value Date     Potassium 4.1 06/26/2019             Passed - Blood pressure filed in past 12 months     BP Readings from Last 1 Encounters:   06/26/19 160/76             Passed - Serum Creatinine in past 12 months     Creatinine   Date Value Ref Range Status   06/26/2019 0.86 0.60 - 1.10 mg/dL Final            pantoprazole (PROTONIX) 40 MG tablet 90 tablet 3     Sig: TAKE 1 TABLET BY MOUTH EVERY DAY       GI Medications Refill Protocol Passed - 9/2/2019 12:57 AM        Passed - PCP or prescribing provider visit in last 12 or next 3 months.     Last office visit with prescriber/PCP: 6/26/2019 Bertin Frances MD OR same dept: 6/26/2019 Bertin Frances MD OR same specialty: 6/26/2019 Bertin Frances MD  Last physical: 3/20/2019 Last MTM visit: Visit date not found   Next visit within 3 mo: Visit date not found  Next physical within 3 mo: Visit date not found  Prescriber OR PCP: Bertin Frances MD  Last diagnosis associated with med order: 1. Atrial fibrillation, permanent (H)  - lisinopril (PRINIVIL,ZESTRIL) 10 MG tablet; TAKE 1 TABLET BY MOUTH EVERY DAY  Dispense: 90 tablet; Refill: 3  - furosemide (LASIX) 40 MG tablet [Pharmacy Med Name: FUROSEMIDE 40 MG TABLET]; TAKE 2 TABLETS (80 MG TOTAL) BY MOUTH DAILY.  Dispense: 180 tablet; Refill: 3    2. GERD (gastroesophageal reflux disease)  - pantoprazole (PROTONIX) 40 MG tablet; TAKE 1 TABLET BY MOUTH EVERY DAY  Dispense: 90 tablet; Refill: 3    3. Diabetes (H)  - atorvastatin (LIPITOR) 10 MG tablet; TAKE 1 TABLET BY MOUTH EVERY DAY  Dispense: 90 tablet; Refill: 3    If protocol passes may refill for 12 months if within 3 months of last provider visit (or a total of 15 months).            atorvastatin (LIPITOR) 10 MG tablet 90 tablet 3     Sig: TAKE 1 TABLET BY MOUTH EVERY DAY       Statins Refill Protocol (Hmg CoA Reductase Inhibitors) Passed - 9/2/2019 12:57 AM        Passed - PCP or prescribing provider visit in past 12 months      Last office visit with prescriber/PCP: 6/26/2019  Bertin Frances MD OR same dept: 6/26/2019 Bertin Frances MD OR same specialty: 6/26/2019 Bertin Frances MD  Last physical: 3/20/2019 Last MTM visit: Visit date not found   Next visit within 3 mo: Visit date not found  Next physical within 3 mo: Visit date not found  Prescriber OR PCP: Bertin Frances MD  Last diagnosis associated with med order: 1. Atrial fibrillation, permanent (H)  - lisinopril (PRINIVIL,ZESTRIL) 10 MG tablet; TAKE 1 TABLET BY MOUTH EVERY DAY  Dispense: 90 tablet; Refill: 3  - furosemide (LASIX) 40 MG tablet [Pharmacy Med Name: FUROSEMIDE 40 MG TABLET]; TAKE 2 TABLETS (80 MG TOTAL) BY MOUTH DAILY.  Dispense: 180 tablet; Refill: 3    2. GERD (gastroesophageal reflux disease)  - pantoprazole (PROTONIX) 40 MG tablet; TAKE 1 TABLET BY MOUTH EVERY DAY  Dispense: 90 tablet; Refill: 3    3. Diabetes (H)  - atorvastatin (LIPITOR) 10 MG tablet; TAKE 1 TABLET BY MOUTH EVERY DAY  Dispense: 90 tablet; Refill: 3    If protocol passes may refill for 12 months if within 3 months of last provider visit (or a total of 15 months).

## 2021-05-31 NOTE — TELEPHONE ENCOUNTER
ANTICOAGULATION  MANAGEMENT-Patient Home Monitoring Result    Assessment     Therapeutic INR result of 2.7 (goal INR of 2.0-3.0) received via fax from [employer] home INR monitoring company.        Previous INR was Therapeutic    Reba Calderon last contacted by phone: 7/30/19    Plan     Per home monitoring agreement with patient, patient was NOT contacted regarding therapeutic result today.  Patient is to continue current dose and continue to check INR with home monitor per protocol.  ?   Crys Fontanez RN    Subjective/Objective:      Reba Calderon, a 83 y.o. female  is established on warfarin using a home INR monitor.    Anticoagulation Episode Summary     Current INR goal:   2.0-3.0   TTR:   81.1 % (2.2 y)   Next INR check:   8/12/2019   INR from last check:   2.10 (8/5/2019)   Weekly max warfarin dose:      Target end date:   Indefinite   INR check location:      Preferred lab:      Send INR reminders to:   [facility] [place]    Indications    Atrial fibrillation  permanent (H) [I48.2]           Comments:            Anticoagulation Care Providers     Provider Role Specialty Phone number    Bertin Frances MD Referring Internal Medicine [phone]

## 2021-05-31 NOTE — TELEPHONE ENCOUNTER
Received a faxed INR result for Reba Calderon  From [facility]  INR result dated 8/5/2019 is 2.1

## 2021-05-31 NOTE — TELEPHONE ENCOUNTER
ANTICOAGULATION  MANAGEMENT-Patient Home Monitoring Result    Assessment     Therapeutic INR result of 2.1 (goal INR of 2.0-3.0) received via fax from [employer] home INR monitoring company.        Previous INR was Therapeutic    Reba Calderon last contacted by phone: 7/30    Plan     Per home monitoring agreement with patient, patient was NOT contacted regarding therapeutic result today.  Patient is to continue current dose and continue to check INR with home monitor per protocol.  ?   Mansi Nolan RN    Subjective/Objective:      Reba Calderon, a 83 y.o. female  is established on warfarin using a home INR monitor.    Anticoagulation Episode Summary     Current INR goal:   2.0-3.0   TTR:   81.1 % (2.2 y)   Next INR check:   8/12/2019   INR from last check:   2.10 (8/5/2019)   Weekly max warfarin dose:      Target end date:   Indefinite   INR check location:      Preferred lab:      Send INR reminders to:   [facility]    Indications    Atrial fibrillation  permanent (H) [I48.2]           Comments:            Anticoagulation Care Providers     Provider Role Specialty Phone number    Bertin Frances MD Referring Internal Medicine [phone]

## 2021-06-01 ENCOUNTER — RECORDS - HEALTHEAST (OUTPATIENT)
Dept: ADMINISTRATIVE | Facility: CLINIC | Age: 86
End: 2021-06-01

## 2021-06-01 VITALS — BODY MASS INDEX: 34.57 KG/M2 | HEIGHT: 66 IN | WEIGHT: 215.08 LBS

## 2021-06-01 VITALS — HEIGHT: 66 IN | BODY MASS INDEX: 34.72 KG/M2 | WEIGHT: 216 LBS

## 2021-06-01 NOTE — TELEPHONE ENCOUNTER
Incoming fax from [employer] [facility] home monitoring     INR date: 9/30    See attached document

## 2021-06-01 NOTE — TELEPHONE ENCOUNTER
ANTICOAGULATION  MANAGEMENT    Assessment     Today's INR result of 2.7 is Therapeutic (goal INR of 2.0-3.0)        Warfarin taken as previously instructed    Increased greens/vitamin K intake may be affecting INR    No new medication/supplements affecting INR    Continues to tolerate warfarin with no reported s/s of bleeding or thromboembolism     Previous INR was Supratherapeutic    Plan:     Spoke with Reba regarding INR result and instructed:     Warfarin Dosing Instructions:  Continue current warfarin dose 2.5 mg daily on Thurs; and 3.75 mg daily rest of week  (0 % change)    Instructed patient to follow up no later than: one week    Education provided: importance of therapeutic range and target INR goal and significance of current INR result    Reba verbalizes understanding and agrees to warfarin dosing plan.    Instructed to call the [facility] for any changes, questions or concerns. (#[phone])   ?   Mansi Nolan RN    Subjective/Objective:      Reba AKI Kvng, a 83 y.o. female is on warfarin.     Reba reports:     Home warfarin dose: verbally confirmed correct dose taken     Missed doses: No     Medication changes:  No     S/S of bleeding or thromboembolism:  No     New Injury or illness:  No     Changes in diet or alcohol consumption:  No     Upcoming surgery, procedure or cardioversion:  No    Anticoagulation Episode Summary     Current INR goal:   2.0-3.0   TTR:   76.8 %   Next INR check:   10/7/2019   INR from last check:   2.70 (9/30/2019)   Weekly max warfarin dose:      Target end date:   Indefinite   INR check location:      Preferred lab:      Send INR reminders to:   [facility]    Indications    Atrial fibrillation  permanent (H) [I48.2]           Comments:            Anticoagulation Care Providers     Provider Role Specialty Phone number    Bertin Frances MD Referring Internal Medicine [phone]

## 2021-06-01 NOTE — TELEPHONE ENCOUNTER
ANTICOAGULATION  MANAGEMENT    Assessment     Today's INR result of 3.1 is Supratherapeutic (goal INR of 2.0-3.0)        Warfarin taken as previously instructed    Decreased greens/vitamin K intake may be affecting INR    No new medication/supplements affecting INR    Continues to tolerate warfarin with no reported s/s of bleeding or thromboembolism     Previous INR was Therapeutic    Plan:     Spoke with Reba regarding INR result and instructed:     Warfarin Dosing Instructions:  Continue current warfarin dose 2.5 mg daily on thurs; and 3.75 mg daily rest of week  (0 % change)    Instructed patient to follow up no later than: one week    Education provided: impact of vitamin K foods on INR, importance of therapeutic range and target INR goal and significance of current INR result  Reba plans on going back to her normal amount of greens.   Reba verbalizes understanding and agrees to warfarin dosing plan.    Instructed to call the [facility] for any changes, questions or concerns. (#[phone])   ?   Mansi Nolan RN    Subjective/Objective:      Reba Calderon, a 83 y.o. female is on warfarin.     Reba reports:     Home warfarin dose: verbally confirmed correct dose taken with Reba     Missed doses: No     Medication changes:  No     S/S of bleeding or thromboembolism:  No     New Injury or illness:  No     Changes in diet or alcohol consumption:  No     Upcoming surgery, procedure or cardioversion:  No    Anticoagulation Episode Summary     Current INR goal:   2.0-3.0   TTR:   76.2 %   Next INR check:   9/30/2019   INR from last check:   3.10! (9/23/2019)   Weekly max warfarin dose:      Target end date:   Indefinite   INR check location:      Preferred lab:      Send INR reminders to:   [facility]    Indications    Atrial fibrillation  permanent (H) [I48.2]           Comments:            Anticoagulation Care Providers     Provider Role Specialty Phone number    Bertin Frances MD  [facility] Internal Medicine [phone]

## 2021-06-01 NOTE — TELEPHONE ENCOUNTER
ANTICOAGULATION  MANAGEMENT-Patient Home Monitoring Result    Assessment     Therapeutic INR result of 2.6 (goal INR of 2.0-3.0) received via fax from [employer] home INR monitoring company.        Previous INR was Therapeutic    Reba Calderon last contacted by phone: 7/30/19    Plan     Per home monitoring agreement with patient, patient was NOT contacted regarding therapeutic result today.  Patient is to continue current dose and continue to check INR with home monitor per protocol.  ?   Mansi Nolan RN    Subjective/Objective:      Reba Calderon, a 83 y.o. female  is established on warfarin using a home INR monitor.    Anticoagulation Episode Summary     Current INR goal:   2.0-3.0   TTR:   76.2 %   Next INR check:   9/23/2019   INR from last check:   2.60 (9/16/2019)   Weekly max warfarin dose:      Target end date:   Indefinite   INR check location:      Preferred lab:      Send INR reminders to:   [facility]    Indications    Atrial fibrillation  permanent (H) [I48.2]           Comments:            Anticoagulation Care Providers     Provider Role Specialty Phone number    Bertin Frances MD Referring Internal Medicine [phone]

## 2021-06-01 NOTE — TELEPHONE ENCOUNTER
ANTICOAGULATION  MANAGEMENT-Patient Home Monitoring Result    Assessment     Therapeutic INR result of 2.2 (goal INR of 2.0-3.0) received via fax from [employer] home INR monitoring company.        Previous INR was Therapeutic    Reba Calderon last contacted by phone: 7/30/19    Plan     Per home monitoring agreement with patient, patient was NOT contacted regarding therapeutic result today.  Patient is to continue current dose and continue to check INR with home monitor per protocol.  ?   Crys Fontanez RN    Subjective/Objective:      Reba Calderon, a 83 y.o. female  is established on warfarin using a home INR monitor.    Anticoagulation Episode Summary     Current INR goal:   2.0-3.0   TTR:   76.2 %   Next INR check:   9/16/2019   INR from last check:   2.20 (9/9/2019)   Weekly max warfarin dose:      Target end date:   Indefinite   INR check location:      Preferred lab:      Send INR reminders to:   [facility]    Indications    Atrial fibrillation  permanent (H) [I48.2]           Comments:            Anticoagulation Care Providers     Provider Role Specialty Phone number    Bertin Frances MD Referring Internal Medicine [phone]

## 2021-06-01 NOTE — TELEPHONE ENCOUNTER
Incoming fax from [employer] [facility] home monitoring     INR date: 9/23    See attached document

## 2021-06-01 NOTE — TELEPHONE ENCOUNTER
Received a faxed INR result for Reba Calderon  From [facility]    INR result dated 9/9/2019 is 2.2

## 2021-06-02 VITALS — WEIGHT: 215.12 LBS | BODY MASS INDEX: 34.57 KG/M2 | HEIGHT: 66 IN

## 2021-06-02 VITALS — HEIGHT: 66 IN | WEIGHT: 219.04 LBS | BODY MASS INDEX: 35.2 KG/M2

## 2021-06-02 NOTE — TELEPHONE ENCOUNTER
ANTICOAGULATION  MANAGEMENT    Assessment     Today's INR result of 3.1 is Supratherapeutic (goal INR of 2.0-3.0)        Warfarin taken as previously instructed    No new diet changes affecting INR    No new medication/supplements affecting INR    Continues to tolerate warfarin with no reported s/s of bleeding or thromboembolism     Previous INR was Therapeutic    Plan:     Spoke with Reba regarding INR result and instructed:     Warfarin Dosing Instructions:  Continue current warfarin dose    2.5 mg every Thu; 3.75 mg all other days     (0 % change)    Instructed patient to follow up no later than: one week with home monitor    Education provided: impact of vitamin K foods on INR, importance of therapeutic range and target INR goal and significance of current INR result    Reba verbalizes understanding and agrees to warfarin dosing plan.    Instructed to call the [facility] for any changes, questions or concerns. (#[phone])   ?   Crys Fontanez RN    Subjective/Objective:      Reba PRABHAKAR Calderon, a 83 y.o. female is on warfarin.     Reba reports:     Home warfarin dose: verbally confirmed home dose with Reba and updated on anticoagulation calendar     Missed doses: No     Medication changes:  No     S/S of bleeding or thromboembolism:  No     New Injury or illness:  No     Changes in diet or alcohol consumption:  No     Upcoming surgery, procedure or cardioversion:  No    Anticoagulation Episode Summary     Current INR goal:   2.0-3.0   TTR:   76.7 %   Next INR check:   10/14/2019   INR from last check:   3.10! (10/7/2019)   Weekly max warfarin dose:      Target end date:   Indefinite   INR check location:      Preferred lab:      Send INR reminders [facility] [place]    Indications    Atrial fibrillation  permanent [I48.21]           Comments:            Anticoagulation Care Providers     Provider Role Specialty Phone number    Bertin Frances MD Referring Internal Medicine [phone]

## 2021-06-02 NOTE — TELEPHONE ENCOUNTER
ANTICOAGULATION  MANAGEMENT-Patient Home Monitoring Result    Assessment     Therapeutic INR result of 2.6 (goal INR of 2.0-3.0) received via fax from [employer]   home INR monitoring company.        Previous INR was Therapeutic    Reba Calderon last contacted by phone: 10/14/19    10/15 ED visit for right lower extremity pain - US result negative for DVT ( per [place] via care everywhere)    Plan     Per home monitoring agreement with patient, patient was NOT contacted regarding therapeutic result today.  Patient is to continue current dose and continue to check INR with home monitor per protocol.  ?   Crys Fontanez RN    Subjective/Objective:      Reba Calderon, a 83 y.o. female  is established on warfarin using a home INR monitor.    Anticoagulation Episode Summary     Current INR goal:   2.0-3.0   TTR:   78.2 %   Next INR check:   11/4/2019   INR from last check:   2.70 (10/14/2019)   Weekly max warfarin dose:      Target end date:   Indefinite   INR check location:      Preferred lab:      Send INR reminders to:   [facility]    Indications    Atrial fibrillation  permanent [I48.21]           Comments:            Anticoagulation Care Providers     Provider Role Specialty Phone number    Bertin Frances MD Referring Internal Medicine [phone]

## 2021-06-02 NOTE — TELEPHONE ENCOUNTER
Received a faxed INR result for Reba Calderon  From [facility]    INR result dated 10/14/2019 is 2.7

## 2021-06-02 NOTE — TELEPHONE ENCOUNTER
Incoming fax from [employer] [facility] home monitoring     INR date: 10/21    See attached document

## 2021-06-02 NOTE — TELEPHONE ENCOUNTER
Received a faxed INR result for Reba Calderon  From [facility]    INR result dated 10/7/2019 is 3.1

## 2021-06-02 NOTE — TELEPHONE ENCOUNTER
ANTICOAGULATION  MANAGEMENT    Assessment     Today's INR result of 2.7 is Therapeutic (goal INR of 2.0-3.0)        Warfarin taken as previously instructed    No new diet changes affecting INR    No new medication/supplements affecting INR    Continues to tolerate warfarin with no reported s/s of bleeding or thromboembolism     Previous INR was Supratherapeutic     Going on a trip ( driving ) 10/24-11/7.    Plan:     Spoke with Reba regarding INR result and instructed:     Warfarin Dosing Instructions:  Continue current warfarin dose    2.5 mg every Thu; 3.75 mg all other days       (0 % change)    Instructed patient to follow up no later than: one week with home monitor.  Made aware that she can check INR 1-2 weeks thereafter if INR is therapeutic again.    Education provided: importance of therapeutic range, target INR goal and significance of current INR result, importance of following up for INR monitoring at instructed interval and travel related clotting risk and prevention    Reba verbalizes understanding and agrees to warfarin dosing plan.    Instructed to call the [facility] for any changes, questions or concerns. (#[phone])   ?   Crys Fontanez RN    Subjective/Objective:      Reba PRABHAKAR Calderon, a 83 y.o. female is on warfarin.     Reba reports:     Home warfarin dose: verbally confirmed home dose with Reba and updated on anticoagulation calendar     Missed doses: No     Medication changes:  No     S/S of bleeding or thromboembolism:  No     New Injury or illness:  No     Changes in diet or alcohol consumption:  No     Upcoming surgery, procedure or cardioversion:  No    Anticoagulation Episode Summary     Current INR goal:   2.0-3.0   TTR:   78.2 %   Next INR check:   11/4/2019   INR from last check:   2.70 (10/14/2019)   Weekly max warfarin dose:      Target end date:   Indefinite   INR check location:      Preferred lab:      Send INR reminders to:   [facility]     Atrial fibrillation  permanent [I48.21]           Comments:            Anticoagulation Care Providers     Provider Role Specialty Phone number    Bertin Frances MD Referring Internal Medicine [phone]

## 2021-06-03 ENCOUNTER — COMMUNICATION - HEALTHEAST (OUTPATIENT)
Dept: INTERNAL MEDICINE | Facility: CLINIC | Age: 86
End: 2021-06-03

## 2021-06-03 VITALS — WEIGHT: 214.08 LBS | HEIGHT: 66 IN | BODY MASS INDEX: 34.4 KG/M2

## 2021-06-03 NOTE — TELEPHONE ENCOUNTER
Refill Approved    Rx renewed per Medication Renewal Policy. Medication was last renewed on 9/2/19.    Yusra Aragon, [facility] Connection Triage/Med Refill 12/3/2019     Requested Prescriptions   Pending Prescriptions Disp Refills     furosemide (LASIX) 40 MG tablet [Pharmacy Med Name: FUROSEMIDE 40 MG TABLET] 90 tablet 0     Sig: TAKE 1 TABLET BY MOUTH EVERY DAY       Diuretics/Combination Diuretics Refill Protocol  Passed - 12/3/2019 11:58 AM        Passed - Visit with PCP or prescribing provider visit in past 12 months     Last office visit with prescriber/PCP: 6/26/2019 Bertin Frances MD OR same dept: 6/26/2019 Bertin Frances MD OR same specialty: 6/26/2019 Bertin Frances MD  Last physical: 3/20/2019 Last MTM visit: Visit date not found   Next visit within 3 mo: Visit date not found  Next physical within 3 mo: Visit date not found  Prescriber OR PCP: Bertin Frances MD  Last diagnosis associated with med order: 1. Atrial fibrillation, permanent  - furosemide (LASIX) 40 MG tablet [Pharmacy Med Name: FUROSEMIDE 40 MG TABLET]; TAKE 1 TABLET BY MOUTH EVERY DAY  Dispense: 90 tablet; Refill: 0    If protocol passes may refill for 12 months if within 3 months of last provider visit (or a total of 15 months).             Passed - Serum Potassium in past 12 months      Lab Results   Component Value Date    Potassium 4.1 06/26/2019             Passed - Serum Sodium in past 12 months      Lab Results   Component Value Date    Sodium 141 06/26/2019             Passed - Blood pressure on file in past 12 months     BP Readings from Last 1 Encounters:   06/26/19 160/76             Passed - Serum Creatinine in past 12 months      Creatinine   Date Value Ref Range Status   06/26/2019 0.86 0.60 - 1.10 mg/dL Final

## 2021-06-03 NOTE — TELEPHONE ENCOUNTER
Incoming fax from [employer] [facility] home monitoring     INR date: 11/25    See attached document

## 2021-06-03 NOTE — TELEPHONE ENCOUNTER
ANTICOAGULATION  MANAGEMENT-Patient Home Monitoring Result    Assessment     Therapeutic INR result of 2.5 (goal INR of 2.0-3.0) received via fax from [employer] home INR monitoring company.        Previous INR was Therapeutic    Reba Calderon last contacted by phone: 10/14/19    Plan     Per home monitoring agreement with patient, patient was NOT contacted regarding therapeutic result today.  Patient is to continue current dose and continue to check INR with home monitor per protocol.  ?   Crys Fontanez RN    Subjective/Objective:      Reba Calderon, a 83 y.o. female  is established on warfarin using a home INR monitor.    Anticoagulation Episode Summary     Current INR goal:   2.0-3.0   TTR:   82.5 % (1 y)   Next INR check:   12/2/2019   INR from last check:   2.50 (11/25/2019)   Weekly max warfarin dose:      Target end date:   Indefinite   INR check location:      Preferred lab:      Send INR reminders to:   [facility]    Indications    Atrial fibrillation  permanent [I48.21]           Comments:            Anticoagulation Care Providers     Provider Role Specialty Phone number    Bertin Frances MD Referring Internal Medicine [phone]

## 2021-06-03 NOTE — TELEPHONE ENCOUNTER
Incoming fax from [employer] [facility] home monitoring     INR date: 12/2    See attached document

## 2021-06-03 NOTE — TELEPHONE ENCOUNTER
Received a faxed INR result for Reba Calderon  From [facility]  INR result dated 11/5/2019 is 2.7

## 2021-06-03 NOTE — TELEPHONE ENCOUNTER
ANTICOAGULATION  MANAGEMENT-Patient Home Monitoring Result    Assessment     Therapeutic INR result of 2.7 (goal INR of 2.0-3.0) received via fax from [employer] home INR monitoring company.        Previous INR was Therapeutic    Reba Calderon last contacted by phone: 10/14/19    Plan     Per home monitoring agreement with patient, patient was NOT contacted regarding therapeutic result today.  Patient is to continue current dose and continue to check INR with home monitor per protocol.  ?   Crys Fontanez RN    Subjective/Objective:      Reba Calderon, a 83 y.o. female  is established on warfarin using a home INR monitor.    Anticoagulation Episode Summary     Current INR goal:   2.0-3.0   TTR:   81.1 %   Next INR check:   11/19/2019   INR from last check:   2.70 (11/5/2019)   Weekly max warfarin dose:      Target end date:   Indefinite   INR check location:      Preferred lab:      Send INR reminders to:   [facility]    Indications    Atrial fibrillation  permanent [I48.21]           Comments:            Anticoagulation Care Providers     Provider Role Specialty Phone number    Bertin Frances MD Referring Internal Medicine [phone]

## 2021-06-03 NOTE — TELEPHONE ENCOUNTER
ANTICOAGULATION  MANAGEMENT    Assessment     Today's INR result of 3.2 is Supratherapeutic (goal INR of 2.0-3.0)        Warfarin taken as previously instructed    Decreased greens/vitamin K intake may be affecting INR    No new medication/supplements affecting INR    Continues to tolerate warfarin with no reported s/s of bleeding or thromboembolism     Previous INR was Therapeutic    Plan:     Spoke with Reba regarding INR result and instructed:     Warfarin Dosing Instructions:  Continue current warfarin dose    2.5 mg every Thu; 3.75 mg all other days     (0 % change)  Patient stated that she has broccoli today to help bring INR down.    Instructed patient to follow up no later than: one week with home monitor    Education provided: impact of vitamin K foods on INR, importance of therapeutic range and target INR goal and significance of current INR result    Reba verbalizes understanding and agrees to warfarin dosing plan.    Instructed to call the [facility] for any changes, questions or concerns. (#[phone])   ?   Crys Fontanez RN    Subjective/Objective:      Reba PRABHAKAR Calderon, a 84 y.o. female is on warfarin.     Reba reports:     Home warfarin dose: verbally confirmed home dose with Reba and updated on anticoagulation calendar     Missed doses: No     Medication changes:  No     S/S of bleeding or thromboembolism:  No     New Injury or illness:  No     Changes in diet or alcohol consumption:  Yes: ate less greens due to holiday but is planning to go back to routine.     Upcoming surgery, procedure or cardioversion:  No    Anticoagulation Episode Summary     Current INR goal:   2.0-3.0   TTR:   81.9 % (1 y)   Next INR check:   12/9/2019   INR from last check:   3.20! (12/2/2019)   Weekly max warfarin dose:      Target end date:   Indefinite   INR check location:      Preferred lab:      Send INR reminders to:   [facility]    Indications    Atrial fibrillation  permanent [I48.21]            Comments:            Anticoagulation Care Providers     Provider Role Specialty Phone number    Bertin Frances MD Referring Internal Medicine [phone]

## 2021-06-03 NOTE — TELEPHONE ENCOUNTER
ANTICOAGULATION  MANAGEMENT-Patient Home Monitoring Result    Assessment     Therapeutic INR result of 2.2 (goal INR of 2.0-3.0) received via fax from [employer] home INR monitoring company.        Previous INR was Therapeutic    Reba Calderon last contacted by phone: 10/14/19    Plan     Per home monitoring agreement with patient, patient was NOT contacted regarding therapeutic result today.  Patient is to continue current dose and continue to check INR with home monitor per protocol.  ?   Crys Fontanez RN    Subjective/Objective:      Reba Calderon, a 83 y.o. female  is established on warfarin using a home INR monitor.    Anticoagulation Episode Summary     Current INR goal:   2.0-3.0   TTR:   82.5 %   Next INR check:   11/25/2019   INR from last check:   2.20 (11/18/2019)   Weekly max warfarin dose:      Target end date:   Indefinite   INR check location:      Preferred lab:      Send INR reminders to:   [facility]    Indications    Atrial fibrillation  permanent [I48.21]           Comments:            Anticoagulation Care Providers     Provider Role Specialty Phone number    Bertin Frances MD Referring Internal Medicine [phone]

## 2021-06-03 NOTE — TELEPHONE ENCOUNTER
ANTICOAGULATION  MANAGEMENT-Patient Home Monitoring Result    Assessment     Therapeutic INR result of 2.1 (goal INR of 2.0-3.0) received via fax from [employer] home INR monitoring company.        Previous INR was Therapeutic    Reba Calderon last contacted by phone: 10/14/19    Plan     Per home monitoring agreement with patient, patient was NOT contacted regarding therapeutic result today.  Patient is to continue current dose and continue to check INR with home monitor per protocol.  ?   Crys Fontanez RN    Subjective/Objective:      Reba Calderon, a 83 y.o. female  is established on warfarin using a home INR monitor.    Anticoagulation Episode Summary     Current INR goal:   2.0-3.0   TTR:   82.5 %   Next INR check:   11/25/2019   INR from last check:   2.10 (11/11/2019)   Weekly max warfarin dose:      Target end date:   Indefinite   INR check location:      Preferred lab:      Send INR reminders to:   [facility]    Indications    Atrial fibrillation  permanent [I48.21]           Comments:            Anticoagulation Care Providers     Provider Role Specialty Phone number    Bertin Frances MD Referring Internal Medicine [phone]

## 2021-06-04 VITALS
HEIGHT: 66 IN | OXYGEN SATURATION: 98 % | SYSTOLIC BLOOD PRESSURE: 122 MMHG | DIASTOLIC BLOOD PRESSURE: 62 MMHG | BODY MASS INDEX: 34.23 KG/M2 | HEART RATE: 63 BPM | WEIGHT: 213 LBS

## 2021-06-04 VITALS
HEART RATE: 87 BPM | DIASTOLIC BLOOD PRESSURE: 74 MMHG | OXYGEN SATURATION: 97 % | WEIGHT: 212.08 LBS | SYSTOLIC BLOOD PRESSURE: 152 MMHG | BODY MASS INDEX: 34.08 KG/M2 | HEIGHT: 66 IN

## 2021-06-04 NOTE — TELEPHONE ENCOUNTER
Susie Hurd for refill furosemide 40 mg two times a day?  Patient reports that she is taking 80 mg due to increased swelling.  Prescription from Dr. Gomez was never sent to the patient's pharmacy.  Please advise.  Juana LARSON CMA/BONI....................11:38 AM

## 2021-06-04 NOTE — PROGRESS NOTES
"OFFICE VISIT NOTE  Reba Calderon   84 y.o. female            Assessment/Plan for  Reba Calderon is a 84 y.o. female.  No Patient Care Coordination Note on file.       1. Atrial fibrillation, permanent  Stable.  Monitor INR.  She is doing this at home with home.  2.4.  - furosemide (LASIX) 40 MG tablet; Take 2 tablets (80 mg total) by mouth daily.  Dispense: 180 tablet; Refill: 3    2. Slow transit constipation  On MiraLAX  - polyethylene glycol (GLYCOLAX) 17 gram/dose powder; Take 17 g by mouth daily.  Dispense: 1700 g; Refill: 3    3. Phlegmonous vulvitis  detention through her Augmentin.  Reviewed culture report.  Staph coag negative sensitive to TMX sulfa.  Multiple resistance pattern    4. Abscess of perineum  Status post I&D hospitalization.       I am adding a second biotic biotic-Bactrim  She is to continue the Augmentin  She is to eat yogurt and probiotic  We talked about diarrhea and the risk of C. difficile  If she worsens she needs to go to back to the hospital for IV antibiotics.  She still has significant phlegmasia's inflammation    Plan:  Add Bactrim  Caution regarding bowel.  Recheck CBC BMP INR  Contact colorectal regarding appropriate duration of soaking-I think she is over soaking  Ring cushion to sit    Patient Instructions       Care Everywhere Result Report  AEROBIC BACTERIAL CULTURE, STAINResulted: 1/2/2020 2:56 PM  [facility]  Component Name Value Ref Range   CULTURE RESULT (A)     CULTURE 1+ Staphylococcus coagulase negative     CULTURE 1+ Streptococcus anginosus     CULTURE 1+ Actinomyces species   Comment: \"Corrected\"     GRAM STAIN                2+ PMNs     GRAM STAIN                1+ RBCs     GRAM STAIN                No organisms seen   Comment: Corrected result: Previously reported as Gram stain performed by [place], [place] on 12/27/2019 at 1304 CST.     GRAM STAIN                Gram stain performed by [place]   "   Specimen Collected on   Other - Other 12/27/2019 11:03 AM   Result Narrative   Actinomyces species previously reported as Gram positive bacilli resembling Corynebacterium     Organism Antibiotic Method Susceptibility   Staphylococcus coagulase negative OXACILLIN   >=4: R    ERYTHROMYCIN   >=8: R    CLINDAMYCIN   >=8: R    TETRACYCLINE   >=16: R    CEFAZOLIN   R    VANCOMYCIN   2: S    LEVOFLOXACIN   <=0.12: S    TRIMETHOPRIM/SULF   <=0.5/9.5: S   Streptococcus anginosus PENICILLIN   0.12: S    CEFTRIAXONE   0.5: S    ERYTHROMYCIN   2: R    CLINDAMYCIN   <=0.25: S    VANCOMYCIN   0.5: S    AMPICILLIN   <=0.25: S    CLARITHROMYCIN   R   Actinomyces species PENICILLIN   <=0.06: S    CLINDAMYCIN   <=0.06: S    CEFOXITIN   0.25: S    IMIPENEM   <=0.06: S    METRONIDAZOLE   256: R            Diagnoses and all orders for this visit:    Abscess of perineum  -     HM1(CBC and Differential)  -     Basic Metabolic Panel  -     INR  -     sulfamethoxazole-trimethoprim (BACTRIM DS) 800-160 mg per tablet; Take 1 tablet by mouth 2 (two) times a day for 10 days.  Dispense: 20 tablet; Refill: 0  -     HM1 (CBC with Diff)    Atrial fibrillation, permanent  -     furosemide (LASIX) 40 MG tablet; Take 2 tablets (80 mg total) by mouth daily.  Dispense: 180 tablet; Refill: 3    Slow transit constipation  -     polyethylene glycol (GLYCOLAX) 17 gram/dose powder; Take 17 g by mouth daily.  Dispense: 1700 g; Refill: 3    Phlegmonous vulvitis    Edema, unspecified type   -     INR        Medications after visit  Current Outpatient Medications   Medication Sig Dispense Refill     ammonium lactate (AMLACTIN) 12 % cream Apply topically as needed for dry skin.       amoxicillin-clavulanate (AUGMENTIN) 875-125 mg per tablet Take 1 tablet by mouth 2 (two) times a day.        ascorbic acid/vitamin E/biotin (HAIR, SKIN, NAILS WITH BIOTIN ORAL) Take 2,500 mcg by mouth every morning.       atorvastatin (LIPITOR) 10 MG tablet TAKE 1 TABLET BY MOUTH  EVERY DAY 90 tablet 3     b complex vitamins capsule Take 1 capsule by mouth daily. 1000mcg       blood glucose test (CONTOUR TEST STRIPS) strips TEST ONCE DAILY AS DIRECTED 100 strip 3     calcium carbonate-vitamin D2 500 mg(1,250mg) -200 unit tablet Take 1 tablet by mouth daily.       cholecalciferol, vitamin D3, 1,000 unit tablet Take 2,000 Units by mouth daily.       doxycycline (VIBRAMYCIN) 100 MG capsule Take 100 mg by mouth 2 (two) times a day.        folic acid (FOLVITE) 1 MG tablet Take 1 mg by mouth daily.       furosemide (LASIX) 40 MG tablet Take 2 tablets (80 mg total) by mouth daily. 180 tablet 3     glucosamine-chondroitin 500-400 mg tablet Take 1 tablet by mouth 2 (two) times a day.       HYDROcodone-acetaminophen 5-325 mg per tablet Take 1 tablet by mouth.       lisinopril (PRINIVIL,ZESTRIL) 10 MG tablet TAKE 1 TABLET BY MOUTH EVERY DAY 90 tablet 3     magnesium chloride (SLOW-MAG) 64 mg TbEC delayed-release tablet Take 64 mg by mouth daily.       magnesium oxide (TAYLOR) 500 mg Tab Take by mouth at bedtime.       metFORMIN (GLUCOPHAGE) 500 MG tablet TAKE 1 TABLET BY MOUTH TWICE A  tablet 3     nitroglycerin (NITROSTAT) 0.4 MG SL tablet Place 0.4 mg under the tongue every 5 (five) minutes as needed for chest pain.       pantoprazole (PROTONIX) 40 MG tablet TAKE 1 TABLET BY MOUTH EVERY DAY 90 tablet 3     polyethylene glycol (GLYCOLAX) 17 gram/dose powder Take 17 g by mouth daily. 1700 g 3     potassium chloride (KLOR-CON 10) 10 MEQ CR tablet Take 1 tablet (10 mEq total) by mouth daily. 90 tablet 3     warfarin (COUMADIN/JANTOVEN) 2.5 MG tablet TAKE 1-1.5 TABLETS BY MOUTH DAILY AS DIRECTED. ADJUST DOSE PER INR RESULTS AS INSTRUCTED. 140 tablet 1     sulfamethoxazole-trimethoprim (BACTRIM DS) 800-160 mg per tablet Take 1 tablet by mouth 2 (two) times a day for 10 days. 20 tablet 0     No current facility-administered medications for this visit.                       Bertin Frances,  MD  Internal medicine  [facility]  [phone]  [email or web address]    Much or all of the text in this note was generated through the use of Dragon Dictate voice-to-text software. Errors in spelling or words which seem out of context are unintentional.   Sound alike errors, in particular, may have escaped editing.                 Subjective:   Chief Complaint:  Hospital Visit Follow Up ([facility] Rectal abcess) and Medication Refill  Still with discomfort.  Discomfort with sitting.  He is sitting in a whirlpool up to an hour at a time    No fever chills or sweats  Having BM  No constipation on MiraLAX  On Augmentin  No fever chills sweats  No rash  She is eating.           Hospital Course:   Reba Calderon is a 84 y.o. female with a history of atrial fibrillation s/p PPM on warfarin, DM, HTN, MATILDE, non-Hodgkins Lymphoma (SLL/CLL) followed by oncology and not currently on treatment and DM  who was admitted 12/27/2019 with perirectal abscess/phlegmon.     Patient has had the abscess for about one week. She was seen in Urgent Care 12/23/19 where this was drained at bedside. Unfortunately her symptoms failed to improve (discharged on oral doxycycline). She was seen by primary care physician on 12/27 and was sent to the ED for further evaluation.     In ER, attempts were made to further open this abscess at bedside without success. Culture sent. A CT was performed that showed a small abscess in right lower ischiorectal fat. Patient was started on IV antibiotics and seen in consultation by colorectal surgery. She was taken to OR for I&D on 12/28/2019 with Dr. Dugan. Intra-operative finding of an abscess cavity, but this contained minimal pus - appeared to be mostly a phlegmon. Cultures finalized growing coag negative staph, Streptococcus anginosus and Gram positive bacilli resembling Corynebacterium. Requested Micro lab to run sensitivity which can take up to one week.  "Antibiotic transitioned to Augmentin X 10 more days at discharge. Final sensitivity result should be followed after discharge and adjust antibiotic as appropriate.     Pain controlled with Norco. She was cleared for discharge from colorectal surgery with outpatient Follow up in 2-3 wks.     Patient was discharged in stable condition.    Addendum: Lab called 12/31/2019 with corrected culture result: \"Actinomyces species previously reported as Gram positive bacilli resembling Corynebacterium\". Final sensitivity result pending. Continue Augmentin, should cover actinomyces (discussed with pharmacist).   Gail Navarro MD .................... 12/31/2019 2:33 PM           Review of Systems:     Extensive 10-point review of systems was performed. Please see the HPI for problem specific pertinent review of systems.     Patient does note no diarrhea    Otherwise, the following systems are noncontributory including constitutional, eyes, ears, nose and throat, cardiovascular, respiratory, gastrointestinal, genitourinary, musculoskeletal,neurological, skin and/or breast, endocrine, hematologic/lymph, allergic/immunologic and psychiatric.              Medications:  Current Outpatient Medications on File Prior to Visit   Medication Sig     ammonium lactate (AMLACTIN) 12 % cream Apply topically as needed for dry skin.     amoxicillin-clavulanate (AUGMENTIN) 875-125 mg per tablet Take 1 tablet by mouth 2 (two) times a day.      ascorbic acid/vitamin E/biotin (HAIR, SKIN, NAILS WITH BIOTIN ORAL) Take 2,500 mcg by mouth every morning.     atorvastatin (LIPITOR) 10 MG tablet TAKE 1 TABLET BY MOUTH EVERY DAY     b complex vitamins capsule Take 1 capsule by mouth daily. 1000mcg     blood glucose test (CONTOUR TEST STRIPS) strips TEST ONCE DAILY AS DIRECTED     calcium carbonate-vitamin D2 500 mg(1,250mg) -200 unit tablet Take 1 tablet by mouth daily.     cholecalciferol, vitamin D3, 1,000 unit tablet Take 2,000 Units by mouth " daily.     doxycycline (VIBRAMYCIN) 100 MG capsule Take 100 mg by mouth 2 (two) times a day.      folic acid (FOLVITE) 1 MG tablet Take 1 mg by mouth daily.     glucosamine-chondroitin 500-400 mg tablet Take 1 tablet by mouth 2 (two) times a day.     HYDROcodone-acetaminophen 5-325 mg per tablet Take 1 tablet by mouth.     lisinopril (PRINIVIL,ZESTRIL) 10 MG tablet TAKE 1 TABLET BY MOUTH EVERY DAY     magnesium chloride (SLOW-MAG) 64 mg TbEC delayed-release tablet Take 64 mg by mouth daily.     magnesium oxide (TAYLOR) 500 mg Tab Take by mouth at bedtime.     metFORMIN (GLUCOPHAGE) 500 MG tablet TAKE 1 TABLET BY MOUTH TWICE A DAY     nitroglycerin (NITROSTAT) 0.4 MG SL tablet Place 0.4 mg under the tongue every 5 (five) minutes as needed for chest pain.     pantoprazole (PROTONIX) 40 MG tablet TAKE 1 TABLET BY MOUTH EVERY DAY     potassium chloride (KLOR-CON 10) 10 MEQ CR tablet Take 1 tablet (10 mEq total) by mouth daily.     warfarin (COUMADIN/JANTOVEN) 2.5 MG tablet TAKE 1-1.5 TABLETS BY MOUTH DAILY AS DIRECTED. ADJUST DOSE PER INR RESULTS AS INSTRUCTED.     [DISCONTINUED] furosemide (LASIX) 40 MG tablet Take 2 tablets (80 mg total) by mouth daily.     [DISCONTINUED] polyethylene glycol (GLYCOLAX) 17 gram/dose powder Take 17 g by mouth.     No current facility-administered medications on file prior to visit.             Allergies:  Allergies   Allergen Reactions     Amiodarone Unknown     Dyspnea, O2 dependency     Beta-Blockers (Beta-Adrenergic Blocking Agts) Unknown     Lower extremity edema     Oxycodone-Acetaminophen Anaphylaxis     Atenolol Other (See Comments)     adverse reaction, ,      Meperidine Nausea And Vomiting     Metoprolol Succinate Unknown     SOB and joint pain     Pioglitazone Hcl Unknown     Legs swell, Comment: intolerance, Description: Actos, Comment: intolerance, Description: Actos       PSFHx: Tobacco Status:  She  reports that she has never smoked. She has never used smokeless  "tobacco.   Alcohol Status:    Social History     Substance and Sexual Activity   Alcohol Use Yes    Comment: occasional       reports that she has never smoked. She has never used smokeless tobacco. She reports current alcohol use. No history on file for drug.    Objective:    /62 (Patient Site: Left Arm, Patient Position: Sitting, Cuff Size: Adult Large)   Pulse 63   Ht 5' 6\" (1.676 m)   Wt 213 lb (96.6 kg)   LMP 08/24/1994 (Exact Date)   SpO2 98%   Breastfeeding No   BMI 34.38 kg/m    Weight:   Wt Readings from Last 3 Encounters:   01/03/20 213 lb (96.6 kg)   12/27/19 212 lb 1.3 oz (96.2 kg)   06/26/19 214 lb 1.3 oz (97.1 kg)     BP Readings from Last 10 Encounters:   01/03/20 122/62   12/27/19 152/74   06/26/19 160/76   03/20/19 126/70   09/12/18 150/70   07/25/18 138/62   07/12/18 126/66   03/08/18 138/80   11/24/17 116/60   05/24/17 132/72       Color is good  Does not appear toxic  Afebrile  General-appears well, no acute distress.  Swollen right vulvar area firm without fluctuance  Open incision with some serosanguineous material.  Non-odiferous        Review of clinical lab tests  Lab Results   Component Value Date    WBC 10.7 01/03/2020    HGB 12.8 01/03/2020    HCT 38.3 01/03/2020     01/03/2020    CHOL 199 03/20/2019    TRIG 98 09/12/2018    HDL 49 (L) 09/12/2018    ALT 15 09/12/2018    AST 15 09/12/2018     06/26/2019    K 4.1 06/26/2019    CL 99 06/26/2019    CREATININE 0.86 06/26/2019    BUN 16 06/26/2019    CO2 32 (H) 06/26/2019    TSH 1.28 09/12/2018    INR 1.80 (H) 01/03/2020    HGBA1C 7.8 (H) 06/26/2019    MICROALBUR 0.59 06/26/2019       Glucose   Date/Time Value Ref Range Status   06/26/2019 11:49  70 - 125 mg/dL Final   03/20/2019 09:28  (H) 70 - 125 mg/dL Final   09/12/2018 10:02  (H) 70 - 125 mg/dL Final   07/25/2018 03:30  (H) 70 - 125 mg/dL Final   03/08/2018 10:21  (H) 70 - 125 mg/dL Final   11/24/2017 09:08  (H) 70 - 125 mg/dL " Final   05/24/2017 03:05  (H) 70 - 125 mg/dL Final     Recent Results (from the past 24 hour(s))   INR   Result Value Ref Range    INR 1.80 (H) 0.90 - 1.10   HM1 (CBC with Diff)   Result Value Ref Range    WBC 10.7 4.0 - 11.0 thou/uL    RBC 4.11 3.80 - 5.40 mill/uL    Hemoglobin 12.8 12.0 - 16.0 g/dL    Hematocrit 38.3 35.0 - 47.0 %    MCV 93 80 - 100 fL    MCH 31.0 27.0 - 34.0 pg    MCHC 33.3 32.0 - 36.0 g/dL    RDW 13.9 11.0 - 14.5 %    Platelets 350 140 - 440 thou/uL    MPV 8.1 7.0 - 10.0 fL    Neutrophils % 74 (H) 50 - 70 %    Lymphocytes % 17 (L) 20 - 40 %    Monocytes % 5 2 - 10 %    Eosinophils % 3 0 - 6 %    Basophils % 1 0 - 2 %    Neutrophils Absolute 7.9 (H) 2.0 - 7.7 thou/uL    Lymphocytes Absolute 1.8 0.8 - 4.4 thou/uL    Monocytes Absolute 0.6 0.0 - 0.9 thou/uL    Eosinophils Absolute 0.3 0.0 - 0.4 thou/uL    Basophils Absolute 0.1 0.0 - 0.2 thou/uL       RADIOLOGY: No results found.    Review of recent consultation-reviewed [facility] record

## 2021-06-04 NOTE — TELEPHONE ENCOUNTER
ANTICOAGULATION  MANAGEMENT    Assessment     Today's INR result of 2.3 is Therapeutic (goal INR of 2.0-3.0)        Previous INR was Supratherapeutic    Plan:     Left a detailed message for Reba regarding INR result and instructed:     Warfarin Dosing Instructions:  Continue current warfarin dose    2.5 mg every Mon, Thu; 3.75 mg all other days     (0 % change)    Instructed patient to follow up no later than: one week with home monitor.    Education provided: importance of therapeutic range        Instructed to call the [facility] for any changes, questions or concerns. (#[phone])   ?   Crys Fontanez RN    Subjective/Objective:      Reba Calderon, a 84 y.o. female is on warfarin.     Reba reports:         Anticoagulation Episode Summary     Current INR goal:   2.0-3.0   TTR:   79.3 % (1 y)   Next INR check:   12/23/2019   INR from last check:   2.30 (12/16/2019)   Weekly max warfarin dose:      Target end date:   Indefinite   INR check location:      Preferred lab:      Send INR reminders to:   [facility]    Indications    Atrial fibrillation  permanent [I48.21]           Comments:            Anticoagulation Care Providers     Provider Role Specialty Phone number    Bertin Frances MD Referring Internal Medicine [phone]

## 2021-06-04 NOTE — PROGRESS NOTES
OFFICE VISIT NOTE            Assessment/Plan for  Reba Calderon is a 84 y.o. female.  No Patient Care Coordination Note on file.       Worsening abscess in diabetic patient with CLL and on coumadin  -nonresponsive to outpatient antibiotic and drainage      Plan:  Needs iv/im antibiotic and probable admission  [facility]    I would image with CT.  Exclude pelvic abscess/perirectal abscess in a diabetic patient.  For follow-up needs to see colorectal or general surgeon    When I went to talk to the patient and her  they had already left for the ER  We contacted the ER patient is present there at this time.  Diagnoses and all orders for this visit:    Perineal abscess    Abscess of right genital labia    CLL (chronic lymphocytic leukemia) (H)    Diabetes mellitus type 2 in nonobese (H)        This provider spent greater than 25 min. face-to-face time with the patient and/or his family.  More than half this time was spent in counseling, discussing, and or coordination of care which was consistent with the nature of this patient's problems which are listed and described in the assessment and plan.    Reviewed emergency room note.    Bertin Frances MD  Internal medicine  [facility]  [phone]  [email or web address]    This is an electronically verified report by Bertin Frances M.D.  (Note created with Dragon voice recognition and unintended spelling errors and word substitutions may occur)        Reba comes with perineal pain  She was seen in urgency room 12/23  I reviewed this  She had IND performed.  She was placed on doxycycline.  She did not receive any IM antibiotic    She has a history of this.  Is always resolved.    Her pain is certainly no better.  It might be worse.  She is having trouble moving.    There are no fever chills or sweats  It is painful to have a bowel movement.        Objective.  Vitals:    12/27/19 0906   BP: 152/74  "  Patient Site: Right Arm   Patient Position: Sitting   Cuff Size: Adult Regular   Pulse: 87   SpO2: 97%   Weight: 212 lb 1.3 oz (96.2 kg)   Height: 5' 6\" (1.676 m)     Afebrile.  Pulse regular.  Lungs clear.      Perineum-indurated area about 8 x 10 cm with a pointed slightly fluctuant tip.  Significant swelling of right labia majora/labia minora  Rectal not performed  "

## 2021-06-04 NOTE — TELEPHONE ENCOUNTER
Medication Question or Clarification  Who is calling: Patient  What medication are you calling about? (include dose and sig) fursomeide 40 mg daily  Who prescribed the medication?: Dr Frances  What is your question/concern?: Patient states she now takes 80 mg daily due to swelling.  She is almost out of medication and will be tottaly out on Wednesday 12/11/19. Please advise.   Pharmacy: [facility]  Okay to leave a detailed message?: Yes [phone]    Site CMT - Please call the pharmacy to obtain any additional needed information.

## 2021-06-04 NOTE — TELEPHONE ENCOUNTER
Received a faxed INR result for Reba Calderon  From [facility]    INR result dated 12/9/2019 is 3.4

## 2021-06-04 NOTE — TELEPHONE ENCOUNTER
ANTICOAGULATION  MANAGEMENT    Assessment     Today's INR result of 1.8 is Subtherapeutic (goal INR of 2.0-3.0)        Warfarin taken as previously instructed    No new diet changes affecting INR    Potential interaction between Bactrium DS and warfarin which may affect subsequent INRs    Continues to tolerate warfarin with no reported s/s of bleeding or thromboembolism     Previous INR was Therapeutic    Plan:     Spoke with Reba regarding INR result and instructed:     Warfarin Dosing Instructions:  Continue current warfarin dose 2.5 mg daily on Mondays and Thursdays; and 3.75 mg daily rest of week  (0 % change)    Instructed patient to follow up no later than: 1/6/20    Education provided: importance of therapeutic range, importance of following up for INR monitoring at instructed interval, importance of taking warfarin as instructed and potential interaction between warfarin and Bactrium DS    Reba verbalizes understanding and agrees to warfarin dosing plan.    Instructed to call the [facility] for any changes, questions or concerns. (#[phone])   ?   Heena Hogan RN    Subjective/Objective:      Reba Calderon, a 84 y.o. female is on warfarin.     Reba reports:     Home warfarin dose: verbally confirmed home dose with Reba and updated on anticoagulation calendar     Missed doses: No     Medication changes:  Yes: starting Bactrim today.     S/S of bleeding or thromboembolism:  No     New Injury or illness:  Yes: Abcess     Changes in diet or alcohol consumption:  No     Upcoming surgery, procedure or cardioversion:  No    Anticoagulation Episode Summary     Current INR goal:   2.0-3.0   TTR:   79.4 % (1 y)   Next INR check:   1/6/2020   INR from last check:   1.80! (1/3/2020)   Weekly max warfarin dose:      Target end date:   Indefinite   INR check location:      Preferred lab:      Send INR reminders to:   [facility]    Indications    Atrial fibrillation  permanent [I48.21]            Comments:            Anticoagulation Care Providers     Provider Role Specialty Phone number    Bertin Frances MD Referring Internal Medicine [phone]

## 2021-06-04 NOTE — TELEPHONE ENCOUNTER
ANTICOAGULATION  MANAGEMENT-Patient Home Monitoring Result    Assessment   Incoming fax from [employer].   Therapeutic INR result of 2.0 (goal INR of 2.0-3.0) received via fax from [employer] home INR monitoring company.        Previous INR was Therapeutic    Reba Calderon last contacted by phone: 12/16    Plan     Per home monitoring agreement with patient, patient was NOT contacted regarding therapeutic result today.  Patient is to continue current dose and continue to check INR with home monitor per protocol.  ?   Mansi Nolan RN    Subjective/Objective:      Reba Calderon, a 84 y.o. female  is established on warfarin using a home INR monitor.    Anticoagulation Episode Summary     Current INR goal:   2.0-3.0   TTR:   79.3 % (1 y)   Next INR check:   12/30/2019   INR from last check:   2.00 (12/23/2019)   Weekly max warfarin dose:      Target end date:   Indefinite   INR check location:      Preferred lab:      Send INR reminders to:   [facility]    Indications    Atrial fibrillation  permanent [I48.21]           Comments:            Anticoagulation Care Providers     Provider Role Specialty Phone number    Bertin Frances MD Referring Internal Medicine [phone]

## 2021-06-04 NOTE — TELEPHONE ENCOUNTER
Susie Alvarenga for refill requested at the increased dose?  Please advise and I can set up for you to review.  Juana LARSON, CMA/CMT....................12:33 PM

## 2021-06-04 NOTE — TELEPHONE ENCOUNTER
Who is calling:  Patient   Reason for Call:  Patient is calling to check the status of the message below with her concern regarding Furosemide.     Patient reported she is out of her medication and needs this to be addressed as soon as possible.    Patient is asking for a call back with an update or when this has been handled so she may then follow up with her pharmacy.   Date of last appointment with primary care: N/a  Okay to leave a detailed message: Yes

## 2021-06-04 NOTE — PROGRESS NOTES
Post Discharge Medication Reconciliation Status: discharge medications reconciled and changed, per note/orders (see AVS)

## 2021-06-04 NOTE — TELEPHONE ENCOUNTER
ANTICOAGULATION  MANAGEMENT    Assessment   1/1 INR result of 2.0 is Therapeutic (goal INR of 2.0-3.0)    Admitted at [facility] 12/27-12/30 and had I& D procedure done for perirectal abscess on 12/28 and was discharged on Augmentin for 10 more days      Warfarin taken as previously instructed    Appetite still not good may be affecting diet and INR     Augmentin started on 12/30 and will be taking for total of 10 days.    Taking Vicodin as needed for pain.    Interaction between Augmentin, Vicodin and warfarin may be affecting INR    Continues to tolerate warfarin with no reported s/s of bleeding or thromboembolism     Previous INR was Therapeutic    Plan:     Spoke with Reba regarding INR result and instructed:     Warfarin Dosing Instructions:  Continue current warfarin dose    2.5 mg every Mon, Thu; 3.75 mg all other days     (0 % change)    Instructed patient to follow up no later than: 1/6/2020 with home monitor, per patient PCP will most likely check INR again with OV tomorrow.    Education provided: importance of therapeutic range, target INR goal and significance of current INR result and potential interaction between warfarin and Augmentin,Vicodin    Reba verbalizes understanding and agrees to warfarin dosing plan.    Instructed to call the [facility] for any changes, questions or concerns. (#[phone])   ?   Crys Fontanez RN    Subjective/Objective:      Reba Calderon, a 84 y.o. female is on warfarin.     Reba reports:     Home warfarin dose: verbally confirmed home dose with Reba and updated on anticoagulation calendar     Missed doses: No     Medication changes:  Yes, Augmentin started 12/30 and has 7 more days left to complete 10 days course.     S/S of bleeding or thromboembolism:  No     New Injury or illness:  Yes: recently admitted and had procedure done on 12/28     Changes in diet or alcohol consumption:  Yes: still not eating per normal post hospital discharge.     Upcoming  surgery, procedure or cardioversion:  No    Anticoagulation Episode Summary     Current INR goal:   2.0-3.0   TTR:   80.0 % (1 y)   Next INR check:   1/8/2020   INR from last check:   2.00 (1/1/2020)   Weekly max warfarin dose:      Target end date:   Indefinite   INR check location:      Preferred lab:      Send INR reminders to:   [facility]    Indications    Atrial fibrillation  permanent [I48.21]           Comments:            Anticoagulation Care Providers     Provider Role Specialty Phone number    Bertin Frances MD Referring Internal Medicine [phone]

## 2021-06-04 NOTE — TELEPHONE ENCOUNTER
Anticoagulation Annual Referral Renewal Review    Reba AKI Calderon's chart reviewed for annual renewal of referral to anticoagulation monitoring.        Criteria for anticoagulation nurse and/or pharmacist renewal met   Warfarin indication: Atrial Fibrillation Yes, per indication   Current with INR monitoring/compliant Yes Yes   Date of last office visit 6/26/19 Yes, had office visit within last year   Time in Therapeutic Range (TTR) 79 % Yes, TTR > 60%       Reba Calderon met all criteria for anticoagulation management program initiated renewal.  New INR standing orders and anticoagulation referral renewal placed.      Crys Fontanez RN  3:01 PM

## 2021-06-04 NOTE — TELEPHONE ENCOUNTER
Left message informing patient that PCP can see her on Friday 01/03/20 at 10:20. If this time works, please schedule or send to the clinic for the CMT to schedule. If this does not work for patient, please help her schedule with another provider.    Glenis Newell, [facility]

## 2021-06-04 NOTE — PATIENT INSTRUCTIONS - HE
"  Care Everywhere Result Report  AEROBIC BACTERIAL CULTURE, STAINResulted: 1/2/2020 2:56 PM  [facility]  Component Name Value Ref Range   CULTURE RESULT (A)     CULTURE 1+ Staphylococcus coagulase negative     CULTURE 1+ Streptococcus anginosus     CULTURE 1+ Actinomyces species   Comment: \"Corrected\"     GRAM STAIN                2+ PMNs     GRAM STAIN                1+ RBCs     GRAM STAIN                No organisms seen   Comment: Corrected result: Previously reported as Gram stain performed by [place], [place] on 12/27/2019 at 1304 CST.     GRAM STAIN                Gram stain performed by [place], [place]     Specimen Collected on   Other - Other 12/27/2019 11:03 AM   Result Narrative   Actinomyces species previously reported as Gram positive bacilli resembling Corynebacterium     Organism Antibiotic Method Susceptibility   Staphylococcus coagulase negative OXACILLIN   >=4: R    ERYTHROMYCIN   >=8: R    CLINDAMYCIN   >=8: R    TETRACYCLINE   >=16: R    CEFAZOLIN   R    VANCOMYCIN   2: S    LEVOFLOXACIN   <=0.12: S    TRIMETHOPRIM/SULF   <=0.5/9.5: S   Streptococcus anginosus PENICILLIN   0.12: S    CEFTRIAXONE   0.5: S    ERYTHROMYCIN   2: R    CLINDAMYCIN   <=0.25: S    VANCOMYCIN   0.5: S    AMPICILLIN   <=0.25: S    CLARITHROMYCIN   R   Actinomyces species PENICILLIN   <=0.06: S    CLINDAMYCIN   <=0.06: S    CEFOXITIN   0.25: S    IMIPENEM   <=0.06: S    METRONIDAZOLE   256: R        "

## 2021-06-04 NOTE — TELEPHONE ENCOUNTER
ANTICOAGULATION  MANAGEMENT    Assessment     Today's INR result of 3.4 is Supratherapeutic (goal INR of 2.0-3.0)        Warfarin taken as previously instructed    No new diet changes affecting INR    No new medication/supplements affecting INR    Continues to tolerate warfarin with no reported s/s of bleeding or thromboembolism     Previous INR was Supratherapeutic    Plan:     Spoke with Reba regarding INR result and instructed:     Warfarin Dosing Instructions:  Change warfarin dose to    2.5 mg every Mon, Thu; 3.75 mg all other days       (5 % change)    Instructed patient to follow up no later than: one week with home monitor    Education provided: importance of therapeutic range and target INR goal and significance of current INR result    Reba verbalizes understanding and agrees to warfarin dosing plan.    Instructed to call the [facility] for any changes, questions or concerns. (#[phone])   ?   Crys Fontanez RN    Subjective/Objective:      Reba Calderon, a 84 y.o. female is on warfarin.     Reba reports:     Home warfarin dose: verbally confirmed home dose with Reba and updated on anticoagulation calendar     Missed doses: No     Medication changes:  No     S/S of bleeding or thromboembolism:  No     New Injury or illness:  No     Changes in diet or alcohol consumption:  No     Upcoming surgery, procedure or cardioversion:  No    Anticoagulation Episode Summary     Current INR goal:   2.0-3.0   TTR:   80.0 % (1 y)   Next INR check:   12/16/2019   INR from last check:   3.40! (12/9/2019)   Weekly max warfarin dose:      Target end date:   Indefinite   INR check location:      Preferred lab:      Send INR reminders to:   [facility]    Indications    Atrial fibrillation  permanent [I48.21]           Comments:            Anticoagulation Care Providers     Provider Role Specialty Phone number    Bertin Frances MD Referring Internal Medicine [phone]

## 2021-06-04 NOTE — TELEPHONE ENCOUNTER
Patient Returning Call  Reason for call:  Appointment  Information relayed to patient:  n/a  Patient has additional questions:  Yes  If YES, what are your questions/concerns:  Patient stated Friday 01/03/2020  At 10:20 am will work to see Dr. Frances, please call her on Thursday to confirm this.     Okay to leave a detailed message?: Yes  Please call patient back on the home number listed.

## 2021-06-04 NOTE — TELEPHONE ENCOUNTER
Refill has been set up for Dr. Gomez to review per message below.      Juana LARSON, CMA/CMT....................1:33 PM

## 2021-06-04 NOTE — TELEPHONE ENCOUNTER
Refill Approved    Rx renewed per Medication Renewal Policy. Medication was last renewed on   furosemide (LASIX) 40 MG tablet 180 tablet 3 1/3/2020 2/2/2020 No   Sig - Route: Take 2 tablets (80 mg total) by mouth daily. - Oral   Sent to pharmacy as: furosemide 40 mg tablet (LASIX)   E-Prescribing Status: Receipt confirmed by pharmacy (1/3/2020 11:07 AM CST)     Yusra Aragon, [facility] Triage/Med Refill 1/4/2020     Requested Prescriptions   Pending Prescriptions Disp Refills     furosemide (LASIX) 40 MG tablet [Pharmacy Med Name: FUROSEMIDE 40 MG TABLET] 60 tablet 0     Sig: TAKE 2 TABLETS BY MOUTH EVERY DAY       Diuretics/Combination Diuretics Refill Protocol  Passed - 1/3/2020  9:38 AM        Passed - Visit with PCP or prescribing provider visit in past 12 months     Last office visit with prescriber/PCP: 12/27/2019 Bertin Frances MD OR same dept: 12/27/2019 Bertin Frances MD OR same specialty: 12/27/2019 Bertin Frances MD  Last physical: 3/20/2019 Last MTM visit: Visit date not found   Next visit within 3 mo: 1/3/2020 Bertin Frances MD  Next physical within 3 mo: Visit date not found  Prescriber OR PCP: Bertin Frances MD  Last diagnosis associated with med order: 1. Atrial fibrillation, permanent  - furosemide (LASIX) 40 MG tablet [Pharmacy Med Name: FUROSEMIDE 40 MG TABLET]; TAKE 2 TABLETS BY MOUTH EVERY DAY  Dispense: 60 tablet; Refill: 0    If protocol passes may refill for 12 months if within 3 months of last provider visit (or a total of 15 months).             Passed - Serum Potassium in past 12 months      Lab Results   Component Value Date    Potassium 3.9 01/03/2020             Passed - Serum Sodium in past 12 months      Lab Results   Component Value Date    Sodium 141 01/03/2020             Passed - Blood pressure on file in past 12 months     BP Readings from Last 1 Encounters:   01/03/20 122/62             Passed - Serum Creatinine in past 12 months      Creatinine   Date Value  Ref Range Status   01/03/2020 0.85 0.60 - 1.10 mg/dL Final

## 2021-06-05 VITALS
WEIGHT: 211 LBS | HEART RATE: 96 BPM | SYSTOLIC BLOOD PRESSURE: 138 MMHG | DIASTOLIC BLOOD PRESSURE: 88 MMHG | TEMPERATURE: 97.9 F | BODY MASS INDEX: 34.06 KG/M2

## 2021-06-05 NOTE — TELEPHONE ENCOUNTER
Received a faxed INR result for Reba Calderon  From [facility]  INR result dated 1/23/2020 is 2.4      Last INR from 1/16/20 was 2.5

## 2021-06-05 NOTE — TELEPHONE ENCOUNTER
ANTICOAGULATION  MANAGEMENT    Assessment     Today's INR result of 2.3 is Therapeutic (goal INR of 2.0-3.0)        Warfarin taken as previously instructed - lower doses taken as instructed due to bactrim.    No new diet changes affecting INR     Completed Augmentin 10 days course yesterday    Will complete 10 days course of Bactrim on 1/13     Interaction between Augmentin, Bactrim and warfarin may be affecting INR    Continues to tolerate warfarin with no reported s/s of bleeding or thromboembolism     Previous INR was Supratherapeutic    Plan:     Spoke with Reba regarding INR result and instructed:     Warfarin Dosing Instructions:  Continue current warfarin dose    2.5 mg every Mon, Thu; 3.75 mg all other days     (0 % change)    Instructed patient to follow up no later than: 1/13 with home monitor.    Education provided: importance of therapeutic range, target INR goal and significance of current INR result and potential interaction between warfarin and augmentin,bactrim    Reba verbalizes understanding and agrees to warfarin dosing plan.    Instructed to call the [facility] for any changes, questions or concerns. (#[phone])   ?   Crys Fontanez RN    Subjective/Objective:      Reba Calderon, a 84 y.o. female is on warfarin.     Reba reports:     Home warfarin dose: verbally confirmed home dose with Reba and updated on anticoagulation calendar     Missed doses: No     Medication changes:  Yes: see above     S/S of bleeding or thromboembolism:  No     New Injury or illness:  No     Changes in diet or alcohol consumption:  No     Upcoming surgery, procedure or cardioversion:  No    Anticoagulation Episode Summary     Current INR goal:   2.0-3.0   TTR:   79.1 % (1 y)   Next INR check:   1/13/2020   INR from last check:   2.30 (1/10/2020)   Weekly max warfarin dose:      Target end date:   Indefinite   INR check location:      Preferred lab:      Send INR reminders to:   [facility]     Indications    Atrial fibrillation  permanent [I48.21]           Comments:            Anticoagulation Care Providers     Provider Role Specialty Phone number    Bertin Frances MD Referring Internal Medicine [phone]

## 2021-06-05 NOTE — TELEPHONE ENCOUNTER
ANTICOAGULATION  MANAGEMENT    Assessment     Today's INR result of 3.0 is Therapeutic (goal INR of 2.0-3.0)        Warfarin taken as previously instructed    No new diet changes affecting INR     Will complete 10 days course of Bactrim today but noted that PCP sent another prescription for Bactrim 800-160 two times a day for 7 days will start first dose tonight.    Interaction between Bactrim and warfarin may be affecting INR    Continues to tolerate warfarin with no reported s/s of bleeding or thromboembolism     Previous INR was Therapeutic    Plan:     Spoke with Reba regarding INR result and instructed:     Warfarin Dosing Instructions:  take scheduled 2.5 today then lower doses of 2.5 mg tomorrow and wednesday then continue current warfarin dose    2.5 mg every Mon, Thu; 3.75 mg all other days      (0 % change)    Instructed patient to follow up no later than: 1/16 or 1/17 with home monitor.    Education provided: impact of vitamin K foods on INR, importance of therapeutic range, target INR goal and significance of current INR result, potential interaction between warfarin and bactrim and monitoring for bleeding signs and symptoms    Reba verbalizes understanding and agrees to warfarin dosing plan.    Instructed to call the [facility] for any changes, questions or concerns. (#[phone])   ?   Crys Fontanez RN    Subjective/Objective:      Reba Calderon, a 84 y.o. female is on warfarin.     Reba reports:     Home warfarin dose: verbally confirmed home dose with Reba and updated on anticoagulation calendar     Missed doses: No     Medication changes:  No- will take last dose of first round of Bactrim today then will start new prescription tonight and will take for total of 7 days.     S/S of bleeding or thromboembolism:  No     New Injury or illness:  Yes: still having pain on [erirectal abscess - see triage call today.     Changes in diet or alcohol consumption:  No     Upcoming surgery, procedure or  cardioversion:  No    Anticoagulation Episode Summary     Current INR goal:   2.0-3.0   TTR:   79.1 % (1 y)   Next INR check:   1/17/2020   INR from last check:   3.00 (1/13/2020)   Weekly max warfarin dose:      Target end date:   Indefinite   INR check location:      Preferred lab:      Send INR reminders to:   [facility]    Indications    Atrial fibrillation  permanent [I48.21]           Comments:            Anticoagulation Care Providers     Provider Role Specialty Phone number    Bertin Frances MD Referring Internal Medicine [phone]

## 2021-06-05 NOTE — TELEPHONE ENCOUNTER
ANTICOAGULATION  MANAGEMENT-Patient Home Monitoring Result    Assessment     Therapeutic INR result of 2.6 (goal INR of 2.0-3.0) received via fax from [employer] home INR monitoring company.        Previous INR was Therapeutic    Reba Calderon last contacted by phone: 1/16/2020    Plan     Per home monitoring agreement with patient, patient was NOT contacted regarding therapeutic result today.  Patient is to continue current dose and continue to check INR with home monitor per protocol.  ?   Crys Fontanez RN    Subjective/Objective:      Reba Calderon, a 84 y.o. female  is established on warfarin using a home INR monitor.    Anticoagulation Episode Summary     Current INR goal:   2.0-3.0   TTR:   79.1 % (1 y)   Next INR check:   2/10/2020   INR from last check:   2.60 (2/3/2020)   Weekly max warfarin dose:      Target end date:   Indefinite   INR check location:      Preferred lab:      Send INR reminders to:   [facility]    Indications    Atrial fibrillation  permanent [I48.21]           Comments:            Anticoagulation Care Providers     Provider Role Specialty Phone number    Bertin Frances MD Referring Internal Medicine [phone]

## 2021-06-05 NOTE — TELEPHONE ENCOUNTER
Received a faxed INR result for Reba Calderon  From [facility]    INR result dated 1/10/2020 is 2.3

## 2021-06-05 NOTE — TELEPHONE ENCOUNTER
ANTICOAGULATION  MANAGEMENT-Patient Home Monitoring Result    Assessment     Therapeutic INR result of 2.4 (goal INR of 2.0-3.0) received via fax from [employer] home INR monitoring company.        Previous INR was Therapeutic    Reba Calderon last contacted by phone: 1/16/2020    Plan     Per home monitoring agreement with patient, patient was NOT contacted regarding therapeutic result today.  Patient is to continue current dose and continue to check INR with home monitor per protocol.  ?   Crys Fontanez RN    Subjective/Objective:      Reba Calderon, a 84 y.o. female  is established on warfarin using a home INR monitor.    Anticoagulation Episode Summary     Current INR goal:   2.0-3.0   TTR:   79.1 % (1 y)   Next INR check:   1/30/2020   INR from last check:   2.40 (1/23/2020)   Weekly max warfarin dose:      Target end date:   Indefinite   INR check location:      Preferred lab:      Send INR reminders to:   [facility]    Indications    Atrial fibrillation  permanent [I48.21]           Comments:            Anticoagulation Care Providers     Provider Role Specialty Phone number    Bertin Frances MD Referring Internal Medicine [phone]

## 2021-06-05 NOTE — TELEPHONE ENCOUNTER
ANTICOAGULATION  MANAGEMENT-Patient Home Monitoring Result    Assessment     Therapeutic INR result of 2.8 (goal INR of 2.0-3.0) received via fax from [employer]   home INR monitoring company.        Previous INR was Therapeutic    Reab Calderon last contacted by phone: 1/16/2020    Plan     Per home monitoring agreement with patient, patient was NOT contacted regarding therapeutic result today.  Patient is to continue current dose and continue to check INR with home monitor per protocol.  ?   Crys Fontanez RN    Subjective/Objective:      Reba Calderon, a 84 y.o. female  is established on warfarin using a home INR monitor.    Anticoagulation Episode Summary     Current INR goal:   2.0-3.0   TTR:   79.1 % (1 y)   Next INR check:   2/17/2020   INR from last check:   2.80 (2/10/2020)   Weekly max warfarin dose:      Target end date:   Indefinite   INR check location:      Preferred lab:      Send INR reminders to:   [facility]    Indications    Atrial fibrillation  permanent [I48.21]           Comments:            Anticoagulation Care Providers     Provider Role Specialty Phone number    Bertin Frances MD Referring Internal Medicine [phone]

## 2021-06-05 NOTE — TELEPHONE ENCOUNTER
Spoke with the patient and relayed message below from Dr. Gomez.  She verbalized understanding and had no further questions at this time.  Juana LARSON, HAIM/CMT....................4:11 PM

## 2021-06-05 NOTE — TELEPHONE ENCOUNTER
ANTICOAGULATION  MANAGEMENT-Patient Home Monitoring Result    Assessment     Therapeutic INR result of 2.2 (goal INR of 2.0-3.0) received via fax from [employer] home INR monitoring company.        Previous INR was Therapeutic    Reba Calderon last contacted by phone: 1/16/2020    Plan     Per home monitoring agreement with patient, patient was NOT contacted regarding therapeutic result today.  Patient is to continue current dose and continue to check INR with home monitor per protocol.  ?   Crys Fontanez RN    Subjective/Objective:      Reba Calderon, a 84 y.o. female  is established on warfarin using a home INR monitor.    Anticoagulation Episode Summary     Current INR goal:   2.0-3.0   TTR:   79.1 % (1 y)   Next INR check:   2/3/2020   INR from last check:   2.20 (1/27/2020)   Weekly max warfarin dose:      Target end date:   Indefinite   INR check location:      Preferred lab:      Send INR reminders to:   [facility]    Indications    Atrial fibrillation  permanent [I48.21]           Comments:            Anticoagulation Care Providers     Provider Role Specialty Phone number    Bertin Frances MD Referring Internal Medicine [phone]

## 2021-06-05 NOTE — TELEPHONE ENCOUNTER
Received a faxed INR result for Reba Calderon  From [facility]    INR result dated 1/27/2020 is 2.2

## 2021-06-05 NOTE — TELEPHONE ENCOUNTER
Probably nothing to worry about but I have extended the bactrim for another week till she sees surgeon .

## 2021-06-05 NOTE — TELEPHONE ENCOUNTER
Dr. Gomez,  Spoke with the patient and relayed message below.  She verbalized understanding, but was asking about the other antibiotic that she was on to treat the other 2 bacteria.  She states that she was taking amoxicillin 875 mg, 1 tab two times a day for 7 days.  Okay for this prescription as well?    Patient states that she was being treated with 2 antibiotics to treat three different bacteria strains.  Please advise.  Thank you.  Juana LARSON CMA/BONI....................2:58 PM

## 2021-06-05 NOTE — TELEPHONE ENCOUNTER
Received a faxed INR result for Reba Calderon  From [employer] [facility]  INR result dated 1/16/2020 is 2.5

## 2021-06-05 NOTE — TELEPHONE ENCOUNTER
Incoming fax from [employer] [facility] home monitoring     INR date: 2/10    See attached document

## 2021-06-05 NOTE — TELEPHONE ENCOUNTER
Received a faxed INR result for Reba Calderon  From [employer] [facility]    INR result dated 2/3/2020 is 2.6

## 2021-06-05 NOTE — TELEPHONE ENCOUNTER
ANTICOAGULATION  MANAGEMENT    Assessment     Today's INR result of 3.1 is Supratherapeutic (goal INR of 2.0-3.0)        Warfarin taken as previously instructed    No new diet changes affecting INR     Day 4/10 Bactrim doses also taking Augmentin day 8/10    Interaction between , augmentin,protonix and warfarin may be affecting INR    Continues to tolerate warfarin with no reported s/s of bleeding or thromboembolism     Previous INR was Subtherapeutic    Plan:     Spoke with Reba regarding INR result and instructed:     Warfarin Dosing Instructions:  take scheduled dose of 2.5 mg today and Thurs then lower doses of 2.5 mg on Tues and Wed     Instructed patient to follow up no later than: 1/10 with home monitor.    Education provided: impact of vitamin K foods on INR, potential interaction between warfarin and augmentin,bactrim,protonix and monitoring for bleeding signs and symptoms    Reba verbalizes understanding and agrees to warfarin dosing plan.    Instructed to call the [facility] for any changes, questions or concerns. (#[phone])   ?   Crys Fontanez RN    Subjective/Objective:      Reba Calderon, a 84 y.o. female is on warfarin.     Reba reports:     Home warfarin dose: verbally confirmed home dose with Reba and updated on anticoagulation calendar     Missed doses: No     Medication changes:  Yes: Bactrim, Augmentin ( for perirectal abscess ) and Protonix to prevent yeast infection     S/S of bleeding or thromboembolism:  No     New Injury or illness:  No     Changes in diet or alcohol consumption:  No     Upcoming surgery, procedure or cardioversion:  No    Anticoagulation Episode Summary     Current INR goal:   2.0-3.0   TTR:   79.2 % (1 y)   Next INR check:   1/10/2020   INR from last check:   3.10! (1/6/2020)   Weekly max warfarin dose:      Target end date:   Indefinite   INR check location:      Preferred lab:      Send INR reminders to:   [facility]    Indications    Atrial  fibrillation  permanent [I48.21]           Comments:            Anticoagulation Care Providers     Provider Role Specialty Phone number    Bertin Frances MD Referring Internal Medicine [phone]

## 2021-06-05 NOTE — TELEPHONE ENCOUNTER
RN Triage:    Spoke with 84 yr old Reba who c/o:    Hospitalized for perirectal abscess 12/27/19 ; I & D and IVAB.    Taking oral AB after discharge.  Took the last dose of bactrim today.    Still has 2 large lumps in the rectal area; 2 inches and 2.4 inches long.      Lumps are tender; rates pain a 1-2 on a 0-10 pain scale.    Reports a slight amount of light bloody/watery drainage at times and wears a pad.    Denies fever.    Pt is wondering if she should still be on an AB with these continued symptoms?    Follow up is 1/20/20 with the surgeon.    PLAN:  Will consult with PCP regarding level of care and Rx advice for symptoms.  Please advise.    Samira Walter RN   [facility] RN Triage      Reason for Disposition    Rectal area looks infected (e.g., draining sore, spreading redness)     Pt had surgery for perirectal abscess and 2 tender lumps remain with slight amount of serous drainage requiring patient to wear a pad.  Pt is unable to see if infection is present.  Denies fever.    Protocols used: RECTAL SYMPTOMS-A-AH

## 2021-06-05 NOTE — TELEPHONE ENCOUNTER
ANTICOAGULATION  MANAGEMENT    Assessment     Today's INR result of 2.5 is Therapeutic (goal INR of 2.0-3.0)    Patient confirmed  INR result today due to it was not clear on the faxed result from [employer].      Warfarin taken as previously instructed    No new diet changes affecting INR     Day 4 of 7 of second round of Bactrim doses    Interaction between Bactrim and warfarin may be affecting INR    Continues to tolerate warfarin with no reported s/s of bleeding or thromboembolism     Previous INR was Therapeutic    Plan:     Spoke with Reba regarding INR result and instructed:     Warfarin Dosing Instructions:  take scheduled dose of 2.5 mg today, lower dose of 2.5 mg tomorrow then continue current warfarin dose    2.5 mg every Mon, Thu; 3.75 mg all other days       (0 % change)    Instructed patient to follow up no later than: one week with home monitor.  Instructed to check sooner if signs and symptoms of bleeding is noted.    Education provided: importance of therapeutic range and potential interaction between warfarin and bactrim    Reba verbalizes understanding and agrees to warfarin dosing plan.    Instructed to call the [facility] for any changes, questions or concerns. (#[phone])   ?   Crys Fontanez RN    Subjective/Objective:      Reba Calderon, a 84 y.o. female is on warfarin.     Reba reports:     Home warfarin dose: verbally confirmed home dose with Reba and updated on anticoagulation calendar     Missed doses: No     Medication changes:  Yes: day 4 of 7 second round if bactrim doses.     S/S of bleeding or thromboembolism:  No     New Injury or illness:  No     Changes in diet or alcohol consumption:  No     Upcoming surgery, procedure or cardioversion:  No    Anticoagulation Episode Summary     Current INR goal:   2.0-3.0   TTR:   79.1 % (1 y)   Next INR check:   1/23/2020   INR from last check:   2.50 (1/16/2020)   Weekly max warfarin dose:      Target end date:   Indefinite   INR check  location:      Preferred lab:      Send INR reminders to:   [facility]    Indications    Atrial fibrillation  permanent [I48.21]           Comments:            Anticoagulation Care Providers     Provider Role Specialty Phone number    Bertin Frances MD Referring Internal Medicine [phone]

## 2021-06-06 NOTE — TELEPHONE ENCOUNTER
ANTICOAGULATION  MANAGEMENT-Patient Home Monitoring Result    Assessment     Therapeutic INR result of 2.7 (goal INR of 2.0-3.0) received via fax from [employer] home INR monitoring company.        Previous INR was Therapeutic    Reba Calderon last contacted by phone: 1/16    Plan     Per home monitoring agreement with patient, patient was NOT contacted regarding therapeutic result today.  Patient is to continue current dose and continue to check INR with home monitor per protocol.  ?   Monica Sanford RN    Subjective/Objective:      Reba Calderon, a 84 y.o. female  is established on warfarin using a home INR monitor.    Anticoagulation Episode Summary     Current INR goal:   2.0-3.0   TTR:   79.1 % (1 y)   Next INR check:   2/24/2020   INR from last check:   2.70 (2/17/2020)   Weekly max warfarin dose:      Target end date:   Indefinite   INR check location:      Preferred lab:      Send INR reminders to:   [facility]    Indications    Atrial fibrillation  permanent [I48.21]           Comments:            Anticoagulation Care Providers     Provider Role Specialty Phone number    Bertin Frances MD Referring Internal Medicine [phone]

## 2021-06-06 NOTE — TELEPHONE ENCOUNTER
New Appointment Needed  What is the reason for the visit:    Inpatient/ED Follow Up Appt Request  At what hospital or facility were you seen?: [facility]  What is the reason you were seen?: Stones in liver bile ducts  What date were you admitted?: date: 3/7/20  What date were you discharged?: date: 3/10/20  What was the recommended timeframe for your follow up appointment?: 5/7  Provider Preference: PCP only  How soon do you need to be seen?: within 7 days  Waitlist offered?: No  Okay to leave a detailed message:  Yes

## 2021-06-06 NOTE — TELEPHONE ENCOUNTER
Who is calling:  Patient     Reason for Call: Pt will like to inform Crys flores that she is going out of town and will not return until the second week in March. She will schedule next INR when she returns.    Please call patient with any questions     Date of last appointment with primary care: 1/03/2020  Okay to leave a detailed message: Yes

## 2021-06-06 NOTE — TELEPHONE ENCOUNTER
Spoke with the patient and helped her to schedule a hospital follow up appointment with Dr. Muro for Monday, 3/16/2020 at 10:40 am.  She had no further questions at this time.  Juana LARSON CMA/BONI....................12:13 PM

## 2021-06-06 NOTE — TELEPHONE ENCOUNTER
Who is calling:  Edmundo  Reason for Call:  Edmundo is calling in patient INR today 3/16 as 1.4. Please advise the patient.  Okay to leave a detailed message: No

## 2021-06-06 NOTE — TELEPHONE ENCOUNTER
FYI - Status Update  Who is Calling: Harmony- did not catch the facility she works at   Update: Called to report a critical INR result of 1.4. Harmony stated that she does not need a call back so she did not leave a call back number. She stated that they would need to call the patient.   Okay to leave a detailed message?:  Yes

## 2021-06-06 NOTE — TELEPHONE ENCOUNTER
Lab Results   Component Value Date    INR 2.80 (!) 02/10/2020    INR 2.60 (!) 02/03/2020    INR 2.20 (!) 01/27/2020       Patient's current Warfarin doses:    2.5 mg every Mon, Thu; 3.75 mg all other days           Next INR check is on 2/17/2020      Patient's last OV with PCP was on 1/3/2020    Warfarin prescription 3 month supply and one refill sent to patient's pharmacy today.    Crys Fontanez RN

## 2021-06-06 NOTE — TELEPHONE ENCOUNTER
ANTICOAGULATION  MANAGEMENT PROGRAM    Reba Calderon is overdue for INR check.     Left message to check INR with home meter and call results to home monitoring company as soon as possible.      Crys Fontanez RN

## 2021-06-06 NOTE — TELEPHONE ENCOUNTER
Received a faxed INR result for Reba Calderon  From [employer] [facility]    INR result dated 3/16/2020 is 1.4

## 2021-06-06 NOTE — TELEPHONE ENCOUNTER
Routed to the anticoagulation pool    Meredith Martin, RN     [facility] Medication Refill and Triage Nurse  2:00 PM  2/11/2020

## 2021-06-07 NOTE — TELEPHONE ENCOUNTER
ANTICOAGULATION  MANAGEMENT    Assessment     Today's INR result of 1.80 is Subtherapeutic (goal INR of 2.0-3.0)        Warfarin taken as previously instructed    No new diet changes affecting INR    No new medication/supplements affecting INR    Continues to tolerate warfarin with no reported s/s of bleeding or thromboembolism     Previous INR was Subtherapeutic at 1.30 on 4/30/20.    Plan:     Spoke with Reba regarding INR result and instructed:     Warfarin Dosing Instructions:   (evening. has 2.5mg tabs)   - Continue current warfarin dose 2.5 mg daily on Mon/Wed/Fri; and 3.75 mg daily rest of week.    Instructed patient to follow up no later than:  One wk.  Scheduled to check on 5/11/20.   - checks INR's every 1-2 wks wit home monitor thru [employer].    Education provided: target INR goal and significance of current INR result    Reba verbalizes understanding and agrees to warfarin dosing plan.    Instructed to call the [facility] for any changes, questions or concerns. (#[phone])   ?   Dimple Sam RN    Subjective/Objective:      Reba Calderon, a 84 y.o. female is on warfarin.     Reba reports:     Home warfarin dose: verbally confirmed home dose with Reba and updated on anticoagulation calendar     Missed doses: No     Medication changes:  Yes:  Reported taking one time booster with 5mg warfairn dose 4 days ago, and taking new increased warfarin dose, as instructed.     S/S of bleeding or thromboembolism:  No     New Injury or illness:  No     Changes in diet or alcohol consumption:  No     Upcoming surgery, procedure or cardioversion:  No    Anticoagulation Episode Summary     Current INR goal:   2.0-3.0   TTR:   72.8 % (1 y)   Next INR check:   5/11/2020   INR from last check:   1.80! (5/4/2020)   Weekly max warfarin dose:      Target end date:   Indefinite   INR check location:      Preferred lab:      Send INR reminders to:   [facility]    Indications    Atrial  fibrillation  permanent [I48.21]           Comments:            Anticoagulation Care Providers     Provider Role Specialty Phone number    Bertin Frances MD Referring Internal Medicine [phone]

## 2021-06-07 NOTE — TELEPHONE ENCOUNTER
Who is calling:  patient  Reason for Call:  Patient calling back stating she just missed a call from some one. At time of patient no notation as to whom called.  Date of last appointment with primary care:   Okay to leave a detailed message: Yes

## 2021-06-07 NOTE — TELEPHONE ENCOUNTER
ANTICOAGULATION  MANAGEMENT-Patient Home Monitoring Result    Assessment     Therapeutic INR result of 2.30 (goal INR of 2.0-3.0) received via fax from [employer] home INR monitoring company.        Previous INR was Supratherapeutic at 3.30 on 4/13/20.    Reba Calderon last contacted by phone: 4/13/20    Plan     Per home monitoring agreement with patient, patient was NOT contacted regarding therapeutic result today.  Patient is to continue current dose and continue to check INR with home monitor per protocol.  ?   Dimple Sam RN    Subjective/Objective:      Reba Calderon, a 84 y.o. female  is established on warfarin using a home INR monitor.    Anticoagulation Episode Summary     Current INR goal:   2.0-3.0   TTR:   76.2 % (1 y)   Next INR check:   4/27/2020   INR from last check:   2.30 (4/20/2020)   Weekly max warfarin dose:      Target end date:   Indefinite   INR check location:      Preferred lab:      Send INR reminders to:   [facility]    Indications    Atrial fibrillation  permanent [I48.21]           Comments:            Anticoagulation Care Providers     Provider Role Specialty Phone number    Bertin Frances MD Referring Internal Medicine [phone]

## 2021-06-07 NOTE — TELEPHONE ENCOUNTER
ANTICOAGULATION  MANAGEMENT    Assessment     Today's INR result of 1.10 is Subtherapeutic (goal INR of 2.0-3.0)        Warfarin recently held as instructed which may be affecting INR    - resumed warfarin on 4/25/20.    No new diet changes affecting INR    Potential interaction between Augmentin / Hydrocodone-Acetaminophen and warfarin which may affect subsequent INRs    Continues to tolerate warfarin with no reported s/s of bleeding or thromboembolism     Previous INR was Therapeutic at 2.30 on 4/20/20.    S/p Incision and drainage of right groin abscess with debridement of wound on 4/25/20 @ [place].    Plan:     Spoke with Reba regarding INR result and instructed:     Warfarin Dosing Instructions:  (evening. has 2.5mg tabs)   - tonight, advised one time booster with 5mg warfarin dose, then continue current warfarin dose 3.75 mg daily on Tues/Sat; and 2.5 mg daily rest of week.    Instructed patient to follow up no later than:  Advised to check INR status this Thurs. 4/30/20 with home monitor.   - INR's checked every 1-2 wks with home monitor.    Education provided: target INR goal and significance of current INR result, potential interaction between warfarin and Augmentin / Hydrocodone-Acetaminophen and importance of notifying clinic for changes in medications    Reba verbalizes understanding and agrees to warfarin dosing plan.    Instructed to call the [facility] for any changes, questions or concerns. (#[phone])   ?   Dimple Sam RN    Subjective/Objective:      Rebajavad Wilsonon, a 84 y.o. female is on warfarin.     Reba reports:     Home warfarin dose: verbally confirmed home dose with Reba and updated on anticoagulation calendar     Missed doses: Yes:  Warfarin held on 4/24 and resumed on 4/25/20 post surgery.   - reported INR was reversed with Vitamin-K on 4/25/20.     Medication changes:  Yes:  Augmentin 875/25mg two times a day for 7 days, from 4/25-5/2/20 and Hydrocodone-Acet 5/325mg one tab  every 6 hrs PRN for pain.   - was not bridged while off warfarin. (see notes of admission on 4/24/20 per cardiology).     S/S of bleeding or thromboembolism:  No     New Injury or illness:  Yes: S/p Incision and drainage of right groin abscess with debridement of wound on 4/25/20 @ [place].     Changes in diet or alcohol consumption:  No     Upcoming surgery, procedure or cardioversion:  No    Anticoagulation Episode Summary     Current INR goal:   2.0-3.0   TTR:   74.7 % (1 y)   Next INR check:   5/4/2020   INR from last check:   1.10! (4/27/2020)   Weekly max warfarin dose:      Target end date:   Indefinite   INR check location:      Preferred lab:      Send INR reminders to:   [facility]    Indications    Atrial fibrillation  permanent [I48.21]           Comments:            Anticoagulation Care Providers     Provider Role Specialty Phone number    Bertin Frances MD Referring Internal Medicine [phone]

## 2021-06-07 NOTE — TELEPHONE ENCOUNTER
Received a faxed INR result for Reba Calderon  From [facility]  INR result dated 4/27/2020 is 1.10

## 2021-06-07 NOTE — TELEPHONE ENCOUNTER
FYI - Status Update  Who is Calling: Cassy carney [facility]  Update: Calling in a INR critical value for patient,  1.3 taken on 04.30.20. Please call the patient.  Okay to leave a detailed message?:  Yes

## 2021-06-07 NOTE — TELEPHONE ENCOUNTER
ANTICOAGULATION  MANAGEMENT    Assessment     Today's INR result of 1.3 is Subtherapeutic (goal INR of 2.0-3.0)        Warfarin taken as previously instructed    No new diet changes affecting INR    No new medication/supplements affecting INR    Continues to tolerate warfarin with no reported s/s of bleeding or thromboembolism     Previous INR was Subtherapeutic    Plan:     Spoke with Reba regarding INR result and instructed:     Warfarin Dosing Instructions:  have 5 mg tonight to boost then change warfarin dose to 2.5 mg daily on mon/wed/fri; and 3.75 mg daily rest of week  (12 % change)    Instructed patient to follow up no later than: Monday with home monitor    Reba verbalizes understanding and agrees to warfarin dosing plan.    Instructed to call the [facility] for any changes, questions or concerns. (#[phone])   ?   Giselle Kelly RN    Subjective/Objective:      Reba Calderon, a 84 y.o. female is on warfarin.     Reba reports:     Home warfarin dose: as updated on anticoagulation calendar per template     Missed doses: No     Medication changes:  No     S/S of bleeding or thromboembolism:  No     New Injury or illness:  No     Changes in diet or alcohol consumption:  No     Upcoming surgery, procedure or cardioversion:  No    Anticoagulation Episode Summary     Current INR goal:   2.0-3.0   TTR:   73.9 % (1 y)   Next INR check:   5/4/2020   INR from last check:   1.30! (4/30/2020)   Weekly max warfarin dose:      Target end date:   Indefinite   INR check location:      Preferred lab:      Send INR reminders to:   [facility]    Indications    Atrial fibrillation  permanent [I48.21]           Comments:            Anticoagulation Care Providers     Provider Role Specialty Phone number    Bertin Frances MD Referring Internal Medicine [phone]

## 2021-06-07 NOTE — PROGRESS NOTES
"Reba Calderon is a 84 y.o. female who is being evaluated via a billable telephone visit.      The patient has been notified of following:     \"This telephone visit will be conducted via a call between you and your physician/provider. We have found that certain health care needs can be provided without the need for a physical exam.  This service lets us provide the care you need with a short phone conversation.  If a prescription is necessary we can send it directly to your pharmacy.  If lab work is needed we can place an order for that and you can then stop by our lab to have the test done at a later time.    Telephone visits are billed at different rates depending on your insurance coverage. During this emergency period, for some insurers they may be billed the same as an in-person visit.  Please reach out to your insurance provider with any questions.    If during the course of the call the physician/provider feels a telephone visit is not appropriate, you will not be charged for this service.\"    Patient has given verbal consent to a Telephone visit? Yes    Patient would like to receive their AVS by AVS Preference: Mail a copy.    Additional provider notes: Telephone call was placed to this patient.  Recently was diagnosed with an epidermal cyst that abscessed in the groin.  She was treated as an outpatient with oral Augmentin but the abscess got worse and she had to go to [facility] emergency room.  She was admitted overnight on IV antibiotics.  She was then taken to surgery and I&D of the abscess was done.  She was discharged later that day.  Currently has a dressing in place.  She still taking oral Augmentin.  There is no fevers or chills.  She is feeling better each day.  Blood sugars are running in the 1 20-1 40 range.  No hypoglycemia.  No dyspnea on exertion no orthopnea.  No rapid heartbeat.    Assessment/Plan:  1 groin abscess.  This is been surgically drained.  She is now using dressing changes daily.  " She is following up with surgery later today.  She continues on oral Augmentin.  2 diabetes mellitus is under good control she will continue her same meds.  3 history of atrial fibrillation.  Patient currently stable.  No rapid heartbeat.  No orthopnea or increasing dyspnea on exertion.      Phone call duration:  15 minutes    Arminda Aranda CMA

## 2021-06-07 NOTE — TELEPHONE ENCOUNTER
Received a faxed INR result for Reba Calderon  From [employer] [facility]    INR result dated 4/13/2020 is 3.3

## 2021-06-07 NOTE — TELEPHONE ENCOUNTER
Who is calling:  Patient   Reason for Call:  Patient stated that she had cyst surgery on right side groin area. Patient stated the area is infected. Patient stated that the surgeon stated that it may takes months of antibiotics and that it would be best to see infection doctor to further evaluate this. Patient stated that the surgeon had already faxed over this information. Patient stated that she would like to speak to Vivien Whalen MD about this. Patient declined nurse triage.  Date of last appointment with primary care: n/a  Okay to leave a detailed message: Yes  [phone] cell phone

## 2021-06-07 NOTE — TELEPHONE ENCOUNTER
ANTICOAGULATION  MANAGEMENT    Assessment     Today's INR result of 3.3 is Supratherapeutic (goal INR of 2.0-3.0)        Warfarin taken as previously instructed    No new diet changes affecting INR    No new medication/supplements affecting INR    Continues to tolerate warfarin with no reported s/s of bleeding or thromboembolism     Previous INR was high Therapeutic    Plan:     Spoke with Reba regarding INR result and instructed:     Warfarin Dosing Instructions:  take scheduled 2.5 mg today then one time lower dose of 2.5 tomorrow then change warfarin dose to    3.75 mg every Sun, Tue, Thu; 2.5 mg all other days      (6 % change)    Instructed patient to follow up no later than: one week with home monitor.    Education provided: importance of therapeutic range and target INR goal and significance of current INR result    Reba verbalizes understanding and agrees to warfarin dosing plan.    Instructed to call the [facility] for any changes, questions or concerns. (#[phone])   ?   Crys Fontanez RN    Subjective/Objective:      Reba Calderon, a 84 y.o. female is on warfarin.     Reba reports:     Home warfarin dose: verbally confirmed home dose with Reba and updated on anticoagulation calendar     Missed doses: No     Medication changes:  No     S/S of bleeding or thromboembolism:  No     New Injury or illness:  No     Changes in diet or alcohol consumption:  No     Upcoming surgery, procedure or cardioversion:  No    Anticoagulation Episode Summary     Current INR goal:   2.0-3.0   TTR:   75.6 % (1 y)   Next INR check:   4/20/2020   INR from last check:   3.30! (4/13/2020)   Weekly max warfarin dose:      Target end date:   Indefinite   INR check location:      Preferred lab:      Send INR reminders to:   [facility]    Indications    Atrial fibrillation  permanent [I48.21]           Comments:            Anticoagulation Care Providers     Provider Role Specialty Phone number    Bertin Frances  MD [facility] Internal Medicine [phone]

## 2021-06-07 NOTE — TELEPHONE ENCOUNTER
Received a faxed INR result for Reba Calderon  From [facility]  INR result dated 5/4/2020 is 1.80      Last INR from 4/30/20 was 1.30

## 2021-06-07 NOTE — TELEPHONE ENCOUNTER
Called and scheduled with Dr. King for a VV today     LM @ [facility] to fax most recent note here- patient states there is one more recent than the 4/24 one in chart.

## 2021-06-07 NOTE — TELEPHONE ENCOUNTER
Received a faxed INR result for Reba Calderon  From [facility]  INR result dated 3/23/2020 is 2.3

## 2021-06-07 NOTE — TELEPHONE ENCOUNTER
Called her and changed to 10:30 with Dr King on 4/28    Bre Adams CMA ([facility])

## 2021-06-07 NOTE — PROGRESS NOTES
"Reba Calderon is a 84 y.o. female who is being evaluated via a billable telephone visit.      The patient has been notified of following:     \"This telephone visit will be conducted via a call between you and your physician/provider. We have found that certain health care needs can be provided without the need for a physical exam.  This service lets us provide the care you need with a short phone conversation.  If a prescription is necessary we can send it directly to your pharmacy.  If lab work is needed we can place an order for that and you can then stop by our lab to have the test done at a later time.    If during the course of the call the physician/provider feels a telephone visit is not appropriate, you will not be charged for this service.\"     Patient has given verbal consent to a Telephone visit? Yes    Reba Calderon complains of    Chief Complaint   Patient presents with     Follow-up     Hospital follow up an diabetic check up       I have reviewed and updated the patient's Past Medical History, Social History, Family History and Medication List.    ALLERGIES  Amiodarone; Beta-blockers (beta-adrenergic blocking agts); Oxycodone-acetaminophen; Atenolol; Meperidine; Metoprolol succinate; and Pioglitazone hcl    Additional provider notes:         Patient admitted for choledocholithiasis.  Underwent ERCP.  Had cholecystectomy 2014 this was the third event she had.  She did not have an identifiable stone at this time-she had sludge.  She has had a sphincterotomy.  She has never heard of Actigall    She has underlying CLL.  She is using appropriate social distancing    Atrial Fibrillation    -patient has no cardiovascular symptoms such as palpitations, PND, orthopnea, dyspnea, dizziness, syncope.  Patient, weakness, hemiparesis, dysarthria, ataxia.  No embolic symptomatology such as leg pain or cardiac/neurologic symptoms.  Coumadin monitoring-the patient is compliant with taking their Coumadin as " directed.  There is no problem with bleeding. There is no excessive bruising, epistaxis, gum bleeding, hemoptysis, melena, hematochezia, hematuria.  The schedule of Coumadin is controlled through ambulatory anticoagulation clinic.  INR is noted in the chart and reviewed          Diabetes mellitus type 2-reviewed sugars in hospital  Component Name  3/10/2020 3/9/2020 3/9/2020 3/9/2020 3/9/2020 3/9/2020 3/8/2020 3/8/2020 3/8/2020 3/8/2020 3/8/2020 3/7/2020 12/30/2019 12/30/2019 12/29/2019 12/29/2019 12/29/2019 12/29/2019 12/28/2019 12/28/2019 12/28/2019 12/28/2019 12/28/2019 12/27/2019 12/27/2019 3/29/2019 3/29/2019 3/29/2019 11/19/2017 11/19/2017 11/18/2017 11/18/2017 11/18/2017 11/18/2017 11/18/2017 11/17/2017 11/17/2017 11/17/2017 3/8/2017 3/7/2017 3/7/2017 3/7/2017 11/4/2016 12/15/2014 12/15/2014 12/14/2014 11/12/2014 11/12/2014 11/11/2014 11/11/2014       202 (H) 243 (H) 216 (H) 125 (H) 143 (H) 138 (H) 119 (H) 122 (H) 130 (         Assessment/Plan:   Phone call duration:  15 minutes               Assessment/Plan for  Reba Calderon is a 84 y.o. female.  No Patient Care Coordination Note on file.       1.  Choledocholithiasis-she is a good candidate for Actigall.  She will contact Dr. Bon Curiel at [facility] who did her recent ERCP to discuss this  2.  Chronic A. fib asymptomatic  3.  Diabetes mellitus-on Metformin adequate control  4.  Chronic GERD on PPI    Plan:  Same meds  Actigall-discussed with Dr. Bon Curiel MD        Diagnoses and all orders for this visit:    Choledocholithiasis    Atrial fibrillation, permanent  -     potassium chloride (KLOR-CON 10) 10 MEQ CR tablet; Take 1 tablet (10 mEq total) by mouth daily.  Dispense: 90 tablet; Refill: 3  -     warfarin ANTICOAGULANT (COUMADIN/JANTOVEN) 2.5 MG tablet; Take 1-1.5 tablets (2.5-3.75 mg total) by mouth daily. TAKE 1 TO 1 AND ONE-HALF TABLETS BY MOUTH DAILY AS DIRECTED. ADJUST DOSE AS DIRECTED  Dispense: 140 tablet; Refill: 1  -     lisinopriL  (PRINIVIL,ZESTRIL) 10 MG tablet; Take 1 tablet (10 mg total) by mouth daily.  Dispense: 90 tablet; Refill: 3  -     furosemide (LASIX) 40 MG tablet; Take 2 tablets (80 mg total) by mouth daily.  Dispense: 180 tablet; Refill: 3    GERD (gastroesophageal reflux disease)  -     pantoprazole (PROTONIX) 40 MG tablet; Take 1 tablet (40 mg total) by mouth daily.  Dispense: 90 tablet; Refill: 3    Diabetes (H)  -     metFORMIN (GLUCOPHAGE) 500 MG tablet; Take 1 tablet (500 mg total) by mouth 2 (two) times a day.  Dispense: 180 tablet; Refill: 3  -     atorvastatin (LIPITOR) 10 MG tablet; Take 1 tablet (10 mg total) by mouth daily.  Dispense: 90 tablet; Refill: 3        Bertin Frances MD  Internal medicine  [facility]  [phone]  [email or web address]    This is an electronically verified report by Bertin Frances M.D.  (Note created with Dragon voice recognition and unintended spelling errors and word substitutions may occur)                 Sunshine Ames, [facility]

## 2021-06-07 NOTE — TELEPHONE ENCOUNTER
FYI - Status Update  Who is Calling: Lorena Gaxiola  Update: INR on 4/27/20 was 1.1  Okay to leave a detailed message?:  No return call needed

## 2021-06-07 NOTE — PROGRESS NOTES
"Reba Calderon is a 84 y.o. female who is being evaluated via a billable video visit.      The patient has been notified of following:     \"This video visit will be conducted via a call between you and your physician/provider. We have found that certain health care needs can be provided without the need for an in-person physical exam.  This service lets us provide the care you need with a video conversation.  If a prescription is necessary we can send it directly to your pharmacy.  If lab work is needed we can place an order for that and you can then stop by our lab to have the test done at a later time.    Video visits are billed at different rates depending on your insurance coverage. Please reach out to your insurance provider with any questions.    If during the course of the call the physician/provider feels a video visit is not appropriate, you will not be charged for this service.\"    Patient has given verbal consent to a Video visit? Yes    Patient would like to receive their AVS by AVS Preference: Mail a copy.    Patient would like the video invitation sent by:   Will anyone else be joining your video visit? No        Video Start Time: 1 pm    Additional provider notes: Video visit was made today.  Patient has been dealing with a groin abscess.  This required operating room time with incision and drainage of the abscess.  She was then placed on Augmentin.  She has been doing well.  She did see the surgeon yesterday who still has a dressing in place.  She was told she had a bad infection may need to take antibiotics for several months.  Also told that she might need to see an infectious disease specialist.  Check blood cultures shows the bacteria culture growing Enterococcus faecalis sensitive to amoxicillin.  Also 1+ actinomyces.  Clinically, this abscess continues to improve after I&D and current antibiotic regimen.  Currently feels well.  Much less pain and swelling at the abscess site.  No fevers or " chills.  Impression is groin abscess.  Status post incision and drainage.  Still on Augmentin.  We will contact surgery and find out exactly what organism was cultured in the abscess.  Decision can be made regarding necessary length of antibiotics.  We can make a decision if she needs to see infectious disease.  Overall, she is feeling quite a bit better.  Management of her diabetes, hypertension, CLL, remain the same.  She will do a follow-up appointment with her hematologist oncologist.  Video-Visit Details    Type of service:  Video Visit    Video End Time (time video stopped): 1:19pm  Originating Location (pt. Location): Home    Distant Location (provider location):  [facility] INTERNAL MEDICINE     Platform used for Video Visit: [employer]den [employer]      Louis King MD

## 2021-06-07 NOTE — PROGRESS NOTES
Reba Calderon is a 84 y.o. female who is being evaluated via a billable video visit.        Video visit   Reba Calderon   84 y.o. female    Date of Visit: 4/21/2020    Chief Complaint   Patient presents with     Mass     Right groin for years- was size of pea and now the size of a golf ball, bloody drainage since last Thursday        Assessment and Plan   1. Sebaceous cyst  I was able to see this due to the video visit today.  It is infected.  I am going to start her on Augmentin.  She is reluctant due to the pandemic to come to a clinic to have it drained.  We did discuss that if it starts to worsen even on antibiotics or its not resolving then she will need to come into an urgent care to have it drained.  Hopefully it will resolve with antibiotics.  She is in agreement with this plan.  - amoxicillin-clavulanate (AUGMENTIN) 875-125 mg per tablet; Take 1 tablet by mouth 2 (two) times a day for 10 days.  Dispense: 20 tablet; Refill: 0    2. Diabetes mellitus type 2 in nonobese (H)  She is going to be due for a recheck of her A1c in a couple of months and I will go ahead and schedule that along with all of her other routine labs.  - HM2(CBC w/o Differential); Future  - Glycosylated Hemoglobin A1c; Future  - Comprehensive Metabolic Panel; Future  - Microalbumin, Random Urine; Future  - Thyroid Cascade; Future  - Lipid Cascade; Future    3. Essential hypertension  - Comprehensive Metabolic Panel; Future  - Thyroid Cascade; Future  - Lipid Cascade; Future       Return in about 2 months (around 7/6/2020) for Annual physical.     History of Present Illness   This 84 y.o. old female was evaluated with a virtual video visit today.  She has a history of type 2 diabetes mellitus.  Over the last week she has noted increased redness, warmth and pain on a sebaceous cyst in her groin area.  She has had the small cyst there for a long time but now it is becoming infected.  It is painful.  She has not had any fevers or chills or  other systemic symptoms.  She is otherwise been feeling pretty well.  She has type 2 diabetes mellitus but has been pretty well controlled.  She has not had any problems with cardiac concerns recently.  She has no other acute concerns today.    Review of Systems: As above, systems otherwise reviewed and negative.     Medications, Allergies and Problem List   Patient Active Problem List   Diagnosis     Atrial fibrillation, permanent     Diabetes mellitus type 2 in nonobese (H)     CLL (chronic lymphocytic leukemia) (H)     Peripheral sensory neuropathy     Cardiac pacemaker in situ     Chronic diastolic heart failure (H)     HTN (hypertension)     MATILDE (obstructive sleep apnea)     Current Outpatient Medications   Medication Sig Dispense Refill     ammonium lactate (AMLACTIN) 12 % cream Apply topically as needed for dry skin.       ascorbic acid/vitamin E/biotin (HAIR, SKIN, NAILS WITH BIOTIN ORAL) Take 2,500 mcg by mouth every morning.       atorvastatin (LIPITOR) 10 MG tablet Take 1 tablet (10 mg total) by mouth daily. 90 tablet 3     b complex vitamins capsule Take 1 capsule by mouth daily. 1000mcg       blood glucose test (CONTOUR TEST STRIPS) strips TEST ONCE DAILY AS DIRECTED 100 strip 3     calcium carbonate-vitamin D2 500 mg(1,250mg) -200 unit tablet Take 1 tablet by mouth daily.       cholecalciferol, vitamin D3, 1,000 unit tablet Take 2,000 Units by mouth daily.       folic acid (FOLVITE) 1 MG tablet Take 1 mg by mouth daily.       furosemide (LASIX) 40 MG tablet Take 2 tablets (80 mg total) by mouth daily. 180 tablet 3     glucosamine-chondroitin 500-400 mg tablet Take 1 tablet by mouth 2 (two) times a day.       lisinopriL (PRINIVIL,ZESTRIL) 10 MG tablet Take 1 tablet (10 mg total) by mouth daily. 90 tablet 3     magnesium chloride (SLOW-MAG) 64 mg TbEC delayed-release tablet Take 64 mg by mouth daily.       metFORMIN (GLUCOPHAGE) 500 MG tablet Take 1 tablet (500 mg total) by mouth 2 (two) times a day. 180  "tablet 3     nitroglycerin (NITROSTAT) 0.4 MG SL tablet Place 0.4 mg under the tongue every 5 (five) minutes as needed for chest pain.       pantoprazole (PROTONIX) 40 MG tablet Take 1 tablet (40 mg total) by mouth daily. 90 tablet 3     polyethylene glycol (GLYCOLAX) 17 gram/dose powder Take 17 g by mouth daily. 1700 g 3     potassium chloride (KLOR-CON 10) 10 MEQ CR tablet Take 1 tablet (10 mEq total) by mouth daily. 90 tablet 3     warfarin ANTICOAGULANT (COUMADIN/JANTOVEN) 2.5 MG tablet Take 1-1.5 tablets (2.5-3.75 mg total) by mouth daily. TAKE 1 TO 1 AND ONE-HALF TABLETS BY MOUTH DAILY AS DIRECTED. ADJUST DOSE AS DIRECTED 140 tablet 1     amoxicillin-clavulanate (AUGMENTIN) 875-125 mg per tablet Take 1 tablet by mouth 2 (two) times a day for 10 days. 20 tablet 0     No current facility-administered medications for this visit.      Allergies   Allergen Reactions     Amiodarone Unknown     Dyspnea, O2 dependency     Beta-Blockers (Beta-Adrenergic Blocking Agts) Unknown     Lower extremity edema     Oxycodone-Acetaminophen Anaphylaxis     Atenolol Other (See Comments)     adverse reaction, ,      Meperidine Nausea And Vomiting     Metoprolol Succinate Unknown     SOB and joint pain     Pioglitazone Hcl Unknown     Legs swell, Comment: intolerance, Description: Actos, Comment: intolerance, Description: Actos          Physical Exam       General: This is an alert female, sitting comfortably, no apparent distress.  Skin: There is a large abscess in the groin area with surrounding redness.     Additional Information   Social History     Tobacco Use     Smoking status: Never Smoker     Smokeless tobacco: Never Used   Substance Use Topics     Alcohol use: Yes     Comment: occasional     Drug use: Not on file            Vivien Whalen MD      The patient has been notified of following:     \"This video visit will be conducted via a call between you and your physician/provider. We have found that certain health care " "needs can be provided without the need for an in-person physical exam.  This service lets us provide the care you need with a video conversation.  If a prescription is necessary we can send it directly to your pharmacy.  If lab work is needed we can place an order for that and you can then stop by our lab to have the test done at a later time.    Video visits are billed at different rates depending on your insurance coverage. Please reach out to your insurance provider with any questions.    If during the course of the call the physician/provider feels a video visit is not appropriate, you will not be charged for this service.\"    Patient has given verbal consent to a Video visit? Yes    Patient would like to receive their AVS by AVS Preference: [employer].    Patient would like the video invitation sent by: Text to cell phone: [phone]    Will anyone else be joining your video visit? No        Video Start Time: 1:41 pm          Video-Visit Details    Type of service:  Video Visit    Video End Time (time video stopped): 1:52 pm    Originating Location (pt. Location): Home    Distant Location (provider location):  [facility] INTERNAL MEDICINE     Mode of Communication:  Video Conference via [employer]      Vivien Whalen MD  "

## 2021-06-07 NOTE — TELEPHONE ENCOUNTER
Pt calls reporting about her cyst in crease of leg and genital/crotch area. Says she has had this cyst for 30 years and she suspects it is now infected.     Reports swelling the size of ping pong ball, and has redness with foul odor. Bloody drainage. Rates pain 3.5/10. Denies vaginal bleeding or pain urination.   Denies fever. Has tried sitz bath and other home management with no relief.     Care advice reviewed. Scheduled telephone visit made with provider for today per symptoms. Advised to call back if symptoms worsen.         Reason for Disposition    Genital area looks infected (e.g., draining sore, spreading redness)    Protocols used: VULVAR SYMPTOMS-A-JULIO Phan RN

## 2021-06-07 NOTE — TELEPHONE ENCOUNTER
Pt is scheduled with Dr Worthington tomorrow for a follow up from [place]     Please help re-schedule pt to Dr Luna schedule or covering provider for DNT     Thank you!

## 2021-06-08 NOTE — TELEPHONE ENCOUNTER
Her cultures grew out enterococcus and actinomyces.  The enterococcus is sensitive to ampicillin so Augmentin is perfect.  Actinomyces responds to penicillin or ampicillin and again Augmentin is appropriate.  I am getting infectious disease involved per colorectal surgery's wishes and for her reassurance.

## 2021-06-08 NOTE — TELEPHONE ENCOUNTER
Received a faxed INR result for Reba Calderon  From [facility]    INR result dated 6/1/2020 is 2.5

## 2021-06-08 NOTE — TELEPHONE ENCOUNTER
Received a faxed INR result for Reba Calderon  From [facility]  INR result dated 5/18/2020 is 2.3

## 2021-06-08 NOTE — TELEPHONE ENCOUNTER
ANTICOAGULATION  MANAGEMENT-Patient Home Monitoring Result    Assessment     Therapeutic INR result of 3.0 (goal INR of 2.0-3.0) received via fax from [employer] home INR monitoring company.        Previous INR was Therapeutic    Reba Calderon last contacted by phone: 5/18/2020    Plan     Per home monitoring agreement with patient, patient was NOT contacted regarding therapeutic result today.  Patient is to continue current dose and continue to check INR with home monitor per protocol.  ?   Crys Fontanez RN    Subjective/Objective:      Reba Calderon, a 84 y.o. female  is established on warfarin using a home INR monitor.    Anticoagulation Episode Summary     Current INR goal:   2.0-3.0   TTR:   77.7 % (1 y)   Next INR check:   6/22/2020   INR from last check:   3.00 (6/15/2020)   Weekly max warfarin dose:      Target end date:   Indefinite   INR check location:      Preferred lab:      Send INR reminders to:   [facility]    Indications    Atrial fibrillation  permanent (H) [I48.21]           Comments:            Anticoagulation Care Providers     Provider Role Specialty Phone number    Bertin Frances MD Referring Internal Medicine [phone]

## 2021-06-08 NOTE — TELEPHONE ENCOUNTER
Received a faxed INR result for Reba Calderon  From [facility]  INR result dated 5/11/2020 is 2.30      Last INR from 5/4/20 was 1.80

## 2021-06-08 NOTE — TELEPHONE ENCOUNTER
She can stay on the topical if that is helping -- powder preferred over cream. If that doesn't work I can prescribe fluconazole. Yes antibiotics can lead to this.     Catracho Chaparro

## 2021-06-08 NOTE — TELEPHONE ENCOUNTER
Please advise on when and how to schedule.    Diagnosis: Abscess of groin [L02.214]   Comments   Video/telephone consult to infectious disease patient was hospitalized last week for I&D of skin abscess with cultures growing enterococcus and actinomyces. Need help with antibiotic regimen.

## 2021-06-08 NOTE — TELEPHONE ENCOUNTER
ANTICOAGULATION  MANAGEMENT-Patient Home Monitoring Result    Assessment     Therapeutic INR result of 2.60 (goal INR of 2.0-3.0) received via fax from [employer] home INR monitoring company.        Previous INR was Therapeutic at 2.50 on 6/1/20.    Reba Calderon last contacted by phone: 5/18/20.    Plan     Per home monitoring agreement with patient, patient was NOT contacted regarding therapeutic result today.  Patient is to continue current dose and continue to check INR with home monitor per protocol.  ?   Dimple Sam RN    Subjective/Objective:      Reba Calderon, a 84 y.o. female  is established on warfarin using a home INR monitor.    Anticoagulation Episode Summary     Current INR goal:   2.0-3.0   TTR:   77.7 % (1 y)   Next INR check:   6/15/2020   INR from last check:   2.60 (6/8/2020)   Weekly max warfarin dose:      Target end date:   Indefinite   INR check location:      Preferred lab:      Send INR reminders to:   [facility]    Indications    Atrial fibrillation  permanent [I48.21]           Comments:            Anticoagulation Care Providers     Provider Role Specialty Phone number    Bertin Frances MD Referring Internal Medicine [phone]

## 2021-06-08 NOTE — TELEPHONE ENCOUNTER
Provider Communication  [facility]   [facility] in which [facility] is [facility]  Reason for call:  PA was calling to discuss patient with pcp or dr. King. States she would like to touch base with them about patient's current condition and referrals.  Urgency for return call:  as available  Okay to leave detailed message?:  Yes

## 2021-06-08 NOTE — TELEPHONE ENCOUNTER
ANTICOAGULATION  MANAGEMENT    Assessment     Today's INR result of 2.30 is Therapeutic (goal INR of 2.0-3.0)        Warfarin taken as previously instructed    No new diet changes affecting INR    No new medication/supplements affecting INR    - continues with Augmentin since 5/5 (treatment for months / longterm).    Continues to tolerate warfarin with no reported s/s of bleeding or thromboembolism     Previous INR was Subtherapeutic at 1.80 on 5/4/20.    RIGHT Groin Abscess - s/p Incision / drainage on 4/25/20.    Plan:     Spoke with Reba regarding INR result and instructed:     Warfarin Dosing Instructions:  (evening. has 2.5mg tabs)   - Continue current warfarin dose 2.5 mg daily on Mon/Wed/Fri; and 3.75 mg daily rest of week.    Instructed patient to follow up no later than:  1-2 wks.   - checks INR's with home monitor thru [employer].    Education provided: importance of consistent vitamin K intake, target INR goal and significance of current INR result, potential interaction between warfarin and Augmentin and importance of notifying clinic for changes in medications    Reba verbalizes understanding and agrees to warfarin dosing plan.    Instructed to call the [facility] for any changes, questions or concerns. (#[phone])   ?   Dimple Sam RN    Subjective/Objective:      Reba Calderon, a 84 y.o. female is on warfarin.     Reba reports:     Home warfarin dose: verbally confirmed home dose with Reba and updated on anticoagulation calendar     Missed doses: No     Medication changes:  No     S/S of bleeding or thromboembolism:  No     New Injury or illness:  No     Changes in diet or alcohol consumption:  No     Upcoming surgery, procedure or cardioversion:  No    Anticoagulation Episode Summary     Current INR goal:   2.0-3.0   TTR:   73.4 % (1 y)   Next INR check:   5/18/2020   INR from last check:   2.30 (5/11/2020)   Weekly max warfarin dose:      Target end date:   Indefinite   INR check location:       Preferred lab:      Send INR reminders to:   [facility]    Indications    Atrial fibrillation  permanent [I48.21]           Comments:            Anticoagulation Care Providers     Provider Role Specialty Phone number    Bertin Frances MD Referring Internal Medicine [phone]

## 2021-06-08 NOTE — TELEPHONE ENCOUNTER
Received a faxed INR result for Reba Calderon  From [facility]  INR result dated 6/15/2020 is 3.00      Last INR from 6/8/20 was 2.60

## 2021-06-08 NOTE — TELEPHONE ENCOUNTER
Who is calling:  patient  Reason for Call:  Calling for medication change, patient states she should be on penicillin instead of sulfa antibiotic she's taking . Would like a call back asap  Date of last appointment with primary care:   Okay to leave a detailed message: Yes

## 2021-06-08 NOTE — PROGRESS NOTES
"Reba Calderon is a 84 y.o. female who is being evaluated via a billable video visit.      The patient has been notified of following:     \"This video visit will be conducted via a call between you and your physician/provider. We have found that certain health care needs can be provided without the need for an in-person physical exam.  This service lets us provide the care you need with a video conversation.  If a prescription is necessary we can send it directly to your pharmacy.  If lab work is needed we can place an order for that and you can then stop by our lab to have the test done at a later time.    Video visits are billed at different rates depending on your insurance coverage. Please reach out to your insurance provider with any questions.    If during the course of the call the physician/provider feels a video visit is not appropriate, you will not be charged for this service.\"    Patient has given verbal consent to a Video visit? Yes    Patient would like to receive their AVS by AVS Preference: Lori.    Patient would like the video invitation sent by: Text to cell phone: [phone]    Will anyone else be joining your video visit? No        Video Start Time: 11:36am    Additional provider notes: see below for note template.       Video-Visit Details    Type of service:  Video Visit    Video End Time (time video stopped): 12:05pm  Originating Location (pt. 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 Culture December with strep anginosus (a known abscess culprit) and actinomyces. Repeat I+D " with actinomyces and enterococcus. Actinomyces can be found in polymicrobial infections, and with the first episode I would have implicated the strep, with treatment of short course of antibiotics. 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 Actinomycosis causes chronic infections, that cross through tissue planes -- jaw, chest, abdomen are foci. We do not have report of sulfur granules (no pathology sent with either surgery) which would confirm the diagnosis of Actinomycosis, but clinically it fits enough to warrant longer course of antibiotic. Why she had this is not clear, but diabetes and NHL may affect her immune system enough to lead to this.     PLAN:  Amoxicillin around 6 months from surgery in April. Start once Augmentin runs out  Labs through Dr. Coats in July, return visit with me after that.       Catracho Chaparro MD  [facility]   Clinic phone: [phone]   Clinic fax: [phone]    ______________________________________________________________________    HISTORY OF PRESENT ILLNESS: Reba Calderon is a 84 y.o. woman who is referred for evaluation of groin abscess.     Recalls symptoms started in December. After abscess started a lancing was attempted in Urgent care, ended up being admitted late December. Had been on amoxicillin previously, remains on Augmentin, has another prescription. Recalls hospital stay in December, attempted lancing in urgency care, had seen Dr. Frances, was admitted from there, tthen had surgery, she was told it was like fibers that were infected, about twice size of thumb in thickness, was on IV antibiotics 4 days, the oral short course. Culture with strep anginosus, actinomyces, coag neg staph.      Still had some induration following this, seemed to not go away in between hospital stays. It drained some  "spontaneously, describes that there was fiber type material. Was back in hospital in April for same problem, had another surgery -- culture grew actinomyces and enterococcus. Discharged on Augmentin, has about a week remaining of this. Now induration is almost gone. Area of infection is perineal/groin.     Has NHL, follows with Dr. Coats. Has been staying at home since 3/10. Has pacemaker, has had AV node ablation. She has appointment with Dr. Coats in July. Checking INR at home -- 2.3 last checked.  this am. Sees Dr. Barrett next week. [place], [place].     Had ERCP between hospital stays    Discharge summary from April, discharged on 7 days of Augmentin:  \"hypertension, diabetes melliltus type II, atrial fibrillation S/P AV jerzy ablation on warfarin who is a direct admit from Colorectal Surgery office upon the request for Dr Shyam Barrett for right groin abscess/possible perirectal abscess and for possible I&D, exam anesthesia 4/25/20.     One week prior to admission she was seen in virtual visit for right groin swelling, pain and bloody drainage. There has been a peasized lump there for years but now is golf ball size. She was started on augmentin which she took but the swelling and drainage persisted. She was seen by Dr Barrett in clinic, advised admission for possible surgery.     Drainage of right groin abscess done 4/25/20. Discharge discussed with Dr Barrett: augmentin x 7 days, pain medications and follow up in clinic after a week.    Patient was off warfarin and bridged with IV heparin per Cardiology consult. Per Dr Barrett, patient can resume warfarin tonight 4/25/2020.     Per Cardiology: The patient has previously undergone procedures without bridging. She states that she has been off AC for as long as 3 weeks without complication. At this time the risk-benefit ratio favors not bridging the patient. She should be placed back on her warfarin when it is felt to be safe to do so postsurgically. I would " "recommend continuing the remainder of her cardiac medications unchanged.\"    Surgical report  \"brought to the operating room.   She had sequential compression devices placed on and activated.  She was placed under general endotracheal anesthesia.  I was allowed in the room 20 minutes after intubation and compliance with coronavirus procedures.  The patient was positioned in the supine position with both of her arms out.  The hair in the surrounding area was clipped.  She was prepped and draped in the usual sterile fashion.  A timeout confirming the patient's identity, problem, and planned procedure was conducted.  After all team members were in agreement we proceeded.     The area around the abscess was infiltrated with 40 mL of 0.25% bupivacaine with epinephrine.  A 2 cm x 1 cm elliptical incision was made in the superficial skin of the right groin.  The punctum of the cyst cavity was excised.  There was a rush of purulent fluid and a culture swab was taken.  This excised skin was removed and the abscess cavity was opened.  A finger was used to break up any loculations.  The wound was copiously irrigated.  Meticulous hemostasis was gained with electrocautery.     Hemostasis was confirmed. A surgicel was placed into the wound as a packing.  This was then covered with gauze fluffs and mesh underwear.      All sponge, needle and instrument counts were correct x 2 at the conclusion of the case.      The patient was awakened from anesthesia and extubated in the operating room. The patient tolerated the procedure well and was transferred to the PACU in stable condition. There were no immediate apparent complications.\"      Past Medical History:   Diagnosis Date     Carotid stenosis, asymptomatic      CLL (chronic lymphocytic leukemia) (H)      DMII (diabetes mellitus, type 2) (H)      GERD (gastroesophageal reflux disease)      Hyperlipidemia      Hypertension      Pacemaker      Permanent atrial fibrillation      Small " B-cell lymphoma (H)        Past Surgical History:   Procedure Laterality Date     BREAST BIOPSY       CATARACT EXTRACTION       CHOLECYSTECTOMY  08/24/2013     DILATION AND CURETTAGE OF UTERUS       ERCP W/ SPHINCTEROTOMY AND BALLOON DILATION  11/2017, 10/27/2014     HEMANGIOMA EXCISION  03/29/2019    Nasal     KNEE ARTHROSCOPY       NASAL ENDOSCOPY  03/29/2019     nasal mucosal flap reconstruction  03/29/2019     pacemaker implantation       REPAIR RECTOCELE       TONSILLECTOMY         Social History     Socioeconomic History     Marital status:      Spouse name: Not on file     Number of children: Not on file     Years of education: Not on file     Highest education level: Not on file   Occupational History     Not on file   Social Needs     Financial resource strain: Not on file     Food insecurity     Worry: Not on file     Inability: Not on file     Transportation needs     Medical: Not on file     Non-medical: Not on file   Tobacco Use     Smoking status: Never Smoker     Smokeless tobacco: Never Used   Substance and Sexual Activity     Alcohol use: Yes     Comment: occasional     Drug use: Not on file     Sexual activity: Not on file   Lifestyle     Physical activity     Days per week: Not on file     Minutes per session: Not on file     Stress: Not on file   Relationships     Social connections     Talks on phone: Not on file     Gets together: Not on file     Attends [employer] service: Not on file     Active member of club or organization: Not on file     Attends meetings of clubs or organizations: Not on file     Relationship status: Not on file     Intimate partner violence     Fear of current or ex partner: Not on file     Emotionally abused: Not on file     Physically abused: Not on file     Forced sexual activity: Not on file   Other Topics Concern     Not on file   Social History Narrative           Immunization History   Administered Date(s) Administered     Influenza high  dose,seasonal,PF, 65+ yrs 10/29/2014, 11/07/2015, 11/05/2016, 11/06/2017     Influenza, inj, historic,unspecified 10/11/2018, 11/26/2019     Pneumo Polysac 23-V 11/14/2008, 08/26/2013     ZOSTER, LIVE 09/01/2013     ZOSTER, RECOMBINANT, IM 11/30/2019        SH: rare alcohol, non smoker. [place] end of February. Lives .     ROS: All other systems negative except as listed above.      Current Outpatient Medications:      ammonium lactate (AMLACTIN) 12 % cream, Apply topically as needed for dry skin., Disp: , Rfl:      amoxicillin-clavulanate (AUGMENTIN) 875-125 mg per tablet, , Disp: , Rfl:      ascorbic acid/vitamin E/biotin (HAIR, SKIN, NAILS WITH BIOTIN ORAL), Take 2,500 mcg by mouth every morning., Disp: , Rfl:      atorvastatin (LIPITOR) 10 MG tablet, Take 1 tablet (10 mg total) by mouth daily., Disp: 90 tablet, Rfl: 3     b complex vitamins capsule, Take 1 capsule by mouth daily. 1000mcg, Disp: , Rfl:      blood glucose test (CONTOUR TEST STRIPS) strips, TEST ONCE DAILY AS DIRECTED, Disp: 100 strip, Rfl: 3     calcium carbonate-vitamin D2 500 mg(1,250mg) -200 unit tablet, Take 1 tablet by mouth daily., Disp: , Rfl:      cholecalciferol, vitamin D3, 1,000 unit tablet, Take 2,000 Units by mouth daily., Disp: , Rfl:      folic acid (FOLVITE) 1 MG tablet, Take 1 mg by mouth daily., Disp: , Rfl:      furosemide (LASIX) 40 MG tablet, Take 2 tablets (80 mg total) by mouth daily., Disp: 180 tablet, Rfl: 3     glucosamine-chondroitin 500-400 mg tablet, Take 1 tablet by mouth 2 (two) times a day., Disp: , Rfl:      HYDROcodone-acetaminophen 5-325 mg per tablet, Take 1 tablet by mouth., Disp: , Rfl:      lisinopriL (PRINIVIL,ZESTRIL) 10 MG tablet, Take 1 tablet (10 mg total) by mouth daily., Disp: 90 tablet, Rfl: 3     magnesium chloride (SLOW-MAG) 64 mg TbEC delayed-release tablet, Take 64 mg by mouth daily., Disp: , Rfl:      metFORMIN (GLUCOPHAGE) 500 MG tablet, Take 1 tablet (500 mg total) by mouth 2 (two) times  a day., Disp: 180 tablet, Rfl: 3     nitroglycerin (NITROSTAT) 0.4 MG SL tablet, Place 0.4 mg under the tongue every 5 (five) minutes as needed for chest pain., Disp: , Rfl:      pantoprazole (PROTONIX) 40 MG tablet, Take 1 tablet (40 mg total) by mouth daily., Disp: 90 tablet, Rfl: 3     polyethylene glycol (GLYCOLAX) 17 gram/dose powder, Take 17 g by mouth daily., Disp: 1700 g, Rfl: 3     potassium chloride (KLOR-CON 10) 10 MEQ CR tablet, Take 1 tablet (10 mEq total) by mouth daily., Disp: 90 tablet, Rfl: 3     warfarin ANTICOAGULANT (COUMADIN/JANTOVEN) 2.5 MG tablet, Take 1-1.5 tablets (2.5-3.75 mg total) by mouth daily. TAKE 1 TO 1 AND ONE-HALF TABLETS BY MOUTH DAILY AS DIRECTED. ADJUST DOSE AS DIRECTED (Patient taking differently: Take 2.5-3.75 mg by mouth daily. TAKE 1 TO 1 AND ONE-HALF TABLETS BY MOUTH DAILY AS DIRECTED. ADJUST DOSE AS DIRECTED MWF 1 tab and rest of days 1.5 tab daily), Disp: 140 tablet, Rfl: 1      OBJECTIVE:  There were no vitals filed for this visit.      GEN: No acute distress. She appears comfortable  Normal respiratory effort.           Pertinent labs:        Lab Results   Component Value Date    ALT 15 09/12/2018    AST 15 09/12/2018    ALKPHOS 56 09/12/2018    BILITOT 0.9 09/12/2018         MICROBIOLOGY DATA:  Reviewed from [facility] record see above

## 2021-06-08 NOTE — TELEPHONE ENCOUNTER
It is my understanding that she is on Augmentin and not a sulfa antibiotic.  That is the correct antibiotic to take.  I spoke with colorectal surgery.  I agree with their recommendation for her to see infectious disease and I have placed referral.  Infectious disease can perform a video/telephone consult that does not require her coming into the office.

## 2021-06-08 NOTE — TELEPHONE ENCOUNTER
ANTICOAGULATION  MANAGEMENT-Patient Home Monitoring Result    Assessment     Therapeutic INR result of 2.50 (goal INR of 2.0-3.0) received via fax from [employer] home INR monitoring company.        Previous INR was Therapeutic at 2.80 on 5/26/20.    Reba Calderon last contacted by phone: 5/18/20.    Plan     Per home monitoring agreement with patient, patient was NOT contacted regarding therapeutic result today.  Patient is to continue current dose and continue to check INR with home monitor per protocol.  ?   Dimple Sam RN    Subjective/Objective:      Reba Calderon, a 84 y.o. female  is established on warfarin using a home INR monitor.    Anticoagulation Episode Summary     Current INR goal:   2.0-3.0   TTR:   77.7 % (1 y)   Next INR check:   6/15/2020   INR from last check:   2.50 (6/1/2020)   Weekly max warfarin dose:      Target end date:   Indefinite   INR check location:      Preferred lab:      Send INR reminders to:   [facility]    Indications    Atrial fibrillation  permanent [I48.21]           Comments:            Anticoagulation Care Providers     Provider Role Specialty Phone number    Bertin Frances MD Referring Internal Medicine [phone]

## 2021-06-08 NOTE — TELEPHONE ENCOUNTER
Are you able to speak with this person as Dr King is out until  Monday and you have seen her or should the on call take it? Thanks

## 2021-06-08 NOTE — TELEPHONE ENCOUNTER
ANTICOAGULATION  MANAGEMENT-Patient Home Monitoring Result    Assessment     Therapeutic INR result of 3.00 (goal INR of 2.0-3.0) received via fax from [email or web address] home INR monitoring company.        Previous INR was Therapeutic at 2.60 on 6/8/20.    Reba Calderon last contacted by phone: 5/18/20.    Plan     Per home monitoring agreement with patient, patient was NOT contacted regarding therapeutic result today.  Patient is to continue current dose and continue to check INR with home monitor per protocol.  ?   Dimple Sam RN    Subjective/Objective:      Reba Calderon, a 84 y.o. female  is established on warfarin using a home INR monitor.    Anticoagulation Episode Summary     Current INR goal:   2.0-3.0   TTR:   77.7 % (1 y)   Next INR check:   6/22/2020   INR from last check:   3.00 (6/15/2020)   Weekly max warfarin dose:      Target end date:   Indefinite   INR check location:      Preferred lab:      Send INR reminders to:   [facility]    Indications    Atrial fibrillation  permanent (H) [I48.21]           Comments:            Anticoagulation Care Providers     Provider Role Specialty Phone number    Bertin Frances MD Referring Internal Medicine [phone]

## 2021-06-08 NOTE — TELEPHONE ENCOUNTER
Received a faxed INR result for Reba Calderon  From [facility]  INR result dated 6/15/2020 is 3.00      Last INR from 6/8/20 was 2.60

## 2021-06-08 NOTE — TELEPHONE ENCOUNTER
ANTICOAGULATION  MANAGEMENT    Assessment     Today's INR result of 2.30 is Therapeutic (goal INR of 2.0-3.0)        Warfarin taken as previously instructed    No new diet changes affecting INR    Potential interaction between Amoxicillin and warfarin which may affect subsequent INRs    Continues to tolerate warfarin with no reported s/s of bleeding or thromboembolism.    Previous INR was Therapeutic at 2.30 on 5/11/20.    RIGHT Groin Abscess - s/p Incision / drainage on 4/25/20.    Right groin abscess improving.  Has f/u visit with ID on 5/19.       Plan:     Spoke with Reba regarding INR result and instructed:     Warfarin Dosing Instructions: (evening. has 2.5mg tabs)   - Continue current warfarin dose 2.5 mg daily on Mon/Wed/Fri; and 3.75 mg daily rest of week.    Instructed patient to follow up no later than:  1-2 wks.  (advised to check in one wk, d/t ABX change).   - checks INR's with home monitor thru [employer].    Education provided: target INR goal and significance of current INR result, potential interaction between warfarin and Amoxicillin and importance of notifying clinic for changes in medications    Reba verbalizes understanding and agrees to warfarin dosing plan.    Instructed to call the [facility] for any changes, questions or concerns. (#[phone])   ?   Dimple Sam RN    Subjective/Objective:      Reba AKI Calderon, a 84 y.o. female is on warfarin.     Reba reports:     Home warfarin dose: verbally confirmed home dose with Reba and updated on anticoagulation calendar     Missed doses: No     Medication changes:  Yes:  On 5/13/20 changed from Augmentin to Amoxicillin 500mg three times a day for Actinomycosis, for approximately 6 months from surgery on 4/25/20.   - was on Augmentin 875/125mg for right groin abscess.     S/S of bleeding or thromboembolism:  No     New Injury or illness:  No     Changes in diet or alcohol consumption:  No     Upcoming surgery, procedure or cardioversion:   No    Anticoagulation Episode Summary     Current INR goal:   2.0-3.0   TTR:   75.3 % (1 y)   Next INR check:   5/25/2020   INR from last check:   2.30 (5/18/2020)   Weekly max warfarin dose:      Target end date:   Indefinite   INR check location:      Preferred lab:      Send INR reminders to:   [facility]    Indications    Atrial fibrillation  permanent [I48.21]           Comments:            Anticoagulation Care Providers     Provider Role Specialty Phone number    Bertin Frances MD Referring Internal Medicine [phone]

## 2021-06-08 NOTE — TELEPHONE ENCOUNTER
Dr. Chaparro patient    Patient called. She was informed by her surgeon that she now has a fungal infection, and it could be from taking the antibiotic that was prescribed by Dr. Chaparro.  Please advise on what to do.    She is also wondering if she should still her Oncologist or if she should just see her PCP in July.    Reba @ [phone]

## 2021-06-08 NOTE — TELEPHONE ENCOUNTER
Called patient- advised her that the Augmentin is the correct abx that she is on and she does not need to be on the BActrim again. She is quite upset about this- wants it changed again.     Did try to reassure that Dr. Garcia did discuss with [facility] Rect[facility] and this is what has been agreed upon. Advised that infectious disease order has been placed and they will take over the abx once she has her virtual visit with them.

## 2021-06-08 NOTE — TELEPHONE ENCOUNTER
Please send to the provider covering Dr. King today.  Thank you.  I just did one brief visit with the patient.  BLUE

## 2021-06-08 NOTE — PATIENT INSTRUCTIONS - HE
I have sent amoxicillin to your pharmacy -- you can start this after your current antibiotic runs out.     This is to treat Actinomycosis, which can cause chronic infections.     Please have labs from Dr. Coats faxed to me at [phone].     Stay on amoxicillin until I tell you to stop. Plan is for 4-6 months.     Return in July after you have had labs    Please let me know if you have concerns or questions in the meantime. Clinic number is .     Catracho Chaparro

## 2021-06-08 NOTE — TELEPHONE ENCOUNTER
I called the pt, she states that she was seen by Dr Barrett today who stated she has a fungal infection in her groin. The pt states that she noticed the area to be red about 1 week ago. The pt denies any drainage. The pt states that she is using tinactin, which stings when it is applied. She does believe this is helping. The pt also states that she is taking a probiotic. The pt is looking for tx for this and is wondering if it is from the abx she is on.   I informed the pt she is to have labs with her Oncologist in July. Pt understands.

## 2021-06-09 NOTE — TELEPHONE ENCOUNTER
ANTICOAGULATION  MANAGEMENT-Patient Home Monitoring Result    Assessment     Therapeutic INR result of 2.2 (goal INR of 2.0-3.0) received via fax from [employer] home INR monitoring company.        Previous INR was Therapeutic    Reba Calderon last contacted by phone: 6/22    Plan     Per home monitoring agreement with patient, patient was NOT contacted regarding therapeutic result today.  Patient is to continue current dose and continue to check INR with home monitor per protocol.  ?   Mansi Nolan RN    Subjective/Objective:      Reba Calderon, a 84 y.o. female  is established on warfarin using a home INR monitor.    Anticoagulation Episode Summary     Current INR goal:   2.0-3.0   TTR:   74.5 % (1 y)   Next INR check:   7/27/2020   INR from last check:   2.20 (7/20/2020)   Weekly max warfarin dose:      Target end date:   Indefinite   INR check location:      Preferred lab:      Send INR reminders to:   [facility]    Indications    Atrial fibrillation  permanent (H) [I48.21]           Comments:            Anticoagulation Care Providers     Provider Role Specialty Phone number    Bertin Frances MD Referring Internal Medicine [phone]    Vivien Whalen MD Responsible Internal Medicine [phone]

## 2021-06-09 NOTE — PATIENT INSTRUCTIONS - HE
I am encouraged with how you are doing.     Continue the amoxicillin, likely into later October, which will be 6 months beyond surgery.     Return in 2 months. Please let me know if you have concerns or questions in the meantime.     Catracho Chaparro

## 2021-06-09 NOTE — TELEPHONE ENCOUNTER
Received a faxed INR result for Reba Calderon  From [facility]    INR result dated 7/20/2020 is 2.2

## 2021-06-09 NOTE — TELEPHONE ENCOUNTER
ANTICOAGULATION  MANAGEMENT-Patient Home Monitoring Result    Assessment     Therapeutic INR result of 2.40 (goal INR of 2.0-3.0) received via fax from [employer] home INR monitoring company.        Previous INR was Therapeutic at 2.60 on 7/6/20.    Reba Calderon last contacted by phone: 6/22/20.    Plan     Per home monitoring agreement with patient, patient was NOT contacted regarding therapeutic result today.  Patient is to continue current dose and continue to check INR with home monitor per protocol.  ?   Dimple Sam RN    Subjective/Objective:      Reba Calderon, a 84 y.o. female  is established on warfarin using a home INR monitor.    Anticoagulation Episode Summary     Current INR goal:   2.0-3.0   TTR:   74.3 % (1 y)   Next INR check:   7/20/2020   INR from last check:   2.40 (7/13/2020)   Weekly max warfarin dose:      Target end date:   Indefinite   INR check location:      Preferred lab:      Send INR reminders to:   [facility]    Indications    Atrial fibrillation  permanent (H) [I48.21]           Comments:            Anticoagulation Care Providers     Provider Role Specialty Phone number    Bertin Frances MD Referring Internal Medicine [phone]    Vivien Whalen MD Responsible Internal Medicine [phone]

## 2021-06-09 NOTE — TELEPHONE ENCOUNTER
RN cannot approve Refill Request    RN can NOT refill this medication Protocol failed and NO refill given.      Radha Ba, [facility] Triage/Med Refill 6/19/2020    Requested Prescriptions   Pending Prescriptions Disp Refills     blood glucose test (CONTOUR TEST STRIPS) strips [Pharmacy Med Name: CONTOUR TEST STRIP] 100 strip 3     Sig: TEST ONCE DAILY AS DIRECTED       Diabetic Supplies Refill Protocol Failed - 6/19/2020 12:13 AM        Failed - A1C in last 6 months     Hemoglobin A1c   Date Value Ref Range Status   06/26/2019 7.8 (H) 3.5 - 6.0 % Final               Passed - Visit with PCP or prescribing provider visit in last 6 months     Last office visit with prescriber/PCP: 1/3/2020 Bertin Frances MD OR same dept: 1/3/2020 Bertin Frances MD OR same specialty: 1/3/2020 Bertin Frances MD  Last physical: 3/20/2019 Last MTM visit: Visit date not found   Next visit within 3 mo: Visit date not found  Next physical within 3 mo: Visit date not found  Prescriber OR PCP: Bertin Frances MD  Last diagnosis associated with med order: 1. Diabetes (H)  - CONTOUR TEST STRIPS strips [Pharmacy Med Name: CONTOUR TEST STRIP]; TEST ONCE DAILY AS DIRECTED  Dispense: 100 strip; Refill: 3    If protocol passes may refill for 12 months if within 3 months of last provider visit (or a total of 15 months).

## 2021-06-09 NOTE — TELEPHONE ENCOUNTER
Received a faxed INR result for Reba Calderon  From [facility]  INR result dated 7/6/2020 is 2.60      Last INR from 6/29/20 was 2.90

## 2021-06-09 NOTE — TELEPHONE ENCOUNTER
ANTICOAGULATION  MANAGEMENT-Patient Home Monitoring Result    Assessment     Therapeutic INR result of 2.60 (goal INR of 2.0-3.0) received via fax from [employer] home INR monitoring company.        Previous INR was Therapeutic at 2.90 on 6/29/20.    Reba Calderon last contacted by phone: 6/22/20    Plan     Per home monitoring agreement with patient, patient was NOT contacted regarding therapeutic result today.  Patient is to continue current dose and continue to check INR with home monitor per protocol.  ?   Dimple Sam RN    Subjective/Objective:      Reba Calderon, a 84 y.o. female  is established on warfarin using a home INR monitor.    Anticoagulation Episode Summary     Current INR goal:   2.0-3.0   TTR:   74.3 % (1 y)   Next INR check:   7/13/2020   INR from last check:   2.60 (7/6/2020)   Weekly max warfarin dose:      Target end date:   Indefinite   INR check location:      Preferred lab:      Send INR reminders to:   [facility]    Indications    Atrial fibrillation  permanent (H) [I48.21]           Comments:            Anticoagulation Care Providers     Provider Role Specialty Phone number    Bertin Frances MD Referring Internal Medicine [phone]    Vivien Whalen MD Responsible Internal Medicine [phone]

## 2021-06-09 NOTE — TELEPHONE ENCOUNTER
ANTICOAGULATION  MANAGEMENT-Patient Home Monitoring Result    Assessment     Therapeutic INR result of 2.90 (goal INR of 2.0-3.0) received via fax from [employer] home INR monitoring company.        Previous INR was Supratherapeutic at 3.40 on 6/22/20.    Reba Calderon last contacted by phone: 6/22/20.    Plan     Per home monitoring agreement with patient, patient was NOT contacted regarding therapeutic result today.  Patient is to continue current dose and continue to check INR with home monitor per protocol.  ?   Dimple Sam RN    Subjective/Objective:      Reba Calderon, a 84 y.o. female  is established on warfarin using a home INR monitor.    Anticoagulation Episode Summary     Current INR goal:   2.0-3.0   TTR:   74.3 % (1 y)   Next INR check:   7/6/2020   INR from last check:   2.90 (6/29/2020)   Weekly max warfarin dose:      Target end date:   Indefinite   INR check location:      Preferred lab:      Send INR reminders to:   [facility]    Indications    Atrial fibrillation  permanent (H) [I48.21]           Comments:            Anticoagulation Care Providers     Provider Role Specialty Phone number    Bertin Frances MD Referring Internal Medicine [phone]    Vivien Whalen MD Responsible Internal Medicine [phone]

## 2021-06-09 NOTE — TELEPHONE ENCOUNTER
Received a faxed INR result for Reba Calderon  From [facility]  INR result dated 6/22/2020 is 3.40      Last INR from 6/15/20 was 3.00

## 2021-06-09 NOTE — TELEPHONE ENCOUNTER
Patient calling to cancel appointment.  Call sent to nurse triage , do not know why.  No current reason for triage.    Cancelled her lab and pcp appt by her request as she thinks it is double duty as she is seeing Dr. Chaparro on 7/8 for same thing.    Fern Holguin, RN [facility] Triage Nurse Advisor

## 2021-06-09 NOTE — TELEPHONE ENCOUNTER
Received a faxed INR result for Reba Calderon  From [facility]    INR result dated 6/29/2020 is 2.9

## 2021-06-09 NOTE — TELEPHONE ENCOUNTER
ANTICOAGULATION  MANAGEMENT    Assessment     Today's INR result of 3.40 is Supratherapeutic (goal INR of 2.0-3.0)        Warfarin taken as previously instructed    diarrhea / not much of appetite that day may be affecting diet and INR    Potential interaction between continues with Amoxicillin for 6 months and warfarin which may affect subsequent INRs    Continues to tolerate warfarin with no reported s/s of bleeding or thromboembolism     Previous INR was Therapeutic at 3.00 on 6/15/20.  (lasdt 5 INR's therapeutic).    Plan:     Spoke with Reba regarding INR result and instructed:    1.  Advised to continue check INR every wk due to taking Amoxicillin for 6 months.    Warfarin Dosing Instructions:  (evening. has 2.5mg tabs)   - today, advised one time lower dose with 1.25mg warfarin, then continue current warfarin dose 2.5 mg daily on Mon/Wed/Fri; and 3.75 mg daily rest of week.    Instructed patient to follow up no later than:  One wk.   - checks INR's wkly with home monitor thru [employer].    Education provided: importance of consistent vitamin K intake, target INR goal and significance of current INR result, monitoring for bleeding signs and symptoms and importance of notifying clinic for diarrhea, nausea/vomiting, reduced intake and/or illness    Reba verbalizes understanding and agrees to warfarin dosing plan.    Instructed to call the [facility] for any changes, questions or concerns. (#[phone])   ?   Dimple Sam RN    Subjective/Objective:      Reba Wilsonon, a 84 y.o. female is on warfarin.     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  Reported diarrhea resolved on  6/17 with no recurrent episodes.  Will have flare-up occasionally.     Changes in diet or alcohol consumption:  Yes:  Reported on 6/17, had diarrhea and no appetite to eat.     Upcoming surgery, procedure or cardioversion:  No    Anticoagulation Episode Summary     Current INR goal:   2.0-3.0   TTR:   75.8 % (1 y)   Next INR check:   6/29/2020   INR from last check:   3.40! (6/22/2020)   Weekly max warfarin dose:      Target end date:   Indefinite   INR check location:      Preferred lab:      Send INR reminders to:   [facility]    Indications    Atrial fibrillation  permanent (H) [I48.21]           Comments:            Anticoagulation Care Providers     Provider Role Specialty Phone number    Bertin Frances MD Referring Internal Medicine [phone]

## 2021-06-09 NOTE — PROGRESS NOTES
"Reba Calderon is a 84 y.o. female who is being evaluated via a billable video visit.      The patient has been notified of following:     \"This video visit will be conducted via a call between you and your physician/provider. We have found that certain health care needs can be provided without the need for an in-person physical exam.  This service lets us provide the care you need with a video conversation.  If a prescription is necessary we can send it directly to your pharmacy.  If lab work is needed we can place an order for that and you can then stop by our lab to have the test done at a later time.    Video visits are billed at different rates depending on your insurance coverage. Please reach out to your insurance provider with any questions.    If during the course of the call the physician/provider feels a video visit is not appropriate, you will not be charged for this service.\"    Patient has given verbal consent to a Video visit? Yes  How would you like to obtain your AVS? AVS Preference: [employer].  Patient would like the video invitation sent by: [employer]  Will anyone else be joining your video visit? No      Video Start Time: 10:35 AM    Additional provider notes:   She had blood work through Dr. Coats, and this was reviewed after today's visit.     She followed up with Dr. Barrett in person on 6/2/20, noted at that time that wound was down to 2mm without cellulitis or induration.     Wound has now completely healed. There is still area of tender that is next to rectum, this is about 2 inches long, this seems to be improved. She notices it when she urinates mainly.     Closest friend has had COVID, and still testing positive. They would like to get together but she is nervous about this. Fever resolved in her friend, first diagnosed about 3.5 weeks ago.    Taking amoxicillin three times a day, tolerating this fine other than some mild GI up set at times.     Had yeast infection in groin, resolved.  "     Current Outpatient Medications on File Prior to Visit   Medication Sig Dispense Refill     amoxicillin (AMOXIL) 500 MG capsule Take 1 capsule (500 mg total) by mouth 3 (three) times a day. 90 capsule 5     ascorbic acid/vitamin E/biotin (HAIR, SKIN, NAILS WITH BIOTIN ORAL) Take 2,500 mcg by mouth every morning.       atorvastatin (LIPITOR) 10 MG tablet Take 1 tablet (10 mg total) by mouth daily. 90 tablet 3     b complex vitamins capsule Take 1 capsule by mouth daily. 1000mcg       blood glucose test (CONTOUR TEST STRIPS) strips TEST ONCE DAILY AS DIRECTED 100 strip 3     calcium carbonate-vitamin D2 500 mg(1,250mg) -200 unit tablet Take 1 tablet by mouth daily.       cholecalciferol, vitamin D3, 1,000 unit tablet Take 2,000 Units by mouth daily.       folic acid (FOLVITE) 1 MG tablet Take 1 mg by mouth daily.       glucosamine-chondroitin 500-400 mg tablet Take 1 tablet by mouth 2 (two) times a day.       lisinopriL (PRINIVIL,ZESTRIL) 10 MG tablet Take 1 tablet (10 mg total) by mouth daily. 90 tablet 3     magnesium chloride (SLOW-MAG) 64 mg TbEC delayed-release tablet Take 64 mg by mouth daily.       metFORMIN (GLUCOPHAGE) 500 MG tablet Take 1 tablet (500 mg total) by mouth 2 (two) times a day. 180 tablet 3     nitroglycerin (NITROSTAT) 0.4 MG SL tablet Place 0.4 mg under the tongue every 5 (five) minutes as needed for chest pain.       pantoprazole (PROTONIX) 40 MG tablet Take 1 tablet (40 mg total) by mouth daily. 90 tablet 3     polyethylene glycol (GLYCOLAX) 17 gram/dose powder Take 17 g by mouth daily. 1700 g 3     potassium chloride (KLOR-CON 10) 10 MEQ CR tablet Take 1 tablet (10 mEq total) by mouth daily. 90 tablet 3     warfarin ANTICOAGULANT (COUMADIN/JANTOVEN) 2.5 MG tablet Take 1-1.5 tablets (2.5-3.75 mg total) by mouth daily. TAKE 1 TO 1 AND ONE-HALF TABLETS BY MOUTH DAILY AS DIRECTED. ADJUST DOSE AS DIRECTED (Patient taking differently: Take 2.5-3.75 mg by mouth daily. TAKE 1 TO 1 AND ONE-HALF  TABLETS BY MOUTH DAILY AS DIRECTED. ADJUST DOSE AS DIRECTED  MWF 1 tab and rest of days 1.5 tab daily) 140 tablet 1     furosemide (LASIX) 40 MG tablet Take 2 tablets (80 mg total) by mouth daily. 180 tablet 3     HYDROcodone-acetaminophen 5-325 mg per tablet Take 1 tablet by mouth.       No current facility-administered medications on file prior to visit.       Exam:  She is in no distress, breathing is non-labored, neurologically intact.     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 Culture December with strep anginosus (a known abscess culprit) and actinomyces. Repeat I+D with actinomyces and enterococcus. Actinomyces can be found in polymicrobial infections, and with the first episode I would have implicated the strep, with treatment of short course of antibiotics. 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 Actinomycosis causes chronic infections, that cross through tissue planes -- jaw, chest, abdomen are foci. We do not have report of sulfur granules (no pathology sent with either surgery) which would confirm the diagnosis of Actinomycosis, but clinically it fits enough to warrant longer course of antibiotic. Why she had this is not clear, but diabetes and NHL may affect her immune system enough to lead to this.     She is doing well now on oral antibiotic since last debridement in late April.     PLAN:  Amoxicillin around 6 months from surgery in April.  Return 2 months.     Video-Visit Details    Type of service:  Video Visit    Video End Time (time video stopped): 10:49 AM  Originating Location (pt. Location): Home    Distant Location (provider location):   [facility]     Platform used for Video Visit: Kailash Chaparro MD

## 2021-06-10 NOTE — TELEPHONE ENCOUNTER
ANTICOAGULATION  MANAGEMENT-Patient Home Monitoring Result    Assessment     Therapeutic INR result of 2.2 (goal INR of 2.0-3.0) received via fax from [employer] home INR monitoring company.        Previous INR was Therapeutic    Reba Calderon last contacted by phone: 6/22/2020.    Plan     Per home monitoring agreement with patient, patient was NOT contacted regarding therapeutic result today.  Patient is to continue current dose and continue to check INR with home monitor per protocol.  ?   Tomás Houston RN    Subjective/Objective:      Reba Calderon, a 84 y.o. female  is established on warfarin using a home INR monitor.    Anticoagulation Episode Summary     Current INR goal:   2.0-3.0   TTR:   75.6 % (1 y)   Next INR check:   8/3/2020   INR from last check:   2.20 (7/27/2020)   Weekly max warfarin dose:      Target end date:   Indefinite   INR check location:      Preferred lab:      Send INR reminders to:   [facility]    Indications    Atrial fibrillation  permanent (H) [I48.21]           Comments:            Anticoagulation Care Providers     Provider Role Specialty Phone number    Bertin Frances MD Referring Internal Medicine [phone]    Vivien Whalen MD Responsible Internal Medicine [phone]

## 2021-06-10 NOTE — TELEPHONE ENCOUNTER
ANTICOAGULATION  MANAGEMENT    Assessment     Today's INR result of 1.90 is Subtherapeutic (goal INR of 2.0-3.0)        Missed dose(s) may be affecting INR    - reported missing dose on 7/31.  However, she took 1.25 warfarin dose the following Saturday morning,    No new diet changes affecting INR    No new medication/supplements affecting INR    Continues to tolerate warfarin with no reported s/s of bleeding or thromboembolism     Previous INR was Therapeutic at 2.20 on 7/27/20.    Continues with Amoxicillin three times a day for a total of 6 months till April - at least till lat Oct 2020, for Actinomycosis bacterial infection.    Plan:     Spoke with Reba regarding INR result and instructed:     Warfarin Dosing Instructions:  (evening. has 2.5mg tabs)   - she already took 1.25mg warfarin dose early morning on 8/1.   - then continue current warfarin dose 2.5 mg daily on Mon/Wed/Fri; and 3.75 mg daily rest of week.    Instructed patient to follow up no later than:  One wk.   - check INRs with home monitor thru [employer]    Education provided: target INR goal and significance of current INR result and importance of taking warfarin as instructed    Reba verbalizes understanding and agrees to warfarin dosing plan.    Instructed to call the [facility] for any changes, questions or concerns. (#[phone])   ?   Dimple Sam RN    Subjective/Objective:      Reba Calderon, a 84 y.o. female is on warfarin.     Reba reports:     Home warfarin dose: verbally confirmed home dose with Reba and updated on anticoagulation calendar     Missed doses: Yes: reported missing warfarin dose on 7/31, and took only 1.25mg warfarin dose the following morning.     Medication changes:  Yes:  On Amoxicillin three times a day for 6 months, from April - October 2020.     S/S of bleeding or thromboembolism:  No     New Injury or illness:  No     Changes in diet or alcohol consumption:  No     Upcoming surgery, procedure or cardioversion:   No    Anticoagulation Episode Summary     Current INR goal:   2.0-3.0   TTR:   75.0 % (1 y)   Next INR check:   8/10/2020   INR from last check:   1.90! (8/3/2020)   Weekly max warfarin dose:      Target end date:   Indefinite   INR check location:      Preferred lab:      Send INR reminders to:   [facility]    Indications    Atrial fibrillation  permanent (H) [I48.21]           Comments:            Anticoagulation Care Providers     Provider Role Specialty Phone number    Bertin Frances MD Referring Internal Medicine [phone]    Vivien Whalen MD Responsible Internal Medicine [phone]

## 2021-06-10 NOTE — TELEPHONE ENCOUNTER
ANTICOAGULATION  MANAGEMENT-Patient Home Monitoring Result    Assessment     Therapeutic INR result of 2.80 (goal INR of 2.0-3.0) received via fax from [employer] home INR monitoring company.        Previous INR was Subtherapeutic at 1.90 on 8/3/20.    Reba Calderon last contacted by phone: 8/3/20.    Plan     Per home monitoring agreement with patient, patient was NOT contacted regarding therapeutic result today.  Patient is to continue current dose and continue to check INR with home monitor per protocol.  ?   Dimple Sam RN    Subjective/Objective:      Reba Calderon, a 84 y.o. female  is established on warfarin using a home INR monitor.    Anticoagulation Episode Summary     Current INR goal:   2.0-3.0   TTR:   74.8 % (1 y)   Next INR check:   8/17/2020   INR from last check:   2.80 (8/10/2020)   Weekly max warfarin dose:      Target end date:   Indefinite   INR check location:      Preferred lab:      Send INR reminders to:   [facility]    Indications    Atrial fibrillation  permanent (H) [I48.21]           Comments:            Anticoagulation Care Providers     Provider Role Specialty Phone number    Bertin Frances MD Referring Internal Medicine [phone]    Vivien Whalen MD Responsible Internal Medicine [phone]

## 2021-06-10 NOTE — TELEPHONE ENCOUNTER
Received a faxed INR result for Reba Calderon  From [facility]  INR result dated 8/17/2020 is 3.1

## 2021-06-10 NOTE — TELEPHONE ENCOUNTER
Received a faxed INR result for Reba Calderon  From [facility]  INR result dated 8/24/2020 is 2.3

## 2021-06-10 NOTE — TELEPHONE ENCOUNTER
ANTICOAGULATION  MANAGEMENT    Assessment     Today's INR result of 3.10 is Supratherapeutic (goal INR of 2.0-3.0)        Warfarin taken as previously instructed    No new diet changes affecting INR    No new medication/supplements affecting INR    Continues to tolerate warfarin with no reported s/s of bleeding or thromboembolism     Previous INR was Therapeutic at 2.80 on 8/10/20.    Plan:     Spoke on phone with Reba regarding INR result and instructed:     Warfarin Dosing Instructions:  (evenings. has 2.5mg tabs)   - Continue current warfarin dose 2,5 mg daily on Mon/Wed/Fri; and 3.75 mg daily rest of week.    Instructed patient to follow up no later than:  One wk.   - checks INR's wkly with home monitor thru [employer].    Education provided: importance of consistent vitamin K intake, impact of vitamin K foods on INR and target INR goal and significance of current INR result    Reba verbalizes understanding and agrees to warfarin dosing plan.    Instructed to call the [facility] for any changes, questions or concerns. (#[phone])   ?   Dimple Sam RN    Subjective/Objective:      Reba Calderon, a 84 y.o. female is on warfarin.     Reba reports:     Home warfarin dose: verbally confirmed home dose with Reba and updated on anticoagulation calendar     Missed doses: No     Medication changes:  Yes:  Continues with Amoxicillin 500mg three times a day for 6 months, since April 2020 till late October 2020.   - on 4/25/20, s/p I&D of abscess with debridement of right groin abscess / wound     S/S of bleeding or thromboembolism:  No     New Injury or illness:  No     Changes in diet or alcohol consumption:  No     Upcoming surgery, procedure or cardioversion:  No    Anticoagulation Episode Summary     Current INR goal:   2.0-3.0   TTR:   74.2 % (1 y)   Next INR check:   8/24/2020   INR from last check:   3.10! (8/17/2020)   Weekly max warfarin dose:      Target end date:   Indefinite   INR check location:       Preferred lab:      Send INR reminders to:   [facility] [place]    Indications    Atrial fibrillation  permanent (H) [I48.21]           Comments:            Anticoagulation Care Providers     Provider Role Specialty Phone number    Bertin Frances MD Referring Internal Medicine [phone]    Vivien Whalen MD Responsible Internal Medicine [phone]

## 2021-06-10 NOTE — PROGRESS NOTES
OFFICE VISIT NOTE  Reba Calderon   81 y.o. female            Assessment/Plan for  Reba Calderon is a 81 y.o. female.  No Patient Care Coordination Note on file.       There are no diagnoses linked to this encounter.   1.  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  If episodes become more frequent or prolonged would consider another ERCP-MR ERCP or via endoscopy.  I did review her case from [facility].  She has had a sphincterotomy  2.  CLL- doing well.  Sees hematology every 6 months.  3.  Situational anxiety-sister broke hip-some anoxia and memory loss.  4.  Diabetes well controlled  5.  Permanent A. fib.  On home monitoring.  Last INR 3.0 at [facility] which they are closing.    Plan:  Nitroglycerin as needed pain  Consider MRCP  Consider reconsult GI  A1c  Anxiety discussion  Clinic visit every 6 months  Oncology next month    There are no Patient Instructions on file for this visit.      Diagnoses and all orders for this visit:    CLL (chronic lymphocytic leukemia)    Diabetes mellitus type 2 in nonobese  -     Glycosylated Hemoglobin A1c  -     Basic Metabolic Panel    Epigastric pain    Atrial fibrillation, permanent  -     Ambulatory referral to Anticoagulation Monitoring              Bertin Frances MD  Internal medicine  [facility]  [phone]  [email or web address]      This is an electronically verified report by Bertin Frances M.D.  (Note created with Dragon voice recognition and unintended spelling errors and word substitutions may occur)               Subjective:   Chief Complaint:  Coumadin Education (Orders for anticoag clinic); Hypertension; and Diabetes    Follow-up chronic medical issues  Still having what sounds like biliary colic.    3 episodes since I saw her in August.  2 that lasted 5 minutes and one lasted about an hour.  Nitroglycerin did help.  She did not repeat the nitroglycerin.  Chart  review says she had an ERCP after her cholecystectomy with a sphincterotomy and removal of common duct stone.  Her appetite is good her urination is normal there is no episodes of dark urine    CLL-reviewed last labs.  No fever chills sweats weakness bruising.    Permanent A. fib/pacemaker-sick sinus syndrome.  On chronic anticoagulation.  Home monitoring every Monday.  She did fall and bruised her legs when she tripped over a curb.  Luckily she did not suffer any more injury    Review of Systems:     Extensive 10-point review of systems was performed. Please see the HPI for problem specific pertinent review of systems.     Patient does note anxious/stressed due to sister's illness, daughter's 's death, friends with chronic illness and death    Otherwise, the following systems are noncontributory including constitutional, eyes, ears, nose and throat, cardiovascular, respiratory, gastrointestinal, genitourinary, musculoskeletal,neurological, skin and/or breast, endocrine, hematologic/lymph, allergic/immunologic and psychiatric.              Medications:  Current Outpatient Prescriptions   Medication Sig Dispense Refill     ammonium lactate (AMLACTIN) 12 % cream Apply topically as needed for dry skin.       aspirin 81 MG EC tablet Take 81 mg by mouth daily.       atorvastatin (LIPITOR) 10 MG tablet TAKE 1 TABLET(10 MG) BY MOUTH DAILY 90 tablet 11     calcium carbonate-vitamin D2 500 mg(1,250mg) -200 unit tablet Take 1 tablet by mouth daily.       cholecalciferol, vitamin D3, 1,000 unit tablet Take 2,000 Units by mouth daily.       CONTOUR TEST STRIPS strips TEST ONCE DAILY AS DIRECTED 100 strip 0     folic acid (FOLVITE) 1 MG tablet Take 1 mg by mouth daily.       furosemide (LASIX) 40 MG tablet TAKE 1 TABLET BY MOUTH TWICE DAILY 180 tablet 3     lisinopril (PRINIVIL,ZESTRIL) 10 MG tablet TAKE ONE TABLET BY MOUTH EVERY DAY 90 tablet 3     magnesium chloride (SLOW-MAG) 64 mg TbEC delayed-release tablet Take 64  mg by mouth daily.       magnesium chloride (SLOW-MAG) 64 mg TbEC delayed-release tablet Take 64 mg by mouth daily.       MAGNESIUM OXIDE (LÓPEZ ORAL) Take by mouth daily. López Laxative       metFORMIN (GLUCOPHAGE) 500 MG tablet TAKE 1 TABLET BY MOUTH TWICE DAILY 180 tablet 3     nitroglycerin (NITROSTAT) 0.4 MG SL tablet Place 1 tablet (0.4 mg total) under the tongue every 5 (five) minutes as needed for chest pain. 100 tablet 3     pantoprazole (PROTONIX) 40 MG tablet TAKE 1 TABLET(40 MG) BY MOUTH DAILY (Patient taking differently: bid) 90 tablet 3     potassium chloride (KLOR-CON) 10 MEQ CR tablet Take 1 tablet (10 mEq total) by mouth daily. 90 tablet 3     triamcinolone (KENALOG) 0.1 % ointment Apply topically 4 (four) times a day. Uses 80grams qid       warfarin (COUMADIN) 2.5 MG tablet Take 2.5 mg by mouth See Admin Instructions. 1 and a half  Daily except Saturdays 1 daily  3     No current facility-administered medications for this visit.      Current Outpatient Prescriptions on File Prior to Visit   Medication Sig     ammonium lactate (AMLACTIN) 12 % cream Apply topically as needed for dry skin.     aspirin 81 MG EC tablet Take 81 mg by mouth daily.     atorvastatin (LIPITOR) 10 MG tablet TAKE 1 TABLET(10 MG) BY MOUTH DAILY     calcium carbonate-vitamin D2 500 mg(1,250mg) -200 unit tablet Take 1 tablet by mouth daily.     cholecalciferol, vitamin D3, 1,000 unit tablet Take 2,000 Units by mouth daily.     CONTOUR TEST STRIPS strips TEST ONCE DAILY AS DIRECTED     folic acid (FOLVITE) 1 MG tablet Take 1 mg by mouth daily.     furosemide (LASIX) 40 MG tablet TAKE 1 TABLET BY MOUTH TWICE DAILY     lisinopril (PRINIVIL,ZESTRIL) 10 MG tablet TAKE ONE TABLET BY MOUTH EVERY DAY     magnesium chloride (SLOW-MAG) 64 mg TbEC delayed-release tablet Take 64 mg by mouth daily.     metFORMIN (GLUCOPHAGE) 500 MG tablet TAKE 1 TABLET BY MOUTH TWICE DAILY     nitroglycerin (NITROSTAT) 0.4 MG SL tablet Place 1 tablet  "(0.4 mg total) under the tongue every 5 (five) minutes as needed for chest pain.     pantoprazole (PROTONIX) 40 MG tablet TAKE 1 TABLET(40 MG) BY MOUTH DAILY (Patient taking differently: bid)     potassium chloride (KLOR-CON) 10 MEQ CR tablet Take 1 tablet (10 mEq total) by mouth daily.     triamcinolone (KENALOG) 0.1 % ointment Apply topically 4 (four) times a day. Uses 80grams qid     [DISCONTINUED] magnesium oxide 500 mg Tab Take by mouth bedtime.     No current facility-administered medications on file prior to visit.        Allergies:  Allergies   Allergen Reactions     Amiodarone Unknown     Dyspnea, O2 dependency     Beta-Blockers (Beta-Adrenergic Blocking Agts) Unknown     Lower extremity edema     Oxycodone-Acetaminophen Anaphylaxis     Atenolol Other (See Comments)     adverse reaction, ,      Meperidine Nausea And Vomiting     Metoprolol Succinate Unknown     SOB and joint pain     Pioglitazone Hcl Unknown     Legs swell, Comment: intolerance, Description: Actos, Comment: intolerance, Description: Actos       PSFHx: Tobacco Status:  She  reports that she has never smoked. She has never used smokeless tobacco.   Alcohol Status:    History   Alcohol Use     Yes     Comment: occasional       reports that she has never smoked. She has never used smokeless tobacco. She reports that she drinks alcohol. Her drug history is not on file.    Objective:    /72 (Patient Site: Left Arm, Patient Position: Sitting, Cuff Size: Adult Large)  Pulse 66  Ht 5' 5.75\" (1.67 m)  Wt 217 lb 1.9 oz (98.5 kg)  LMP 08/24/1994 (Exact Date)  SpO2 96%  Breastfeeding? No  BMI 35.31 kg/m2  Weight:   Wt Readings from Last 3 Encounters:   05/24/17 217 lb 1.9 oz (98.5 kg)   08/24/16 215 lb 1.9 oz (97.6 kg)     BP Readings from Last 3 Encounters:   05/24/17 132/72   08/24/16 118/64         General-appears well, no acute distress.  Bruised pretibial area bilaterally.  Fading.  Pulse regular  Clear lungs  No murmur  Benign " abdomen  No edema  Good pulses      Reports sugars at home generally quite good      Review of clinical lab tests  Lab Results   Component Value Date    K 4.2 05/19/2010    INR 2.49 (H) 03/12/2010    HGBA1C 6.9 (H) 05/24/2017     Reviewed [place] [facility].  Reviewed  RADIOLOGY: No results found.    Review of recent consultation-CBC laboratory from oncology-normal.  Sugar slightly high.

## 2021-06-10 NOTE — TELEPHONE ENCOUNTER
Received a faxed INR result for Reba Calderon  From [facility]  INR result dated 8/10/2020 is 2.80

## 2021-06-10 NOTE — TELEPHONE ENCOUNTER
ANTICOAGULATION  MANAGEMENT-Patient Home Monitoring Result    Assessment     Therapeutic INR result of 2.30 (goal INR of 2.0-3.0) received via fax from [employer] home INR monitoring company.        Previous INR was Supratherapeutic at 3.10 on 8/17/20    Reba Calderon last contacted by phone: 8/17/20.    Plan     Per home monitoring agreement with patient, patient was NOT contacted regarding therapeutic result today.  Patient is to continue current dose and continue to check INR with home monitor per protocol.  ?   Dimple Sam RN    Subjective/Objective:      Reba Calderon, a 84 y.o. female  is established on warfarin using a home INR monitor.    Anticoagulation Episode Summary     Current INR goal:   2.0-3.0   TTR:   73.9 % (1 y)   Next INR check:   8/31/2020   INR from last check:   2.30 (8/24/2020)   Weekly max warfarin dose:      Target end date:   Indefinite   INR check location:      Preferred lab:      Send INR reminders to:   [facility]    Indications    Atrial fibrillation  permanent (H) [I48.21]           Comments:            Anticoagulation Care Providers     Provider Role Specialty Phone number    Bertin Frances MD Referring Internal Medicine [phone]    Vivien Whalen MD Responsible Internal Medicine [phone]

## 2021-06-11 NOTE — TELEPHONE ENCOUNTER
ANTICOAGULATION  MANAGEMENT-Patient Home Monitoring Result    Assessment     Therapeutic INR result of 2.90 (goal INR of 2.0-3.0) received via fax from [employer] home INR monitoring company.        Previous INR was Therapeutic at 2.30 on 9/21/20.    Reba Calderon last contacted by phone: 8/17/20    Plan     Per home monitoring agreement with patient, patient was NOT contacted regarding therapeutic result today.  Patient is to continue current dose and continue to check INR with home monitor per protocol.  ?   Dimple Sam RN    Subjective/Objective:      Reba Calderon, a 84 y.o. female  is established on warfarin using a home INR monitor.    Anticoagulation Episode Summary     Current INR goal:   2.0-3.0   TTR:   74.8 % (1 y)   Next INR check:   10/5/2020   INR from last check:   2.90 (9/28/2020)   Weekly max warfarin dose:      Target end date:   Indefinite   INR check location:      Preferred lab:      Send INR reminders to:   [facility]    Indications    Atrial fibrillation  permanent (H) [I48.21]           Comments:            Anticoagulation Care Providers     Provider Role Specialty Phone number    Bertin Frances MD Referring Internal Medicine [phone]    Vivien Whalen MD Responsible Internal Medicine [phone]

## 2021-06-11 NOTE — TELEPHONE ENCOUNTER
ANTICOAGULATION  MANAGEMENT-Patient Home Monitoring Result    Assessment     Therapeutic INR result of 2.30 (goal INR of 2.0-3.0) received via fax from [employer] home INR monitoring company.        Previous INR was Therapeutic at 2.30 on 8/24/20.    Reba Calderon last contacted by phone: 8/17/20.    Plan     Per home monitoring agreement with patient, patient was NOT contacted regarding therapeutic result today.  Patient is to continue current dose and continue to check INR with home monitor per protocol.  ?   Dimple Sam RN    Subjective/Objective:      Reba Calderon, a 84 y.o. female  is established on warfarin using a home INR monitor.    Anticoagulation Episode Summary     Current INR goal:   2.0-3.0   TTR:   73.8 % (1 y)   Next INR check:   9/14/2020   INR from last check:   2.30 (9/7/2020)   Weekly max warfarin dose:      Target end date:   Indefinite   INR check location:      Preferred lab:      Send INR reminders to:   [facility]    Indications    Atrial fibrillation  permanent (H) [I48.21]           Comments:            Anticoagulation Care Providers     Provider Role Specialty Phone number    Bertin Frances MD Referring Internal Medicine [phone]    Vivien Whalen MD Responsible Internal Medicine [phone]

## 2021-06-11 NOTE — TELEPHONE ENCOUNTER
ANTICOAGULATION  MANAGEMENT-Patient Home Monitoring Result    Assessment     Therapeutic INR result of 2.70 (goal INR of 2.0-3.0) received via fax from [employer] home INR monitoring company.        Previous INR was Therapeutic at 2.30 on 9/7/20.    Reba Calderon last contacted by phone: 8/17/20    Plan     Per home monitoring agreement with patient, patient was NOT contacted regarding therapeutic result today.  Patient is to continue current dose and continue to check INR with home monitor per protocol.  ?   Dimple Sam RN    Subjective/Objective:      Reba Calderon, a 84 y.o. female  is established on warfarin using a home INR monitor.    Anticoagulation Episode Summary     Current INR goal:   2.0-3.0   TTR:   73.9 % (1 y)   Next INR check:   9/21/2020   INR from last check:   2.70 (9/14/2020)   Weekly max warfarin dose:      Target end date:   Indefinite   INR check location:      Preferred lab:      Send INR reminders to:   [facility]    Indications    Atrial fibrillation  permanent (H) [I48.21]           Comments:            Anticoagulation Care Providers     Provider Role Specialty Phone number    Bertin Frances MD Referring Internal Medicine [phone]    Vivien Whalen MD Responsible Internal Medicine [phone]

## 2021-06-11 NOTE — TELEPHONE ENCOUNTER
Received a faxed INR result for Reba Calderon  From [facility]  INR result dated 9/7/2020 is 2.3

## 2021-06-11 NOTE — PROGRESS NOTES
"Reba Calderon is a 84 y.o. female who is being evaluated via a billable video visit.      The patient has been notified of following:     \"This video visit will be conducted via a call between you and your physician/provider. We have found that certain health care needs can be provided without the need for an in-person physical exam.  This service lets us provide the care you need with a video conversation.  If a prescription is necessary we can send it directly to your pharmacy.  If lab work is needed we can place an order for that and you can then stop by our lab to have the test done at a later time.    Video visits are billed at different rates depending on your insurance coverage. Please reach out to your insurance provider with any questions.    If during the course of the call the physician/provider feels a video visit is not appropriate, you will not be charged for this service.\"    Patient has given verbal consent to a Video visit? Yes  How would you like to obtain your AVS? AVS Preference: MyChart.  If dropped by the video visit, the video invitation should be sent to: Text to cell phone: [phone]  Will anyone else be joining your video visit? No    Video Start Time: 10:46    Additional provider notes:   A couple of weeks ago had swelling on her left perineal area, this drained on its own, this is now pea-size.     She recalls she has had old cyst on the left side in this area -- recalls having this drained many years ago, was told that this would need to be removed at some point, this may have been a sebaceous cyst. There was concern that this would get infected if she had a procedure to remove it, so this wasn't removed at the time of her surgeries.     Other infections this year have been on right side. This area remains firm, hasn't really changed the past 2 months. This is about 2 inches long. Not really bothersome, but feels some pulling in the area with urination or BM. Continues amoxicillin, " tolerates this.      Exam:  She is in no distress, breathing is non-labored, neurologically intact.     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 Culture December with strep anginosus (a known abscess culprit) and actinomyces. Repeat I+D with actinomyces and enterococcus. Actinomyces can be found in polymicrobial infections, and with the first episode I would have implicated the strep, with treatment of short course of antibiotics. 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 Actinomycosis causes chronic infections, that cross through tissue planes -- jaw, chest, abdomen are foci. We do not have report of sulfur granules (no pathology sent with either surgery) which would confirm the diagnosis of Actinomycosis, but clinically it fits enough to warrant longer course of antibiotic. Why she had this is not clear, but diabetes and NHL may affect her immune system enough to lead to this.     She is doing well now on oral antibiotic since last debridement in late April.     She has had drainage from an area on the left side in perineal area. It sounds like this has been a chronic cyst, so it is difficult to call this related to the actinomycosis.     PLAN:  Amoxicillin around 6 months from surgery in April, she has supply into early November  Labs ordered  Return late November -- it would be best to have in person visit    Video-Visit Details    Type of service:  Video Visit    Video End Time (time video stopped): 11:00am  Originating Location (pt. Location): Home    Distant Location (provider location):   [email or web address]     Platform used for Video Visit: Vince Chaparro MD

## 2021-06-11 NOTE — TELEPHONE ENCOUNTER
Received a faxed INR result for Reba Calderon  From [facility]  INR result dated 9/28/2020 is 2.9

## 2021-06-11 NOTE — TELEPHONE ENCOUNTER
Received a faxed INR result for Reba Calderon  From [facility]  INR result dated 9/14/2020 is 2.7

## 2021-06-11 NOTE — PATIENT INSTRUCTIONS - HE
Continue amoxicillin until refills run out in early November.    Set up labs through [facility] -- this can be done through Dr. Whalen's clinic.     Return in late November. Please let me know if you have concerns or questions in the meantime.     Catracho Chaparro

## 2021-06-11 NOTE — TELEPHONE ENCOUNTER
Received a faxed INR result for Reba Calderon  From [facility]  INR result dated 9/21/2020 is 2.3

## 2021-06-11 NOTE — TELEPHONE ENCOUNTER
ANTICOAGULATION  MANAGEMENT-Patient Home Monitoring Result    Assessment     Therapeutic INR result of 2.30 (goal INR of 2.0-3.0) received via fax from [employer] home INR monitoring company.        Previous INR was Therapeutic at 2.70 on 9/14/20.    Reba Calderon last contacted by phone: 8/17/20.    Plan     Per home monitoring agreement with patient, patient was NOT contacted regarding therapeutic result today.  Patient is to continue current dose and continue to check INR with home monitor per protocol.  ?   Dimple Sam RN    Subjective/Objective:      Reba Calderon, a 84 y.o. female  is established on warfarin using a home INR monitor.    Anticoagulation Episode Summary     Current INR goal:   2.0-3.0   TTR:   74.0 % (1 y)   Next INR check:   9/28/2020   INR from last check:   2.30 (9/21/2020)   Weekly max warfarin dose:      Target end date:   Indefinite   INR check location:      Preferred lab:      Send INR reminders to:   [facility]    Indications    Atrial fibrillation  permanent (H) [I48.21]           Comments:            Anticoagulation Care Providers     Provider Role Specialty Phone number    Bertin Frances MD Referring Internal Medicine [phone]    Vivien Whalen MD Responsible Internal Medicine [phone]

## 2021-06-12 NOTE — TELEPHONE ENCOUNTER
ANTICOAGULATION  MANAGEMENT-Patient Home Monitoring Result    Assessment     Therapeutic INR result of 2.60 (goal INR of 2.0-3.0) received via fax from [employer] home INR monitoring company.        Previous INR was Therapeutic at 3.00 on 10/5/20.    Reba Calderon last contacted by phone: 8/17/20.    Plan     Per home monitoring agreement with patient, patient was NOT contacted regarding therapeutic result today.  Patient is to continue current dose and continue to check INR with home monitor per protocol.  ?   Dimple Sam RN    Subjective/Objective:      Reba Calderon, a 84 y.o. female  is established on warfarin using a home INR monitor.    Anticoagulation Episode Summary     Current INR goal:  2.0-3.0   TTR:  75.7 % (1 y)   Next INR check:  10/19/2020   INR from last check:  2.60 (10/12/2020)   Weekly max warfarin dose:     Target end date:  Indefinite   INR check location:     Preferred lab:     Send INR reminders to:  [facility]    Indications    Atrial fibrillation  permanent (H) [I48.21]           Comments:           Anticoagulation Care Providers     Provider Role Specialty Phone number    Bertin Frances MD Referring Internal Medicine [phone]    Vivien Whalen MD Responsible Internal Medicine [phone]

## 2021-06-12 NOTE — TELEPHONE ENCOUNTER
RN cannot approve Refill Request    RN can NOT refill this medication Protocol failed and NO refill given. Last office visit: 1/3/2020 Bertin Frances MD Last Physical: 3/20/2019 Last MTM visit: Visit date not found Last visit same specialty: 1/3/2020 Bertin Frances MD.  Next visit within 3 mo: Visit date not found  Next physical within 3 mo: Visit date not found      Radha Ba, [facility] Triage/Med Refill 10/13/2020    Requested Prescriptions   Pending Prescriptions Disp Refills     warfarin ANTICOAGULANT (COUMADIN/JANTOVEN) 2.5 MG tablet [Pharmacy Med Name: WARFARIN SODIUM 2.5 MG TABLET] 135 tablet 2     Sig: TAKE 1 TO 1 AND ONE-HALF TABS BY MOUTH DAILY AS DIRECTED.       Warfarin Refill Protocol  Failed - 10/12/2020  2:51 PM        Failed -  Route to appropriate pool/provider     Last Anticoagulation Summary:   Anticoagulation Episode Summary     Current INR goal:  2.0-3.0   TTR:  75.7 % (1 y)   Next INR check:  10/19/2020   INR from last check:  2.60 (10/12/2020)   Weekly max warfarin dose:     Target end date:  Indefinite   INR check location:     Preferred lab:     Send INR reminders to:  [facility]    Indications    Atrial fibrillation  permanent (H) [I48.21]           Comments:           Anticoagulation Care Providers     Provider Role Specialty Phone number    Bertin Frances MD Referring Internal Medicine [phone]    Vivien Whalen MD Responsible Internal Medicine [phone]                Passed - Provider visit in last year     Last office visit with prescriber/PCP: 1/3/2020 Bertin Frances MD OR same dept: 1/3/2020 Bertin Frances MD OR same specialty: 1/3/2020 Bertin Frnaces MD  Last physical: 3/20/2019 Last MTM visit: Visit date not found    Next appt within 3 mo: Visit date not found Next physical within 3 mo: Visit date not found  Prescriber OR PCP: Bertin Frances MD  Last diagnosis associated with med order: 1. Atrial fibrillation, permanent (H)  -  warfarin ANTICOAGULANT (COUMADIN/JANTOVEN) 2.5 MG tablet [Pharmacy Med Name: WARFARIN SODIUM 2.5 MG TABLET]; TAKE 1 TO 1 AND ONE-HALF TABS BY MOUTH DAILY AS DIRECTED.  Dispense: 135 tablet; Refill: 2    If protocol passes may refill for 6 months if within 3 months of last provider visit (or a total of 9 months).

## 2021-06-12 NOTE — TELEPHONE ENCOUNTER
ANTICOAGULATION  MANAGEMENT-Patient Home Monitoring Result    Assessment     Therapeutic INR result of 2.8 (goal INR of 2.0-3.0) received via fax from [employer] home INR monitoring company.        Previous INR was Therapeutic    Reba Calderon last contacted by phone: 8/17/2020.    Plan     Per home monitoring agreement with patient, patient was NOT contacted regarding therapeutic result today.  Patient is to continue current dose and continue to check INR with home monitor per protocol.  ?   Tomás Houston RN    Subjective/Objective:      Reba Calderon, a 84 y.o. female  is established on warfarin using a home INR monitor.    Anticoagulation Episode Summary     Current INR goal:  2.0-3.0   TTR:  75.7 % (1 y)   Next INR check:  11/2/2020   INR from last check:  2.80 (10/26/2020)   Weekly max warfarin dose:     Target end date:  Indefinite   INR check location:     Preferred lab:     Send INR reminders to:  [facility]    Indications    Atrial fibrillation  permanent (H) [I48.21]           Comments:           Anticoagulation Care Providers     Provider Role Specialty Phone number    Bertin Fracnes MD Referring Internal Medicine [phone]    Vivien Whalen MD Responsible Internal Medicine [phone]

## 2021-06-12 NOTE — TELEPHONE ENCOUNTER
I called and discussed with the pt. I encouraged her to err on the side of caution and to limit the amount of people she is around/gatherings and wait to be around people that are at gathering x 2 wks.

## 2021-06-12 NOTE — TELEPHONE ENCOUNTER
ANTICOAGULATION  MANAGEMENT-Patient Home Monitoring Result    Assessment     Therapeutic INR result of 2.7 (goal INR of 2.0-3.0) received via fax from [employer] home INR monitoring company.        Previous INR was Therapeutic    Reba Calderon last contacted by phone: 8/17    Plan     Per home monitoring agreement with patient, patient was NOT contacted regarding therapeutic result today.  Patient is to continue current dose and continue to check INR with home monitor per protocol.  ?   Giselle Kelly RN    Subjective/Objective:      Reba Calderon, a 84 y.o. female  is established on warfarin using a home INR monitor.    Anticoagulation Episode Summary     Current INR goal:  2.0-3.0   TTR:  75.7 % (1 y)   Next INR check:  11/2/2020   INR from last check:  2.70 (10/19/2020)   Weekly max warfarin dose:     Target end date:  Indefinite   INR check location:     Preferred lab:     Send INR reminders to:  [facility]    Indications    Atrial fibrillation  permanent (H) [I48.21]           Comments:           Anticoagulation Care Providers     Provider Role Specialty Phone number    Bertin Frances MD Referring Internal Medicine [phone]    Vivien Whalen MD Responsible Internal Medicine [phone]

## 2021-06-12 NOTE — TELEPHONE ENCOUNTER
Received a faxed INR result for Reba Calderon  From [facility]  INR result dated 11/2/2020 is 2.5

## 2021-06-12 NOTE — TELEPHONE ENCOUNTER
ANTICOAGULATION  MANAGEMENT-Patient Home Monitoring Result    Assessment     Therapeutic INR result of 2.50 (goal INR of 2.0-3.0) received via fax from [employer] home INR monitoring company.        Previous INR was Therapeutic at 2.80 on 10/26/20.    Reba Calderon last contacted by phone: 8/17/20.    Plan     Per home monitoring agreement with patient, patient was NOT contacted regarding therapeutic result today.  Patient is to continue current dose and continue to check INR with home monitor per protocol.  ?   Dimple Sam RN    Subjective/Objective:      Reba Calderon, a 84 y.o. female  is established on warfarin using a home INR monitor.    Anticoagulation Episode Summary     Current INR goal:  2.0-3.0   TTR:  75.7 % (1 y)   Next INR check:  11/9/2020   INR from last check:  2.50 (11/2/2020)   Weekly max warfarin dose:     Target end date:  Indefinite   INR check location:     Preferred lab:     Send INR reminders to:  [facility]    Indications    Atrial fibrillation  permanent (H) [I48.21]           Comments:           Anticoagulation Care Providers     Provider Role Specialty Phone number    Bertin Frances MD Referring Internal Medicine [phone]    Vivien Whalen MD Responsible Internal Medicine [phone]

## 2021-06-12 NOTE — TELEPHONE ENCOUNTER
ANTICOAGULATION  MANAGEMENT-Patient Home Monitoring Result    Assessment     Therapeutic INR result of 3.00 (goal INR of 2.0-3.0) received via fax from [employer] home INR monitoring company.        Previous INR was Therapeutic at 2.90 on 9/28/20.    Reba Calderon last contacted by phone: 8/17/20.    Plan     Per home monitoring agreement with patient, patient was NOT contacted regarding therapeutic result today.  Patient is to continue current dose and continue to check INR with home monitor per protocol.  ?   Dimple Sam RN    Subjective/Objective:      Reba Calderon, a 84 y.o. female  is established on warfarin using a home INR monitor.    Anticoagulation Episode Summary     Current INR goal:  2.0-3.0   TTR:  75.0 % (1 y)   Next INR check:  10/12/2020   INR from last check:  3.00 (10/5/2020)   Weekly max warfarin dose:     Target end date:  Indefinite   INR check location:     Preferred lab:     Send INR reminders to:  [facility]    Indications    Atrial fibrillation  permanent (H) [I48.21]           Comments:           Anticoagulation Care Providers     Provider Role Specialty Phone number    Bertin Frances MD Referring Internal Medicine [phone]    Vivien Whalen MD Responsible Internal Medicine [phone]

## 2021-06-12 NOTE — TELEPHONE ENCOUNTER
Patient would like a call back from [facility]. She has a question about her infection and being around a big gathering of people.    Reba @ [phone]

## 2021-06-12 NOTE — TELEPHONE ENCOUNTER
ANTICOAGULATION  MANAGEMENT    Assessment     Today's INR result of 2.7 is Therapeutic (goal INR of 2.0-3.0)        Warfarin taken as previously instructed    No new diet changes affecting INR    No new medication/supplements affecting INR    Continues to tolerate warfarin with no reported s/s of bleeding or thromboembolism     Previous INR was Therapeutic    Plan:     Spoke on phone with Reba regarding INR result and instructed:     Warfarin Dosing Instructions:  Continue current warfarin dose    2.5 mg every Mon, Wed, Fri; 3.75 mg all other days      (0 % change)    Instructed patient to follow up no later than: one week with home monitor.    Education provided: importance of therapeutic range    Reba verbalizes understanding and agrees to warfarin dosing plan.    Instructed to call the [facility] for any changes, questions or concerns. (#[phone])   ?   Crys Fontanez RN    Subjective/Objective:      Reba Calderon, a 84 y.o. female is on warfarin.     Reba reports:     Home warfarin dose: verbally confirmed home dose with Reba and updated on anticoagulation calendar     Missed doses: No     Medication changes:  No     S/S of bleeding or thromboembolism:  No     New Injury or illness:  No     Changes in diet or alcohol consumption:  No     Upcoming surgery, procedure or cardioversion:  No    Anticoagulation Episode Summary     Current INR goal:  2.0-3.0   TTR:  75.7 % (1 y)   Next INR check:  11/16/2020   INR from last check:  2.70 (11/9/2020)   Weekly max warfarin dose:     Target end date:  Indefinite   INR check location:     Preferred lab:     Send INR reminders to:  [facility]    Indications    Atrial fibrillation  permanent (H) [I48.21]           Comments:           Anticoagulation Care Providers     Provider Role Specialty Phone number    Bertin Frances MD Referring Internal Medicine [phone]    Vivien Whalen MD Responsible Internal Medicine [phone]

## 2021-06-12 NOTE — TELEPHONE ENCOUNTER
Received a faxed INR result for Reba Calderon  From [facility]    INR result dated 11/9/2020 is 2.7

## 2021-06-12 NOTE — TELEPHONE ENCOUNTER
Received a faxed INR result for Reba Calderon  From [facility]  INR result dated 10/12/2020 is 2.6

## 2021-06-13 NOTE — TELEPHONE ENCOUNTER
ANTICOAGULATION  MANAGEMENT    Assessment     Today's INR result of 2.1 is Therapeutic (goal INR of 2.0-3.0)        Warfarin taken as previously instructed    Increased greens/vitamin K intake may be affecting INR - patient stated that she will cut back on intake moving forward.    No new medication/supplements affecting INR    Continues to tolerate warfarin with no reported s/s of bleeding or thromboembolism     Previous INR was Supratherapeutic    Plan:     Spoke on phone with Reba regarding INR result and instructed:     Warfarin Dosing Instructions:  Continue current warfarin dose    3.75 mg every Sun, Tue, Thu; 2.5 mg all other days     (0 % change)    Instructed patient to follow up no later than: one week with home monitor.    Education provided: importance of therapeutic range    Reba verbalizes understanding and agrees to warfarin dosing plan.    Instructed to call the [facility] for any changes, questions or concerns. (#[phone])   ?   Crys Fontanez RN    Subjective/Objective:      Reba Calderon, a 85 y.o. female is on warfarin.     Reba reports:     Home warfarin dose: verbally confirmed home dose with Reba and updated on anticoagulation calendar     Missed doses: No     Medication changes:  No     S/S of bleeding or thromboembolism:  No     New Injury or illness:  No     Changes in diet or alcohol consumption:  Yes: per patient she overdid eating greens this past week but will cut back on intake moving forward.     Upcoming surgery, procedure or cardioversion:  No    Anticoagulation Episode Summary     Current INR goal:  2.0-3.0   TTR:  75.7 % (1 y)   Next INR check:  12/21/2020   INR from last check:  2.10 (12/14/2020)   Weekly max warfarin dose:     Target end date:  Indefinite   INR check location:     Preferred lab:     Send INR reminders to:  [facility]    Indications    Atrial fibrillation  permanent (H) [I48.21]           Comments:           Anticoagulation Care Providers      Provider Role Specialty Phone number    Bertin Frances MD Referring Internal Medicine [phone]    Vivien Whalen MD Responsible Internal Medicine [phone]

## 2021-06-13 NOTE — TELEPHONE ENCOUNTER
ANTICOAGULATION  MANAGEMENT-Patient Home Monitoring Result    Assessment     Therapeutic INR result of 2.20 (goal INR of 2.0-3.0) received via fax from [employer] home INR monitoring company.        Previous INR was Therapeutic at 2.10 from 12/14/20.    Reba Calderon last contacted by phone: 12/14/20.    Plan     Per home monitoring agreement with patient, patient was NOT contacted regarding therapeutic result today.  Patient is to continue current dose and continue to check INR with home monitor per protocol.  ?   Dimple Sam RN    Subjective/Objective:      Reba Calderon, a 85 y.o. female  is established on warfarin using a home INR monitor.    Anticoagulation Episode Summary     Current INR goal:  2.0-3.0   TTR:  75.7 % (1 y)   Next INR check:  12/28/2020   INR from last check:  2.20 (12/21/2020)   Weekly max warfarin dose:     Target end date:  Indefinite   INR check location:     Preferred lab:     Send INR reminders to:  [facility]    Indications    Atrial fibrillation  permanent (H) [I48.21]           Comments:           Anticoagulation Care Providers     Provider Role Specialty Phone number    Bertin Frances MD Referring Internal Medicine [phone]    Vivien Whalen MD Responsible Internal Medicine [phone]

## 2021-06-13 NOTE — TELEPHONE ENCOUNTER
Provider Review: Anticoagulation Annual Referral Renewal    ACM Renewal Decision:  Renew ACM warfarin management      INR Range:   Change INR goal range to 2.0-3.0   Anticipated Duration of Therapy (from today):  Long-term anticoagulation      Vivien Whalen MD  4:15 PM

## 2021-06-13 NOTE — TELEPHONE ENCOUNTER
Received a faxed INR result for Reba Calderon  From [facility]  INR result dated 12/7/2020 is 3.3

## 2021-06-13 NOTE — TELEPHONE ENCOUNTER
Received a faxed INR result for Reba Calderon  From [facility]  INR result dated 12/21/2020 is 2.2

## 2021-06-13 NOTE — TELEPHONE ENCOUNTER
Received a faxed INR result for Reba Calderon  From [facility]  INR result dated 11/16/2020 is 2.3

## 2021-06-13 NOTE — PROGRESS NOTES
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 Culture December with strep anginosus (a known abscess culprit) and actinomyces. Repeat I+D with actinomyces and enterococcus. Actinomyces can be found in polymicrobial infections, and with the first episode I would have implicated the strep, with treatment of short course of antibiotics. 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 Actinomycosis causes chronic infections, that cross through tissue planes -- jaw, chest, abdomen are foci. We do not have report of sulfur granules (no pathology sent with either surgery) which would confirm the diagnosis of Actinomycosis, but clinically it fits enough to warrant longer course of antibiotic. Why she had this is not clear, but diabetes and NHL may affect her immune system enough to lead to this.     She is doing having completed 6 months oral antibiotic since last debridement in late April.     She has still a small area of induration but this is quite small and I suspect represents scarring vs lymph node.     PLAN:  Off antibiotics.  Advised her about what symptoms to watch for.     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     Last dose of amoxicillin was yesterday. Still feels firm area that is stable. The left sided-area  drained with warm compress and then healed.     The area on the right is painful sometimes with a bowel movement.     Denies fever, chills, sweats.    Recent pacemaker check.     She follows with Dr. Scott every 6 months, has been monitored for NHL, has not required treatment.     ROS: All other systems negative except as listed above.      Current Outpatient Medications:      amoxicillin (AMOXIL) 500 MG capsule, Take 1 capsule (500 mg total) by mouth 3 (three) times a day., Disp: 90 capsule, Rfl: 5     ascorbic acid/vitamin E/biotin (HAIR, SKIN, NAILS WITH BIOTIN ORAL), Take 2,500 mcg by mouth every morning., Disp: , Rfl:      atorvastatin (LIPITOR) 10 MG tablet, Take 1 tablet (10 mg total) by mouth daily., Disp: 90 tablet, Rfl: 3     b complex vitamins capsule, Take 1 capsule by mouth daily. 1000mcg, Disp: , Rfl:      blood glucose test (CONTOUR TEST STRIPS) strips, TEST ONCE DAILY AS DIRECTED, Disp: 100 strip, Rfl: 3     calcium carbonate-vitamin D2 500 mg(1,250mg) -200 unit tablet, Take 1 tablet by mouth daily., Disp: , Rfl:      cholecalciferol, vitamin D3, 1,000 unit tablet, Take 2,000 Units by mouth daily., Disp: , Rfl:      folic acid (FOLVITE) 1 MG tablet, Take 1 mg by mouth daily., Disp: , Rfl:      furosemide (LASIX) 40 MG tablet, Take 2 tablets (80 mg total) by mouth daily., Disp: 180 tablet, Rfl: 3     glucosamine-chondroitin 500-400 mg tablet, Take 1 tablet by mouth 2 (two) times a day., Disp: , Rfl:      HYDROcodone-acetaminophen 5-325 mg per tablet, Take 1 tablet by mouth., Disp: , Rfl:      lisinopriL (PRINIVIL,ZESTRIL) 10 MG tablet, Take 1 tablet (10 mg total) by mouth daily., Disp: 90 tablet, Rfl: 3     magnesium chloride (SLOW-MAG) 64 mg TbEC delayed-release tablet, Take 64 mg by mouth daily., Disp: , Rfl:      metFORMIN (GLUCOPHAGE) 500 MG tablet, Take 1 tablet (500 mg total) by mouth 2 (two) times a day., Disp: 180 tablet, Rfl: 3     nitroglycerin (NITROSTAT) 0.4 MG SL tablet, Place 0.4 mg  under the tongue every 5 (five) minutes as needed for chest pain., Disp: , Rfl:      pantoprazole (PROTONIX) 40 MG tablet, Take 1 tablet (40 mg total) by mouth daily., Disp: 90 tablet, Rfl: 3     polyethylene glycol (GLYCOLAX) 17 gram/dose powder, Take 17 g by mouth daily., Disp: 1700 g, Rfl: 3     potassium chloride (KLOR-CON 10) 10 MEQ CR tablet, Take 1 tablet (10 mEq total) by mouth daily., Disp: 90 tablet, Rfl: 3     warfarin ANTICOAGULANT (COUMADIN/JANTOVEN) 2.5 MG tablet, Take 1-1.5 tablets (2.5-3.75 mg total) by mouth daily. Adjust dose per INR results as instructed., Disp: 135 tablet, Rfl: 1      OBJECTIVE:  Vitals:    11/18/20 1025   BP: 138/88   Pulse: 96   Temp: 97.9  F (36.6  C)         GEN: No acute distress.    RESPIRATORY:  Normal breathing pattern.  CARDIOVASCULAR:  pacemaker  ABDOMEN/pelvic area. There is scar at right inguinal area. More posterior groin on right is the area that she has felt, this is small at 1cm or smaller. Area at left groin anterior with scar.   EXTREMITIES: No edema.  SKIN/HAIR/NAILS:  No rashes          Pertinent labs:            Lab Results   Component Value Date    CRP 0.3 09/14/2020         Lab Results   Component Value Date    ALT 13 09/14/2020    AST 18 06/24/2020    ALKPHOS 56 09/12/2018    BILITOT 0.9 09/12/2018         MICROBIOLOGY DATA:  Reviewed from surgical cultures

## 2021-06-13 NOTE — TELEPHONE ENCOUNTER
MID schedulers     - Please call patient to schedule appt with Dr. Whalen.    ( I don't see she ever scheduled to establish care with Dr Whalen).     - former patient of Dr. Bertin Frances from [facility] - retired on 9/2020.

## 2021-06-13 NOTE — PATIENT INSTRUCTIONS - HE
You can stay off antibiotics. It looks like this is scarring at this point that you are feeling.    Let me know if you think that infection is flaring at all -- increased pain, swelling, redness. If it recurs it would be helpful again to have a culture checked if it is draining or is lanced at a clinic/ER.     Catracho Chaparro

## 2021-06-13 NOTE — TELEPHONE ENCOUNTER
Received a faxed INR result for Reba Calderon  From [facility]  INR result dated 11/23/2020 is 1.9

## 2021-06-13 NOTE — TELEPHONE ENCOUNTER
Received a faxed INR result for Reba Calderon  From [facility]  INR result dated 11/30/2020 is 3.1

## 2021-06-13 NOTE — TELEPHONE ENCOUNTER
"Anticoagulation Annual Referral Renewal Review    Reba Calderon's chart reviewed for annual renewal of referral to anticoagulation monitoring.        Criteria for anticoagulation nurse and/or pharmacist renewal met   Warfarin indication: Atrial Fibrillation Yes, per indication   Current with INR monitoring/compliant Yes Yes   Date of last office visit 01/03/2020 w/ Dr. Frances.  NOTE:  NEEDS A F/U VISIT WITH DR. ERAZO.  (sent to MID schedulers to call patient and make appt  No, last office visit more than a year ago   Time in Therapeutic Range (TTR) 75.7 % Yes, TTR > 60%       Reba Calderon did NOT meet all criteria for anticoagulation management program initiated renewal and requires provider review. Using dot phrase, \".acmrenewalprovider\", please advise if [facility] anticoagulation management referral should be renewed or if patient should be seen in office to review anticoagulation therapy      Dimple Sam RN  3:51 PM      "

## 2021-06-13 NOTE — TELEPHONE ENCOUNTER
ANTICOAGULATION  MANAGEMENT-Patient Home Monitoring Result    Assessment     Therapeutic INR result of 2.30 (goal INR of 2.0-3.0) received via fax from [employer] home INR monitoring company.        Previous INR was Therapeutic at 2.70 on 11/9/20.    Reba Calderon last contacted by phone: 11/9/20    Plan     Per home monitoring agreement with patient, patient was NOT contacted regarding therapeutic result today.  Patient is to continue current dose and continue to check INR with home monitor per protocol.  ?   Dimple Sam RN    Subjective/Objective:      Reba Calderon, a 84 y.o. female  is established on warfarin using a home INR monitor.    Anticoagulation Episode Summary     Current INR goal:  2.0-3.0   TTR:  75.7 % (1 y)   Next INR check:  11/23/2020   INR from last check:  2.30 (11/16/2020)   Weekly max warfarin dose:     Target end date:  Indefinite   INR check location:     Preferred lab:     Send INR reminders to:  [facility]    Indications    Atrial fibrillation  permanent (H) [I48.21]           Comments:           Anticoagulation Care Providers     Provider Role Specialty Phone number    Bertin Frances MD Referring Internal Medicine [phone]    Vivien Whalen MD Responsible Internal Medicine [phone]

## 2021-06-13 NOTE — TELEPHONE ENCOUNTER
Received a faxed INR result for Reba Calderon  From [facility]  INR result dated 12/14/2020 is 2.1

## 2021-06-14 NOTE — TELEPHONE ENCOUNTER
ANTICOAGULATION  MANAGEMENT-Patient Home Monitoring Result    Assessment     Therapeutic INR result of 2.30 (goal INR of 2.0-3.0) received via fax from [employer] home INR monitoring company.        Previous INR was Therapeutic at 2.30 on 12/28/20.    Reba Calderon last contacted by phone: 12/14/20    Plan     Per home monitoring agreement with patient, patient was NOT contacted regarding therapeutic result today.  Patient is to continue current dose and continue to check INR with home monitor per protocol.  ?   Dimple Sam RN    Subjective/Objective:      Reba Calderon, a 85 y.o. female  is established on warfarin using a home INR monitor.    Anticoagulation Episode Summary     Current INR goal:  2.0-3.0   TTR:  76.5 % (1 y)   Next INR check:  1/11/2021   INR from last check:  2.30 (1/4/2021)   Weekly max warfarin dose:     Target end date:  Indefinite   INR check location:     Preferred lab:     Send INR reminders to:  [facility]    Indications    Atrial fibrillation  permanent (H) [I48.21]           Comments:           Anticoagulation Care Providers     Provider Role Specialty Phone number    Bertin Frances MD Referring Internal Medicine [phone]    Vivien Whalen MD Responsible Internal Medicine [phone]

## 2021-06-14 NOTE — TELEPHONE ENCOUNTER
Received a faxed INR result for Reba Calderon  From [facility]  INR result dated 1/11/2021 is 2.3

## 2021-06-14 NOTE — TELEPHONE ENCOUNTER
Received a faxed INR result for Reba Calderon  From [facility]  INR result dated 12/28/2020 is 2.3

## 2021-06-14 NOTE — TELEPHONE ENCOUNTER
Received a faxed INR result for Reba Calderon  From [facility]  INR result dated 1/4/2021 is 2.3

## 2021-06-14 NOTE — TELEPHONE ENCOUNTER
ANTICOAGULATION  MANAGEMENT-Patient Home Monitoring Result    Assessment     Therapeutic INR result of 2.30 (goal INR of 2.0-3.0) received via fax from [employer] IN[employer] monitoring company.        Previous INR was Therapeutic at 2.20 on 12/21/20.    Reba Calderon last contacted by phone: 12/14/20    Plan     Per home monitoring agreement with patient, patient was NOT contacted regarding therapeutic result today.  Patient is to continue current dose and continue to check INR with home monitor per protocol.  ?   Dimple Sam RN    Subjective/Objective:      Reba Calderon, a 85 y.o. female  is established on warfarin using a home INR monitor.    Anticoagulation Episode Summary     Current INR goal:  2.0-3.0   TTR:  75.7 % (1 y)   Next INR check:  1/4/2021   INR from last check:  2.30 (12/28/2020)   Weekly max warfarin dose:     Target end date:  Indefinite   INR check location:     Preferred lab:     Send INR reminders to:  [facility]    Indications    Atrial fibrillation  permanent (H) [I48.21]           Comments:           Anticoagulation Care Providers     Provider Role Specialty Phone number    Bertin Frances MD Referring Internal Medicine [phone]    Vivien Whalen MD Responsible Internal Medicine [phone]

## 2021-06-14 NOTE — TELEPHONE ENCOUNTER
ANTICOAGULATION  MANAGEMENT-Patient Home Monitoring Result    Assessment     Therapeutic INR result of 2.30 (goal INR of 2.0-3.0) received via fax from [employer] home INR monitoring company.        Previous INR was Therapeutic at 2.30 on 1/4/21.    Reba Calderon last contacted by phone: 12/14/20.    Plan     Per home monitoring agreement with patient, patient was NOT contacted regarding therapeutic result today.  Patient is to continue current dose and continue to check INR with home monitor per protocol.  ?   Dimple Sam RN    Subjective/Objective:      Reba Calderon, a 85 y.o. female  is established on warfarin using a home INR monitor.    Anticoagulation Episode Summary     Current INR goal:  2.0-3.0   TTR:  76.7 % (1 y)   Next INR check:  1/18/2021   INR from last check:  2.30 (1/11/2021)   Weekly max warfarin dose:     Target end date:  Indefinite   INR check location:     Preferred lab:     Send INR reminders to:  [facility]    Indications    Atrial fibrillation  permanent (H) [I48.21]           Comments:           Anticoagulation Care Providers     Provider Role Specialty Phone number    Bertin Frances MD Referring Internal Medicine [phone]    Vivien Whalen MD Responsible Internal Medicine [phone]

## 2021-06-14 NOTE — TELEPHONE ENCOUNTER
REG / SCHED.     - please call patient and schedule OV .  She was last seen over one year ago.     - it stated she wants to go to [facility].  Ask her if she wants to switch doctors and got to [facility], instead of [facility].       - then make new appt to establish care with new PCP.

## 2021-06-14 NOTE — TELEPHONE ENCOUNTER
ANTICOAGULATION  MANAGEMENT-Patient Home Monitoring Result    Assessment     Therapeutic INR result of 2.00 (goal INR of 2.0-3.0) received via fax from [employer] home INR monitoring company.        Previous INR was Therapeutic at 2.20 on 1/18/21.    Reba Calderon last contacted by phone: 12/14/2020    Plan     Per home monitoring agreement with patient, patient was NOT contacted regarding therapeutic result today.  Patient is to continue current dose and continue to check INR with home monitor per protocol.  ?   Dimple Sam RN    Subjective/Objective:      Reba Calderon, a 85 y.o. female  is established on warfarin using a home INR monitor.    Anticoagulation Episode Summary     Current INR goal:  2.0-3.0   TTR:  76.7 % (1 y)   Next INR check:  2/1/2021   INR from last check:  2.00 (1/25/2021)   Weekly max warfarin dose:     Target end date:  Indefinite   INR check location:     Preferred lab:     Send INR reminders to:  [facility]    Indications    Atrial fibrillation  permanent (H) [I48.21]           Comments:           Anticoagulation Care Providers     Provider Role Specialty Phone number    Bertin Frances MD Referring Internal Medicine [phone]    Vivien Whalen MD Responsible Internal Medicine [phone]

## 2021-06-14 NOTE — PROGRESS NOTES
Patient Name: Reba Calderon    Date of Procedure: November 17, 2017    Endoscopist: Bon Curiel MD    Procedure: ENDOSCOPIC RETROGRADE CHOLANGIOPANCREATOGRAPHY    Pre-operative Diagnosis: Suspected choledocholithiasis    Post-operative Diagnosis: Choledocholithiasis    Indications: 81-year-old female with history of CBD stones s/p ERCP in 2014 with recurrent pain and elevated LFTs    Medications: General anesthesia    Procedure Details    The patient was informed of the risks, benefits and alternatives and gave informed consent. The patient had stable cardiovascular status and was judged to be adequate for sedation. A timeout was performed.    The Olympus videoendoscope was passed into the upper esophagus without difficulty. The distal stomach and duodenum were briefly examined and seen to be normal.    The scope was advanced into the second portion of the duodenum and the ampulla encountered with a straightening maneuver. 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    Initial cannulation was performed utilizing retrieval balloon and .035 dreamwire. The guidewire and balloon advanced in the trajectory of the bile duct. Contrast was injected. The CBD was dilated to 11-12 mm with large filling defects. The duct was swept with the 11.5 mm balloon. Stone got impacted at the ampulla during the initial sweep. The retrieval balloon was exchanged with the tritome over the guidewire. The sphincterotomy was extended by 4-5 mm. The tritome was exchanged again with the balloon. Multiple large cholesterol stones and thick sludge were extracted with 8.5 mm and 11.5 mm balloon. The CBD was finally cleared with a basket x 3. Procedure was terminated.    Findings   Bile duct: dilated to 11-12 mm with filling defects, large stones/thick sludge extracted  Pancreas: n/a    Estimated Blood Loss: none    Specimens: None    Complications: No immediate complications    Impression: Choledocholithiasis      Recommendations: No  additional intervention is anticipated. Follow clinical course and trend LFTs.      Bon Curiel MD

## 2021-06-14 NOTE — PROGRESS NOTES
OFFICE VISIT NOTE  Reba Calderon   81 y.o. female            Assessment/Plan for  Reba Calderon is a 81 y.o. female.  No Patient Care Coordination Note on file.       1. Cholelithiasis with choledocholithiasis  Status post balloon dilatation.  Reviewed GI note.  No sphincterotomy performed.  Multiple stones extracted up to 12 mm in size.  Patient completing course of Cipro/Flagyl.    Complete antibiotic  Low-fat diet weeks  Return to clinic 3 months  - HM1(CBC and Differential)  - Lipid Cascade  - Basic Metabolic Panel  - HM1 (CBC with Diff)    2. Atrial fibrillation, permanent.  Ventricular pacemaker.  On chronic Coumadin.  Now on antibiotic  Anticoagulation clinic aware    - warfarin (COUMADIN) 2.5 MG tablet; Take 1 tablet (2.5 mg total) by mouth See Admin Instructions. 1 and a half  Daily except Saturdays 1 daily  Dispense: 200 tablet; Refill: 3  - INR    3. Diabetes mellitus type 2 in nonobese  Controlled    4.  CLL with normal white count  Plan:  Complete antibiotic  Probiotic/Greek yogurt  CBC  Liver  INR  Clinic visit 3-4 months  Anticoagulation clinic following INRs    There are no Patient Instructions on file for this visit.    Diagnoses and all orders for this visit:    Cholelithiasis with choledocholithiasis  -     HM1(CBC and Differential)  -     Lipid Cascade  -     Basic Metabolic Panel  -     HM1 (CBC with Diff)    Atrial fibrillation, permanent  -     warfarin (COUMADIN) 2.5 MG tablet; Take 1 tablet (2.5 mg total) by mouth See Admin Instructions. 1 and a half  Daily except Saturdays 1 daily  Dispense: 200 tablet; Refill: 3  -     INR    Diabetes mellitus type 2 in nonobese        Medications after visit  Current Outpatient Prescriptions   Medication Sig Dispense Refill     ammonium lactate (AMLACTIN) 12 % cream Apply topically as needed for dry skin.       aspirin 81 MG EC tablet Take 81 mg by mouth daily.       atorvastatin (LIPITOR) 10 MG tablet TAKE 1 TABLET(10 MG) BY MOUTH DAILY 90 tablet 11      blood glucose test (CONTOUR TEST STRIPS) strips TEST ONCE DAILY AS DIRECTED 100 strip 3     calcium carbonate-vitamin D2 500 mg(1,250mg) -200 unit tablet Take 1 tablet by mouth daily.       cholecalciferol, vitamin D3, 1,000 unit tablet Take 2,000 Units by mouth daily.       folic acid (FOLVITE) 1 MG tablet Take 1 mg by mouth daily.       furosemide (LASIX) 40 MG tablet TAKE 1 TABLET BY MOUTH TWICE DAILY 180 tablet 3     lisinopril (PRINIVIL,ZESTRIL) 10 MG tablet TAKE 1 TABLET BY MOUTH EVERY DAY 90 tablet 3     magnesium chloride (SLOW-MAG) 64 mg TbEC delayed-release tablet Take 64 mg by mouth daily.       MAGNESIUM OXIDE (LÓPEZ ORAL) Take by mouth daily. López Laxative       metFORMIN (GLUCOPHAGE) 500 MG tablet TAKE 1 TABLET BY MOUTH TWICE DAILY 180 tablet 3     pantoprazole (PROTONIX) 40 MG tablet TAKE 1 TABLET(40 MG) BY MOUTH DAILY 90 tablet 2     potassium chloride (KLOR-CON) 10 MEQ CR tablet Take 1 tablet (10 mEq total) by mouth daily. 90 tablet 3     triamcinolone (KENALOG) 0.1 % ointment Apply topically 4 (four) times a day. Uses 80grams qid       warfarin (COUMADIN) 2.5 MG tablet Take 1 tablet (2.5 mg total) by mouth See Admin Instructions. 1 and a half  Daily except Saturdays 1 daily 200 tablet 3     No current facility-administered medications for this visit.                       Bertin Frances MD  Internal medicine  [facility]  [phone]  [email or web address]      This is an electronically verified report by Bertin Frances M.D.  (Note created with Dragon voice recognition and unintended spelling errors and word substitutions may occur)               Subjective:   Chief Complaint:  Hospital Visit Follow Up (Per MD) and Medication Refill     Hospital followup-united      Patient was admitted with biliary colic intense  Large multiple stones and bile duct  Endoscopy performed  Sphincter dilation-balloon.  One stone as large as well the 12 mm  No mention made  of sphincterotomy    Normal white count  Liver enzymes elevated  She is on Cipro Flagyl  No diarrhea    Review of Systems:     Extensive 10-point review of systems was performed. Please see the HPI for problem specific pertinent review of systems.     Patient does note she is in permanent A. fib  No cardiac congestion  No bleeding    Sugars were okay    Otherwise, the following systems are noncontributory including constitutional, eyes, ears, nose and throat, cardiovascular, respiratory, gastrointestinal, genitourinary, musculoskeletal,neurological, skin and/or breast, endocrine, hematologic/lymph, allergic/immunologic and psychiatric.              Medications:  Current Outpatient Prescriptions on File Prior to Visit   Medication Sig     ammonium lactate (AMLACTIN) 12 % cream Apply topically as needed for dry skin.     aspirin 81 MG EC tablet Take 81 mg by mouth daily.     atorvastatin (LIPITOR) 10 MG tablet TAKE 1 TABLET(10 MG) BY MOUTH DAILY     blood glucose test (CONTOUR TEST STRIPS) strips TEST ONCE DAILY AS DIRECTED     calcium carbonate-vitamin D2 500 mg(1,250mg) -200 unit tablet Take 1 tablet by mouth daily.     cholecalciferol, vitamin D3, 1,000 unit tablet Take 2,000 Units by mouth daily.     folic acid (FOLVITE) 1 MG tablet Take 1 mg by mouth daily.     furosemide (LASIX) 40 MG tablet TAKE 1 TABLET BY MOUTH TWICE DAILY     lisinopril (PRINIVIL,ZESTRIL) 10 MG tablet TAKE 1 TABLET BY MOUTH EVERY DAY     magnesium chloride (SLOW-MAG) 64 mg TbEC delayed-release tablet Take 64 mg by mouth daily.     MAGNESIUM OXIDE (LÓPEZ ORAL) Take by mouth daily. López Laxative     metFORMIN (GLUCOPHAGE) 500 MG tablet TAKE 1 TABLET BY MOUTH TWICE DAILY     pantoprazole (PROTONIX) 40 MG tablet TAKE 1 TABLET(40 MG) BY MOUTH DAILY     potassium chloride (KLOR-CON) 10 MEQ CR tablet Take 1 tablet (10 mEq total) by mouth daily.     triamcinolone (KENALOG) 0.1 % ointment Apply topically 4 (four) times a day. Uses 80grams qid  "    [DISCONTINUED] warfarin (COUMADIN) 2.5 MG tablet Take 2.5 mg by mouth See Admin Instructions. 1 and a half  Daily except Saturdays 1 daily     [DISCONTINUED] magnesium chloride (SLOW-MAG) 64 mg TbEC delayed-release tablet Take 64 mg by mouth daily.     No current facility-administered medications on file prior to visit.             Allergies:  Allergies   Allergen Reactions     Amiodarone Unknown     Dyspnea, O2 dependency     Beta-Blockers (Beta-Adrenergic Blocking Agts) Unknown     Lower extremity edema     Oxycodone-Acetaminophen Anaphylaxis     Atenolol Other (See Comments)     adverse reaction, ,      Meperidine Nausea And Vomiting     Metoprolol Succinate Unknown     SOB and joint pain     Pioglitazone Hcl Unknown     Legs swell, Comment: intolerance, Description: Actos, Comment: intolerance, Description: Actos       PSFHx: Tobacco Status:  She  reports that she has never smoked. She has never used smokeless tobacco.   Alcohol Status:    History   Alcohol Use     Yes     Comment: occasional       reports that she has never smoked. She has never used smokeless tobacco. She reports that she drinks alcohol. Her drug history is not on file.    Objective:    /60 (Patient Site: Left Arm, Patient Position: Sitting, Cuff Size: Adult Large)  Pulse 82  Ht 5' 5.75\" (1.67 m)  Wt 215 lb 1.9 oz (97.6 kg)  LMP 08/24/1994 (Exact Date)  SpO2 96%  Breastfeeding? No  BMI 34.99 kg/m2  Weight:   Wt Readings from Last 3 Encounters:   11/24/17 215 lb 1.9 oz (97.6 kg)   05/24/17 217 lb 1.9 oz (98.5 kg)   08/24/16 215 lb 1.9 oz (97.6 kg)     BP Readings from Last 3 Encounters:   11/24/17 116/60   05/24/17 132/72   08/24/16 118/64         General-appears well, no acute distress.  Afebrile  Pulses regular throughout  Benign abdominal exam  Sclera-no icterus          Review of clinical lab tests  Lab Results   Component Value Date     05/24/2017    K 3.5 05/24/2017     05/24/2017    CREATININE 0.91 05/24/2017 "    BUN 21 05/24/2017    CO2 32 (H) 05/24/2017    INR 2.80 (!) 11/22/2017    HGBA1C 6.9 (H) 05/24/2017       Glucose   Date/Time Value Ref Range Status   05/24/2017 03:05  (H) 70 - 125 mg/dL Final     No results found for this or any previous visit (from the past 24 hour(s)).    RADIOLOGY: No results found.    Review of recent consultation-reviewed [facility]  Reviewed endoscopy Dr. Bon Curiel  Reviewed lab  Spoke to patient last week

## 2021-06-14 NOTE — TELEPHONE ENCOUNTER
Received a faxed INR result for Reba Calderon  From [facility]  INR result dated 1/25/2021 is 2.0

## 2021-06-14 NOTE — TELEPHONE ENCOUNTER
ANTICOAGULATION  MANAGEMENT-Patient Home Monitoring Result    Assessment     Therapeutic INR result of 2.20 (goal INR of 2.0-3.0) received via fax from [employer] home INR monitoring company.        Previous INR was Therapeutic AT 2.30 ON 1/11/21.    Reba Calderon last contacted by phone: 12/14/2020    Plan     Per home monitoring agreement with patient, patient was NOT contacted regarding therapeutic result today.  Patient is to continue current dose and continue to check INR with home monitor per protocol.  ?   Dimple Sam RN    Subjective/Objective:      Reba Calderon, a 85 y.o. female  is established on warfarin using a home INR monitor.    Anticoagulation Episode Summary     Current INR goal:  2.0-3.0   TTR:  76.7 % (1 y)   Next INR check:  1/25/2021   INR from last check:  2.20 (1/18/2021)   Weekly max warfarin dose:     Target end date:  Indefinite   INR check location:     Preferred lab:     Send IN[facility] reminders [facility]:  [facility]    Indications    Atrial fibrillation  permanent (H) [I48.21]           Comments:           Anticoagulation Care Providers     Provider Role Specialty Phone number    Bertin Frances MD Referring Internal Medicine [phone]    Vivien Whalen MD Responsible Internal Medicine [phone]

## 2021-06-15 PROBLEM — I48.21 ATRIAL FIBRILLATION, PERMANENT (H): Status: ACTIVE | Noted: 2017-05-24

## 2021-06-16 PROBLEM — A42.9 ACTINOMYCOSIS: Status: ACTIVE | Noted: 2020-05-07

## 2021-06-16 PROBLEM — C91.10 CLL (CHRONIC LYMPHOCYTIC LEUKEMIA) (H): Status: ACTIVE | Noted: 2018-03-08

## 2021-06-16 NOTE — TELEPHONE ENCOUNTER
Telephone Encounter by Juana Timmons CMA at 12/11/2019  1:41 PM     Author: Juana Timmons CMA Service: -- Author Type: Medical Assistant    Filed: 12/11/2019  1:42 PM Encounter Date: 12/9/2019 Status: Signed    : Juana Timmons CMA (Medical Assistant)       Spoke with the patient and relayed the following message from Dr. Frances:  Bertin Frances MD  [facility] Internal Medicine Support Pool 36 minutes ago (1:03 PM)      Lasix   40 mg-2 daily okay but needs to have an appointment.  Should see myself or Dr. Duran     I sent an Rx    Routing comment      She verbalized understanding and already has an appointment with Dr. Frances for 12/27/19.  Patient had no further questions at this time.  Juana LARSON CMA/BONI....................1:42 PM

## 2021-06-16 NOTE — TELEPHONE ENCOUNTER
RX declined, patient is not seen through [facility], patient sees [facility] anticoagulation RN's.    Ban Mccallum RN, BSN Nurse Triage Advisor 1:57 PM 4/24/2021

## 2021-06-16 NOTE — TELEPHONE ENCOUNTER
Telephone Encounter by Crys Fontanez RN at 3/10/2020 12:28 PM     Author: Crys Fontanez RN Service: -- Author Type: Registered Nurse    Filed: 3/10/2020 12:40 PM Encounter Date: 3/10/2020 Status: Attested    : Crys Fontanez RN (Registered Nurse) Cosigner: Bertin Frances MD at 3/11/2020  8:58 AM    Attestation signed by Bertin Frances MD at 3/11/2020  8:58 AM    Agree                ANTICOAGULATION  MANAGEMENT    Assessment     Today's INR result of 1.4 is Subtherapeutic (goal INR of 2.0-3.0)   Hospital admission 3/7-3/9        Warfarin recently held 3 days prior to gallbladder stone removal procedure on 3/9  ( ERCP) which may be affecting INR    No new diet changes affecting INR    No new medication/supplements affecting INR    Continues to tolerate warfarin with no reported s/s of bleeding or thromboembolism     Previous INR was Therapeutic    Plan:     Spoke with Reba regarding INR result and instructed:     Warfarin Dosing Instructions:  resume current warfarin doses    2.5 mg every Mon, Thu; 3.75 mg all other days     (0 % change)    Instructed patient to follow up no later than: 3/16 with home monitor    Education provided: importance of therapeutic range and target INR goal and significance of current INR result    Reba verbalizes understanding and agrees to warfarin dosing plan.    Instructed to call the [facility] for any changes, questions or concerns. (#[phone])   ?   Crys Fontanez RN    Subjective/Objective:      Reba Calderon, a 84 y.o. female is on warfarin.     Reba reports:     Home warfarin dose: per patient she held her warfarin doses from 3/7 to 3/9 for procedure done yesterday     Missed doses: Yes: held doses- see above     Medication changes:  No     S/S of bleeding or thromboembolism:  No     New Injury or illness:  Yes-recent hospitalization     Changes in diet or alcohol consumption:  No     Upcoming surgery, procedure or cardioversion:   No    Anticoagulation Episode Summary     Current INR goal:   2.0-3.0   TTR:   78.9 % (1 y)   Next INR check:   2/24/2020   INR from last check:   1.40! (3/10/2020)   Weekly max warfarin dose:      Target end date:   Indefinite   INR check location:      Preferred lab:      Send INR reminders to:   [facility]    Indications    Atrial fibrillation  permanent [I48.21]           Comments:            Anticoagulation Care Providers     Provider Role Specialty Phone number    Bertin Frances MD Referring Internal Medicine [phone]

## 2021-06-16 NOTE — PROGRESS NOTES
CALL    B12 OK, BUT LOW NORMAL    I WOULD LIKE YOU TO TAKE OTC VITAMIN B12 1MG A DAY  THIS MAY HELP YOUR NERVES IN THE LEGS        NO NEED FOR INJECTIONS     I WOUL      Lab Results       Component                Value               Date                       [id number]               339                 03/08/2018

## 2021-06-16 NOTE — TELEPHONE ENCOUNTER
Telephone Encounter by Crys Fontanez RN at 3/16/2020 10:01 AM     Author: Crys Fontanez RN Service: -- Author Type: Registered Nurse    Filed: 3/16/2020 10:14 AM Encounter Date: 3/16/2020 Status: Attested    : Crys Fontanez RN (Registered Nurse) Cosigner: Bertin Frances MD at 3/17/2020  7:17 AM    Attestation signed by Bertin Frances MD at 3/17/2020  7:17 AM    agree                ANTICOAGULATION  MANAGEMENT    Assessment     Today's INR result of 1.4 is Subtherapeutic (goal INR of 2.0-3.0)        Warfarin taken as previously instructed    No new diet changes affecting INR    No new medication/supplements affecting INR    Continues to tolerate warfarin with no reported s/s of bleeding or thromboembolism     Previous INR was Subtherapeutic most likely due to held doses for procedure.    Patient updated ACN that she decided to stay with [facility] and will find a new PCP due to current PCP will be retiring.    Plan:     Spoke with Reba regarding INR result and instructed:     Warfarin Dosing Instructions:  take 5 mg booster dose today then continue current warfarin dose  (0 % change)    Instructed patient to follow up no later than: 5-7 days with home monitor.    Education provided: importance of therapeutic range and target INR goal and significance of current INR result    Reba verbalizes understanding and agrees to warfarin dosing plan.    Instructed to call the [facility] for any changes, questions or concerns. (#[phone])   ?   Crys Fontanez RN    Subjective/Objective:      Reba Calderon, a 84 y.o. female is on warfarin.     Reba reports:     Home warfarin dose: verbally confirmed home dose with Reba and updated on anticoagulation calendar     Missed doses: No     Medication changes:  No     S/S of bleeding or thromboembolism:  No     New Injury or illness:  No     Changes in diet or alcohol consumption:  No     Upcoming surgery, procedure or cardioversion:   No    Anticoagulation Episode Summary     Current INR goal:   2.0-3.0   TTR:   77.2 % (1 y)   Next INR check:   3/23/2020   INR from last check:   1.40! (3/16/2020)   Weekly max warfarin dose:      Target end date:   Indefinite   INR check location:      Preferred lab:      Send INR reminders to:   [facility]    Indications    Atrial fibrillation  permanent [I48.21]           Comments:            Anticoagulation Care Providers     Provider Role Specialty Phone number    Bertin Frances MD Referring Internal Medicine [phone]

## 2021-06-16 NOTE — TELEPHONE ENCOUNTER
Telephone Encounter by Giselle Kelly RN at 3/30/2020 10:27 AM     Author: Giselle Kelly RN Service: -- Author Type: Registered Nurse    Filed: 3/30/2020 10:34 AM Encounter Date: 3/30/2020 Status: Attested    : Giselle Kelly RN (Registered Nurse)    Related Notes: Original Note by Giselle Kelly RN (Registered Nurse) filed at 3/30/2020 10:33 AM    Cosigner: Bertin Frances MD at 3/31/2020  2:12 PM    Attestation signed by Bertin Frances MD at 3/31/2020  2:12 PM    Agree                  ANTICOAGULATION  MANAGEMENT    Assessment     Today's INR result of 3.4 is Supratherapeutic (goal INR of 2.0-3.0)        Warfarin taken as previously instructed    No new diet changes affecting INR not eating out, trying to keep vit k levels normal    No new medication/supplements affecting INR    Continues to tolerate warfarin with no reported s/s of bleeding or thromboembolism     Previous INR was Therapeutic    Plan:     Spoke with Reba regarding INR result and instructed:     Warfarin Dosing Instructions:  Change warfarin dose to 2.5 mg daily on mon/thu/sat; and 3.75 mg daily rest of week  (5 % change)    Instructed patient to follow up no later than: one week with home monitor    Reba verbalizes understanding and agrees to warfarin dosing plan.    Instructed to call the [facility] for any changes, questions or concerns. (#[phone])   ?   Giselle Kelly RN    Subjective/Objective:      Reba Calderon, a 84 y.o. female is on warfarin.     Reba reports:     Home warfarin dose: verbally confirmed home dose with Reba and updated on anticoagulation calendar     Missed doses: No     Medication changes:  No     S/S of bleeding or thromboembolism:  No     New Injury or illness:  No     Changes in diet or alcohol consumption:  No     Upcoming surgery, procedure or cardioversion:  No    Anticoagulation Episode Summary     Current INR goal:   2.0-3.0   TTR:   75.6 % (1 y)   Next INR check:   4/6/2020   INR from last  check:   3.40! (3/30/2020)   Weekly max warfarin dose:      Target end date:   Indefinite   INR check location:      Preferred lab:      Send INR reminders to:   [facility]    Indications    Atrial fibrillation  permanent [I48.21]           Comments:            Anticoagulation Care Providers     Provider Role Specialty Phone number    Bertin Frances MD Referring Internal Medicine [phone]

## 2021-06-16 NOTE — PROGRESS NOTES
OFFICE VISIT NOTE  Reba Calderon   82 y.o. female            Assessment/Plan for  Reba Calderon is a 82 y.o. female.  No Patient Care Coordination Note on file.       1. Diabetes mellitus type 2 in nonobese  controlled  - Basic Metabolic Panel  - Glycosylated Hemoglobin A1c    2. CLL (chronic lymphocytic leukemia)  Stable on observation  - HM1(CBC and Differential)  - HM1 (CBC with Diff)    3. Atrial fibrillation, permanent  Controlled without evidence of heart failure.  No embolic phenomenon  - INR    4. Peripheral sensory neuropathy  In an elderly diabetic.  Adequate control of her sugars.  Does not need any Lyrica at least at this time.  Will check B12 and MMA    5.  Situational distress-brother dying of progressive pulmonary fibrosis, sister with progressive dementia    Plan:  Same medications  Check B12 and MMA  INR  Clinic visit 6 months  Has routine cardiology [id number] if might discontinue aspirin  Continue routine follow-up with Dr. Coats  There are no Patient Instructions on file for this visit.    Diagnoses and all orders for this visit:    Diabetes mellitus type 2 in nonobese  -     Basic Metabolic Panel  -     Glycosylated Hemoglobin A1c    CLL (chronic lymphocytic leukemia)  -     HM1(CBC and Differential)  -     HM1 (CBC with Diff)    Atrial fibrillation, permanent  -     Cancel: INR  -     INR    Peripheral sensory neuropathy  -     Methylmalonic Acid,Serum or Plasma (Vitamin B12 Status)  -     Vitamin B12        Medications after visit  Current Outpatient Prescriptions   Medication Sig Dispense Refill     aspirin 81 MG EC tablet Take 81 mg by mouth daily.       atorvastatin (LIPITOR) 10 MG tablet TAKE 1 TABLET(10 MG) BY MOUTH DAILY 90 tablet 11     blood glucose test (CONTOUR TEST STRIPS) strips TEST ONCE DAILY AS DIRECTED 100 strip 3     calcium carbonate-vitamin D2 500 mg(1,250mg) -200 unit tablet Take 1 tablet by mouth daily.       cholecalciferol, vitamin D3, 1,000 unit tablet Take  2,000 Units by mouth daily.       folic acid (FOLVITE) 1 MG tablet Take 1 mg by mouth daily.       furosemide (LASIX) 40 MG tablet TAKE 1 TABLET BY MOUTH TWICE DAILY 180 tablet 3     lisinopril (PRINIVIL,ZESTRIL) 10 MG tablet TAKE 1 TABLET BY MOUTH EVERY DAY 90 tablet 3     magnesium chloride (SLOW-MAG) 64 mg TbEC delayed-release tablet Take 64 mg by mouth daily.       metFORMIN (GLUCOPHAGE) 500 MG tablet TAKE 1 TABLET BY MOUTH TWICE DAILY 180 tablet 0     pantoprazole (PROTONIX) 40 MG tablet TAKE 1 TABLET(40 MG) BY MOUTH DAILY 90 tablet 2     potassium chloride (KLOR-CON) 10 MEQ CR tablet Take 1 tablet (10 mEq total) by mouth daily. 90 tablet 3     warfarin (COUMADIN) 2.5 MG tablet Take 1 tablet (2.5 mg total) by mouth See Admin Instructions. 1 and a half  Daily except Saturdays 1 daily 200 tablet 3     ammonium lactate (AMLACTIN) 12 % cream Apply topically as needed for dry skin.       triamcinolone (KENALOG) 0.1 % ointment Apply topically 4 (four) times a day. Uses 80grams qid       No current facility-administered medications for this visit.                       Bertin Frances MD  Internal medicine  [facility]  [phone]  [email or web address]    Much or all of the text in this note was generated through the use of Dragon Dictate voice-to-text software. Errors in spelling or words which seem out of context are unintentional.   Sound alike errors, in particular, may have escaped editing.                 Subjective:   Chief Complaint:  Follow-up (4 month follow up)    Patient doing well    CLL on observation.  No recent infection.  Was exposed to flu-her sister.  At flu shot.  I informed her regarding Tamiflu if that should occur in the future    Status post cholecystectomy.  Doing well.  No diarrhea    Under situational stress-brother with progressive pulmonary fibrosis-on hospice.  Sister with progressive dementia.  She is attending to them.  She is eating a bit  differently.  Her sugars are between 90 and 150 which is higher.  Her last A1c was less than 7.  No diarrhea on the metformin.      Chronic atrial fibrillation-rate control/anticoagulation strategy.  No TIA symptoms.  Coumadin monitoring-the patient is compliant with taking their Coumadin as directed.  There is no problem with bleeding. There is no excessive bruising, epistaxis, gum bleeding, hemoptysis, melena, hematochezia, hematuria.  The schedule of Coumadin is controlled through ambulatory anticoagulation clinic.  INR is noted in the chart and reviewed  Review of Systems:     Extensive 10-point review of systems was performed. Please see the HPI for problem specific pertinent review of systems.     Patient does note GERD is well controlled    Otherwise, the following systems are noncontributory including constitutional, eyes, ears, nose and throat, cardiovascular, respiratory, gastrointestinal, genitourinary, musculoskeletal,neurological, skin and/or breast, endocrine, hematologic/lymph, allergic/immunologic and psychiatric.              Medications:  Current Outpatient Prescriptions on File Prior to Visit   Medication Sig     aspirin 81 MG EC tablet Take 81 mg by mouth daily.     atorvastatin (LIPITOR) 10 MG tablet TAKE 1 TABLET(10 MG) BY MOUTH DAILY     blood glucose test (CONTOUR TEST STRIPS) strips TEST ONCE DAILY AS DIRECTED     calcium carbonate-vitamin D2 500 mg(1,250mg) -200 unit tablet Take 1 tablet by mouth daily.     cholecalciferol, vitamin D3, 1,000 unit tablet Take 2,000 Units by mouth daily.     folic acid (FOLVITE) 1 MG tablet Take 1 mg by mouth daily.     furosemide (LASIX) 40 MG tablet TAKE 1 TABLET BY MOUTH TWICE DAILY     lisinopril (PRINIVIL,ZESTRIL) 10 MG tablet TAKE 1 TABLET BY MOUTH EVERY DAY     magnesium chloride (SLOW-MAG) 64 mg TbEC delayed-release tablet Take 64 mg by mouth daily.     metFORMIN (GLUCOPHAGE) 500 MG tablet TAKE 1 TABLET BY MOUTH TWICE DAILY     pantoprazole (PROTONIX)  "40 MG tablet TAKE 1 TABLET(40 MG) BY MOUTH DAILY     potassium chloride (KLOR-CON) 10 MEQ CR tablet Take 1 tablet (10 mEq total) by mouth daily.     warfarin (COUMADIN) 2.5 MG tablet Take 1 tablet (2.5 mg total) by mouth See Admin Instructions. 1 and a half  Daily except Saturdays 1 daily     ammonium lactate (AMLACTIN) 12 % cream Apply topically as needed for dry skin.     triamcinolone (KENALOG) 0.1 % ointment Apply topically 4 (four) times a day. Uses 80grams qid     [DISCONTINUED] MAGNESIUM OXIDE (LÓPEZ ORAL) Take by mouth daily. López Laxative     No current facility-administered medications on file prior to visit.             Allergies:  Allergies   Allergen Reactions     Amiodarone Unknown     Dyspnea, O2 dependency     Beta-Blockers (Beta-Adrenergic Blocking Agts) Unknown     Lower extremity edema     Oxycodone-Acetaminophen Anaphylaxis     Atenolol Other (See Comments)     adverse reaction, ,      Meperidine Nausea And Vomiting     Metoprolol Succinate Unknown     SOB and joint pain     Pioglitazone Hcl Unknown     Legs swell, Comment: intolerance, Description: Actos, Comment: intolerance, Description: Actos       PSFHx: Tobacco Status:  She  reports that she has never smoked. She has never used smokeless tobacco.   Alcohol Status:    History   Alcohol Use     Yes     Comment: occasional       reports that she has never smoked. She has never used smokeless tobacco. She reports that she drinks alcohol. Her drug history is not on file.    Objective:    /80  Pulse 76  Ht 5' 5.75\" (1.67 m)  Wt 216 lb (98 kg)  LMP 08/24/1994 (Exact Date)  SpO2 96%  BMI 35.13 kg/m2  Weight:   Wt Readings from Last 3 Encounters:   03/08/18 216 lb (98 kg)   11/24/17 215 lb 1.9 oz (97.6 kg)   05/24/17 217 lb 1.9 oz (98.5 kg)     BP Readings from Last 3 Encounters:   03/08/18 138/80   11/24/17 116/60   05/24/17 132/72       Patient appears well  General-appears well, no acute distress.  Good color  No petechiae  No " lymphadenopathy  Lungs clear  Cardiac-no murmur  Abdomen no hepatosplenomegaly  Extremities no edema  Good pulses  Gait normal    Diabetic foot exam normal except for decreased vibratory sense.      Review of clinical lab tests  Lab Results   Component Value Date    WBC 7.4 03/08/2018    HGB 12.9 03/08/2018    HCT 40.5 03/08/2018     03/08/2018    ALT 56 (H) 11/24/2017    AST 38 11/24/2017     11/24/2017    K 3.6 11/24/2017    CL 98 11/24/2017    CREATININE 1.07 11/24/2017    BUN 19 11/24/2017    CO2 28 11/24/2017    INR 2.20 (H) 03/08/2018    HGBA1C 7.4 (H) 03/08/2018       Glucose   Date/Time Value Ref Range Status   11/24/2017 09:08  (H) 70 - 125 mg/dL Final   05/24/2017 03:05  (H) 70 - 125 mg/dL Final     Recent Results (from the past 24 hour(s))   Glycosylated Hemoglobin A1c   Result Value Ref Range    Hemoglobin A1c 7.4 (H) 3.5 - 6.0 %   HM1 (CBC with Diff)   Result Value Ref Range    WBC 7.4 4.0 - 11.0 thou/uL    RBC 4.55 3.80 - 5.40 mill/uL    Hemoglobin 12.9 12.0 - 16.0 g/dL    Hematocrit 40.5 35.0 - 47.0 %    MCV 89 80 - 100 fL    MCH 28.4 27.0 - 34.0 pg    MCHC 31.9 (L) 32.0 - 36.0 g/dL    RDW 15.2 (H) 11.0 - 14.5 %    Platelets 213 140 - 440 thou/uL    MPV 8.6 7.0 - 10.0 fL    Neutrophils % 72 (H) 50 - 70 %    Lymphocytes % 19 (L) 20 - 40 %    Monocytes % 7 2 - 10 %    Eosinophils % 2 0 - 6 %    Basophils % 1 0 - 2 %    Neutrophils Absolute 5.4 2.0 - 7.7 thou/uL    Lymphocytes Absolute 1.4 0.8 - 4.4 thou/uL    Monocytes Absolute 0.5 0.0 - 0.9 thou/uL    Eosinophils Absolute 0.1 0.0 - 0.4 thou/uL    Basophils Absolute 0.0 0.0 - 0.2 thou/uL   INR   Result Value Ref Range    INR 2.20 (H) 0.90 - 1.10       RADIOLOGY: No results found.    Review of recent consultation-cardiology/hematology oncology

## 2021-06-16 NOTE — TELEPHONE ENCOUNTER
Telephone Encounter by Crys Fontanez RN at 3/23/2020  9:12 AM     Author: Crys Fontanez RN Service: -- Author Type: Registered Nurse    Filed: 3/23/2020  9:14 AM Encounter Date: 3/23/2020 Status: Attested    : Crys Fontanez RN (Registered Nurse)    Related Notes: Original Note by Crys Fontanez RN (Registered Nurse) filed at 3/23/2020  9:14 AM    Cosigner: Bertin Frances MD at 3/23/2020  6:28 PM    Attestation signed by Bertin Frances MD at 3/23/2020  6:28 PM    agree                ANTICOAGULATION  MANAGEMENT    Assessment     Today's INR result of 2.3 is Therapeutic (goal INR of 2.0-3.0)        Warfarin taken as previously instructed    No new diet changes affecting INR    No new medication/supplements affecting INR    Continues to tolerate warfarin with no reported s/s of bleeding or thromboembolism     Previous INR was Subtherapeutic    Plan:     Spoke with Reba regarding INR result and instructed:     Warfarin Dosing Instructions:  Continue current warfarin dose    2.5 mg every Mon, Thu; 3.75 mg all other days        (0 % change)    Instructed patient to follow up no later than: one week with home monitor.    Education provided: importance of therapeutic range and target INR goal and significance of current INR result    Reba verbalizes understanding and agrees to warfarin dosing plan.    Instructed to call the [facility] for any changes, questions or concerns. (#[phone])   ?   Crys Fontanez RN    Subjective/Objective:      Reba Calderon, a 84 y.o. female is on warfarin.     Reba reports:     Home warfarin dose: verbally confirmed home dose with Reba and updated on anticoagulation calendar     Missed doses: No     Medication changes:  No     S/S of bleeding or thromboembolism:  No     New Injury or illness:  No     Changes in diet or alcohol consumption:  No     Upcoming surgery, procedure or cardioversion:  No    Anticoagulation Episode Summary     Current INR goal:    2.0-3.0   TTR:   75.9 % (1 y)   Next INR check:   3/30/2020   INR from last check:   2.30 (3/23/2020)   Weekly max warfarin dose:      Target end date:   Indefinite   INR check location:      Preferred lab:      Send INR reminders to:   [facility]    Indications    Atrial fibrillation  permanent [I48.21]           Comments:            Anticoagulation Care Providers     Provider Role Specialty Phone number    Bertin Frances MD Referring Internal Medicine [phone]

## 2021-06-17 NOTE — TELEPHONE ENCOUNTER
Telephone Encounter by Dimple Sam RN at 11/30/2020  8:23 AM     Author: Dimple Sam RN Service: -- Author Type: Registered Nurse    Filed: 11/30/2020  4:07 PM Encounter Date: 11/30/2020 Status: Signed    : Dimple Sam RN (Registered Nurse)       ANTICOAGULATION  MANAGEMENT    Assessment     Today's INR result of 3.10 is Supratherapeutic (goal INR of 2.0-3.0)        Warfarin taken as previously instructed    No new diet changes affecting INR    No new medication/supplements affecting INR    - on 11/17/20, completed 6 months therapy of Amoxicillin r/t Actinomycosis.    Continues to tolerate warfarin with no reported s/s of bleeding or thromboembolism     Previous INR was Subtherapeutic at 1.90 on 11/23/20.    Gall bladder pains have resolved.  Is now able to eat ok.    Plan:     Spoke on phone with Reba regarding INR result and instructed:     Warfarin Dosing Instructions:    - Continue current warfarin dose 2.5 mg daily on Mon/Wed/Fri; and 3.75 mg daily rest of week.    Instructed patient to follow up no later than:  1-2 wks.   - checks INRs with home monitor thru [employer].    Education provided: importance of consistent vitamin K intake, impact of vitamin K foods on INR, target INR goal and significance of current INR result and when to seek medical attention/emergency care    Reba verbalizes understanding and agrees to warfarin dosing plan.    Instructed to call the [facility] for any changes, questions or concerns. (#[phone])   ?   Dimple Sam RN    Subjective/Objective:      Reba Calderon, a 84 y.o. female is on warfarin.     Reba reports:     Home warfarin dose: verbally confirmed home dose with Reba and updated on anticoagulation calendar     Missed doses: No     Medication changes:  No     S/S of bleeding or thromboembolism:  No     New Injury or illness:  No     Changes in diet or alcohol consumption:  No     Upcoming surgery, procedure or cardioversion:   No    Anticoagulation Episode Summary     Current INR goal:  2.0-3.0   TTR:  75.2 % (1 y)   Next INR check:  12/7/2020   INR from last check:  3.10 (11/30/2020)   Weekly max warfarin dose:     Target end date:  Indefinite   INR check location:     Preferred lab:     Send INR reminders to:  [facility]    Indications    Atrial fibrillation  permanent (H) [I48.21]           Comments:           Anticoagulation Care Providers     Provider Role Specialty Phone number    Bertin Frances MD Referring Internal Medicine [phone]    Vivien Whalen MD Responsible Internal Medicine [phone]

## 2021-06-17 NOTE — TELEPHONE ENCOUNTER
Telephone Encounter by Dimple Sam RN at 12/7/2020 11:38 AM     Author: Dimple Sam RN Service: -- Author Type: Registered Nurse    Filed: 12/7/2020 11:47 AM Encounter Date: 12/7/2020 Status: Signed    : Dimple Sam RN (Registered Nurse)       ANTICOAGULATION  MANAGEMENT    Assessment     Today's INR result of 3.30 is Supratherapeutic (goal INR of 2.0-3.0)        Warfarin taken as previously instructed    No new diet changes affecting INR    No new medication/supplements affecting INR    - completed 6 months therapy with [facility] on 11/20/20.    Continues to tolerate warfarin with no reported s/s of bleeding or thromboembolism     Previous INR was Supratherapeutic at 3.10 on 11/30/20.    Gall bladder pains have resolved.    Plan:     Spoke on phone with Reba regarding INR result and instructed:    1.  Advised one time lower warfarin dose today, due to taking 3.75mg warfarin dose over the weekend on 12/5-6.    Warfarin Dosing Instructions:  (evenings. has 2.5mg tabs)   - just for tonight, advised one time lower dose with 1.25mg warfarin,   - Change warfarin dose to 3.75 mg daily on Sun/Tues/Thurs; and 2.5 mg daily rest of week.   - (5.6 % change)    Instructed patient to follow up no later than: one wk.   - checks INR's wkly with home monitor thru [employer].    Education provided: importance of consistent vitamin K intake, target INR goal and significance of current INR result and monitoring for bleeding signs and symptoms    Reba verbalizes understanding and agrees to warfarin dosing plan.    Instructed to call the [facility] for any changes, questions or concerns. (#[phone])   ?   Dimple Sam RN    Subjective/Objective:      Reba Calderon, a 85 y.o. female is on warfarin.     Reba reports:     Home warfarin dose: verbally confirmed home dose with Reba and updated on anticoagulation calendar     Missed doses: No     Medication changes:  No     S/S of bleeding or  thromboembolism:  No     New Injury or illness:  No     Changes in diet or alcohol consumption:  No     Upcoming surgery, procedure or cardioversion:  No    Anticoagulation Episode Summary     Current INR goal:  2.0-3.0   TTR:  75.2 % (1 y)   Next INR check:  12/14/2020   INR from last check:  3.30 (12/7/2020)   Weekly max warfarin dose:     Target end date:  Indefinite   INR check location:     Preferred lab:     Send INR reminders [facility]:  [facility]    Indications    Atrial fibrillation  permanent (H) [I48.21]           Comments:           Anticoagulation Care Providers     Provider Role Specialty Phone number    Bertin Frances MD Referring Internal Medicine [phone]    Vivien Whalen MD Responsible Internal Medicine [phone]

## 2021-06-17 NOTE — TELEPHONE ENCOUNTER
Telephone Encounter by Crys Fontanez RN at 11/23/2020 10:09 AM     Author: Crys Fontanez RN Service: -- Author Type: Registered Nurse    Filed: 11/23/2020 10:21 AM Encounter Date: 11/23/2020 Status: Signed    : Crys Fontanez RN (Registered Nurse)       ANTICOAGULATION  MANAGEMENT    Assessment     Today's INR result of 1.9 is Subtherapeutic (goal INR of 2.0-3.0)        Warfarin taken as previously instructed    Did not eat due to gallstone pain attack yesterday may be affecting diet and INR - per patient issue now resolved.     No new medication/supplements affecting INR    Continues to tolerate warfarin with no reported s/s of bleeding or thromboembolism     Previous INR was Therapeutic    Plan:     Spoke on phone with Reba regarding INR result and instructed:     Warfarin Dosing Instructions:  Continue current warfarin dose    2.5 mg every Mon, Wed, Fri; 3.75 mg all other days       (0 % change)    Instructed patient to follow up no later than: one week with home monitor.    Education provided: importance of therapeutic range, target INR goal and significance of current INR result and importance of following up for INR monitoring at instructed interval    Reba verbalizes understanding and agrees to warfarin dosing plan.    Instructed to call the [facility] for any changes, questions or concerns. (#[phone])   ?   Crys Fontanez RN    Subjective/Objective:      Reba Calderon, a 84 y.o. female is on warfarin.     Reba reports:     Home warfarin dose: verbally confirmed home dose with Reba and updated on anticoagulation calendar     Missed doses: No     Medication changes:  No     S/S of bleeding or thromboembolism:  No     New Injury or illness:  No     Changes in diet or alcohol consumption:  Yes: did not eat yesterday due to she had gallstone attack and she thinks that she passed it and now eating back to normal.     Upcoming surgery, procedure or cardioversion:  No    Anticoagulation  Episode Summary     Current INR goal:  2.0-3.0   TTR:  75.3 % (1 y)   Next INR check:  11/30/2020   INR from last check:  1.90 (11/23/2020)   Weekly max warfarin dose:     Target end date:  Indefinite   INR check location:     Preferred lab:     Send INR reminders [facility]:  [facility]    Indications    Atrial fibrillation  permanent (H) [I48.21]           Comments:           Anticoagulation Care Providers     Provider Role Specialty Phone number    Bertin Frances MD Referring Internal Medicine [phone]    Vivien Whalen MD Responsible Internal Medicine [phone]

## 2021-06-17 NOTE — TELEPHONE ENCOUNTER
Telephone Encounter by Crys Fontanez RN at 4/6/2020 11:01 AM     Author: Crys Fontanez RN Service: -- Author Type: Registered Nurse    Filed: 4/6/2020 11:35 AM Encounter Date: 4/6/2020 Status: Attested    : Crys Fontanez RN (Registered Nurse) Cosigner: Bertin Frances MD at 4/7/2020 11:09 AM    Attestation signed by Bertin Frances MD at 4/7/2020 11:09 AM    Agree                  ANTICOAGULATION  MANAGEMENT    Assessment     Today's INR result of 3.0 is Therapeutic (goal INR of 2.0-3.0)        Warfarin taken differently than instructed, but no impact to total weekly dose    No new diet changes affecting INR    No new medication/supplements affecting INR    Continues to tolerate warfarin with no reported s/s of bleeding or thromboembolism     Previous INR was Supratherapeutic    Plan:     Spoke with Reba regarding INR result and instructed:     Warfarin Dosing Instructions:  Continue current warfarin dose    2.5 mg every Mon, Wed, Sat; 3.75 mg all other days       (0 % change)    Instructed patient to follow up no later than: one week with home monitor.    Education provided: importance of therapeutic range, target INR goal and significance of current INR result, importance of following up for INR monitoring at instructed interval and importance of taking warfarin as instructed    Reba verbalizes understanding and agrees to warfarin dosing plan.    Instructed to call the [facility] for any changes, questions or concerns. (#[phone])   ?   Crys Fontanez RN    Subjective/Objective:      Reba Calderon, a 84 y.o. female is on warfarin.     Reba reports:     Home warfarin dose: reported that she took 2.5 mg on Mon Wed and Sat then 3.75 mg all other days     Missed doses: No     Medication changes:  No     S/S of bleeding or thromboembolism:  No     New Injury or illness:  No     Changes in diet or alcohol consumption:  No     Upcoming surgery, procedure or cardioversion:   No    Anticoagulation Episode Summary     Current INR goal:   2.0-3.0   TTR:   75.6 % (1 y)   Next INR check:   4/13/2020   INR from last check:   3.00 (4/6/2020)   Weekly max warfarin dose:      Target end date:   Indefinite   INR check location:      Preferred lab:      Send INR reminders to:   [facility]    Indications    Atrial fibrillation  permanent [I48.21]           Comments:            Anticoagulation Care Providers     Provider Role Specialty Phone number    Bertin Frances MD Referring Internal Medicine [phone]

## 2021-06-17 NOTE — TELEPHONE ENCOUNTER
Telephone Encounter by Crys Fontanez RN at 2/8/2021  2:38 PM     Author: Crys Fontanez RN Service: -- Author Type: Registered Nurse    Filed: 2/8/2021  2:54 PM Encounter Date: 2/8/2021 Status: Attested    : Crys Fontanez RN (Registered Nurse)    Related Notes: Original Note by Crys Fontanez RN (Registered Nurse) filed at 2/8/2021  2:51 PM    Cosigner: Vivien Whalen MD at 2/8/2021  2:59 PM    Attestation signed by Vivien Whalen MD at 2/8/2021  2:59 PM    Noted.                ANTICOAGULATION  MANAGEMENT PROGRAM    Reba Calderon is being discharged from the [facility] Anticoagulation Management Program (ACM).    Reason for discharge: care has been transferred to [facility].    ACN called and spoke with patient and she confirmed that anticoagulation management is now with [facility].    ACM referral closed, anticoagulation episode resolved and INR standing order discontinued.         Crys Fontanez RN

## 2021-06-19 NOTE — LETTER
Letter by Bertin Frances MD at      Author: Bertin Frances MD Service: -- Author Type: --    Filed:  Encounter Date: 6/26/2019 Status: (Other)         Reba Calderon  [address] [facility] [address]             June 26, 2019         Dear Ms. Calderon,    Below are the results from your recent visit:    Resulted Orders   Glycosylated Hemoglobin A1c   Result Value Ref Range    Hemoglobin A1c 7.8 (H) 3.5 - 6.0 %   Microalbumin, Random Urine   Result Value Ref Range    Microalbumin, Random Urine 0.59 0.00 - 1.99 mg/dL    Creatinine, Urine 8.9 mg/dL    Microalbumin/Creatinine Ratio Random Urine 66.3 (H) <=19.9 mg/g    Narrative    Microalbumin, Random Urine  <2.0 mg/dL . . . . . . . . Normal  3.0-30.0 mg/dL . . . . . . Microalbuminuria  >30.0 mg/dL . . . . . .  . Clinical Proteinuria    Microalbumin/Creatinine Ratio, Random Urine  <20 mg/g . . . . .. . . . Normal   mg/g . . . . . . . Microalbuminuria  >300 mg/g . . . . . . . . Clinical Proteinuria       Basic Metabolic Panel   Result Value Ref Range    Sodium 141 136 - 145 mmol/L    Potassium 4.1 3.5 - 5.0 mmol/L    Chloride 99 98 - 107 mmol/L    CO2 32 (H) 22 - 31 mmol/L    Anion Gap, Calculation 10 5 - 18 mmol/L    Glucose 121 70 - 125 mg/dL    Calcium 10.7 (H) 8.5 - 10.5 mg/dL    BUN 16 8 - 28 mg/dL    Creatinine 0.86 0.60 - 1.10 mg/dL    GFR MDRD Af Amer >60 >60 mL/min/1.73m2    GFR MDRD Non Af Amer >60 >60 mL/min/1.73m2    Narrative    Fasting Glucose reference range is 70-99 mg/dL per  American Diabetes Association (ADA) guidelines.       Reba  -  the lab looks fine.  There is nothing unexpected here.    Please call with questions or contact us using [facility].    Sincerely,        Electronically signed by Bertin Frances MD

## 2021-06-19 NOTE — PROGRESS NOTES
OFFICE VISIT NOTE         Assessment/Plan for  Reba Calderon is a 82 y.o. female.  No Patient Care Coordination Note on file.       1.  Traumatic right pretibial hematoma with secondary cellulitis in diabetic woman with indolent CLL    Elevate, cold compresses, Keflex, tramadol, if no improvement notify me.    May require Coumadin adjustment.  Will notify monitoring clinic  2.  Diabetes controlled  3.  CLL quiesced sent-reviewed /18  4.  Tendency towards vaginitis yeast-Diflucan if needed to hold atorvastatin if takes    Plan:  Keflex  Diflucan  Tramadol  INR monitoring ulcer        There are no Patient Instructions on file for this visit.    Diagnoses and all orders for this visit:    Diabetes mellitus type 2 in nonobese (H)  -     fluconazole (DIFLUCAN) 150 MG tablet; Take 1 tablet (150 mg total) by mouth once for 1 dose.  Dispense: 1 tablet; Refill: 0    Traumatic hematoma of lower leg with infection, initial encounter  -     traMADol (ULTRAM) 50 mg tablet; Take 1 tablet (50 mg total) by mouth 4 (four) times a day as needed for pain.  Dispense: 30 tablet; Refill: 0    CLL (chronic lymphocytic leukemia) (H)    Cellulitis  -     cephalexin (KEFLEX) 500 MG capsule; Take 1 capsule (500 mg total) by mouth 4 (four) times a day for 10 days.  Dispense: 40 capsule; Refill: 0  -     fluconazole (DIFLUCAN) 150 MG tablet; Take 1 tablet (150 mg total) by mouth once for 1 dose.  Dispense: 1 tablet; Refill: 0        Medications after visit  Current Outpatient Prescriptions   Medication Sig Dispense Refill     ammonium lactate (AMLACTIN) 12 % cream Apply topically as needed for dry skin.       ascorbic acid/vitamin E/biotin (HAIR, SKIN, NAILS WITH BIOTIN ORAL) Take 2,500 mcg by mouth every morning.       aspirin 81 MG EC tablet Take 81 mg by mouth daily.       atorvastatin (LIPITOR) 10 MG tablet TAKE 1 TABLET (10 MG TOTAL) BY MOUTH DAILY. 90 tablet 2     b complex vitamins capsule Take 1 capsule by mouth daily. 1000mcg        calcium carbonate-vitamin D2 500 mg(1,250mg) -200 unit tablet Take 1 tablet by mouth daily.       cholecalciferol, vitamin D3, 1,000 unit tablet Take 2,000 Units by mouth daily.       CONTOUR TEST STRIPS strips TEST ONCE DAILY AS DIRECTED 100 strip 0     ferrous sulfate 325 (65 FE) MG tablet Take 1 tablet by mouth daily with breakfast.       folic acid (FOLVITE) 1 MG tablet Take 1 mg by mouth daily.       furosemide (LASIX) 40 MG tablet TAKE 1 TABLET BY MOUTH TWICE DAILY 180 tablet 0     glucosamine-chondroitin 500-400 mg tablet Take 1 tablet by mouth 2 (two) times a day.       lisinopril (PRINIVIL,ZESTRIL) 10 MG tablet TAKE 1 TABLET BY MOUTH EVERY DAY 90 tablet 0     magnesium chloride (SLOW-MAG) 64 mg TbEC delayed-release tablet Take 64 mg by mouth daily.       metFORMIN (GLUCOPHAGE) 500 MG tablet TAKE 1 TABLET BY MOUTH 2 TIMES A DAY. 60 tablet 0     nitroglycerin (NITROSTAT) 0.4 MG SL tablet Place 0.4 mg under the tongue every 5 (five) minutes as needed for chest pain.       pantoprazole (PROTONIX) 40 MG tablet TAKE 1 TABLET(40 MG) BY MOUTH DAILY 90 tablet 0     potassium chloride (KLOR-CON) 10 MEQ CR tablet Take 1 tablet (10 mEq total) by mouth daily. 90 tablet 0     triamcinolone (KENALOG) 0.1 % ointment Apply topically 4 (four) times a day. Uses 80grams qid 30 g 0     warfarin (COUMADIN) 2.5 MG tablet Take one - one and a half tablets ( 2.5 - 3.75 mg ) daily as directed. Adjust dose per INR results as instructed. 140 tablet 1     cephalexin (KEFLEX) 500 MG capsule Take 1 capsule (500 mg total) by mouth 4 (four) times a day for 10 days. 40 capsule 0     fluconazole (DIFLUCAN) 150 MG tablet Take 1 tablet (150 mg total) by mouth once for 1 dose. 1 tablet 0     traMADol (ULTRAM) 50 mg tablet Take 1 tablet (50 mg total) by mouth 4 (four) times a day as needed for pain. 30 tablet 0     No current facility-administered medications for this visit.                     Bertin Frances MD  Internal  [facility]  [phone]    This is an electronically verified report by Bertin Frances M.D.  (Note created with Dragon voice recognition and unintended spelling errors and word substitutions may occur)         Subjective:   Chief Complaint:  Shin Injury (Right sided)    New issue  Traumatic hematoma early July  Pushing on one more to trailer.  Ramp came up and hit her.  She is on Coumadin  Her INR was a bit high at 3.4  She had a little bruising and subsequently some discomfort and swelling  Located over the right pretibial area    Discontinue to get worse.  About a week later she went to emergency room in [place].  X-ray on [facility]-no fracture of leg  Ultrasound did show hematoma no phlebitis    They did a CBC on 710.  Normal    She does have CLL.  Her white count has been normal.  Differential was normal  Her sugars have been 1/31/1970 a bit high for her.    Is getting progressively more tender.  Is a little bit red.  It is more swollen.  No fever chills sweats or dyspnea  It does hurt to touch it.    Patient is on chronic warfarin therapy.  Iron was recently added as oncology noted in iron deficiency in mid June        PSFHx:: Past Medical History, Social History, and Family History reviewed and updated.  Medications and Allergies reviewed and reconciled.    Medications:  Current Outpatient Prescriptions   Medication Sig Dispense Refill     ammonium lactate (AMLACTIN) 12 % cream Apply topically as needed for dry skin.       ascorbic acid/vitamin E/biotin (HAIR, SKIN, NAILS WITH BIOTIN ORAL) Take 2,500 mcg by mouth every morning.       aspirin 81 MG EC tablet Take 81 mg by mouth daily.       atorvastatin (LIPITOR) 10 MG tablet TAKE 1 TABLET (10 MG TOTAL) BY MOUTH DAILY. 90 tablet 2     b complex vitamins capsule Take 1 capsule by mouth daily. 1000mcg       calcium carbonate-vitamin D2 500 mg(1,250mg) -200 unit tablet Take 1 tablet by mouth daily.        cholecalciferol, vitamin D3, 1,000 unit tablet Take 2,000 Units by mouth daily.       CONTOUR TEST STRIPS strips TEST ONCE DAILY AS DIRECTED 100 strip 0     ferrous sulfate 325 (65 FE) MG tablet Take 1 tablet by mouth daily with breakfast.       folic acid (FOLVITE) 1 MG tablet Take 1 mg by mouth daily.       furosemide (LASIX) 40 MG tablet TAKE 1 TABLET BY MOUTH TWICE DAILY 180 tablet 0     glucosamine-chondroitin 500-400 mg tablet Take 1 tablet by mouth 2 (two) times a day.       lisinopril (PRINIVIL,ZESTRIL) 10 MG tablet TAKE 1 TABLET BY MOUTH EVERY DAY 90 tablet 0     magnesium chloride (SLOW-MAG) 64 mg TbEC delayed-release tablet Take 64 mg by mouth daily.       metFORMIN (GLUCOPHAGE) 500 MG tablet TAKE 1 TABLET BY MOUTH 2 TIMES A DAY. 60 tablet 0     nitroglycerin (NITROSTAT) 0.4 MG SL tablet Place 0.4 mg under the tongue every 5 (five) minutes as needed for chest pain.       pantoprazole (PROTONIX) 40 MG tablet TAKE 1 TABLET(40 MG) BY MOUTH DAILY 90 tablet 0     potassium chloride (KLOR-CON) 10 MEQ CR tablet Take 1 tablet (10 mEq total) by mouth daily. 90 tablet 0     triamcinolone (KENALOG) 0.1 % ointment Apply topically 4 (four) times a day. Uses 80grams qid 30 g 0     warfarin (COUMADIN) 2.5 MG tablet Take one - one and a half tablets ( 2.5 - 3.75 mg ) daily as directed. Adjust dose per INR results as instructed. 140 tablet 1     cephalexin (KEFLEX) 500 MG capsule Take 1 capsule (500 mg total) by mouth 4 (four) times a day for 10 days. 40 capsule 0     fluconazole (DIFLUCAN) 150 MG tablet Take 1 tablet (150 mg total) by mouth once for 1 dose. 1 tablet 0     traMADol (ULTRAM) 50 mg tablet Take 1 tablet (50 mg total) by mouth 4 (four) times a day as needed for pain. 30 tablet 0     No current facility-administered medications for this visit.      Allergies:  Allergies   Allergen Reactions     Amiodarone Unknown     Dyspnea, O2 dependency     Beta-Blockers (Beta-Adrenergic Blocking Agts) Unknown     Lower  "extremity edema     Oxycodone-Acetaminophen Anaphylaxis     Atenolol Other (See Comments)     adverse reaction, ,      Meperidine Nausea And Vomiting     Metoprolol Succinate Unknown     SOB and joint pain     Pioglitazone Hcl Unknown     Legs swell, Comment: intolerance, Description: Actos, Comment: intolerance, Description: Actos     PSFHx: Tobacco Status:  She  reports that she has never smoked. She has never used smokeless tobacco.    Review of Systems:     Extensive 12-point review of systems was performed. Please see the HPI for problem specific pertinent review of systems.     Patient does note she feels well otherwise.  She is eating no nausea vomiting    Otherwise, the following systems are noncontributory including constitutional, eyes, ears, nose and throat, cardiovascular, respiratory, gastrointestinal, genitourinary, musculoskeletal,neurological, skin and/or breast, endocrine, hematologic/lymph, allergic/immunologic and psychiatric.    .  .    Objective:    /66 (Patient Site: Left Arm, Patient Position: Sitting, Cuff Size: Adult Large)  Pulse 65  Ht 5' 5.75\" (1.67 m)  Wt 215 lb 1.3 oz (97.6 kg)  LMP 08/24/1994 (Exact Date)  SpO2 96%  Breastfeeding? No  BMI 34.98 kg/m2  Weight:   Wt Readings from Last 3 Encounters:   07/12/18 215 lb 1.3 oz (97.6 kg)   03/08/18 216 lb (98 kg)   11/24/17 215 lb 1.9 oz (97.6 kg)     [unfilled]  215 lb 1.3 oz (97.6 kg)    In general, no acute distress.  Psych-affect appropriate for situation    Skin-unremarkable. No rash or petichae  Lymph-no lymphadenopathy  Eyes-sclera white,midline  Face symmetric non-flushed  Pulses regular  Left leg normal with good pulses  Swollen right leg  Bruise right leg  Tender right leg erythematous area about 10 x 20 on a slightly abraded area mid right pretibial area.  No lymphangitic streaking.  Homans sign negative      LABS: No results found for this or any previous visit (from the past 24 hour(s)).-Reviewed CBC " 7/10  RADIOLOGY: No results found.-Reviewed x-ray ultrasound reports from emergency room    DATA REVIEWED:     Additional History from Old Records Summarized (2): Reviewed urgency normal.  No fracture hematoma.  710  Decision to Obtain Records / Care everywhere (1): Reviewed care everywhere  Reviewed oncology  Radiology Tests Summarized or Ordered (1): Reviewed radiology/ultrasound report  Labs Reviewed or Ordered (1): Reviewed lab  Medicine Test Summarized or Ordered (1):    Independent Review of EKG or X-RAY(2 each):

## 2021-06-19 NOTE — PROGRESS NOTES
OFFICE VISIT NOTE            Assessment/Plan for  Reba Calderon is a 82 y.o. female.  No Patient Care Coordination Note on file.       1.  Right leg swelling secondary to hematoma.  No evidence for cellulitis or phlebitis.  INR therapeutic       Plan:  No more antibiotic  Increase Lasix 80 twice daily ×1-2 weeks  Elevate above heart leg- twice daily for several weeks  Some will return to normal.  Discussed stockings.       Diagnoses and all orders for this visit:    Leg edema, right  -     furosemide (LASIX) 80 MG tablet; Take 1 tablet (80 mg total) by mouth daily. For two weeks for leg edema  Dispense: 30 tablet; Refill: 11    Traumatic hematoma of lower leg with infection, initial encounter        Bertin Frances MD  Internal medicine  [facility]  [phone]  [email or web address]    This is an electronically verified report by Bertin Frances M.D.  (Note created with Dragon voice recognition and unintended spelling errors and word substitutions may occur)             Subjective:   Chief Complaint:  Wound Check (R shin)    Your concern she has an infection  Leg her leg is red and tender and swollen    She is completed antibiotics    Medications:  Current Outpatient Prescriptions on File Prior to Visit   Medication Sig     ammonium lactate (AMLACTIN) 12 % cream Apply topically as needed for dry skin.     ascorbic acid/vitamin E/biotin (HAIR, SKIN, NAILS WITH BIOTIN ORAL) Take 2,500 mcg by mouth every morning.     aspirin 81 MG EC tablet Take 81 mg by mouth daily.     atorvastatin (LIPITOR) 10 MG tablet TAKE 1 TABLET (10 MG TOTAL) BY MOUTH DAILY.     b complex vitamins capsule Take 1 capsule by mouth daily. 1000mcg     calcium carbonate-vitamin D2 500 mg(1,250mg) -200 unit tablet Take 1 tablet by mouth daily.     cholecalciferol, vitamin D3, 1,000 unit tablet Take 2,000 Units by mouth daily.     CONTOUR TEST STRIPS strips TEST ONCE DAILY AS DIRECTED     ferrous sulfate 325 (65  FE) MG tablet Take 1 tablet by mouth daily with breakfast.     folic acid (FOLVITE) 1 MG tablet Take 1 mg by mouth daily.     furosemide (LASIX) 40 MG tablet TAKE 1 TABLET BY MOUTH TWICE DAILY     glucosamine-chondroitin 500-400 mg tablet Take 1 tablet by mouth 2 (two) times a day.     lisinopril (PRINIVIL,ZESTRIL) 10 MG tablet TAKE 1 TABLET BY MOUTH EVERY DAY     magnesium chloride (SLOW-MAG) 64 mg TbEC delayed-release tablet Take 64 mg by mouth daily.     metFORMIN (GLUCOPHAGE) 500 MG tablet TAKE 1 TABLET BY MOUTH 2 TIMES A DAY.     nitroglycerin (NITROSTAT) 0.4 MG SL tablet Place 0.4 mg under the tongue every 5 (five) minutes as needed for chest pain.     pantoprazole (PROTONIX) 40 MG tablet TAKE 1 TABLET(40 MG) BY MOUTH DAILY     potassium chloride (KLOR-CON) 10 MEQ CR tablet Take 1 tablet (10 mEq total) by mouth daily.     traMADol (ULTRAM) 50 mg tablet Take 1 tablet (50 mg total) by mouth 4 (four) times a day as needed for pain.     triamcinolone (KENALOG) 0.1 % ointment Apply topically 4 (four) times a day. Uses 80grams qid     warfarin (COUMADIN) 2.5 MG tablet Take one - one and a half tablets ( 2.5 - 3.75 mg ) daily as directed. Adjust dose per INR results as instructed.     No current facility-administered medications on file prior to visit.      Allergies:  Allergies   Allergen Reactions     Amiodarone Unknown     Dyspnea, O2 dependency     Beta-Blockers (Beta-Adrenergic Blocking Agts) Unknown     Lower extremity edema     Oxycodone-Acetaminophen Anaphylaxis     Atenolol Other (See Comments)     adverse reaction, ,      Meperidine Nausea And Vomiting     Metoprolol Succinate Unknown     SOB and joint pain     Pioglitazone Hcl Unknown     Legs swell, Comment: intolerance, Description: Actos, Comment: intolerance, Description: Actos       Review of Systems:     Extensive 10-point review of systems was performed. Please see the HPI for problem specific pertinent review of systems.     Patient does note she  "is on chronic anticoagulation her INR has been therapeutic    Otherwise, the following systems are noncontributory including constitutional, eyes, ears, nose and throat, cardiovascular, respiratory, gastrointestinal, genitourinary, musculoskeletal,neurological, skin and/or breast, endocrine, hematologic/lymph, allergic/immunologic and psychiatric.      Objective:    /62 (Patient Site: Right Arm, Patient Position: Sitting, Cuff Size: Adult Large)  Pulse 83  Temp 97.5  F (36.4  C)  Ht 5' 5.75\" (1.67 m)  Wt 215 lb 1.3 oz (97.6 kg)  LMP 08/24/1994 (Exact Date)  SpO2 96%  Breastfeeding? No  BMI 34.98 kg/m2  Weight:   Wt Readings from Last 3 Encounters:   07/25/18 215 lb 1.3 oz (97.6 kg)   07/12/18 215 lb 1.3 oz (97.6 kg)   03/08/18 216 lb (98 kg)     [unfilled]  215 lb 1.3 oz (97.6 kg)    In general, no acute distress.  Right leg is swollen 1-2+ compared to left.  She has some warmth and erythema.  There is some shininess to the edema.  Calf is negative Nneka.  She does have palpable pretibial fluid    Ultrasound revealed no clot.  Did show pretibial fluid collection.        Review of clinical lab tests  Lab Results   Component Value Date    WBC 7.4 03/08/2018    HGB 12.9 03/08/2018    HCT 40.5 03/08/2018     03/08/2018    ALT 56 (H) 11/24/2017    AST 38 11/24/2017     03/08/2018    K 4.0 03/08/2018     03/08/2018    CREATININE 0.83 03/08/2018    BUN 25 03/08/2018    CO2 30 03/08/2018    INR 2.60 (!) 07/23/2018    HGBA1C 7.4 (H) 03/08/2018       Glucose   Date/Time Value Ref Range Status   03/08/2018 10:21  (H) 70 - 125 mg/dL Final   11/24/2017 09:08  (H) 70 - 125 mg/dL Final   05/24/2017 03:05  (H) 70 - 125 mg/dL Final     No results found for this or any previous visit (from the past 24 hour(s)).    No results found.     "

## 2021-06-20 NOTE — PROGRESS NOTES
Call patient    Iron studies show that you have normal iron but low normal.  I would continue the iron    Bertin Frances MD  Internal medicine  [facility]  [phone]  [email or web address]

## 2021-06-20 NOTE — PROGRESS NOTES
Assessment and Plan:        1. Medicare annual wellness visit, subsequent  Completed    2. Atrial fibrillation, permanent (H)  With pacemaker.  On chronic anticoagulation  Complicated by iron deficiency anemia  Complicated by easy bruising  She is on concomitant aspirin  She has never had a TIA stroke ischemic event  She will speak with Dr. Osvaldo Pagan her electrophysiologist to see whether he will discontinue this.  - furosemide (LASIX) 40 MG tablet; Take 2 tablets (80 mg total) by mouth daily.  Dispense: 180 tablet; Refill: 3    3. Diabetes mellitus type 2 in nonobese (H)  Controlled.  Check A1c  - Renal Function Profile    4. 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  Reviewed oncology note 6/2018  - HM1(CBC and Differential)  - Erythrocyte Sedimentation Rate  - Hepatic Profile  - HM1 (CBC with Diff)    5. Anemia, iron deficiency  With nosebleeds, chronic Coumadin.  Has had a negative endoscopy  Oncology aware  I will check her iron studies  She is not having any rectal issues  She notes she has not had a colonoscopy some time-discussed  - HM1(CBC and Differential)  - Ferritin  - Iron and Transferrin Iron Binding Capacity  - HM1 (CBC with Diff)    6. Hyperlipidemia  On Rx  - Urinalysis-UC if Indicated  - Lipid Cascade    7. Carotid stenosis  Mild on screening 10 years ago.  Will get formal ultrasound.  Especially in light of possible discontinuation of aspirin  - Lipid Cascade  - US Carotid Bilateral; Future    8. History of permanent cardiac pacemaker placement  Monitored at [facility]    9. Benign essential hypertension  Elevated today.  Will check on RTC.  Will blood pressures been great    10. DM type 2 (diabetes mellitus, type 2) (H)  Well-controlled    11. Thyroid nodule     - Thyroid Cascade    12. Low bone mass  Check bone density  - Vitamin D, Total (25-Hydroxy)    13. Osteopenia     - DXA Bone Density Scan; Future    14. Disorder of bone      - DXA Bone Density  Scan; Future     The patient's current medical problems were reviewed.      The following health maintenance schedule was reviewed with the patient and provided in printed form in the after visit summary:   Health Maintenance   Topic Date Due     DIABETES FOOT EXAM  12/01/1945     DIABETES OPHTHALMOLOGY EXAM  12/01/1945     DIABETES URINE MICROALBUMIN  12/01/1945     TD 18+ HE  12/01/1953     DXA SCAN  12/01/2000     PNEUMOCOCCAL CONJUGATE VACCINE FOR ADULTS (PCV13 OR PREVNAR)  12/01/2000     INFLUENZA VACCINE RULE BASED (1) 08/01/2018     DIABETES HEMOGLOBIN A1C  09/08/2018     DIABETES FOLLOW-UP  09/08/2018     FALL RISK ASSESSMENT  03/08/2019     ADVANCE DIRECTIVES DISCUSSED WITH PATIENT  08/24/2021     PNEUMOCOCCAL POLYSACCHARIDE VACCINE AGE 65 AND OVER  Completed     ZOSTER VACCINE  Completed        Subjective:   Chief Complaint: Reba Calderon is an 82 y.o. female here for an Annual Wellness visit.   HPI: Here for follow-up      She is active  She is doing well    She has had no biliary symptoms.  She had choledocholithiasis ERCP a year ago no dyspepsia etc.    Patient has iron deficiency anemia  She is on Coumadin  She has nosebleeds  She has been cauterized by ENT for nosebleeds in the past and has been bleeding from her right nares.  No melena or hematochezia    Diabetes has been under good control  On metformin  Last A1c excellent    CLL small cell lymphocytic lymphoma.  Clinically doing well.  Recent oncology note without evidence for active disease.    Easy bruising.  On chronic aspirin, Coumadin.  No history of ischemic event      Review of Systems:    Appetite is good  Easy bruising  Nosebleeds  Urination okay  No vaginal bleeding    please see above.  The rest of the review of systems are negative for all systems.    Patient Care Team:  Bertin Frances MD as PCP - General (Internal Medicine)     Patient Active Problem List   Diagnosis     Atrial fibrillation, permanent (H)     Diabetes mellitus type  2 in nonobese (H)     CLL (chronic lymphocytic leukemia) (H)     Peripheral sensory neuropathy (H)     Past Medical History:   Diagnosis Date     Cardiac arrhythmia      CLL (chronic lymphocytic leukemia) (H)      DMII (diabetes mellitus, type 2) (H)      GERD (gastroesophageal reflux disease)      Hyperlipidemia      Hypertension      Permanent atrial fibrillation (H)      Small B-cell lymphoma (H)       Past Surgical History:   Procedure Laterality Date     BREAST BIOPSY       CATARACT EXTRACTION       CHOLECYSTECTOMY  08/24/2013     DILATION AND CURETTAGE OF UTERUS       ERCP W/ SPHINCTEROTOMY AND BALLOON DILATION  11/2017, 10/27/2014     KNEE ARTHROSCOPY       pacemaker implantation       REPAIR RECTOCELE       TONSILLECTOMY        No family history on file.   Social History     Social History     Marital status:      Spouse name: N/A     Number of children: N/A     Years of education: N/A     Occupational History     Not on file.     Social History Main Topics     Smoking status: Never Smoker     Smokeless tobacco: Never Used     Alcohol use Yes      Comment: occasional     Drug use: Not on file     Sexual activity: Not on file     Other Topics Concern     Not on file     Social History Narrative          Current Outpatient Prescriptions   Medication Sig Dispense Refill     ammonium lactate (AMLACTIN) 12 % cream Apply topically as needed for dry skin.       ascorbic acid/vitamin E/biotin (HAIR, SKIN, NAILS WITH BIOTIN ORAL) Take 2,500 mcg by mouth every morning.       aspirin 81 MG EC tablet Take 81 mg by mouth daily.       atorvastatin (LIPITOR) 10 MG tablet TAKE 1 TABLET (10 MG TOTAL) BY MOUTH DAILY. 90 tablet 2     b complex vitamins capsule Take 1 capsule by mouth daily. 1000mcg       calcium carbonate-vitamin D2 500 mg(1,250mg) -200 unit tablet Take 1 tablet by mouth daily.       cholecalciferol, vitamin D3, 1,000 unit tablet Take 2,000 Units by mouth daily.       CONTOUR TEST STRIPS strips  "TEST ONCE DAILY AS DIRECTED 100 strip 0     ferrous sulfate 325 (65 FE) MG tablet Take 1 tablet by mouth daily with breakfast.       folic acid (FOLVITE) 1 MG tablet Take 1 mg by mouth daily.       furosemide (LASIX) 40 MG tablet Take 2 tablets (80 mg total) by mouth daily. 180 tablet 3     glucosamine-chondroitin 500-400 mg tablet Take 1 tablet by mouth 2 (two) times a day.       KLOR-CON 10 10 mEq CR tablet TAKE 1 TABLET (10 MEQ TOTAL) BY MOUTH DAILY. 90 tablet 2     lisinopril (PRINIVIL,ZESTRIL) 10 MG tablet TAKE 1 TABLET BY MOUTH EVERY DAY 90 tablet 3     magnesium chloride (SLOW-MAG) 64 mg TbEC delayed-release tablet Take 64 mg by mouth daily.       magnesium oxide (TAYLOR) 500 mg Tab Take by mouth at bedtime.       metFORMIN (GLUCOPHAGE) 500 MG tablet TAKE 1 TABLET BY MOUTH TWICE A DAY 60 tablet 0     nitroglycerin (NITROSTAT) 0.4 MG SL tablet Place 0.4 mg under the tongue every 5 (five) minutes as needed for chest pain.       pantoprazole (PROTONIX) 40 MG tablet Take 1 tablet (40 mg total) by mouth daily. 90 tablet 3     warfarin (COUMADIN) 2.5 MG tablet Take one - one and a half tablets ( 2.5 - 3.75 mg ) daily as directed. Adjust dose per INR results as instructed. 140 tablet 1     No current facility-administered medications for this visit.       Objective:   Vital Signs:   Visit Vitals     /70     Pulse 78     Resp 14     Ht 5' 6\" (1.676 m)     Wt 219 lb 0.6 oz (99.4 kg)     LMP 08/24/1994 (Exact Date)     SpO2 95%     Breastfeeding No     BMI 35.35 kg/m2        VisionScreening:  No exam data present     PHYSICAL EXAM  Vitals:    09/12/18 0813 09/12/18 0842   BP: 140/74 150/70   Patient Site: Left Arm    Patient Position: Sitting    Cuff Size: Adult Large    Pulse: 78    Resp: 14    SpO2: 95%    Weight: 219 lb 0.6 oz (99.4 kg)    Height: 5' 6\" (1.676 m)      BP Readings from Last 9 Encounters:   09/12/18 150/70   07/25/18 138/62   07/12/18 126/66   03/08/18 138/80   11/24/17 116/60   05/24/17 " 132/72   08/24/16 118/64     Skin: Multiple bruises primarily on right side.  No hematoma.  Lymph Nodes: None palpable-including neck, axilla, inguinal, epitrochlear.  Head:  Normocephalic.    Eyes: Midline.  Equal size., full ROM.  External exams normal.  No icterus  Ears:  Normal pinnae, canals, and TM's.    Nose:  Patent, without deformity.  No ulceration noted  Throat:  Moist mucous membranes without lesions, erythema, or exudate.    Neck: No palpable masses, lymphadenopathy or tenderness.No thyromegaly or goiter.  No thyroid nodule.  Carotid Arteries:  No Bruit.  Carotid upstroke normal  Chest Wall: No deformity or pain elicited on compression.  Axilla no adenopathy  Breast normal  Pacemaker left infraclavicular.  Respiratory:  Normal respiratory effort.  Lungs are clear with good breath sounds.  No dullness.  No wheezing.  Heart: Regular rhythm.  Normal sounding S1, S2 without S3, S4, murmurs, rubs, or gallops.    Abdomen:  The abdomen was rounded, soft and nontender without guarding rebound or masses.  There are normal bowel sounds.  There is no hepatosplenomegaly.  There is no palpable enlargement of the aorta.  GYN-atrophic external.  Tight introitus.  Rectal normal tight tone  Uterus small through rectal exam  No palpable adnexa  Extremities:  Full ROM without limitation, deformity or edema.    4+ pulses    Diabetic foot exam-normal.    Neurologic-intact- No focal deficit.  Speech clear.  Coordination normal.  Strength symmetric  Orthopedic-no arthropathy.      Assessment Results 9/12/2018   Activities of Daily Living No help needed   Instrumental Activities of Daily Living No help needed   Get Up and Go Score Less than 12 seconds   Mini Cog Total Score 4   Some recent data might be hidden     A Mini-Cog score of 0-2 suggests the possibility of dementia, score of 3-5 suggests no dementia    Identified Health Risks:     She is at risk for lack of exercise and has been provided with information to increase  physical activity for the benefit of her well-being.  Information regarding advance directives (living [place]), including where she can download the appropriate form, was provided to the patient via the AVS.

## 2021-06-20 NOTE — PROGRESS NOTES
Patient has moderate carotid stenosis.  Asymptomatic.  She is already on aspirin and atorvastatin.    Lab Results   Component Value Date    CHOL 178 09/12/2018     Lab Results   Component Value Date    HDL 49 (L) 09/12/2018     Lab Results   Component Value Date    LDLCALC 109 09/12/2018     Lab Results   Component Value Date    TRIG 98 09/12/2018     No components found for: CHOLHDL

## 2021-06-21 NOTE — PROGRESS NOTES
Assisted pt to change batteries in home monitor.  Verified that had correct date/time.  Pt will test INR at home.

## 2021-06-22 NOTE — TELEPHONE ENCOUNTER
Incoming fax from [facility] home monitoring     INR date: 1/7    See attached document

## 2021-06-22 NOTE — TELEPHONE ENCOUNTER
ANTICOAGULATION  MANAGEMENT    Assessment     Today's INR result of 2.7 is Therapeutic (goal INR of 2.0-3.0)        Warfarin taken as previously instructed    No new diet changes affecting INR    No new medication/supplements affecting INR    Continues to tolerate warfarin with no reported s/s of bleeding or thromboembolism     Previous INR was slightly Supratherapeutic at 3.1 on 12/31/18    Plan:     Spoke with Reba regarding INR result and instructed:     Warfarin Dosing Instructions:  Continue current warfarin dose    2.5 mg on Mon, Thu; 3.75 mg all other days     (0 % change)    Instructed patient to follow up no later than: one week with home monitor.  Patient made aware that [facility] will not call if INR is therapeutic. NO call means continue current doses and check INR per routine.    Education provided: importance of therapeutic range    Reba verbalizes understanding and agrees to warfarin dosing plan.    Instructed to call the [facility] for any changes, questions or concerns. (#[phone])   ?   Crys Fontanez RN    Subjective/Objective:      Reba Calderon, a 83 y.o. female is on warfarin.     Reba reports:     Home warfarin dose: verbally confirmed home dose with Reba and updated on anticoagulation calendar     Missed doses: No     Medication changes:  No     S/S of bleeding or thromboembolism:  No     New Injury or illness:  No     Changes in diet or alcohol consumption:  No     Upcoming surgery, procedure or cardioversion:  No    Anticoagulation Episode Summary     Current INR goal:   2.0-3.0   TTR:   83.1 % (1.6 y)   Next INR check:   1/14/2019   INR from last check:   2.70 (1/7/2019)   Weekly max warfarin dose:      Target end date:   Indefinite   INR check location:      Preferred lab:      Send INR reminders to:   ANTICOAGULATION POOL C (DTN,VAD,CGR,GAV)    Indications    Atrial fibrillation  permanent (H) [I48.2]           Comments:            Anticoagulation Care Providers     Provider Role  Specialty Phone number    Bertin Frances MD Referring Internal Medicine [phone]

## 2021-06-23 NOTE — TELEPHONE ENCOUNTER
Anticoagulation Annual Referral Renewal Review    Reba AKI Wilsonon's chart reviewed for annual renewal of referral to anticoagulation monitoring.        Criteria for anticoagulation nurse and/or pharmacist renewal met   Warfarin indication: Atrial Fibrillation Yes, per indication   Current with INR monitoring/compliant Yes Yes   Date of last office visit 9/12/18 Yes, had office visit within last year   Time in Therapeutic Range (TTR) 81.5 % Yes, TTR > 60%       Reba Calderon met all criteria for anticoagulation management program initiated renewal.  New INR standing orders and anticoagulation referral renewal placed.      Crsy Fontanez RN  9:07 AM

## 2021-06-23 NOTE — TELEPHONE ENCOUNTER
Incoming fax from [facility] home monitoring     INR date: 1/21    See attached document

## 2021-06-23 NOTE — TELEPHONE ENCOUNTER
ANTICOAGULATION  MANAGEMENT-Patient Home Monitoring Result    Assessment     Therapeutic INR result of 2.1 (goal INR of 2.0-3.0) received via fax from [employer] home INR monitoring company.        Previous INR was Therapeutic    Reba Calderon last contacted by phone: 1/7/19    Plan     Per home monitoring agreement with patient, patient was NOT contacted regarding therapeutic result today.  Patient is to continue current dose and continue to checking INR with home monitor every 1 week(s) per protocol.  ?   Crys Fontanez RN    Subjective/Objective:      Reba Calderon, a 83 y.o. female  is established on warfarin.     Reba Calderon is using a home INR monitor. Home INR result received via fax today.     Anticoagulation Episode Summary     Current INR goal:   2.0-3.0   TTR:   83.5 % (1.6 y)   Next INR check:   1/28/2019   INR from last check:   2.10 (1/21/2019)   Weekly max warfarin dose:      Target end date:   Indefinite   INR check location:      Preferred lab:      Send INR reminders to:   ANTICOAGULATION POOL C (DTN,VAD,CGR,GAV)    Indications    Atrial fibrillation  permanent (H) [I48.2]           Comments:            Anticoagulation Care Providers     Provider Role Specialty Phone number    Bertin Frances MD Referring Internal Medicine [phone]

## 2021-06-23 NOTE — TELEPHONE ENCOUNTER
ANTICOAGULATION  MANAGEMENT-Patient Home Monitoring Result    Assessment     Therapeutic INR result of 2.8 (goal INR of 2.0-3.0) received via fax from [employer] home INR monitoring company.        Previous INR was Therapeutic    Reba Calderon last contacted by phone: 1/7/19    Plan     Per home monitoring agreement with patient, patient was NOT contacted regarding therapeutic result today.  Patient is to continue current dose and continue to checking INR with home monitor every 1 week(s) per protocol.  ?   Crys Fontanez RN    Subjective/Objective:      Reba Calderon, a 83 y.o. female  is established on warfarin.     Reba Calderon is using a home INR monitor. Home INR result received via fax today.     Anticoagulation Episode Summary     Current INR goal:   2.0-3.0   TTR:   83.9 % (1.7 y)   Next INR check:   2/11/2019   INR from last check:   2.80 (2/4/2019)   Weekly max warfarin dose:      Target end date:   Indefinite   INR check location:      Preferred lab:      Send INR reminders to:   ANTICOAGULATION POOL C (DTN,VAD,CGR,GAV)    Indications    Atrial fibrillation  permanent (H) [I48.2]           Comments:            Anticoagulation Care Providers     Provider Role Specialty Phone number    Bertin Frances MD Referring Internal Medicine [phone]

## 2021-06-23 NOTE — TELEPHONE ENCOUNTER
Incoming fax from [facility] home monitoring     INR date: 1/28      See attached document

## 2021-06-23 NOTE — TELEPHONE ENCOUNTER
Incoming fax from [facility] home monitoring     INR date: 1/14    See attached document

## 2021-06-23 NOTE — TELEPHONE ENCOUNTER
Refill Given    Refill given per Policy, patient informed they are overdue for Labs.  Last OV 9/12/2018.  Last A1C 3/8/2018.    Alyssa Junior, [facility] Triage/Med Refill 1/17/2019    Requested Prescriptions   Pending Prescriptions Disp Refills     blood glucose test (CONTOUR TEST STRIPS) strips 100 strip 0     Sig: TEST ONCE DAILY AS DIRECTED    Diabetic Supplies Refill Protocol Failed - 1/17/2019  8:37 AM       Failed - A1C in last 6 months    Hemoglobin A1c   Date Value Ref Range Status   03/08/2018 7.4 (H) 3.5 - 6.0 % Final              Passed - Visit with PCP or prescribing provider visit in last 6 months    Last office visit with prescriber/PCP: 7/25/2018 Bertin Frances MD OR same dept: 7/25/2018 Bertin Frances MD OR same specialty: 7/25/2018 Bertin Frances MD  Last physical: 9/12/2018 Last MTM visit: Visit date not found   Next visit within 3 mo: Visit date not found  Next physical within 3 mo: Visit date not found  Prescriber OR PCP: Bertin Frances MD  Last diagnosis associated with med order: 1. Diabetes (H)  - blood glucose test (CONTOUR TEST STRIPS) strips; TEST ONCE DAILY AS DIRECTED  Dispense: 100 strip; Refill: 0    If protocol passes may refill for 12 months if within 3 months of last provider visit (or a total of 15 months).

## 2021-06-23 NOTE — TELEPHONE ENCOUNTER
Incoming fax from [facility] home monitoring     INR date: 2/4    See attached document

## 2021-06-23 NOTE — TELEPHONE ENCOUNTER
ANTICOAGULATION  MANAGEMENT-Patient Home Monitoring Result    Assessment     Therapeutic INR result of 2.3 (goal INR of 2.0-3.0) received via fax from [employer] home INR monitoring company.        Previous INR was Therapeutic    Reba Calderon last contacted by phone: 1/7/19    Plan     Per home monitoring agreement with patient, patient was NOT contacted regarding therapeutic result today.  Patient is to continue current dose and continue to checking INR with home monitor every 1 week(s) per protocol.  ?   Crys Fontanez RN    Subjective/Objective:      Reba Calderon, a 83 y.o. female  is established on warfarin.     Reba Calderon is using a home INR monitor. Home INR result received via fax today.     Anticoagulation Episode Summary     Current INR goal:   2.0-3.0   TTR:   83.3 % (1.6 y)   Next INR check:   1/21/2019   INR from last check:   2.30 (1/14/2019)   Weekly max warfarin dose:      Target end date:   Indefinite   INR check location:      Preferred lab:      Send INR reminders to:   ANTICOAGULATION POOL C (DTN,VAD,CGR,GAV)    Indications    Atrial fibrillation  permanent (H) [I48.2]           Comments:            Anticoagulation Care Providers     Provider Role Specialty Phone number    Bertin Frances MD Referring Internal Medicine [phone]

## 2021-06-24 NOTE — TELEPHONE ENCOUNTER
SPOKE WITH PT AND SCHEDULED PRE OP ON 3/20/2019 SURGERY IS 3/29/2019 @ [facility].

## 2021-06-24 NOTE — TELEPHONE ENCOUNTER
Incoming fax from [facility] home monitoring     INR date: 2/18    See attached document

## 2021-06-24 NOTE — TELEPHONE ENCOUNTER
"ANTICOAGULATION  MANAGEMENT    Assessment     Today's INR result of 3.0 is Therapeutic (goal INR of 2.0-3.0)        Warfarin taken as previously instructed    Decreased greens/vitamin K intake may be affecting INR - patient stated that she will increase her intake moving forward.    No new medication/supplements affecting INR    Continues to tolerate warfarin with no reported s/s of bleeding or thromboembolism     Previous INR was Subtherapeutic     3/29 Granuloma on nose removal - pre op visit with PCP is on 3/20/19 - patient will update [facility] if she is advised by [place] [facility] to hold warfarin / special instructions prior to procedure.    Plan:     Spoke with Reba regarding INR result and instructed:     Warfarin Dosing Instructions:  Continue current warfarin dose    2.5 mg every Thu; 3.75 mg all other days      (0 % change)    Instructed patient to follow up no later than: one week with home monitor.    Education provided: importance of consistent vitamin K intake, impact of vitamin K foods on INR, importance of therapeutic range and target INR goal and significance of current INR result    Reba verbalizes understanding and agrees to warfarin dosing plan.    Instructed to call the [facility] for any changes, questions or concerns. (#[phone])   ?   Crys Fontanez RN    Subjective/Objective:      Reba Calderon, a 83 y.o. female is on warfarin.     Reba reports:     Home warfarin dose: verbally confirmed home dose with Reba and updated on anticoagulation calendar     Missed doses: No     Medication changes:  No     S/S of bleeding or thromboembolism:  No     New Injury or illness:  No     Changes in diet or alcohol consumption:  Yes: \" I ate less greens this time\" per patient report.     Upcoming surgery, procedure or cardioversion:  No    Anticoagulation Episode Summary     Current INR goal:   2.0-3.0   TTR:   83.9 % (1.8 y)   Next INR check:   3/18/2019   INR from last check:   3.00 (3/11/2019)   Weekly max " warfarin dose:      Target end date:   Indefinite   INR check location:      Preferred lab:      Send INR reminders to:   ANTICOAGULATION POOL C (DTN,VAD,CGR,GAV)    Indications    Atrial fibrillation  permanent (H) [I48.2]           Comments:            Anticoagulation Care Providers     Provider Role Specialty Phone number    Bertin Frances MD Referring Internal Medicine [phone]

## 2021-06-24 NOTE — TELEPHONE ENCOUNTER
New Appointment Needed  What is the reason for the visit:    Pre-Op Appt Request  When is the surgery? :  03/29/2019  Where is the surgery?:   [facility]  Who is the surgeon? :  Dr. Hunt  What type of surgery is being done?: Removing a granuloma inside the nose  Provider Preference: Any available  How soon do you need to be seen?: ASAP  Waitlist offered?: No  Okay to leave a detailed message:  Yes    Patient stated that she prefers to be called on the home number, but she can also be called on her cell phone, [phone] if needed. Detailed messages can be left on either phone.

## 2021-06-24 NOTE — TELEPHONE ENCOUNTER
Received a faxed INR result for Reba Calderon  [facility]  INR result dated 2/25/2019 is 1.8

## 2021-06-24 NOTE — TELEPHONE ENCOUNTER
Refill Approved    Rx renewed per Medication Renewal Policy. Medication was last renewed on 6/19/18.    Radha Ba, [facility] Triage/Med Refill 2/27/2019     Requested Prescriptions   Pending Prescriptions Disp Refills     atorvastatin (LIPITOR) 10 MG tablet [Pharmacy Med Name: ATORVASTATIN 10 MG TABLET] 90 tablet 2     Sig: TAKE 1 TABLET BY MOUTH EVERY DAY    Statins Refill Protocol (Hmg CoA Reductase Inhibitors) Passed - 2/27/2019  1:31 AM       Passed - PCP or prescribing provider visit in past 12 months     Last office visit with prescriber/PCP: 7/25/2018 Bertin Frances MD OR same dept: 7/25/2018 Bertin Frances MD OR same specialty: 7/25/2018 Bertin Frances MD  Last physical: 9/12/2018 Last MTM visit: Visit date not found   Next visit within 3 mo: Visit date not found  Next physical within 3 mo: Visit date not found  Prescriber OR PCP: Bertin Frances MD  Last diagnosis associated with med order: 1. Diabetes (H)  - atorvastatin (LIPITOR) 10 MG tablet [Pharmacy Med Name: ATORVASTATIN 10 MG TABLET]; TAKE 1 TABLET BY MOUTH EVERY DAY  Dispense: 90 tablet; Refill: 2    If protocol passes may refill for 12 months if within 3 months of last provider visit (or a total of 15 months).

## 2021-06-24 NOTE — TELEPHONE ENCOUNTER
Please help patient schedule with any available provider as Dr. Frances is full.  Deana Aranda CSS

## 2021-06-24 NOTE — TELEPHONE ENCOUNTER
Incoming fax from [employer] [facility] home monitoring     INR date: 3/4    See attached document

## 2021-06-24 NOTE — TELEPHONE ENCOUNTER
Incoming fax from [facility] home monitoring     INR date: 3/11    See attached document

## 2021-06-24 NOTE — TELEPHONE ENCOUNTER
Incoming fax from [employer] [facility] home monitoring     INR date: 2/12    See attached document

## 2021-06-24 NOTE — TELEPHONE ENCOUNTER
ANTICOAGULATION  MANAGEMENT    Assessment     Today's INR result of 1.9 is Subtherapeutic (goal INR of 2.0-3.0)        Warfarin taken as previously instructed    No new diet changes affecting INR    No new medication/supplements affecting INR    Continues to tolerate warfarin with no reported s/s of bleeding or thromboembolism     Previous INR was Subtherapeutic     3/29 Granuloma on nose removal - pre op visit with PCP is on 3/20/19    Plan:     Spoke with Reba regarding INR result and instructed:     Warfarin Dosing Instructions:  Change warfarin dose to    2.5 mg every Thu; 3.75 mg all other days     (5 % change)    Instructed patient to follow up no later than: one week with home monitor.    Education provided: importance of therapeutic range and target INR goal and significance of current INR result    Reba verbalizes understanding and agrees to warfarin dosing plan.    Instructed to call the [facility] for any changes, questions or concerns. (#[phone])   ?   Crys Fontanez RN    Subjective/Objective:      Reba Calderon, a 83 y.o. female is on warfarin.     Reba reports:     Home warfarin dose: verbally confirmed home dose with Reba and updated on anticoagulation calendar     Missed doses: No     Medication changes:  No     S/S of bleeding or thromboembolism:  No     New Injury or illness:  No     Changes in diet or alcohol consumption:  No     Upcoming surgery, procedure or cardioversion:  Yes: granuloma on nose removal on 3/29- pre op with PCP on 3/20/19.    Anticoagulation Episode Summary     Current INR goal:   2.0-3.0   TTR:   83.8 % (1.7 y)   Next INR check:   3/4/2019   INR from last check:   1.90! (3/4/2019)   Weekly max warfarin dose:      Target end date:   Indefinite   INR check location:      Preferred lab:      Send INR reminders to:   ANTICOAGULATION POOL C (DTN,VAD,CGR,GAV)    Indications    Atrial fibrillation  permanent (H) [I48.2]           Comments:            Anticoagulation Care  Providers     Provider Role Specialty Phone number    Bertin Frances MD Referring Internal Medicine [phone]

## 2021-06-24 NOTE — TELEPHONE ENCOUNTER
ANTICOAGULATION  MANAGEMENT-Patient Home Monitoring Result    Assessment     Therapeutic INR result of 2.4 (goal INR of 2.0-3.0) received via fax from [employer] home INR monitoring company.        Previous INR was Therapeutic    Reba Calderon last contacted by phone: 1/7/19    Plan     Per home monitoring agreement with patient, patient was NOT contacted regarding therapeutic result today.  Patient is to continue current dose and continue to checking INR with home monitor every 1 week(s) per protocol.  ?   Monica Sanford RN    Subjective/Objective:      Reba Calderon, a 83 y.o. female  is established on warfarin.     Reba Calderon is using a home INR monitor. Home INR result received via fax today.     Anticoagulation Episode Summary     Current INR goal:   2.0-3.0   TTR:   84.2 % (1.7 y)   Next INR check:   2/25/2019   INR from last check:   2.60 (2/18/2019)   Weekly max warfarin dose:      Target end date:   Indefinite   INR check location:      Preferred lab:      Send INR reminders to:   ANTICOAGULATION POOL C (DTN,VAD,CGR,GAV)    Indications    Atrial fibrillation  permanent (H) [I48.2]           Comments:            Anticoagulation Care Providers     Provider Role Specialty Phone number    Bertin Frances MD Referring Internal Medicine [phone]

## 2021-06-24 NOTE — TELEPHONE ENCOUNTER
ANTICOAGULATION  MANAGEMENT    Assessment     Today's INR result of 1.8 is Subtherapeutic (goal INR of 2.0-3.0)        Warfarin taken as previously instructed    Increased greens/vitamin K intake may be affecting INR- had broccoli soup yesterday.    No new medication/supplements affecting INR    Continues to tolerate warfarin with no reported s/s of bleeding or thromboembolism     Previous INR was Therapeutic    Plan:     Spoke with Reba regarding INR result and instructed:     Warfarin Dosing Instructions:  takea one time booster dose of 3.75 mg today then continue current warfarin dose    2.5 mg every Mon, Thu; 3.75 mg all other days       (0 % change)    Instructed patient to follow up no later than: one week with home monitor.    Education provided: impact of vitamin K foods on INR, importance of therapeutic range and target INR goal and significance of current INR result    Reba verbalizes understanding and agrees to warfarin dosing plan.    Instructed to call the [facility] for any changes, questions or concerns. (#[phone])   ?   Crys Fontanez RN    Subjective/Objective:      Reba Wilsonon, a 83 y.o. female is on warfarin.     Reba reports:     Home warfarin dose: verbally confirmed home dose with Reba and updated on anticoagulation calendar     Missed doses: No     Medication changes:  No     S/S of bleeding or thromboembolism:  No     New Injury or illness:  No     Changes in diet or alcohol consumption:  Yes: had broccoli soup yesterday which most likely affected INR today.     Upcoming surgery, procedure or cardioversion:  No    Anticoagulation Episode Summary     Current INR goal:   2.0-3.0   TTR:   84.7 % (1.7 y)   Next INR check:   3/4/2019   INR from last check:   1.80! (2/25/2019)   Weekly max warfarin dose:      Target end date:   Indefinite   INR check location:      Preferred lab:      Send INR reminders to:   ANTICOAGULATION POOL C (DTN,VAD,CGR,GAV)    Indications    Atrial  fibrillation  permanent (H) [I48.2]           Comments:            Anticoagulation Care Providers     Provider Role Specialty Phone number    Bertin Frances MD Referring Internal Medicine [phone]

## 2021-06-24 NOTE — TELEPHONE ENCOUNTER
ANTICOAGULATION  MANAGEMENT-Patient Home Monitoring Result    Assessment     Therapeutic INR result of 2.4 (goal INR of 2.0-3.0) received via fax from [employer] home INR monitoring company.        Previous INR was Therapeutic    Reba Calderon last contacted by phone: 1/7/19    Plan     Per home monitoring agreement with patient, patient was NOT contacted regarding therapeutic result today.  Patient is to continue current dose and continue to checking INR with home monitor every 1 week(s) per protocol.  ?   Crys Fontanez RN    Subjective/Objective:      Reba Calderon, a 83 y.o. female  is established on warfarin.     Reba Calderon is using a home INR monitor. Home INR result received via fax today.     Anticoagulation Episode Summary     Current INR goal:   2.0-3.0   TTR:   84.1 % (1.7 y)   Next INR check:   2/19/2019   INR from last check:   2.40 (2/12/2019)   Weekly max warfarin dose:      Target end date:   Indefinite   INR check location:      Preferred lab:      Send INR reminders to:   ANTICOAGULATION POOL C (DTN,VAD,CGR,GAV)    Indications    Atrial fibrillation  permanent (H) [I48.2]           Comments:            Anticoagulation Care Providers     Provider Role Specialty Phone number    Bertin Frances MD Referring Internal Medicine [phone]

## 2021-06-25 NOTE — TELEPHONE ENCOUNTER
ANTICOAGULATION  MANAGEMENT-Patient Home Monitoring Result    Assessment     Therapeutic INR result of 2.6 (goal INR of 2.0-3.0) received via fax from [employer]   home INR monitoring company.        Previous INR was Therapeutic    Reba Calderon last contacted by phone: 3/11/19    3/29 Granuloma on nose removal - pre op with PCP is on 3/20/19.    Plan     Per home monitoring agreement with patient, patient was NOT contacted regarding therapeutic result today.  Patient is to continue current dose and continue to checking INR with home monitor every 1 week(s) per protocol.  ?   Crys Fontanez RN    Subjective/Objective:      Reba Calderon, a 83 y.o. female  is established on warfarin.     Reba Calderon is using a home INR monitor. Home INR result received via fax today.     Anticoagulation Episode Summary     Current INR goal:   2.0-3.0   TTR:   83.9 % (1.8 y)   Next INR check:   3/18/2019   INR from last check:   3.00 (3/11/2019)   Weekly max warfarin dose:      Target end date:   Indefinite   INR check location:      Preferred lab:      Send INR reminders to:   ANTICOAGULATION POOL C (DTN,VAD,CGR,GAV)    Indications    Atrial fibrillation  permanent (H) [I48.2]           Comments:            Anticoagulation Care Providers     Provider Role Specialty Phone number    Bertin Frances MD Referring Internal Medicine [phone]

## 2021-06-25 NOTE — PROGRESS NOTES
INTERNAL MEDICINE PREOPERATIVE CONSULTATION  Reba Calderon   83 y.o. female      This is a preoperative consultation requested by Dr. Hunt in preparation for right nares surgery on 3/28/19 at [facility]  [phone]              Assessment/Plan for  Reba Calderon is a 83 y.o. female.  No Patient Care Coordination Note on file.       1.  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  Permanent A. fib-on chronic Coumadin  3.  Ventricular pacemaker  4.  Diabetes mellitus type 2-controlled  5.  Carotid stenosis asymptomatic  6.  Non-Hodgkin's lymphoma    Plan:  1.  Patient is medically cleared for surgery/anesthesia   2.  Hold medications a.m. of surgery  3.  In regards to the patient's Coumadin.  If Dr. Hunt once is held she will hold her Coumadin 5 days preop without bridging needed  4.  Routine labs.  ECG done          Thank you for this consultation. Doctor Gilbert if you have any questions or concerns regarding this patient or recommendations please do not hesitate to contact me at phone number [phone]            Diagnoses and all orders for this visit:    Nasal granuloma    Diabetes mellitus type 2 in nonobese (H)  -     HM1(CBC and Differential)  -     Basic Metabolic Panel  -     Cholesterol, Total  -     HM1 (CBC with Diff)  -     Glycosylated Hemoglobin A1c    Atrial fibrillation, permanent (H)  -     Electrocardiogram Perform - Clinic    History of permanent cardiac pacemaker placement    Asymptomatic carotid artery stenosis, unspecified laterality    Preoperative examination        Bertin Frances MD  Internal medicine  [facility]  [phone]      Total time spent with the patient today was greater than 40 minutes of which > 50% was spent in counseling and coordination of care    This is an electronically verified report by Bertin Frances M.D.  (Note created with Dragon voice recognition and unintended spelling errors and word substitutions may occur)         SUBJECTIVE  Chief Complaint:  Pre-op Exam ([facility] Surgery Dr Hunt Fax [phone] Excision R nasal mass 03/29/19)    Patient is here for preoperative surgery evaluation prior to removal of a right nares granuloma which is causing her discomfort, dyspnea, irritation.  She had had prior cauterization in the past at this site.  Patient is on chronic Coumadin.  She has not been instructed as to whether she needs to hold this preoperatively.    She feels well.  She does have a pacemaker, permanent A. fib.  Chronic Coumadin use   -patient has no cardiovascular symptoms such as palpitations, PND, orthopnea, dyspnea, dizziness, syncope.  Patient, weakness, hemiparesis, dysarthria, ataxia.  No embolic symptomatology such as leg pain or cardiac/neurologic symptoms.  I Coumadin monitoring-the patient is compliant with taking their Coumadin as directed.  There is no problem with bleeding. There is no excessive bruising, epistaxis, gum bleeding, hemoptysis, melena, hematochezia, hematuria.  The schedule of Coumadin is controlled through ambulatory anticoagulation clinic.  INR is noted in the chart and reviewed    Patient is off aspirin    She has a history of non-Hodgkin's lymphoma CLL which is followed by Dr. Coats.  She has been in remission    Sugars range 117-1 average.  No hypoglycemia      Recent antiplatelet use: no  Currently is off aspirin therapy  Steroid use in the past year: no  Personal or family history of difficulty with anesthesia: no  Current cardiopulmonary symptoms: no  Diabetes: yes Well-controlled.  Not on insulin.  Sleep Apnea: no  Recent infection: Negative  Surgical complications: Negative    Surgical History  Past Surgical History:   Procedure Laterality Date     BREAST BIOPSY       CATARACT EXTRACTION       CHOLECYSTECTOMY  08/24/2013     DILATION AND CURETTAGE OF UTERUS       ERCP W/ SPHINCTEROTOMY AND BALLOON DILATION  11/2017, 10/27/2014     KNEE ARTHROSCOPY       pacemaker implantation        REPAIR RECTOCELE       TONSILLECTOMY         Medical History     Past Medical History:   Diagnosis Date     Cardiac arrhythmia      Carotid stenosis, asymptomatic      Carotid stenosis, asymptomatic      CLL (chronic lymphocytic leukemia) (H)      DMII (diabetes mellitus, type 2) (H)      GERD (gastroesophageal reflux disease)      Hyperlipidemia      Hypertension      Pacemaker      Permanent atrial fibrillation (H)      Small B-cell lymphoma (H)      Patient Active Problem List    Diagnosis Date Noted     Diabetes mellitus type 2 in nonobese (H) 03/08/2018     CLL (chronic lymphocytic leukemia) (H) 03/08/2018     Peripheral sensory neuropathy 03/08/2018     Atrial fibrillation, permanent (H) 05/24/2017        Family History  No family history on file.    Medications:  Current Outpatient Medications   Medication Sig Dispense Refill     ammonium lactate (AMLACTIN) 12 % cream Apply topically as needed for dry skin.       ascorbic acid/vitamin E/biotin (HAIR, SKIN, NAILS WITH BIOTIN ORAL) Take 2,500 mcg by mouth every morning.       atorvastatin (LIPITOR) 10 MG tablet TAKE 1 TABLET BY MOUTH EVERY DAY 90 tablet 1     b complex vitamins capsule Take 1 capsule by mouth daily. 1000mcg       blood glucose test (CONTOUR TEST STRIPS) strips TEST ONCE DAILY AS DIRECTED 100 strip 0     calcium carbonate-vitamin D2 500 mg(1,250mg) -200 unit tablet Take 1 tablet by mouth daily.       cholecalciferol, vitamin D3, 1,000 unit tablet Take 2,000 Units by mouth daily.       folic acid (FOLVITE) 1 MG tablet Take 1 mg by mouth daily.       glucosamine-chondroitin 500-400 mg tablet Take 1 tablet by mouth 2 (two) times a day.       KLOR-CON 10 10 mEq CR tablet TAKE 1 TABLET (10 MEQ TOTAL) BY MOUTH DAILY. 90 tablet 2     lisinopril (PRINIVIL,ZESTRIL) 10 MG tablet TAKE 1 TABLET BY MOUTH EVERY DAY 90 tablet 3     magnesium chloride (SLOW-MAG) 64 mg TbEC delayed-release tablet Take 64 mg by mouth daily.       magnesium oxide (TAYLOR) 500 mg  Tab Take by mouth at bedtime.       metFORMIN (GLUCOPHAGE) 500 MG tablet Take 1 tablet (500 mg total) by mouth 2 (two) times a day. 180 tablet 3     nitroglycerin (NITROSTAT) 0.4 MG SL tablet Place 0.4 mg under the tongue every 5 (five) minutes as needed for chest pain.       pantoprazole (PROTONIX) 40 MG tablet Take 1 tablet (40 mg total) by mouth daily. 90 tablet 3     warfarin (COUMADIN/JANTOVEN) 2.5 MG tablet TAKE 1 TO 1 AND ONE-HALF TAB BY MOUTH DAILY AS DIRECTED. ADJUST DOSE PER INR RESULTS AS INSTRUCTED. 140 tablet 1     furosemide (LASIX) 40 MG tablet Take 2 tablets (80 mg total) by mouth daily. 180 tablet 3     No current facility-administered medications for this visit.        Allergies:  Allergies   Allergen Reactions     Amiodarone Unknown     Dyspnea, O2 dependency     Beta-Blockers (Beta-Adrenergic Blocking Agts) Unknown     Lower extremity edema     Oxycodone-Acetaminophen Anaphylaxis     Atenolol Other (See Comments)     adverse reaction, ,      Meperidine Nausea And Vomiting     Metoprolol Succinate Unknown     SOB and joint pain     Pioglitazone Hcl Unknown     Legs swell, Comment: intolerance, Description: Actos, Comment: intolerance, Description: Actos       HABITS:   Social History     Tobacco Use     Smoking status: Never Smoker     Smokeless tobacco: Never Used   Substance Use Topics     Alcohol use: Yes     Comment: occasional     Drug use: Not on file       Social History;  Social History     Social History Narrative           Review of Systems:     Extensive 14-point review of systems was performed. Please see the HPI for problem specific pertinent review of systems.     Patient does note appetite is good  Occasional abdominal pain  Patient has had cholecystectomy and biliary duct surgery.  She is on chronic PPI.  Her bowels are regular.  She has no melena nor hematochezia    Otherwise, the following systems are noncontributory including constitutional, eyes, ears, nose and throat,  "cardiovascular, respiratory, gastrointestinal, genitourinary, musculoskeletal,neurological, skin and/or breast, endocrine, hematologic/lymph, allergic/immunologic and psychiatric.           Objective:   /70 (Patient Site: Right Arm, Patient Position: Sitting, Cuff Size: Adult Regular)   Pulse 79   Ht 5' 6\" (1.676 m)   Wt 215 lb 1.9 oz (97.6 kg)   LMP 08/24/1994 (Exact Date)   SpO2 95%   Breastfeeding? No   BMI 34.72 kg/m     Weight:   Wt Readings from Last 3 Encounters:   03/20/19 215 lb 1.9 oz (97.6 kg)   09/12/18 219 lb 0.6 oz (99.4 kg)   07/25/18 215 lb 1.3 oz (97.6 kg)     A very pleasant  Appears healthy    General-appears well, no acute distress.  Skin: Normal. No rash or lesion  Lymph Nodes: None palpable-including neck, axilla, inguinal, epitrochlear.  Head:  Normocephalic.    Eyes: Midline.  Equal size., full ROM.  External exams normal.  No icterus  Ears:  Normal pinnae, canals, and TM's.    Nose: Slight obstruction on right nares.  Nodule noted right lateral nares and right nares   throat:  Moist mucous membranes without lesions, erythema, or exudate.    Neck: No palpable masses, lymphadenopathy or tenderness.No thyromegaly or goiter.  No thyroid nodule.  Carotid Arteries:  No Bruit.  Carotid upstroke normal  Chest Wall: No deformity or pain elicited on compression.  Pacemaker site left pectoral area nontender    Respiratory:  Normal respiratory effort.  Lungs are clear with good breath sounds.  No dullness.  No wheezing.  Heart: Regular rhythm.  Normal sounding S1, S2 without S3, S4, murmurs, rubs, or gallops.    Abdomen:  The abdomen was flat, soft and nontender without guarding rebound or masses.  There are normal bowel sounds.  There is no hepatosplenomegaly.  There is no palpable enlargement of the aorta.    Extremities:  Full ROM without limitation, deformity or edema.    4+ pulses  Neurologic-intact- No focal deficit.  Speech clear.  Coordination normal.  Strength " symmetric  Orthopedic-no arthropathy.            Laboratory:        Review of clinical lab tests  Lab Results   Component Value Date    WBC 8.1 03/20/2019    HGB 14.6 03/20/2019    HCT 42.9 03/20/2019     03/20/2019    CHOL 178 09/12/2018    TRIG 98 09/12/2018    HDL 49 (L) 09/12/2018    ALT 15 09/12/2018    AST 15 09/12/2018     09/12/2018    K 4.1 09/12/2018     09/12/2018    CREATININE 0.88 09/12/2018    BUN 17 09/12/2018    CO2 29 09/12/2018    TSH 1.28 09/12/2018    INR 2.60 (!) 03/18/2019    HGBA1C 6.6 (H) 03/20/2019       Glucose   Date/Time Value Ref Range Status   09/12/2018 10:02  (H) 70 - 125 mg/dL Final   07/25/2018 03:30  (H) 70 - 125 mg/dL Final   03/08/2018 10:21  (H) 70 - 125 mg/dL Final   11/24/2017 09:08  (H) 70 - 125 mg/dL Final   05/24/2017 03:05  (H) 70 - 125 mg/dL Final     Recent Results (from the past 24 hour(s))   Electrocardiogram Perform - Clinic   Result Value Ref Range    SYSTOLIC BLOOD PRESSURE  mmHg    DIASTOLIC BLOOD PRESSURE  mmHg    VENTRICULAR RATE 89 BPM    ATRIAL RATE 88 BPM    P-R INTERVAL  ms    QRS DURATION 158 ms    Q-T INTERVAL 402 ms    QTC CALCULATION (BEZET) 489 ms    P Axis 79 degrees    R AXIS -57 degrees    T AXIS 106 degrees    MUSE DIAGNOSIS       Ventricular-paced rhythm  Abnormal ECG  When compared with ECG of 12-MAR-2010 12:09,  Electronic ventricular pacemaker has replaced Sinus rhythm     HM1 (CBC with Diff)   Result Value Ref Range    WBC 8.1 4.0 - 11.0 thou/uL    RBC 4.81 3.80 - 5.40 mill/uL    Hemoglobin 14.6 12.0 - 16.0 g/dL    Hematocrit 42.9 35.0 - 47.0 %    MCV 89 80 - 100 fL    MCH 30.4 27.0 - 34.0 pg    MCHC 34.1 32.0 - 36.0 g/dL    RDW 14.4 11.0 - 14.5 %    Platelets 202 140 - 440 thou/uL    MPV 8.6 7.0 - 10.0 fL    Neutrophils % 73 (H) 50 - 70 %    Lymphocytes % 18 (L) 20 - 40 %    Monocytes % 5 2 - 10 %    Eosinophils % 3 0 - 6 %    Basophils % 1 0 - 2 %    Neutrophils Absolute 5.9 2.0 - 7.7 thou/uL     Lymphocytes Absolute 1.5 0.8 - 4.4 thou/uL    Monocytes Absolute 0.4 0.0 - 0.9 thou/uL    Eosinophils Absolute 0.3 0.0 - 0.4 thou/uL    Basophils Absolute 0.1 0.0 - 0.2 thou/uL   Glycosylated Hemoglobin A1c   Result Value Ref Range    Hemoglobin A1c 6.6 (H) 3.5 - 6.0 %       RADIOLOGY: No results found.

## 2021-06-25 NOTE — TELEPHONE ENCOUNTER
New Appointment Needed  What is the reason for the visit:    [facility]   Provider Preference: [facility] with [facility].  How soon do you need to be seen?:  Before July 14th  Waitlist offered?: No  Okay to leave a detailed message:  Yes - Pt was with [facility] and then changed to [facility] but was hoping to come back to [facility] with Dr. Ball.

## 2021-06-25 NOTE — TELEPHONE ENCOUNTER
Can take on this patient.    Please use open slots only and end of am or end of pm visit to have enough time.    Devon Ball MD  General Internal Medicine  [facility]  6/3/2021, 4:04 PM

## 2021-06-25 NOTE — TELEPHONE ENCOUNTER
[facility] called patient back and she stated that she moved to [facility] due to her previous [facility] provider retired. Now her [facility] PCP is retiring and she would like to establish care with [facility] again.   She was requesting for [facility] to be her nurse again but [facility] is too far for her. She stated that she will establish care at [facility]. Patient given contact information.    Crys Fontanez RN

## 2021-06-25 NOTE — TELEPHONE ENCOUNTER
Incoming fax from [employer] [facility] home monitoring     INR date: 3/18      See attached document

## 2021-07-03 NOTE — ADDENDUM NOTE
Addendum Note by Cristy Frances MD at 12/11/2019  1:03 PM     Author: Cristy Frances MD Service: -- Author Type: Physician    Filed: 12/11/2019  1:03 PM Encounter Date: 12/9/2019 Status: Signed    : Cristy Frances MD (Physician)    Addended by: CRISTY FRANCES on: 12/11/2019 01:03 PM        Modules accepted: Orders

## 2021-07-05 LAB — INR PPP: 2.4

## 2021-07-06 ENCOUNTER — TRANSFERRED RECORDS (OUTPATIENT)
Dept: HEALTH INFORMATION MANAGEMENT | Facility: CLINIC | Age: 86
End: 2021-07-06

## 2021-07-11 ENCOUNTER — HEALTH MAINTENANCE LETTER (OUTPATIENT)
Age: 86
End: 2021-07-11

## 2021-07-12 LAB — INR PPP: 2.1 (ref 2–3)

## 2021-07-15 ENCOUNTER — OFFICE VISIT (OUTPATIENT)
Dept: INTERNAL MEDICINE | Facility: CLINIC | Age: 86
End: 2021-07-15
Payer: MEDICARE

## 2021-07-15 VITALS
DIASTOLIC BLOOD PRESSURE: 66 MMHG | OXYGEN SATURATION: 97 % | SYSTOLIC BLOOD PRESSURE: 112 MMHG | BODY MASS INDEX: 33.41 KG/M2 | HEART RATE: 61 BPM | WEIGHT: 207 LBS

## 2021-07-15 DIAGNOSIS — C91.10 CLL (CHRONIC LYMPHOCYTIC LEUKEMIA) (H): ICD-10-CM

## 2021-07-15 DIAGNOSIS — I50.32 CHRONIC DIASTOLIC HEART FAILURE (H): ICD-10-CM

## 2021-07-15 DIAGNOSIS — E11.628 TYPE 2 DIABETES MELLITUS WITH OTHER SKIN COMPLICATION, WITHOUT LONG-TERM CURRENT USE OF INSULIN (H): Primary | ICD-10-CM

## 2021-07-15 DIAGNOSIS — M25.571 RIGHT ANKLE PAIN, UNSPECIFIED CHRONICITY: ICD-10-CM

## 2021-07-15 DIAGNOSIS — Z78.9 NONSMOKER: Chronic | ICD-10-CM

## 2021-07-15 DIAGNOSIS — Z76.89 ENCOUNTER TO ESTABLISH CARE WITH NEW DOCTOR: ICD-10-CM

## 2021-07-15 DIAGNOSIS — I48.21 ATRIAL FIBRILLATION, PERMANENT (H): ICD-10-CM

## 2021-07-15 DIAGNOSIS — M25.511 RIGHT SHOULDER PAIN, UNSPECIFIED CHRONICITY: ICD-10-CM

## 2021-07-15 DIAGNOSIS — D68.9 COAGULOPATHY (H): ICD-10-CM

## 2021-07-15 DIAGNOSIS — K80.50 CHOLEDOCHOLITHIASIS: ICD-10-CM

## 2021-07-15 DIAGNOSIS — A42.9 ACTINOMYCOSIS: ICD-10-CM

## 2021-07-15 DIAGNOSIS — I10 ESSENTIAL HYPERTENSION: Chronic | ICD-10-CM

## 2021-07-15 DIAGNOSIS — N18.31 STAGE 3A CHRONIC KIDNEY DISEASE (H): ICD-10-CM

## 2021-07-15 PROBLEM — N18.30 CHRONIC KIDNEY DISEASE, STAGE 3 (H): Status: ACTIVE | Noted: 2021-07-15

## 2021-07-15 PROCEDURE — 99215 OFFICE O/P EST HI 40 MIN: CPT | Performed by: INTERNAL MEDICINE

## 2021-07-15 PROCEDURE — 99207 PR FOOT EXAM NO CHARGE: CPT | Mod: 25 | Performed by: INTERNAL MEDICINE

## 2021-07-15 RX ORDER — CARVEDILOL 25 MG/1
TABLET, FILM COATED ORAL
COMMUNITY
Start: 2020-06-19 | End: 2021-08-11

## 2021-07-15 RX ORDER — RIBOFLAVIN (VITAMIN B2) 100 MG
1 TABLET ORAL
COMMUNITY

## 2021-07-15 RX ORDER — WARFARIN SODIUM 2.5 MG/1
TABLET ORAL
COMMUNITY
Start: 2020-10-14 | End: 2022-02-21

## 2021-07-15 RX ORDER — POLYETHYLENE GLYCOL 3350 17 G/17G
POWDER, FOR SOLUTION ORAL
COMMUNITY
Start: 2020-09-10

## 2021-07-15 RX ORDER — NITROGLYCERIN 0.4 MG/1
0.4 TABLET SUBLINGUAL
COMMUNITY
Start: 2021-01-25

## 2021-07-15 RX ORDER — VITAMIN B COMPLEX
1 CAPSULE ORAL
COMMUNITY
End: 2022-07-20

## 2021-07-15 RX ORDER — LISINOPRIL 10 MG/1
10 TABLET ORAL DAILY
COMMUNITY
Start: 2020-04-03 | End: 2021-08-11

## 2021-07-15 RX ORDER — POTASSIUM CHLORIDE 750 MG/1
10 TABLET, EXTENDED RELEASE ORAL DAILY
COMMUNITY
Start: 2021-05-24 | End: 2021-09-17

## 2021-07-15 RX ORDER — ATORVASTATIN CALCIUM 10 MG/1
TABLET, FILM COATED ORAL
COMMUNITY
Start: 2021-05-20 | End: 2022-02-21

## 2021-07-15 ASSESSMENT — ANXIETY QUESTIONNAIRES
5. BEING SO RESTLESS THAT IT IS HARD TO SIT STILL: NOT AT ALL
GAD7 TOTAL SCORE: 7
1. FEELING NERVOUS, ANXIOUS, OR ON EDGE: MORE THAN HALF THE DAYS
3. WORRYING TOO MUCH ABOUT DIFFERENT THINGS: MORE THAN HALF THE DAYS
4. TROUBLE RELAXING: SEVERAL DAYS
IF YOU CHECKED OFF ANY PROBLEMS ON THIS QUESTIONNAIRE, HOW DIFFICULT HAVE THESE PROBLEMS MADE IT FOR YOU TO DO YOUR WORK, TAKE CARE OF THINGS AT HOME, OR GET ALONG WITH OTHER PEOPLE: SOMEWHAT DIFFICULT
2. NOT BEING ABLE TO STOP OR CONTROL WORRYING: SEVERAL DAYS
6. BECOMING EASILY ANNOYED OR IRRITABLE: NOT AT ALL
7. FEELING AFRAID AS IF SOMETHING AWFUL MIGHT HAPPEN: SEVERAL DAYS

## 2021-07-15 ASSESSMENT — PATIENT HEALTH QUESTIONNAIRE - PHQ9: SUM OF ALL RESPONSES TO PHQ QUESTIONS 1-9: 8

## 2021-07-16 ENCOUNTER — ANTICOAGULATION THERAPY VISIT (OUTPATIENT)
Dept: ANTICOAGULATION | Facility: CLINIC | Age: 86
End: 2021-07-16

## 2021-07-16 ENCOUNTER — DOCUMENTATION ONLY (OUTPATIENT)
Dept: ANTICOAGULATION | Facility: CLINIC | Age: 86
End: 2021-07-16
Payer: MEDICARE

## 2021-07-16 ENCOUNTER — TELEPHONE (OUTPATIENT)
Dept: ANTICOAGULATION | Facility: CLINIC | Age: 86
End: 2021-07-16

## 2021-07-16 DIAGNOSIS — I48.21 ATRIAL FIBRILLATION, PERMANENT (H): Primary | ICD-10-CM

## 2021-07-16 NOTE — PROGRESS NOTES
Patient enrolled in [facility] program 7/15. Transferred to this program from [facility]. Pt uses [employer] home monitor and will need new orders. Routing to Michelle Del Real.

## 2021-07-16 NOTE — TELEPHONE ENCOUNTER
Spoke with patient and introduced self and [facility] program. Pt familiar with program as she used to be managed by us but has been with [facility] since February 2021. Reviewed and confirmed current dosing with patient. Pt checks INRs with [employer] home monitor every Monday.     Anticoagulation tracking calendar updated to reflect last 2 INRs and dosing instructions.     Will follow up with patient Monday 7/19.

## 2021-07-19 ENCOUNTER — TELEPHONE (OUTPATIENT)
Dept: INTERNAL MEDICINE | Facility: CLINIC | Age: 86
End: 2021-07-19

## 2021-07-19 DIAGNOSIS — I48.21 ATRIAL FIBRILLATION, PERMANENT (H): Primary | ICD-10-CM

## 2021-07-19 LAB — INR PPP: 2.4 (ref 2–3)

## 2021-07-19 NOTE — TELEPHONE ENCOUNTER
ANTICOAGULATION MANAGEMENT     Reba Calderon 85 year old female is on warfarin with therapeutic INR result. (Goal INR )    Recent labs: (last 7 days)     07/19/21  0000   INR 2.4       ASSESSMENT     Source(s): Patient/Caregiver Call       Warfarin doses taken: Warfarin taken as instructed    Diet: No new diet changes identified    New illness, injury, or hospitalization: No    Medication/supplement changes: None noted    Signs or symptoms of bleeding or clotting: No    Previous INR: Therapeutic last 2(+) visits    Additional findings: Notified patient she would probably have to call INR into [facility] next week as well as we are working on getting order so that we receive faxes from [employer]. Pt verbalized understanding and will call in result next week.      PLAN     Recommended plan for no diet, medication or health factor changes affecting INR     Dosing Instructions: Continue your current warfarin dose with next INR in 1 week       Summary  As of 7/19/2021    Full warfarin instructions:  2.5 mg every Mon, Wed, Fri; 3.75 mg all other days   Next INR check:  7/26/21             Telephone call with Reba who verbalizes understanding and agrees to plan    Patient to recheck with home meter    Education provided: Target INR goal and significance of current INR result    Plan made per [facility] anticoagulation protocol    Rosa Maria Neil RN  Anticoagulation Clinic  7/19/2021    _______________________________________________________________________     Anticoagulation Episode Summary     Current INR goal:  2.0-3.0   TTR:  --   Target end date:  7/15/2036   Send INR reminders to:  EDWIN HODGE    Indications    Atrial fibrillation  permanent (H) [I48.21]           Comments:           Anticoagulation Care Providers     Provider Role Specialty Phone number    Devon Ball MD Referring Internal Medicine [phone]

## 2021-07-19 NOTE — TELEPHONE ENCOUNTER
Reason for Call:  Other     Detailed comments: INR was 2.4 today-   INR- was done at home today and she got an email from [employer] saying they sent this to [facility]-Patient thought that this was taken care of, as [email or web address]       Phone Number Patient can be reached at: Home number on file [phone] (home)    Best Time: any    Can we leave a detailed message on this number? YES    Call taken on 7/19/2021 at 10:27 AM by America Titus

## 2021-07-24 PROBLEM — E11.9 DIABETES MELLITUS, TYPE 2 (H): Status: RESOLVED | Noted: 2018-03-08 | Resolved: 2021-07-24

## 2021-07-24 PROBLEM — I48.21 ATRIAL FIBRILLATION, PERMANENT (H): Status: ACTIVE | Noted: 2017-05-24

## 2021-07-25 NOTE — PROGRESS NOTES
[facility] - Primary Care Specialists    Comprehensive and complex medical care - Chronic disease management - Shared decision making - Care coordination - Compassionate care    Patient advocacy - Rational deprescribing - Minimally disruptive medicine - Ethical focus - Customized care         Date of Service: 7/15/2021  Primary Provider: Devon Ball    Patient Care Team:  Devon Ball MD as PCP - General (Internal Medicine)  Bertin Frances MD as Assigned PCP  Sherwin Coats MD as Physician (Hematology)  Louis Jacobsen MD as MD (Ophthalmology)  Shyam Barrett MD as MD (Colon & Rectal)  DANGELO Pagan as Catracho Forman MD as Assigned Infectious Disease Provider          Patient's Pharmacy:    [facility]/pharmacy #[id number] - [place], [place] - [address]  [address] [place] [address]  Phone: [phone] Fax: [phone]     Patient's Contacts:  Name Home Phone Work Phone Mobile Phone Relationship Lgl Grd   XIMENA GUPTA [phone]   Spouse    JYOTHI SHEIKH   [phone] Daughter      Patient's Insurance:    Payor: MEDICARE / Plan: MEDICARE / Product Type: Medicare /            Active Problem List:  Problem List as of 7/15/2021 Reviewed: 4/3/2014  9:30 AM by Erin Galaviz    Chronic kidney disease, stage 3       Other    Atrial fibrillation, permanent (H)    Diabetes mellitus type 2 in nonobese (H)    CLL (chronic lymphocytic leukemia) (H)    Peripheral sensory neuropathy    Cardiac pacemaker in situ    Chronic diastolic heart failure (H)    HTN (hypertension)    MATILDE (obstructive sleep apnea)    Actinomycosis    Perirectal abscess           Current Outpatient Medications   Medication Instructions     atorvastatin (LIPITOR) 10 MG tablet TAKE 1 TABLET BY MOUTH EVERY EVENING     biotin 2,500 mcg, Oral     blood glucose (CONTOUR TEST) test strip TEST ONCE DAILY AS DIRECTED     Calcium Carb-Ergocalciferol 500-200 MG-UNIT TABS 1 tablet, Oral      Cholecalciferol (VITAMIN D3 PO) 2,000 Units, Oral, DAILY     FOLIC ACID PO 1 mg, DAILY     FUROSEMIDE PO 40 mg, Oral, DAILY, 2 tablets every morning     glucosamine-chondroitinoitin 500-400 MG tablet 1 tablet, Oral     lisinopril (ZESTRIL) 10 mg, Oral, DAILY     magnesium chloride (MAG-DELAY) 64 mg, Oral     METFORMIN HCL  mg, 2 TIMES DAILY WITH MEALS     nitroGLYcerin (NITROSTAT) 0.4 mg, Sublingual     pantoprazole (PROTONIX) 40 mg, Oral, 2 TIMES DAILY     polyethylene glycol (MIRALAX) 17 GM/Dose powder MIX 1 CAPFUL (17 GRAMS) IN LIQUID AND DRINK BY MOUTH DAILY. (OTC NC)     potassium chloride ER (K-TAB/KLOR-CON) 10 MEQ CR tablet 10 mEq, Oral, DAILY     triamcinolone (KENALOG) 0.1 % ointment Topical     vitamin (B COMPLEX) capsule 1 capsule, Oral     vitamin B-12 (CYANOCOBALAMIN) 1,000 mcg, Oral     warfarin ANTICOAGULANT (COUMADIN) 2.5 MG tablet Take by mouth 2.5 mg (2.5 mg x 1) every Mon, Wed, Fri; 3.75 mg (2.5 mg x 1.5) all other days      Social History     Social History Narrative           Subjective:     Reba Calderon is a 85 year old female who comes in today for:    Chief Complaint   Patient presents with     Establish Care      Patient comes in today to establish care.  She has been a long-term patient of Dr. Frances.    She first wants to get established for anticoagulation management.  She has a home monitor.  She is on anticoagulation due to A. fib.  She is looking forward to get reestablished here as she has had some issues with her current clinics process in relationship to this.    We reviewed her A. fib and she follows with Dr. Pagan for this.  She is not had any worsening of this.  She has had ablation procedures and does have a pacemaker.  Her med list was reviewed today.    She has CLL and sees Dr. Coats through [facility].  Her recent blood work was brought in by herself and we reviewed this today.      We reviewed her diabetes and she has no concerns related to this.   Her feet have been doing well.    She is has problems with right shoulder pain.  This has bothered her off and on.  It is not severe.    She is followed by right medial ankle pain more than anything.  She does have a fair amount of this off and on.  She would consider possible imaging in the future with a CT scan due to the fact that she has a pacemaker.    She had recurrent choledocholithiasis in the past and has needed to go to the hospital for this.  She is very aware of the typical symptoms with this.    We reviewed her other issues noted in the assessment but not specifically addressed in the HPI above.     Objective:     Wt Readings from Last 3 Encounters:   07/15/21 93.9 kg (207 lb)   11/18/20 95.7 kg (211 lb)   01/03/20 96.6 kg (213 lb)     BP Readings from Last 3 Encounters:   07/15/21 112/66   11/18/20 138/88   01/03/20 122/62     /66   Pulse 61   Wt 93.9 kg (207 lb)   SpO2 97%   BMI 33.41 kg/m     The patient is comfortable, no acute distress.  Mood good.  Insight good.  Eyes are nonicteric.  Neck is supple without mass.  No cervical adenopathy.  No thyromegaly. Heart regular rate and rhythm.  Lungs clear to auscultation bilaterally.  Respiratory effort is good.  Abdomen soft and nontender.  No hepatosplenomegaly.  Extremities no edema.      Diagnostics:     Communication - [facility] on 01/25/2021   Component Date Value Ref Range Status     INR 01/25/2021 2.00* 0.90 - 1.10 Final       No results found for any visits on 07/15/21.     PHQ-2 Score:   PHQ-2 ( 1999 [employer]) 7/15/2021   Q1: Little interest or pleasure in doing things 0   Q2: Feeling down, depressed or hopeless 0   PHQ-2 Score 0        Assessment:     1. Type 2 diabetes mellitus with other skin complication, without long-term current use of insulin (H)    2. Atrial fibrillation, permanent (H)    3. Stage 3a chronic kidney disease    4. Choledocholithiasis, RECURRENT    5. Nonsmoker    6. CLL (chronic lymphocytic leukemia) (H)    7.  Essential hypertension    8. Actinomycosis    9. Coagulopathy (H) - due to warfarin    10. Encounter to establish care with new doctor    11. Chronic diastolic heart failure (H)    12. Right shoulder pain, unspecified chronicity    13. Right ankle pain, unspecified chronicity        Plan:     1. Referral to anticoagulation clinic.  2. Continue home monitoring.  3. Reviewed recent blood work.  4. Full blood work next visit.  5. Doing well with anticoagulation at this time.  6. Consider CT of the ankle if needed.  7. She has had actinomycosis in the past and she does have ID involved if needed in the future.  8. Continue current medications otherwise.  9. Follow up sooner if issues.    40 minutes or greater was spent today on the patient's care on the day of service.      This includes time for chart preparation, reviewing medical tests done before or during the visit, talking with the patient, review of quality indicators, required documentation, and other elements of care.        Devon Ball MD  General Internal Medicine  [facility]    Return in about 6 months (around 1/24/2022), or if symptoms worsen or fail to improve, for follow up visit.     Future Appointments   Date Time Provider Department Center   1/24/2022 10:15 AM Devon Ball MD [facility]

## 2021-07-25 NOTE — PATIENT INSTRUCTIONS
Please follow up if you have any further issues.    You may contact me by phone or MyChart if you are worsening or if things are not improving.    ______________________________________________________________________     Please remember that you can call [phone] to schedule an appointment.     You can schedule appointments 24 hours a day, 7 days a week.  Sometimes the best time to schedule an appointment is after clinic hours when less people are calling in.  Weekends are another option for calling in to schedule appointments.        ______________________________________________________________________      Future Appointments   Date Time Provider Department Withams   1/24/2022 10:15 AM Devon Ball MD [facility]

## 2021-07-26 ENCOUNTER — TELEPHONE (OUTPATIENT)
Dept: INTERNAL MEDICINE | Facility: CLINIC | Age: 86
End: 2021-07-26

## 2021-07-26 DIAGNOSIS — I48.21 ATRIAL FIBRILLATION, PERMANENT (H): Primary | ICD-10-CM

## 2021-07-26 LAB — INR (EXTERNAL): 2 (ref 2–3)

## 2021-07-26 NOTE — TELEPHONE ENCOUNTER
ANTICOAGULATION MANAGEMENT     Reba Calderon 85 year old female is on warfarin with therapeutic INR result. (Goal INR 2.0-3.0 )    Recent labs: (last 7 days)     07/26/21  1000   INR 2.0       ASSESSMENT     Source(s): Patient/Caregiver Call       Warfarin doses taken: Warfarin taken as instructed    Diet: No new diet changes identified    New illness, injury, or hospitalization: No    Medication/supplement changes: None noted    Signs or symptoms of bleeding or clotting: No    Previous INR: Therapeutic last 2(+) visits    Additional findings: None     PLAN     Recommended plan for no diet, medication or health factor changes affecting INR     Dosing Instructions: Continue your current warfarin dose with next INR in 1 week       Summary  As of 7/26/2021    Full warfarin instructions:  2.5 mg every Mon, Wed, Fri; 3.75 mg all other days   Next INR check:  8/2/21             Telephone call with Reba who verbalizes understanding and agrees to plan    Patient to recheck with home meter    Education provided: Target INR goal and significance of current INR result    Plan made per [facility] anticoagulation protocol    Rosa Maria Neil RN  Anticoagulation Clinic  7/26/2021    _______________________________________________________________________     Anticoagulation Episode Summary     Current INR goal:  2.0-3.0   TTR:  100.0 % (1 d)   Target end date:  7/15/2036   Send INR reminders to:  EDWIN HODGE    Indications    Atrial fibrillation  permanent (H) [I48.21]           Comments:           Anticoagulation Care Providers     Provider Role Specialty Phone number    Devon Ball MD Referring Internal Medicine [phone]

## 2021-07-26 NOTE — TELEPHONE ENCOUNTER
Reason for Call:  Other - Anticoagulation    Detailed comments: Patient called and stated she has been trying to get her INR care switched from [facility] to [facility] for over a week now. She does her INR every Monday. She called to report her INR results today are 2.0. She asked for a call back from an INR nurse.    Phone Number Patient can be reached at: Home number on file [phone] (home)    Best Time: anytime    Can we leave a detailed message on this number? YES - please leave a detailed voicemail with call back number.     Call taken on 7/26/2021 at 12:51 PM by Lynda Moses

## 2021-07-29 ENCOUNTER — MEDICAL CORRESPONDENCE (OUTPATIENT)
Dept: HEALTH INFORMATION MANAGEMENT | Facility: CLINIC | Age: 86
End: 2021-07-29

## 2021-08-09 LAB — INR (EXTERNAL): 2 (ref 0.9–1.1)

## 2021-08-11 DIAGNOSIS — E11.628 TYPE 2 DIABETES MELLITUS WITH OTHER SKIN COMPLICATION, WITHOUT LONG-TERM CURRENT USE OF INSULIN (H): Primary | ICD-10-CM

## 2021-08-11 DIAGNOSIS — I10 ESSENTIAL HYPERTENSION: ICD-10-CM

## 2021-08-11 RX ORDER — CARVEDILOL 25 MG/1
1 TABLET, FILM COATED ORAL DAILY
Qty: 100 STRIP | Refills: 3 | Status: SHIPPED | OUTPATIENT
Start: 2021-08-11 | End: 2022-09-07

## 2021-08-11 RX ORDER — LISINOPRIL 10 MG/1
10 TABLET ORAL DAILY
Qty: 90 TABLET | Refills: 3 | Status: SHIPPED | OUTPATIENT
Start: 2021-08-11 | End: 2022-10-11

## 2021-08-16 LAB — INR (EXTERNAL): 2.4 (ref 0.9–1.1)

## 2021-08-23 ENCOUNTER — ANTICOAGULATION THERAPY VISIT (OUTPATIENT)
Dept: ANTICOAGULATION | Facility: CLINIC | Age: 86
End: 2021-08-23

## 2021-08-23 ENCOUNTER — TRANSFERRED RECORDS (OUTPATIENT)
Dept: HEALTH INFORMATION MANAGEMENT | Facility: CLINIC | Age: 86
End: 2021-08-23

## 2021-08-23 ENCOUNTER — MYC MEDICAL ADVICE (OUTPATIENT)
Dept: INTERNAL MEDICINE | Facility: CLINIC | Age: 86
End: 2021-08-23

## 2021-08-23 DIAGNOSIS — I48.21 ATRIAL FIBRILLATION, PERMANENT (H): Primary | ICD-10-CM

## 2021-08-23 LAB — INR (EXTERNAL): 1.8 (ref 0.9–1.1)

## 2021-08-23 NOTE — TELEPHONE ENCOUNTER
Spoke with Mich from [facility]. Confirmed they have correct MD information and fax number. Celsa states that when information was switched from [employer] to [employer], it was not selected to fax results automatically. She states this will be taken care of and that we should receive INR results via fax going forward.     Rosa Maria Neil RN

## 2021-08-23 NOTE — TELEPHONE ENCOUNTER
Can someone in anticoagulation management help with this?  Both the INTERNATIONAL NORMALIZED RATIO (INR) and the communication of results?    Devon Ball MD  General Internal Medicine  [facility]  8/23/2021, 1:53 PM

## 2021-08-23 NOTE — PROGRESS NOTES
ANTICOAGULATION MANAGEMENT     Reba Calderon 85 year old female is on warfarin with subtherapeutic INR result. (Goal INR 2.0-3.0)    Recent labs: (last 7 days)     08/23/21  0000   INR 1.8*       ASSESSMENT     Source(s): Chart Review, Patient/Caregiver Call and Template       Warfarin doses taken: Warfarin taken as instructed    Diet: No new diet changes identified    New illness, injury, or hospitalization: No    Medication/supplement changes: None noted    Signs or symptoms of bleeding or clotting: No    Previous INR: Therapeutic last 2(+) visits    Additional findings: Pt had family in town last week, states she has been very busy and eaten a little differently.      [facility] still not receiving faxed INR results from [employer]. Spoke with Maki at [facility] who confirmed that they have the correct doctor and fax number, she is unsure why results are not faxing.     Message left for Celsa with [facility] with direct call back number to discuss.      PLAN     Recommended plan for temporary change(s) affecting INR     Dosing Instructions: Continue your current warfarin dose with next INR in 1 week       Summary  As of 8/23/2021    Full warfarin instructions:  2.5 mg every Mon, Wed, Fri; 3.75 mg all other days   Next INR check:  8/30/21             Telephone call with Reba who verbalizes understanding and agrees to plan    Patient to recheck with home meter    Education provided: Goal range and significance of current result    Plan made per [facility] anticoagulation protocol    Rosa Maria eNil RN  Anticoagulation Clinic  8/23/2021    _______________________________________________________________________     Anticoagulation Episode Summary     Current INR goal:  2.0-3.0   TTR:  92.0 % (4.1 wk)   Target end date:  7/15/2036   Send INR reminders to:  EDWIN HODGE    Indications    Atrial fibrillation  permanent (H) [I48.21]           Comments:           Anticoagulation Care Providers     Provider Role Specialty Phone number     Devon Ball MD [facility] Internal Medicine [phone]

## 2021-08-30 ENCOUNTER — TRANSFERRED RECORDS (OUTPATIENT)
Dept: HEALTH INFORMATION MANAGEMENT | Facility: CLINIC | Age: 86
End: 2021-08-30

## 2021-08-30 ENCOUNTER — ANTICOAGULATION THERAPY VISIT (OUTPATIENT)
Dept: ANTICOAGULATION | Facility: CLINIC | Age: 86
End: 2021-08-30

## 2021-08-30 DIAGNOSIS — I48.21 ATRIAL FIBRILLATION, PERMANENT (H): Primary | ICD-10-CM

## 2021-08-30 LAB — INR (EXTERNAL): 2.2 (ref 2–3)

## 2021-09-05 ENCOUNTER — HEALTH MAINTENANCE LETTER (OUTPATIENT)
Age: 86
End: 2021-09-05

## 2021-09-06 ENCOUNTER — TRANSFERRED RECORDS (OUTPATIENT)
Dept: HEALTH INFORMATION MANAGEMENT | Facility: CLINIC | Age: 86
End: 2021-09-06

## 2021-09-07 ENCOUNTER — DOCUMENTATION ONLY (OUTPATIENT)
Dept: ANTICOAGULATION | Facility: CLINIC | Age: 86
End: 2021-09-07

## 2021-09-07 DIAGNOSIS — I48.21 ATRIAL FIBRILLATION, PERMANENT (H): Primary | ICD-10-CM

## 2021-09-07 LAB — INR (EXTERNAL): 2 (ref 0.9–1.1)

## 2021-09-07 NOTE — PROGRESS NOTES
ANTICOAGULATION  MANAGEMENT-Patient Home Monitoring Result    Assessment     Therapeutic INR result of 2.0 . Goal range 2.0-3.0. Received via fax from [employer] home INR monitoring company.        Previous INR was therapeutic    Reba was last contacted by phone: 21    Plan     Per home monitoring agreement with patient, patient was NOT contacted regarding therapeutic result today.  Patient is to continue current dose and continue to check INR with home monitor per protocol.  ?       OBJECTIVE    INR (External)   Date Value Ref Range Status   2021 2.0 (A) 0.9 - 1.1 Final       ASSESSMENT / PLAN  No question data found.  Anticoagulation Summary  As of 2021    INR goal:  2.0-3.0   TTR:  86.2 % (1.4 mo)   INR used for dosin.0 (2021)   Warfarin maintenance plan:  2.5 mg (2.5 mg x 1) every Mon, Wed, Fri; 3.75 mg (2.5 mg x 1.5) all other days   Full warfarin instructions:  2.5 mg every Mon, Wed, Fri; 3.75 mg all other days   Weekly warfarin total:  22.5 mg   Plan last modified:  Rosa Maria Neil, RN (2021)   Next INR check:     Priority:  Maintenance   Target end date:  7/15/2036    Indications    Atrial fibrillation  permanent (H) [I48.21]             Anticoagulation Episode Summary     INR check location:      Preferred lab:      Send INR reminders to:  EDWIN HODGE    Comments:        Anticoagulation Care Providers     Provider Role Specialty Phone number    Devon Ball MD Referring Internal Medicine [phone]

## 2021-09-13 ENCOUNTER — TRANSFERRED RECORDS (OUTPATIENT)
Dept: HEALTH INFORMATION MANAGEMENT | Facility: CLINIC | Age: 86
End: 2021-09-13

## 2021-09-13 ENCOUNTER — DOCUMENTATION ONLY (OUTPATIENT)
Dept: ANTICOAGULATION | Facility: CLINIC | Age: 86
End: 2021-09-13

## 2021-09-13 DIAGNOSIS — I48.21 ATRIAL FIBRILLATION, PERMANENT (H): Primary | ICD-10-CM

## 2021-09-13 LAB — INR (EXTERNAL): 2.4 (ref 0.9–1.1)

## 2021-09-13 NOTE — PROGRESS NOTES
ANTICOAGULATION  MANAGEMENT-Patient Home Monitoring Result    Assessment     Therapeutic INR result of 2.4 . Goal range 2.0-3.0. Received via fax from [employer] home INR monitoring company.        Previous INR was therapeutic    Reba was last contacted by phone: 21    Plan     Per home monitoring agreement with patient, patient was NOT contacted regarding therapeutic result today.  Patient is to continue current dose and continue to check INR with home monitor per protocol.  ?       OBJECTIVE    INR (External)   Date Value Ref Range Status   2021 2.4 (A) 0.9 - 1.1 Final       ASSESSMENT / PLAN  No question data found.  Anticoagulation Summary  As of 2021    INR goal:  2.0-3.0   TTR:  88.1 % (1.7 mo)   INR used for dosin.4 (2021)   Warfarin maintenance plan:  2.5 mg (2.5 mg x 1) every Mon, Wed, Fri; 3.75 mg (2.5 mg x 1.5) all other days   Full warfarin instructions:  2.5 mg every Mon, Wed, Fri; 3.75 mg all other days   Weekly warfarin total:  22.5 mg   Plan last modified:  Rosa Maria Neil, RN (2021)   Next INR check:     Priority:  Maintenance   Target end date:  7/15/2036    Indications    Atrial fibrillation  permanent (H) [I48.21]             Anticoagulation Episode Summary     INR check location:      Preferred lab:      Send INR reminders to:  EDWIN HODGE    Comments:        Anticoagulation Care Providers     Provider Role Specialty Phone number    Devon Ball MD Referring Internal Medicine [phone]

## 2021-09-17 DIAGNOSIS — I10 ESSENTIAL HYPERTENSION: Primary | ICD-10-CM

## 2021-09-17 DIAGNOSIS — E11.628 TYPE 2 DIABETES MELLITUS WITH OTHER SKIN COMPLICATION, WITHOUT LONG-TERM CURRENT USE OF INSULIN (H): ICD-10-CM

## 2021-09-17 RX ORDER — POTASSIUM CHLORIDE 750 MG/1
10 TABLET, EXTENDED RELEASE ORAL DAILY
Qty: 90 TABLET | Refills: 3 | Status: SHIPPED | OUTPATIENT
Start: 2021-09-17 | End: 2022-07-19

## 2021-09-17 NOTE — TELEPHONE ENCOUNTER
Pt requesting refill of Metformin and Potassium.  Pt last seen 7/15/21  Due to be seen around 1/24/22  Plan:      1. Referral to anticoagulation clinic.  2. Continue home monitoring.  3. Reviewed recent blood work.  4. Full blood work next visit.  5. Doing well with anticoagulation at this time.  6. Consider CT of the ankle if needed.  7. She has had actinomycosis in the past and she does have ID involved if needed in the future.  8. Continue current medications otherwise.  9. Follow up sooner if issues.     40 minutes or greater was spent today on the patient's care on the day of service.       This includes time for chart preparation, reviewing medical tests done before or during the visit, talking with the patient, review of quality indicators, required documentation, and other elements of care.         Devon Ball MD  General Internal Medicine  [facility]     Return in about 6 months (around 1/24/2022), or if symptoms worsen or fail to improve, for follow up visit.

## 2021-09-20 ENCOUNTER — DOCUMENTATION ONLY (OUTPATIENT)
Dept: ANTICOAGULATION | Facility: CLINIC | Age: 86
End: 2021-09-20

## 2021-09-20 ENCOUNTER — TRANSFERRED RECORDS (OUTPATIENT)
Dept: HEALTH INFORMATION MANAGEMENT | Facility: CLINIC | Age: 86
End: 2021-09-20

## 2021-09-20 DIAGNOSIS — I48.21 ATRIAL FIBRILLATION, PERMANENT (H): Primary | ICD-10-CM

## 2021-09-20 LAB — INR (EXTERNAL): 2.2 (ref 2–3)

## 2021-09-20 NOTE — PROGRESS NOTES
ANTICOAGULATION  MANAGEMENT-Patient Home Monitoring Result    Assessment     Therapeutic INR result of 2.2 . Goal range 2.0-3.0. Received via fax from [employer] home INR monitoring company.        Previous INR was therapeutic    Reba was last contacted by phone: 21    Plan     Per home monitoring agreement with patient, patient was NOT contacted regarding therapeutic result today.  Patient is to continue current dose and continue to check INR with home monitor per protocol.  ?       OBJECTIVE    INR (External)   Date Value Ref Range Status   2021 2.2 (A) 0.9 - 1.1 Final       ASSESSMENT / PLAN  No question data found.  Anticoagulation Summary  As of 2021    INR goal:  2.0-3.0   TTR:  89.5 % (1.9 mo)   INR used for dosin.2 (2021)   Warfarin maintenance plan:  2.5 mg (2.5 mg x 1) every Mon, Wed, Fri; 3.75 mg (2.5 mg x 1.5) all other days   Full warfarin instructions:  2.5 mg every Mon, Wed, Fri; 3.75 mg all other days   Weekly warfarin total:  22.5 mg   No change documented:  Dimple Sam, RN   Plan last modified:  Rosa Maria Neil RN (2021)   Next INR check:  10/4/2021   Priority:  Maintenance   Target end date:  7/15/2036    Indications    Atrial fibrillation  permanent (H) [I48.21]             Anticoagulation Episode Summary     INR check location:      Preferred lab:      Send INR reminders to:  EDWIN HODGE    Comments:        Anticoagulation Care Providers     Provider Role Specialty Phone number    Devon Ball MD Referring Internal Medicine [phone]

## 2021-09-22 NOTE — TELEPHONE ENCOUNTER
Who is calling:  Reba  Reason for Call:  Patient called requesting to speak with Crys Akins. She stated she needs a new PCP due to Dr Whalen leaving and would like to talk to Crys Akins about it.  Date of last appointment with primary care: 5/6/2020  Okay to leave a detailed message: Yes       normal sinus rhythm

## 2021-09-27 ENCOUNTER — DOCUMENTATION ONLY (OUTPATIENT)
Dept: ANTICOAGULATION | Facility: CLINIC | Age: 86
End: 2021-09-27

## 2021-09-27 ENCOUNTER — TRANSFERRED RECORDS (OUTPATIENT)
Dept: HEALTH INFORMATION MANAGEMENT | Facility: CLINIC | Age: 86
End: 2021-09-27

## 2021-09-27 DIAGNOSIS — I48.21 ATRIAL FIBRILLATION, PERMANENT (H): Primary | ICD-10-CM

## 2021-09-27 LAB — INR (EXTERNAL): 2.4 (ref 2–3)

## 2021-09-27 NOTE — PROGRESS NOTES
ANTICOAGULATION  MANAGEMENT-Home Monitor Managed by Exception    Reba PRABHAKAR Kvng 85 year old female is on warfarin with therapeutic INR result. (Goal INR 2.0-3.0)    Recent labs: (last 7 days)     09/27/21  0000   INR 2.4*         Previous INR was Therapeutic    Medication, diet, health changes since last INR:chart reviewed; none idientified    Contacted within the last 12 weeks by phone on 8/23/21      BELGICA     Reba was NOT contacted regarding therapeutic result today per home monitoring policy manage by exception agreement.   Current warfarin dose is to be continued:     Summary  As of 9/27/2021    Full warfarin instructions:  2.5 mg every Mon, Wed, Fri; 3.75 mg all other days   Next INR check:  10/11/2021           ?   Giselle Kelly RN  Anticoagulation Clinic  9/27/2021    _______________________________________________________________________     Anticoagulation Episode Summary     Current INR goal:  2.0-3.0   TTR:  90.6 % (2.1 mo)   Target end date:  7/15/2036   Send INR reminders to:  EDWIN HODGE    Indications    Atrial fibrillation  permanent (H) [I48.21]           Comments:           Anticoagulation Care Providers     Provider Role Specialty Phone number    Devon Ball MD Referring Internal Medicine [phone]

## 2021-10-04 ENCOUNTER — DOCUMENTATION ONLY (OUTPATIENT)
Dept: ANTICOAGULATION | Facility: CLINIC | Age: 86
End: 2021-10-04

## 2021-10-04 ENCOUNTER — TRANSFERRED RECORDS (OUTPATIENT)
Dept: HEALTH INFORMATION MANAGEMENT | Facility: CLINIC | Age: 86
End: 2021-10-04

## 2021-10-04 DIAGNOSIS — I48.21 ATRIAL FIBRILLATION, PERMANENT (H): Primary | ICD-10-CM

## 2021-10-04 LAB — INR (EXTERNAL): 2.1 (ref 0.9–1.1)

## 2021-10-04 NOTE — PROGRESS NOTES
ANTICOAGULATION  MANAGEMENT-Home Monitor Managed by Exception    Reba AKI Calderon 85 year old female is on warfarin with therapeutic INR result. (Goal INR 2.0-3.0)    Recent labs: (last 7 days)     10/04/21  0000   INR 2.1*         Previous INR was Therapeutic    Medication, diet, health changes since last INR:chart reviewed; none idientified    Contacted within the last 12 weeks by phone on 8/23      BELGICA     Reba was NOT contacted regarding therapeutic result today per home monitoring policy manage by exception agreement.   Current warfarin dose is to be continued:     Summary  As of 10/4/2021    Full warfarin instructions:  2.5 mg every Mon, Wed, Fri; 3.75 mg all other days   Next INR check:             ?   Rosa Maria Neil RN  Anticoagulation Clinic  10/4/2021    _______________________________________________________________________     Anticoagulation Episode Summary     Current INR goal:  2.0-3.0   TTR:  91.6 % (2.4 mo)   Target end date:  7/15/2036   Send INR reminders to:  EDWIN HODGE    Indications    Atrial fibrillation  permanent (H) [I48.21]           Comments:           Anticoagulation Care Providers     Provider Role Specialty Phone number    Devon Ball MD Referring Internal Medicine [phone]

## 2021-10-11 ENCOUNTER — TRANSFERRED RECORDS (OUTPATIENT)
Dept: HEALTH INFORMATION MANAGEMENT | Facility: CLINIC | Age: 86
End: 2021-10-11
Payer: MEDICARE

## 2021-10-11 ENCOUNTER — DOCUMENTATION ONLY (OUTPATIENT)
Dept: ANTICOAGULATION | Facility: CLINIC | Age: 86
End: 2021-10-11

## 2021-10-11 DIAGNOSIS — I48.21 ATRIAL FIBRILLATION, PERMANENT (H): Primary | ICD-10-CM

## 2021-10-11 LAB — INR (EXTERNAL): 2.3 (ref 0.9–1.1)

## 2021-10-18 ENCOUNTER — DOCUMENTATION ONLY (OUTPATIENT)
Dept: ANTICOAGULATION | Facility: CLINIC | Age: 86
End: 2021-10-18

## 2021-10-18 ENCOUNTER — TRANSFERRED RECORDS (OUTPATIENT)
Dept: HEALTH INFORMATION MANAGEMENT | Facility: CLINIC | Age: 86
End: 2021-10-18
Payer: MEDICARE

## 2021-10-18 DIAGNOSIS — I48.21 ATRIAL FIBRILLATION, PERMANENT (H): Primary | ICD-10-CM

## 2021-10-18 LAB — INR (EXTERNAL): 2.1 (ref 0.9–1.1)

## 2021-10-18 NOTE — PROGRESS NOTES
ANTICOAGULATION  MANAGEMENT-Home Monitor Managed by Exception    Reba AKI Calderon 85 year old female is on warfarin with therapeutic INR result. (Goal INR 2.0-3.0)    Recent labs: (last 7 days)     10/18/21  0800   INR 2.1*         Previous INR was Therapeutic    Medication, diet, health changes since last INR:chart reviewed; none idientified    Contacted within the last 12 weeks by phone on 8/23/21      BELGICA     Reba was NOT contacted regarding therapeutic result today per home monitoring policy manage by exception agreement.   Current warfarin dose is to be continued:     Summary  As of 10/18/2021    Full warfarin instructions:  2.5 mg every Mon, Wed, Fri; 3.75 mg all other days   Next INR check:             ?   Dimple Sam RN  Anticoagulation Clinic  10/18/2021    _______________________________________________________________________     Anticoagulation Episode Summary     Current INR goal:  2.0-3.0   TTR:  93.0 % (2.8 mo)   Target end date:  7/15/2036   Send INR reminders to:  EDWIN HODGE    Indications    Atrial fibrillation  permanent (H) [I48.21]           Comments:           Anticoagulation Care Providers     Provider Role Specialty Phone number    Devon Ball MD Referring Internal Medicine [phone]           No

## 2021-10-25 ENCOUNTER — TRANSFERRED RECORDS (OUTPATIENT)
Dept: HEALTH INFORMATION MANAGEMENT | Facility: CLINIC | Age: 86
End: 2021-10-25
Payer: MEDICARE

## 2021-10-25 ENCOUNTER — DOCUMENTATION ONLY (OUTPATIENT)
Dept: ANTICOAGULATION | Facility: CLINIC | Age: 86
End: 2021-10-25

## 2021-10-25 DIAGNOSIS — I48.21 ATRIAL FIBRILLATION, PERMANENT (H): Primary | ICD-10-CM

## 2021-10-25 LAB — INR (EXTERNAL): 2 (ref 0.9–1.1)

## 2021-10-25 NOTE — PROGRESS NOTES
ANTICOAGULATION  MANAGEMENT-Home Monitor Managed by Exception    Reba KAI Calderon 85 year old female is on warfarin with therapeutic INR result. (Goal INR 2.0-3.0)    Recent labs: (last 7 days)     10/25/21  0700   INR 2.0*         Previous INR was Therapeutic    Medication, diet, health changes since last INR:chart reviewed; none idientified    Contacted within the last 12 weeks by phone on 8/23/21      BELGICA     Reba was NOT contacted regarding therapeutic result today per home monitoring policy manage by exception agreement.   Current warfarin dose is to be continued:     Summary  As of 10/25/2021    Full warfarin instructions:  2.5 mg every Mon, Wed, Fri; 3.75 mg all other days   Next INR check:             ?   Dimple Sam RN  Anticoagulation Clinic  10/25/2021    _______________________________________________________________________     Anticoagulation Episode Summary     Current INR goal:  2.0-3.0   TTR:  93.6 % (3.1 mo)   Target end date:  7/15/2036   Send INR reminders to:  EDWIN HODGE    Indications    Atrial fibrillation  permanent (H) [I48.21]           Comments:           Anticoagulation Care Providers     Provider Role Specialty Phone number    eDvon Ball MD Referring Internal Medicine [phone]

## 2021-11-01 ENCOUNTER — ANTICOAGULATION THERAPY VISIT (OUTPATIENT)
Dept: ANTICOAGULATION | Facility: CLINIC | Age: 86
End: 2021-11-01

## 2021-11-01 ENCOUNTER — TRANSFERRED RECORDS (OUTPATIENT)
Dept: HEALTH INFORMATION MANAGEMENT | Facility: CLINIC | Age: 86
End: 2021-11-01
Payer: MEDICARE

## 2021-11-01 DIAGNOSIS — I48.21 ATRIAL FIBRILLATION, PERMANENT (H): Primary | ICD-10-CM

## 2021-11-01 LAB — INR (EXTERNAL): 1.9 (ref 0.9–1.1)

## 2021-11-01 NOTE — PROGRESS NOTES
ANTICOAGULATION MANAGEMENT     Reba Calderon 85 year old female is on warfarin with subtherapeutic INR result. (Goal INR 2.0-3.0)    Recent labs: (last 7 days)     11/01/21  0000   INR 1.9*       ASSESSMENT     Source(s): Chart Review and Patient/Caregiver Call       Warfarin doses taken: Warfarin taken as instructed    Diet: Increased greens/vitamin K in diet; plans to resume previous intake    New illness, injury, or hospitalization: No    Medication/supplement changes: None noted    Signs or symptoms of bleeding or clotting: No    Previous INR: Therapeutic last 2(+) visits    Additional findings: None     PLAN     Recommended plan for temporary change(s) affecting INR     Dosing Instructions: Continue your current warfarin dose with next INR in 1 week       Summary  As of 11/1/2021    Full warfarin instructions:  2.5 mg every Mon, Wed, Fri; 3.75 mg all other days   Next INR check:  11/8/2021             Telephone call with Reba who verbalizes understanding and agrees to plan    Patient to recheck with home meter    Education provided: Importance of consistent vitamin K intake    Plan made per [facility] anticoagulation protocol    Rosa Maria Neil RN  Anticoagulation Clinic  11/1/2021    _______________________________________________________________________     Anticoagulation Episode Summary     Current INR goal:  2.0-3.0   TTR:  87.2 % (3.3 mo)   Target end date:  7/15/2036   Send INR reminders to:  EDWIN HODGE    Indications    Atrial fibrillation  permanent (H) [I48.21]           Comments:           Anticoagulation Care Providers     Provider Role Specialty Phone number    Devon Ball MD Referring Internal Medicine [phone]

## 2021-11-08 ENCOUNTER — TRANSFERRED RECORDS (OUTPATIENT)
Dept: HEALTH INFORMATION MANAGEMENT | Facility: CLINIC | Age: 86
End: 2021-11-08
Payer: MEDICARE

## 2021-11-08 ENCOUNTER — ANTICOAGULATION THERAPY VISIT (OUTPATIENT)
Dept: ANTICOAGULATION | Facility: CLINIC | Age: 86
End: 2021-11-08
Payer: MEDICARE

## 2021-11-08 DIAGNOSIS — I48.21 ATRIAL FIBRILLATION, PERMANENT (H): Primary | ICD-10-CM

## 2021-11-08 LAB — INR (EXTERNAL): 2.3 (ref 0.9–1.1)

## 2021-11-08 NOTE — PROGRESS NOTES
ANTICOAGULATION MANAGEMENT     Reba Calderon 85 year old female is on warfarin with therapeutic INR result. (Goal INR 2.0-3.0)    Recent labs: (last 7 days)     11/08/21  0000   INR 2.3*       ASSESSMENT     Source(s): Chart Review and Patient/Caregiver Call       Warfarin doses taken: Warfarin taken as instructed    Diet: No new diet changes identified    New illness, injury, or hospitalization: No    Medication/supplement changes: None noted    Signs or symptoms of bleeding or clotting: No    Previous INR: Subtherapeutic    Additional findings: notified will return to call by exception next week.      PLAN     Recommended plan for no diet, medication or health factor changes affecting INR     Dosing Instructions: Continue your current warfarin dose with next INR in 1 week       Summary  As of 11/8/2021    Full warfarin instructions:  2.5 mg every Mon, Wed, Fri; 3.75 mg all other days   Next INR check:  11/15/2021             Telephone call with Reba who verbalizes understanding and agrees to plan    Patient to recheck with home meter    Education provided: None required    Plan made per [facility] anticoagulation protocol    Rosa Maria Neil RN  Anticoagulation Clinic  11/8/2021    _______________________________________________________________________     Anticoagulation Episode Summary     Current INR goal:  2.0-3.0   TTR:  86.4 % (3.5 mo)   Target end date:  7/15/2036   Send INR reminders to:  EDWIN HODGE    Indications    Atrial fibrillation  permanent (H) [I48.21]           Comments:           Anticoagulation Care Providers     Provider Role Specialty Phone number    Devon Ball MD Referring Internal Medicine [phone]

## 2021-11-15 ENCOUNTER — DOCUMENTATION ONLY (OUTPATIENT)
Dept: ANTICOAGULATION | Facility: CLINIC | Age: 86
End: 2021-11-15
Payer: MEDICARE

## 2021-11-15 ENCOUNTER — TRANSFERRED RECORDS (OUTPATIENT)
Dept: HEALTH INFORMATION MANAGEMENT | Facility: CLINIC | Age: 86
End: 2021-11-15
Payer: MEDICARE

## 2021-11-15 DIAGNOSIS — I48.21 ATRIAL FIBRILLATION, PERMANENT (H): Primary | ICD-10-CM

## 2021-11-15 LAB — INR (EXTERNAL): 2.3 (ref 0.9–1.1)

## 2021-11-15 NOTE — PROGRESS NOTES
ANTICOAGULATION  MANAGEMENT-Home Monitor Managed by Exception    Reba AKI Calderon 85 year old female is on warfarin with therapeutic INR result. (Goal INR 2.0-3.0)    Recent labs: (last 7 days)     11/15/21  0700   INR 2.3*         Previous INR was Therapeutic    Medication, diet, health changes since last INR:chart reviewed; none identified    Contacted within the last 12 weeks by phone on 11/8/21      BELGICA     Reba was NOT contacted regarding therapeutic result today per home monitoring policy manage by exception agreement.   Current warfarin dose is to be continued:     Summary  As of 11/15/2021    Full warfarin instructions:  2.5 mg every Mon, Wed, Fri; 3.75 mg all other days   Next INR check:  11/22/2021           ?   Dimple Sam RN  Anticoagulation Clinic  11/15/2021    _______________________________________________________________________     Anticoagulation Episode Summary     Current INR goal:  2.0-3.0   TTR:  87.3 % (3.8 mo)   Target end date:  7/15/2036   Send INR reminders to:  EDWIN HODGE    Indications    Atrial fibrillation  permanent (H) [I48.21]           Comments:           Anticoagulation Care Providers     Provider Role Specialty Phone number    Devon Ball MD Referring Internal Medicine [phone]

## 2021-11-19 NOTE — TELEPHONE ENCOUNTER
Dermatology Office Visit Note    Assessment (exam) and Plan     Tinea Faciale and Corporis (superficial dry scaly pink erythematous papules/plaques without associated pustules of the right nasal tip and left abdomen)    We discussed the diagnosis in detail. Treatment options were outlined including topical creams and oral antifungal medications. We discussed behavioral modification and techniques to prevent recurrence.    Today's recommendations:  -I prescribed ketoconazole 2% cream to apply twice daily.  -I prescribed Hydrocortisone 1% cream over the countner to apply 2 times daily thru the weekend (3 days total) for the nose given she had been treating with clotrimazole over the counter previously and it was already improving and she has important work function in 5 days.      I recommended systemic therapy with terbinafine. We discussed the risks and benefits of oral terbinafine therapy. Risks discussed included the potential for upset stomach, headache, taste disturbance and hepatitis.     I prescribed Terbinafine 250 mg tablet.  Take one by mouth each day.  Dispense 28  tablets with 0 refills. Discuss need for LFT lab testing if the patient is on it for more than 4-6 weeks.       Orders Placed This Encounter   • POCT KOH, HAIR, SKIN, NAIL     Scheduling Instructions:      Test performed and interpreted by Dr. Donna Frost. Positive for fungal elements on the left abdomen. Nose inconclusive.   • terbinafine (LamISIL) 250 MG tablet     Sig: Take one tablet by mouth once daily     Dispense:  28 tablet     Refill:  0   • ketoconazole (NIZORAL) 2 % cream     Sig: Apply BID to affected areas on abdomen and nasal tip until smooth and no longer itchy     Dispense:  30 g     Refill:  0       Follow up:  Return if symptoms worsen or fail to improve.    ----------------------------------------------------------------------------------------------------------------------------     Chief Complaint   Patient presents  Incoming fax from [facility] 10/19/20    inr 2.7   with   • Office Visit     Possible ringworm on nose and abdomen       HISTORY OF PRESENT ILLNESS:     The patient is a 52 year old female who goes by the name Josie.        The patient would like evaluation of the following lesions:    Location:  nose  Location:  Abdomen-itches    Was at vet and her dog was evaluated for possible ringworm. She does have horses and also had a stray cat and several others have gotten ringworm.    She has been treating with Lotrimin over the counter cream twice daily (or more) with no improvement.      DERMATOLOGY PAST MEDICAL HISTORY:    Melanoma in situ of left lower leg treated with Slow Mohs on 06/15/2020 by Dr. Mullen    Left calf nevus, monitoring.   Seborrheic dermatitis, frontal hairline, controlled with keto shampoo          REVIEW OF SYSTEMS:  Constitutional:  In general, the patient feels well:  Yes    Cardiovascular:  The patient has a pacemaker:  No  The patient has a defibrillator:  No  Hematologic:  The patient bleeds easily because of being on aspirin or an anticoagulant:  No    The patient is pregnant:           [] Yes   [x] No    [] Not applicable.  The patient is breastfeeding:  [] Yes   [x] No    [] Not applicable.    Family history of:  -Melanoma: No  -Other skin cancers: Yes Mother-basal cell carcinoma    ALLERGIES:  No Known Allergies    Current Outpatient Medications   Medication Sig   • betamethasone dipropionate augmented (DIPROLENE) 0.05 % lotion Apply twice a day to scaly rash of scalp as needed for scaling   • fluticasone (FLONASE) 50 MCG/ACT nasal spray Spray 2 sprays in each nostril daily.   • omeprazole (PrilOSEC) 20 MG capsule Take 20 mg by mouth daily.   • minoxidil (ROGAINE WOMENS) 5 % foam Apply topically daily. Rogaine   • Probiotic Product (PROBIOTIC PO) Take 1 capsule by mouth daily.   • Multiple Vitamins-Minerals (MULTIVITAMIN ADULT PO) Take 1 tablet by mouth daily.    • VITAMIN D, CHOLECALCIFEROL, PO Take 1,000 Units by mouth daily.    •  clobetasol (TEMOVATE) 0.05 % topical solution Apply topically as needed (psoriasis).   • terbinafine (LamISIL) 250 MG tablet Take one tablet by mouth once daily   • ketoconazole (NIZORAL) 2 % cream Apply BID to affected areas on abdomen and nasal tip until smooth and no longer itchy     No current facility-administered medications for this visit.         Allergy                                                         Comment: SEASONAL/environmental: pollens, trees    Chronic neck pain                                               Comment: markedly decreased with stopping Diet Coke    Hemorrhoids                                                   Menorrhagia                                                   Ovarian cyst                                                  Melanoma (CMS/HCC)                              06/15/2020      Comment: left calf    Endometriosis determined by laparoscopy         2014            PHYSICAL EXAMINATION: See assessment and plan section above for full documentation    The patient was seen and examined by Donna Frost MD.  in the presence of my medical assistant  Michaela Betts CMA .  This office visit note was in part created by Michaela Betts CMA acting also as a scribe for Donna Frost MD.   Donna Frost MD    reviewed the note for accuracy and completeness before signing.        LABORATORY (results reviewed today, if any, include):      On 11/19/2021, I, Michaela Betts CMA scribed the services personally performed by Donna Frost MD       I have reviewed and edited the visit summary above and attest that it is accurate.The documentation recorded by the scribe accurately and completely reflects the service(s) I personally performed and the decisions made by me.

## 2021-11-22 ENCOUNTER — TRANSFERRED RECORDS (OUTPATIENT)
Dept: HEALTH INFORMATION MANAGEMENT | Facility: CLINIC | Age: 86
End: 2021-11-22
Payer: MEDICARE

## 2021-11-23 ENCOUNTER — DOCUMENTATION ONLY (OUTPATIENT)
Dept: ANTICOAGULATION | Facility: CLINIC | Age: 86
End: 2021-11-23
Payer: MEDICARE

## 2021-11-23 DIAGNOSIS — I48.21 ATRIAL FIBRILLATION, PERMANENT (H): Primary | ICD-10-CM

## 2021-11-23 LAB — INR (EXTERNAL): 2 (ref 0.9–1.1)

## 2021-11-23 NOTE — PROGRESS NOTES
ANTICOAGULATION  MANAGEMENT-Home Monitor Managed by Exception    Reba PRABHAKAR Kvng 85 year old female is on warfarin with therapeutic INR result. (Goal INR 2.0-3.0)    Recent labs: (last 7 days)     11/22/21 2000   INR 2.0*         Previous INR was Therapeutic    Medication, diet, health changes since last INR:chart reviewed; none identified    Contacted within the last 12 weeks by phone on 11/8/21      BELGICA     Reba was NOT contacted regarding therapeutic result today per home monitoring policy manage by exception agreement.   Current warfarin dose is to be continued:     Summary  As of 11/23/2021    Full warfarin instructions:  2.5 mg every Mon, Wed, Fri; 3.75 mg all other days   Next INR check:             ?   Dimple Sam RN  Anticoagulation Clinic  11/23/2021    _______________________________________________________________________     Anticoagulation Episode Summary     Current INR goal:  2.0-3.0   TTR:  88.1 % (4 mo)   Target end date:  7/15/2036   Send INR reminders to:  EDWIN HODGE    Indications    Atrial fibrillation  permanent (H) [I48.21]           Comments:           Anticoagulation Care Providers     Provider Role Specialty Phone number    Devon Ball MD Referring Internal Medicine [phone]

## 2021-11-29 ENCOUNTER — ANTICOAGULATION THERAPY VISIT (OUTPATIENT)
Dept: ANTICOAGULATION | Facility: CLINIC | Age: 86
End: 2021-11-29
Payer: MEDICARE

## 2021-11-29 ENCOUNTER — TRANSFERRED RECORDS (OUTPATIENT)
Dept: HEALTH INFORMATION MANAGEMENT | Facility: CLINIC | Age: 86
End: 2021-11-29
Payer: MEDICARE

## 2021-11-29 DIAGNOSIS — I48.21 ATRIAL FIBRILLATION, PERMANENT (H): Primary | ICD-10-CM

## 2021-11-29 LAB — INR (EXTERNAL): 2.4 (ref 0.9–1.1)

## 2021-12-06 ENCOUNTER — TRANSFERRED RECORDS (OUTPATIENT)
Dept: HEALTH INFORMATION MANAGEMENT | Facility: CLINIC | Age: 86
End: 2021-12-06
Payer: MEDICARE

## 2021-12-06 ENCOUNTER — ANTICOAGULATION THERAPY VISIT (OUTPATIENT)
Dept: ANTICOAGULATION | Facility: CLINIC | Age: 86
End: 2021-12-06
Payer: MEDICARE

## 2021-12-06 DIAGNOSIS — I48.21 ATRIAL FIBRILLATION, PERMANENT (H): Primary | ICD-10-CM

## 2021-12-06 LAB — INR (EXTERNAL): 1.9 (ref 0.9–1.1)

## 2021-12-06 NOTE — PROGRESS NOTES
ANTICOAGULATION MANAGEMENT     Reba Calderon 86 year old female is on warfarin with subtherapeutic INR result. (Goal INR 2.0-3.0)    Recent labs: (last 7 days)     12/06/21  1028   INR 1.9*       ASSESSMENT     Source(s): Chart Review and Patient/Caregiver Call       Warfarin doses taken: Less warfarin taken than planned which may be affecting INR - However, pt reports this is how she has been taking it for a long time. Will have her continue dosing as is since she has been stable.     Diet: No new diet changes identified    New illness, injury, or hospitalization: No    Medication/supplement changes: None noted    Signs or symptoms of bleeding or clotting: No    Previous INR: Therapeutic last 2(+) visits    Additional findings: None     PLAN     Recommended plan for no diet, medication or health factor changes affecting INR     Dosing Instructions: Continue your current warfarin dose as you have been taking it) with next INR in 1 week       Summary  As of 12/6/2021    Full warfarin instructions:  3.75 mg every Mon, Wed, Fri; 2.5 mg all other days   Next INR check:  12/13/2021             Telephone call with Reba who agrees to plan and repeated back plan correctly    Patient to recheck with home meter    Education provided: Goal range and significance of current result and Importance of taking warfarin as instructed    Plan made per [facility] anticoagulation protocol    Rosa Maria Neil RN  Anticoagulation Clinic  12/6/2021    _______________________________________________________________________     Anticoagulation Episode Summary     Current INR goal:  2.0-3.0   TTR:  88.2 % (4.5 mo)   Target end date:  7/15/2036   Send INR reminders to:  EDWIN HODGE    Indications    Atrial fibrillation  permanent (H) [I48.21]           Comments:  [employer] home monitor. Managed by exception.         Anticoagulation Care Providers     Provider Role Specialty Phone number    Devon Ball MD Referring Internal Medicine  [phone]

## 2021-12-13 ENCOUNTER — TRANSFERRED RECORDS (OUTPATIENT)
Dept: HEALTH INFORMATION MANAGEMENT | Facility: CLINIC | Age: 86
End: 2021-12-13
Payer: MEDICARE

## 2021-12-13 ENCOUNTER — ANTICOAGULATION THERAPY VISIT (OUTPATIENT)
Dept: ANTICOAGULATION | Facility: CLINIC | Age: 86
End: 2021-12-13
Payer: MEDICARE

## 2021-12-13 DIAGNOSIS — I48.21 ATRIAL FIBRILLATION, PERMANENT (H): Primary | ICD-10-CM

## 2021-12-13 LAB — INR (EXTERNAL): 2.2 (ref 0.9–1.1)

## 2021-12-13 NOTE — PROGRESS NOTES
ANTICOAGULATION MANAGEMENT     Reba Calderon 86 year old female is on warfarin with therapeutic INR result. (Goal INR 2.0-3.0)    Recent labs: (last 7 days)     12/13/21  1120   INR 2.2*       ASSESSMENT     Source(s): Chart Review and Patient/Caregiver Call       Warfarin doses taken: Warfarin taken as instructed    Diet: No new diet changes identified    New illness, injury, or hospitalization: No    Medication/supplement changes: None noted    Signs or symptoms of bleeding or clotting: No    Previous INR: Subtherapeutic    Additional findings: None     PLAN     Recommended plan for no diet, medication or health factor changes affecting INR     Dosing Instructions: Continue your current warfarin dose with next INR in 1 week       Summary  As of 12/13/2021    Full warfarin instructions:  3.75 mg every Mon, Wed, Fri; 2.5 mg all other days   Next INR check:  12/20/2021             Telephone call with Reba who verbalizes understanding and agrees to plan    Patient to recheck with home meter    Education provided: Resume manage by exception with home monitor. Continue to submit INR results to home monitor company.You will only be called when your result is out of range. Please call and notify [facility] if new medication started, dose missed, signs or symptoms of bleeding or clotting, or a surgery/procedure is scheduled.    Plan made per [facility] anticoagulation protocol    Rosa Maria Neil RN  Anticoagulation Clinic  12/13/2021    _______________________________________________________________________     Anticoagulation Episode Summary     Current INR goal:  2.0-3.0   TTR:  87.1 % (4.7 mo)   Target end date:  7/15/2036   Send INR reminders to:  EDWIN HODGE    Indications    Atrial fibrillation  permanent (H) [I48.21]           Comments:  [employer] home monitor. Managed by exception.         Anticoagulation Care Providers     Provider Role Specialty Phone number    Devon Ball MD Referring Internal Medicine [phone]

## 2021-12-21 ENCOUNTER — DOCUMENTATION ONLY (OUTPATIENT)
Dept: ANTICOAGULATION | Facility: CLINIC | Age: 86
End: 2021-12-21
Payer: MEDICARE

## 2021-12-21 ENCOUNTER — TRANSFERRED RECORDS (OUTPATIENT)
Dept: HEALTH INFORMATION MANAGEMENT | Facility: CLINIC | Age: 86
End: 2021-12-21
Payer: MEDICARE

## 2021-12-21 DIAGNOSIS — I48.21 ATRIAL FIBRILLATION, PERMANENT (H): Primary | ICD-10-CM

## 2021-12-21 LAB — INR HOME MONITORING: 2.3 (ref 2–3)

## 2021-12-27 ENCOUNTER — DOCUMENTATION ONLY (OUTPATIENT)
Dept: ANTICOAGULATION | Facility: CLINIC | Age: 86
End: 2021-12-27
Payer: MEDICARE

## 2021-12-27 ENCOUNTER — TRANSFERRED RECORDS (OUTPATIENT)
Dept: HEALTH INFORMATION MANAGEMENT | Facility: CLINIC | Age: 86
End: 2021-12-27
Payer: MEDICARE

## 2021-12-27 DIAGNOSIS — I48.21 ATRIAL FIBRILLATION, PERMANENT (H): Primary | ICD-10-CM

## 2021-12-27 LAB — INR HOME MONITORING: 2.1 (ref 2–3)

## 2021-12-27 NOTE — PROGRESS NOTES
ANTICOAGULATION  MANAGEMENT-Home Monitor Managed by Exception    Reba AKI Calderon 86 year old female is on warfarin with therapeutic INR result. (Goal INR 2.0-3.0)    Recent labs: (last 7 days)     12/27/21  0000   INR 2.10         Previous INR was Therapeutic    Medication, diet, health changes since last INR:chart reviewed; none identified    Contacted within the last 12 weeks by phone on 12/13      BELGICA     Reba was NOT contacted regarding therapeutic result today per home monitoring policy manage by exception agreement.   Current warfarin dose is to be continued:     Summary  As of 12/27/2021    Full warfarin instructions:  3.75 mg every Mon, Wed, Fri; 2.5 mg all other days   Next INR check:  1/3/2022           ?   Rosa Maria Neil RN  Anticoagulation Clinic  12/27/2021    _______________________________________________________________________     Anticoagulation Episode Summary     Current INR goal:  2.0-3.0   TTR:  88.3 % (5.2 mo)   Target end date:  7/15/2036   Send INR reminders to:  EDWIN HODGE    Indications    Atrial fibrillation  permanent (H) [I48.21]           Comments:  [employer] home monitor. Managed by exception.         Anticoagulation Care Providers     Provider Role Specialty Phone number    Devon Ball MD Referring Internal Medicine [phone]

## 2022-01-01 NOTE — TELEPHONE ENCOUNTER
Parent aware of message below. No further questions or concerns.    RN cannot approve Refill Request    RN can NOT refill this medication Protocol failed and NO refill given.     Radha Ba, [facility] Triage/Med Refill 12/16/2019    Requested Prescriptions   Pending Prescriptions Disp Refills     metFORMIN (GLUCOPHAGE) 500 MG tablet [Pharmacy Med Name: METFORMIN  MG TABLET] 180 tablet 3     Sig: TAKE 1 TABLET BY MOUTH TWICE A DAY       Metformin Refill Protocol Failed - 12/15/2019  2:19 PM        Failed - LFT or AST or ALT in last 12 months     Albumin   Date Value Ref Range Status   09/12/2018 3.9 3.5 - 5.0 g/dL Final   09/12/2018 3.9 3.5 - 5.0 g/dL Final     Bilirubin, Total   Date Value Ref Range Status   09/12/2018 0.9 0.0 - 1.0 mg/dL Final     Bilirubin, Direct   Date Value Ref Range Status   09/12/2018 0.2 <=0.5 mg/dL Final     Alkaline Phosphatase   Date Value Ref Range Status   09/12/2018 56 45 - 120 U/L Final     AST   Date Value Ref Range Status   09/12/2018 15 0 - 40 U/L Final     ALT   Date Value Ref Range Status   09/12/2018 15 0 - 45 U/L Final     Protein, Total   Date Value Ref Range Status   09/12/2018 6.9 6.0 - 8.0 g/dL Final                Passed - Blood pressure in last 12 months     BP Readings from Last 1 Encounters:   06/26/19 160/76             Passed - GFR or Serum Creatinine in last 6 months     GFR MDRD Non Af Amer   Date Value Ref Range Status   06/26/2019 >60 >60 mL/min/1.73m2 Final     GFR MDRD Af Amer   Date Value Ref Range Status   06/26/2019 >60 >60 mL/min/1.73m2 Final             Passed - Visit with PCP or prescribing provider visit in last 6 months or next 3 months     Last office visit with prescriber/PCP: 6/26/2019 OR same dept: 6/26/2019 Bertin Frances MD OR same specialty: 6/26/2019 Bertin Frances MD Last physical: Visit date not found Last MTM visit: Visit date not found         Next appt within 3 mo: Visit date not found  Next physical within 3 mo: Visit date not found  Prescriber OR PCP: Bertin Frances MD  Last diagnosis  associated with med order: 1. Diabetes (H)  - metFORMIN (GLUCOPHAGE) 500 MG tablet [Pharmacy Med Name: METFORMIN  MG TABLET]; TAKE 1 TABLET BY MOUTH TWICE A DAY  Dispense: 180 tablet; Refill: 3     If protocol passes may refill for 12 months if within 3 months of last provider visit (or a total of 15 months).           Passed - A1C in last 6 months     Hemoglobin A1c   Date Value Ref Range Status   06/26/2019 7.8 (H) 3.5 - 6.0 % Final               Passed - Microalbumin in last year      Microalbumin, Random Urine   Date Value Ref Range Status   06/26/2019 0.59 0.00 - 1.99 mg/dL Final

## 2022-01-03 ENCOUNTER — DOCUMENTATION ONLY (OUTPATIENT)
Dept: INTERNAL MEDICINE | Facility: CLINIC | Age: 87
End: 2022-01-03
Payer: MEDICARE

## 2022-01-03 ENCOUNTER — TRANSFERRED RECORDS (OUTPATIENT)
Dept: HEALTH INFORMATION MANAGEMENT | Facility: CLINIC | Age: 87
End: 2022-01-03
Payer: MEDICARE

## 2022-01-03 DIAGNOSIS — I48.21 ATRIAL FIBRILLATION, PERMANENT (H): Primary | ICD-10-CM

## 2022-01-03 LAB — INR HOME MONITORING: 2.2 (ref 2–3)

## 2022-01-03 NOTE — PROGRESS NOTES
ANTICOAGULATION  MANAGEMENT-Home Monitor Managed by Exception    Reba AKI Calderon 86 year old female is on warfarin with therapeutic INR result. (Goal INR 2.0-3.0)    Recent labs: (last 7 days)     01/03/22  0000   INR 2.20         Previous INR was Therapeutic    Medication, diet, health changes since last INR:chart reviewed; none identified    Contacted within the last 12 weeks by phone on 12/13/22      BELGICA     Reba was NOT contacted regarding therapeutic result today per home monitoring policy manage by exception agreement.   Current warfarin dose is to be continued:     Summary  As of 1/3/2022    Full warfarin instructions:  3.75 mg every Mon, Wed, Fri; 2.5 mg all other days   Next INR check:  1/10/2022           ?   Crys Simpson RN  Anticoagulation Clinic  1/3/2022    _______________________________________________________________________     Anticoagulation Episode Summary     Current INR goal:  2.0-3.0   TTR:  88.8 % (5.4 mo)   Target end date:  7/15/2036   Send INR reminders to:  EDWIN MONROE    Indications    Atrial fibrillation  permanent (H) [I48.21]           Comments:  [employer] home monitor. Managed by exception.         Anticoagulation Care Providers     Provider Role Specialty Phone number    Devon Ball MD Referring Internal Medicine [phone]

## 2022-01-10 ENCOUNTER — DOCUMENTATION ONLY (OUTPATIENT)
Dept: INTERNAL MEDICINE | Facility: CLINIC | Age: 87
End: 2022-01-10
Payer: MEDICARE

## 2022-01-10 ENCOUNTER — TRANSFERRED RECORDS (OUTPATIENT)
Dept: HEALTH INFORMATION MANAGEMENT | Facility: CLINIC | Age: 87
End: 2022-01-10
Payer: MEDICARE

## 2022-01-10 DIAGNOSIS — I48.21 ATRIAL FIBRILLATION, PERMANENT (H): Primary | ICD-10-CM

## 2022-01-10 LAB — INR HOME MONITORING: 2.4 (ref 2–3)

## 2022-01-10 NOTE — PROGRESS NOTES
ANTICOAGULATION  MANAGEMENT-Home Monitor Managed by Exception    Reba AKI Calderon 86 year old female is on warfarin with therapeutic INR result. (Goal INR 2.0-3.0)    Recent labs: (last 7 days)     01/10/22  0000   INR 2.40         Previous INR was Therapeutic    Medication, diet, health changes since last INR:chart reviewed; none identified    Contacted within the last 12 weeks by phone on 12/13/21      BELGICA     Reba was NOT contacted regarding therapeutic result today per home monitoring policy manage by exception agreement.   Current warfarin dose is to be continued:     Summary  As of 1/10/2022    Full warfarin instructions:  3.75 mg every Mon, Wed, Fri; 2.5 mg all other days   Next INR check:  1/17/2022           ?   Crys Simpson RN  Anticoagulation Clinic  1/10/2022    _______________________________________________________________________     Anticoagulation Episode Summary     Current INR goal:  2.0-3.0   TTR:  89.2 % (5.6 mo)   Target end date:  7/15/2036   Send INR reminders to:  EDWIN MONROE    Indications    Atrial fibrillation  permanent (H) [I48.21]           Comments:  [employer] home monitor. Managed by exception.         Anticoagulation Care Providers     Provider Role Specialty Phone number    Devon Ball MD Referring Internal Medicine [phone]

## 2022-01-17 ENCOUNTER — DOCUMENTATION ONLY (OUTPATIENT)
Dept: INTERNAL MEDICINE | Facility: CLINIC | Age: 87
End: 2022-01-17
Payer: MEDICARE

## 2022-01-17 DIAGNOSIS — I48.21 ATRIAL FIBRILLATION, PERMANENT (H): Primary | ICD-10-CM

## 2022-01-17 LAB — INR HOME MONITORING: 2.2 (ref 2–3)

## 2022-01-17 NOTE — PROGRESS NOTES
ANTICOAGULATION  MANAGEMENT-Home Monitor Managed by Exception    Reba AKI Calderon 86 year old female is on warfarin with therapeutic INR result. (Goal INR 2.0-3.0)    Recent labs: (last 7 days)     01/17/22  0000   INR 2.20         Previous INR was Therapeutic    Medication, diet, health changes since last INR:chart reviewed; none identified    Contacted within the last 12 weeks by phone on 12/13/21      BELGICA     Reba was NOT contacted regarding therapeutic result today per home monitoring policy manage by exception agreement.   Current warfarin dose is to be continued:     Summary  As of 1/17/2022    Full warfarin instructions:  3.75 mg every Mon, Wed, Fri; 2.5 mg all other days   Next INR check:  1/24/2022           ?   Crys Simpson RN  Anticoagulation Clinic  1/17/2022    _______________________________________________________________________     Anticoagulation Episode Summary     Current INR goal:  2.0-3.0   TTR:  89.7 % (5.9 mo)   Target end date:  7/15/2036   Send INR reminders to:  EDWIN MONROE    Indications    Atrial fibrillation  permanent (H) [I48.21]           Comments:  [employer] home monitor. Managed by exception.         Anticoagulation Care Providers     Provider Role Specialty Phone number    Devon Ball MD Referring Internal Medicine [phone]

## 2022-01-24 ENCOUNTER — TRANSFERRED RECORDS (OUTPATIENT)
Dept: HEALTH INFORMATION MANAGEMENT | Facility: CLINIC | Age: 87
End: 2022-01-24
Payer: MEDICARE

## 2022-01-24 ENCOUNTER — DOCUMENTATION ONLY (OUTPATIENT)
Dept: ANTICOAGULATION | Facility: CLINIC | Age: 87
End: 2022-01-24
Payer: MEDICARE

## 2022-01-24 DIAGNOSIS — I48.21 ATRIAL FIBRILLATION, PERMANENT (H): Primary | ICD-10-CM

## 2022-01-24 LAB — INR HOME MONITORING: 2.3 (ref 2–3)

## 2022-01-24 NOTE — PROGRESS NOTES
ANTICOAGULATION  MANAGEMENT-Home Monitor Managed by Exception    Reba Calderon 86 year old female is on warfarin with therapeutic INR result. (Goal INR 2.0-3.0)    Recent labs: (last 7 days)     01/24/22  0000   INR 2.30         Previous INR was Therapeutic    Medication, diet, health changes since last INR:chart reviewed; none identified    Contacted within the last 12 weeks by phone on  12/13/21      BELGICA Britton was NOT contacted regarding therapeutic result today per home monitoring policy manage by exception agreement.   Current warfarin dose is to be continued: 3.75 mgs every Mon, Wed, Friday, 2.5 mgs all other days.    Summary  As of 1/24/2022    Full warfarin instructions:  3.75 mg every Mon, Wed, Fri; 2.5 mg all other days   Next INR check:  2/7/2022           ?   Mignon Randall RN  Anticoagulation Clinic  1/24/2022    _______________________________________________________________________     Anticoagulation Episode Summary     Current INR goal:  2.0-3.0   TTR:  90.1 % (6.1 mo)   Target end date:  7/15/2036   Send INR reminders to:  EDWIN MONROE    Indications    Atrial fibrillation  permanent (H) [I48.21]           Comments:  [employer] home monitor. Managed by exception.         Anticoagulation Care Providers     Provider Role Specialty Phone number    Devon Ball MD Referring Internal Medicine [phone]

## 2022-01-25 ENCOUNTER — OFFICE VISIT (OUTPATIENT)
Dept: INTERNAL MEDICINE | Facility: CLINIC | Age: 87
End: 2022-01-25
Payer: MEDICARE

## 2022-01-25 VITALS
SYSTOLIC BLOOD PRESSURE: 134 MMHG | HEART RATE: 80 BPM | OXYGEN SATURATION: 97 % | BODY MASS INDEX: 33.41 KG/M2 | DIASTOLIC BLOOD PRESSURE: 70 MMHG | WEIGHT: 207 LBS

## 2022-01-25 DIAGNOSIS — I48.21 ATRIAL FIBRILLATION, PERMANENT (H): ICD-10-CM

## 2022-01-25 DIAGNOSIS — Z95.0 CARDIAC PACEMAKER IN SITU: Chronic | ICD-10-CM

## 2022-01-25 DIAGNOSIS — K80.50 CHOLEDOCHOLITHIASIS: ICD-10-CM

## 2022-01-25 DIAGNOSIS — M18.11 PRIMARY OSTEOARTHRITIS OF FIRST CARPOMETACARPAL JOINT OF RIGHT HAND: ICD-10-CM

## 2022-01-25 DIAGNOSIS — Z20.822 EXPOSURE TO 2019 NOVEL CORONAVIRUS: ICD-10-CM

## 2022-01-25 DIAGNOSIS — G47.33 OSA (OBSTRUCTIVE SLEEP APNEA): ICD-10-CM

## 2022-01-25 DIAGNOSIS — Z00.00 MEDICARE ANNUAL WELLNESS VISIT, SUBSEQUENT: Primary | ICD-10-CM

## 2022-01-25 DIAGNOSIS — N18.31 STAGE 3A CHRONIC KIDNEY DISEASE (H): ICD-10-CM

## 2022-01-25 DIAGNOSIS — M75.41 IMPINGEMENT SYNDROME, SHOULDER, RIGHT: ICD-10-CM

## 2022-01-25 DIAGNOSIS — I10 PRIMARY HYPERTENSION: Chronic | ICD-10-CM

## 2022-01-25 DIAGNOSIS — D68.9 COAGULOPATHY (H): ICD-10-CM

## 2022-01-25 DIAGNOSIS — E11.628 TYPE 2 DIABETES MELLITUS WITH OTHER SKIN COMPLICATION, WITHOUT LONG-TERM CURRENT USE OF INSULIN (H): ICD-10-CM

## 2022-01-25 DIAGNOSIS — C91.10 CLL (CHRONIC LYMPHOCYTIC LEUKEMIA) (H): ICD-10-CM

## 2022-01-25 DIAGNOSIS — I48.20 CHRONIC ATRIAL FIBRILLATION (H): ICD-10-CM

## 2022-01-25 DIAGNOSIS — G60.8 PERIPHERAL SENSORY NEUROPATHY: ICD-10-CM

## 2022-01-25 DIAGNOSIS — I50.32 CHRONIC DIASTOLIC HEART FAILURE (H): ICD-10-CM

## 2022-01-25 PROBLEM — K61.1 PERIRECTAL ABSCESS: Status: ACTIVE | Noted: 2020-04-24

## 2022-01-25 LAB
ALBUMIN SERPL-MCNC: 4 G/DL (ref 3.5–5)
ALP SERPL-CCNC: 69 U/L (ref 45–120)
ALT SERPL W P-5'-P-CCNC: 12 U/L (ref 0–45)
ANION GAP SERPL CALCULATED.3IONS-SCNC: 13 MMOL/L (ref 5–18)
AST SERPL W P-5'-P-CCNC: 16 U/L (ref 0–40)
BASOPHILS # BLD AUTO: 0.1 10E3/UL (ref 0–0.2)
BASOPHILS NFR BLD AUTO: 1 %
BILIRUB SERPL-MCNC: 0.9 MG/DL (ref 0–1)
BUN SERPL-MCNC: 26 MG/DL (ref 8–28)
CALCIUM SERPL-MCNC: 9.9 MG/DL (ref 8.5–10.5)
CHLORIDE BLD-SCNC: 102 MMOL/L (ref 98–107)
CHOLEST SERPL-MCNC: 205 MG/DL
CO2 SERPL-SCNC: 29 MMOL/L (ref 22–31)
CREAT SERPL-MCNC: 1 MG/DL (ref 0.6–1.1)
EOSINOPHIL # BLD AUTO: 0.1 10E3/UL (ref 0–0.7)
EOSINOPHIL NFR BLD AUTO: 1 %
ERYTHROCYTE [DISTWIDTH] IN BLOOD BY AUTOMATED COUNT: 16.8 % (ref 10–15)
FASTING STATUS PATIENT QL REPORTED: NO
GFR SERPL CREATININE-BSD FRML MDRD: 55 ML/MIN/1.73M2
GLUCOSE BLD-MCNC: 161 MG/DL (ref 70–125)
HBA1C MFR BLD: 7.4 % (ref 0–5.6)
HCT VFR BLD AUTO: 43.8 % (ref 35–47)
HDLC SERPL-MCNC: 46 MG/DL
HGB BLD-MCNC: 14 G/DL (ref 11.7–15.7)
IMM GRANULOCYTES # BLD: 0 10E3/UL
IMM GRANULOCYTES NFR BLD: 0 %
LDLC SERPL CALC-MCNC: 127 MG/DL
LYMPHOCYTES # BLD AUTO: 1.8 10E3/UL (ref 0.8–5.3)
LYMPHOCYTES NFR BLD AUTO: 20 %
MCH RBC QN AUTO: 29.2 PG (ref 26.5–33)
MCHC RBC AUTO-ENTMCNC: 32 G/DL (ref 31.5–36.5)
MCV RBC AUTO: 91 FL (ref 78–100)
MONOCYTES # BLD AUTO: 0.5 10E3/UL (ref 0–1.3)
MONOCYTES NFR BLD AUTO: 6 %
NEUTROPHILS # BLD AUTO: 6.4 10E3/UL (ref 1.6–8.3)
NEUTROPHILS NFR BLD AUTO: 72 %
PLATELET # BLD AUTO: 215 10E3/UL (ref 150–450)
POTASSIUM BLD-SCNC: 4.2 MMOL/L (ref 3.5–5)
PROT SERPL-MCNC: 6.9 G/DL (ref 6–8)
RBC # BLD AUTO: 4.8 10E6/UL (ref 3.8–5.2)
SARS-COV-2 RNA RESP QL NAA+PROBE: NORMAL
SODIUM SERPL-SCNC: 144 MMOL/L (ref 136–145)
TRIGL SERPL-MCNC: 161 MG/DL
VIT B12 SERPL-MCNC: 1727 PG/ML (ref 213–816)
WBC # BLD AUTO: 8.9 10E3/UL (ref 4–11)

## 2022-01-25 PROCEDURE — U0005 INFEC AGEN DETEC AMPLI PROBE: HCPCS | Performed by: INTERNAL MEDICINE

## 2022-01-25 PROCEDURE — 20610 DRAIN/INJ JOINT/BURSA W/O US: CPT | Performed by: INTERNAL MEDICINE

## 2022-01-25 PROCEDURE — G0439 PPPS, SUBSEQ VISIT: HCPCS | Performed by: INTERNAL MEDICINE

## 2022-01-25 PROCEDURE — 82607 VITAMIN B-12: CPT | Performed by: INTERNAL MEDICINE

## 2022-01-25 PROCEDURE — 36415 COLL VENOUS BLD VENIPUNCTURE: CPT | Performed by: INTERNAL MEDICINE

## 2022-01-25 PROCEDURE — 83036 HEMOGLOBIN GLYCOSYLATED A1C: CPT | Performed by: INTERNAL MEDICINE

## 2022-01-25 PROCEDURE — 80053 COMPREHEN METABOLIC PANEL: CPT | Performed by: INTERNAL MEDICINE

## 2022-01-25 PROCEDURE — 20600 DRAIN/INJ JOINT/BURSA W/O US: CPT | Performed by: INTERNAL MEDICINE

## 2022-01-25 PROCEDURE — 99213 OFFICE O/P EST LOW 20 MIN: CPT | Mod: 25 | Performed by: INTERNAL MEDICINE

## 2022-01-25 PROCEDURE — 85025 COMPLETE CBC W/AUTO DIFF WBC: CPT | Performed by: INTERNAL MEDICINE

## 2022-01-25 PROCEDURE — 80061 LIPID PANEL: CPT | Performed by: INTERNAL MEDICINE

## 2022-01-25 RX ORDER — METHYLPREDNISOLONE ACETATE 40 MG/ML
20 INJECTION, SUSPENSION INTRA-ARTICULAR; INTRALESIONAL; INTRAMUSCULAR; SOFT TISSUE ONCE
Status: COMPLETED | OUTPATIENT
Start: 2022-01-25 | End: 2022-01-25

## 2022-01-25 RX ORDER — TRIAMCINOLONE ACETONIDE 40 MG/ML
80 INJECTION, SUSPENSION INTRA-ARTICULAR; INTRAMUSCULAR ONCE
Status: COMPLETED | OUTPATIENT
Start: 2022-01-25 | End: 2022-01-25

## 2022-01-25 RX ADMIN — METHYLPREDNISOLONE ACETATE 20 MG: 40 INJECTION, SUSPENSION INTRA-ARTICULAR; INTRALESIONAL; INTRAMUSCULAR; SOFT TISSUE at 12:49

## 2022-01-25 RX ADMIN — TRIAMCINOLONE ACETONIDE 80 MG: 40 INJECTION, SUSPENSION INTRA-ARTICULAR; INTRAMUSCULAR at 12:49

## 2022-01-25 ASSESSMENT — PATIENT HEALTH QUESTIONNAIRE - PHQ9
SUM OF ALL RESPONSES TO PHQ QUESTIONS 1-9: 1
SUM OF ALL RESPONSES TO PHQ QUESTIONS 1-9: 1
10. IF YOU CHECKED OFF ANY PROBLEMS, HOW DIFFICULT HAVE THESE PROBLEMS MADE IT FOR YOU TO DO YOUR WORK, TAKE CARE OF THINGS AT HOME, OR GET ALONG WITH OTHER PEOPLE: NOT DIFFICULT AT ALL

## 2022-01-25 ASSESSMENT — ACTIVITIES OF DAILY LIVING (ADL): CURRENT_FUNCTION: NO ASSISTANCE NEEDED

## 2022-01-25 NOTE — PROGRESS NOTES
MSK: Hand / Upper Extremity Injection/Arthrocentesis    Date/Time: 1/25/2022 12:41 PM  Performed by: Devon Ball MD  Authorized by: Devon Ball MD            Procedure:  CMC joint injection, right    Diagnosis:  CMC arthritis.    Description of procedure: Patient was given informed consent prior to proceeding with corticosteroid injection.  The patient had the joint identified and marked.  The area was cleaned with an alcohol swab.  20 mg of Depo-Medrol and 0.2 cc of lidocaine without epi were drawn up.  Using a 27-gauge 1/2 inch needle the joint space was found.  The fluid was injected without difficulty.  No immediate complications.  Area was dressed with a bandage.  Aftercare instructions were given.         ______________________________________________________________________       Procedure:  Right subacromial corticosteroid injection.    Diagnosis:  Shoulder impingement syndrome.    Description of procedure:    Patient was given informed consent prior to proceeding with corticosteroid injection.  Patient agreed to proceed knowing risks and benefits.  A posterior approach was marked and prepped with alcohol.  Using a combination of 80 mg of Kenalog and 1 ml of lidocaine without epinephrine, the subacromial space was found using a 25G 1.5 inch needle.  The space was then injected and the fluid flowed easily.  Bandage applied and aftercare instructions given.  No immediate complications.

## 2022-01-25 NOTE — PROGRESS NOTES
"HPI  Do you feel safe in your environment? Yes  Fall risk  Fallen 2 or more times in the past year?: (P) No  Any fall with injury in the past year?: (P) Yes    Cognitive Screening   1) Repeat 3 items (Leader, Season, Table)    2) Clock draw: ABNORMAL hands for the clock are wrong only did one hand  3) 3 item recall: Recalls 3 objects  Results: 3 items recalled: COGNITIVE IMPAIRMENT LESS LIKELY    Mini-CogTM Copyright KARL Antunez. Licensed by the author for use in [facility] [employer]; reprinted with permission (tessa@[email or web address]). All rights reserved.    Answers for HPI/ROS submitted by the patient on 1/25/2022  If you checked off any problems, how difficult have these problems made it for you to do your work, take care of things at home, or get along with other people?: Not difficult at all  PHQ9 TOTAL SCORE: 1  In general, how would you rate your overall physical health?: good  Frequency of exercise:: None  Do you usually eat at least 4 servings of fruit and vegetables a day, include whole grains & fiber, and avoid regularly eating high fat or \"junk\" foods? : Yes  Taking medications regularly:: Yes  Medication side effects:: None  Activities of Daily Living: no assistance needed  Home safety: no safety concerns identified  Hearing Impairment:: no hearing concerns  In the past 6 months, have you been bothered by leaking of urine?: No  In general, how would you rate your overall mental or emotional health?: excellent  Additional concerns today:: No      "

## 2022-01-25 NOTE — PROGRESS NOTES
[facility] - Primary Care Specialists    Comprehensive and complex medical care - Chronic disease management - Shared decision making - Care coordination - Compassionate care    Patient advocacy - Rational deprescribing - Minimally disruptive medicine - Ethical focus - Customized care         Date of Service: 1/25/2022  Primary Provider: Devon Ball    Patient Care Team:  Devon Ball MD as PCP - General (Internal Medicine)  Sherwin Coats MD as Physician (Hematology)  Louis Jacobsen MD as MD (Ophthalmology)  Shyam Barrett MD as MD (Colon & Rectal)  DANGELO Pagan as Catracho Forman MD as Assigned Infectious Disease Provider  Devon Ball MD as Assigned PCP          Patient's Pharmacy:    [facility]/pharmacy #[id number] - [facility], [place] - [address]  [address] [address]  Phone: [phone] Fax: [phone]     Patient's Contacts:  Name Home Phone Work Phone Mobile Phone Relationship Lgl Grd   XIMENA CALDERON [phone]   Spouse    JYOTHI SHEIKH   [phone] Daughter      Patient's Insurance:    Payor: MEDICARE / Plan: MEDICARE / Product Type: Medicare /      Subjective:     History of present illness:    Reba Calderon is an 86 year old female here for an annual wellness visit.    The issues she would like to address at today's visit include the following:    Chief Complaint   Patient presents with     Annual Visit     Imm/Inj     in right thumb and shoulder       Patient comes in today for annual wellness visit and for other issues.    Her  is 89 and dealing with macular degeneration.  He is otherwise doing well.  His name is Kris.    We reviewed her joint issues at this time.  She has been having increasing pain at the CMC joint of the right hand.  This is been there for a while but escalated as of recently.  She has tried some over-the-counter things but has not tried Voltaren gel.  We reviewed this.  She can use this with  warfarin.    We reviewed her right shoulder.  She has pain in the area of the supraspinatus.  She does get occasional sharp pains with this.  It does also bother her with laying on the side at night.  She denies any obvious focal weakness.  She would like a shot of this if possible as well.    Reviewed her memory issues.  She occasionally has difficulty finding words but is able to manage finances and other areas of her life quite well.    She had a fall on her deck since I saw her last and she had scanning of her head and her neck which was okay.    Reviewed her CLL and will be checking up on this.    Reviewed her A. fib and she remains on anticoagulation and doing okay.  She has a home INR monitor.    Her pacemaker seems to be doing well.    Her cholesterol and blood pressure generally are doing okay.    She has had her daughter-in-law over at her house.  Her daughter-in-law has been taking care of her mom and her mom recently turned positive for Covid.  The patient saw the daughter-in-law last on Saturday for a prolonged period of time.  She has no current symptoms at this time.    We will check up on her diabetes as well today.    We reviewed her other issues noted in the assessment but not specifically addressed in the HPI above.           Active Problem List:  Problem List as of 1/25/2022 Reviewed: 4/3/2014  9:30 AM by Erin Galaviz    Nonsmoker    Atrial fibrillation, permanent (H)    CLL (chronic lymphocytic leukemia) (H)       Medium    Cardiac pacemaker in situ    HTN (hypertension)    Chronic diastolic heart failure (H)    Actinomycosis    Chronic kidney disease, stage 3 (H)    H/O atrioventricular jerzy ablation    CHB (complete heart block) (H)    Anticoagulation monitoring, INR range 2-3    Choledocholithiasis, RECURRENT       Low    Peripheral sensory neuropathy    MATILDE (obstructive sleep apnea)    Perirectal abscess    Coagulopathy (H) - due to warfarin           Past Medical History:    Diagnosis Date     Carotid stenosis, asymptomatic      Choledocholithiasis, RECURRENT      CLL (chronic lymphocytic leukemia) (H)      DMII (diabetes mellitus, type 2) (H)      GERD (gastroesophageal reflux disease)      Hyperlipidemia      Hypertension      Nonsmoker      Pacemaker      Permanent atrial fibrillation (H)       Past Surgical History:   Procedure Laterality Date     ARTHROSCOPY KNEE       BIOPSY BREAST       CATARACT EXTRACTION       CHOLECYSTECTOMY       CHOLECYSTECTOMY  08/24/2013     D & C       DILATION AND CURETTAGE       ERCP W/ SPHINCTEROTOMY AND BALLOON DILATION  11/2017, 10/27/2014     H ABLATION AV NODE       H ABLATION FOCAL AFIB      also for Atrial Flutter     HEMANGIOMA EXCISION  03/29/2019    Nasal     IMPLANT PACEMAKER       INCISIONAL BREAST BIOPSY       knee arthroscopy[      left     LASER YAG CAPSULOTOMY Right 07/23/2015    Procedure: LASER YAG CAPSULOTOMY;  Surgeon: Louis Jacobsen MD;  Location: [facility]     NASAL ENDOSCOPY  03/29/2019     OTHER SURGICAL HISTORY      pacemaker implantation     OTHER SURGICAL HISTORY  03/29/2019    nasal mucosal flap reconstruction     PHACOEMULSIFICATION CLEAR CORNEA WITH TORIC INTRAOCULAR LENS IMPLANT  04/03/2014    Procedure: PHACOEMULSIFICATION CLEAR CORNEA WITH TORIC INTRAOCULAR LENS IMPLANT;  RIGHT PHACOEMULSIFICATION CLEAR CORNEA WITH TORIC INTRAOCULAR LENS IMPLANT  ;  Surgeon: Louis Jacobsen MD;  Location: [facility]     PHACOEMULSIFICATION CLEAR CORNEA WITH TORIC INTRAOCULAR LENS IMPLANT  05/01/2014    Procedure: PHACOEMULSIFICATION CLEAR CORNEA WITH TORIC INTRAOCULAR LENS IMPLANT;  Surgeon: Louis Jacobsen MD;  Location: [facility]     RECTOCELE REPAIR      CYSTOCELE AS WELL     REPAIR RECTOCELE       TONSILLECTOMY       TONSILLECTOMY        No family history on file.   Family history is otherwise noncontributory.    Social History     Occupational History     Not on file   Tobacco Use     Smoking status: Never Smoker     Smokeless tobacco:  Never Used   Substance and Sexual Activity     Alcohol use: Yes     Comment: Alcoholic Drinks/day: occasional     Drug use: No     Sexual activity: Not on file      Social History     Social History Narrative          Current Outpatient Medications   Medication Instructions     atorvastatin (LIPITOR) 10 MG tablet TAKE 1 TABLET BY MOUTH EVERY EVENING     biotin 2,500 mcg, Oral     blood glucose (CONTOUR TEST) test strip 1 strip, In Vitro, DAILY     Calcium Carb-Ergocalciferol 500-200 MG-UNIT TABS 1 tablet, Oral     Cholecalciferol (VITAMIN D3 PO) 2,000 Units, Oral, DAILY     FOLIC ACID PO 1 mg, DAILY     FUROSEMIDE PO 40 mg, Oral, DAILY, 2 tablets every morning     glucosamine-chondroitinoitin 500-400 MG tablet 1 tablet, Oral     lisinopril (ZESTRIL) 10 mg, Oral, DAILY     magnesium chloride (MAG-DELAY) 64 mg, Oral     metFORMIN (GLUCOPHAGE) 500 mg, Oral, 2 TIMES DAILY WITH MEALS     nitroGLYcerin (NITROSTAT) 0.4 mg, Sublingual     pantoprazole (PROTONIX) 40 mg, Oral, 2 TIMES DAILY     polyethylene glycol (MIRALAX) 17 GM/Dose powder MIX 1 CAPFUL (17 GRAMS) IN LIQUID AND DRINK BY MOUTH DAILY. (OTC NC)     potassium chloride ER (K-TAB/KLOR-CON) 10 MEQ CR tablet 10 mEq, Oral, DAILY     triamcinolone (KENALOG) 0.1 % ointment Topical     vitamin (B COMPLEX) capsule 1 capsule, Oral     vitamin B-12 (CYANOCOBALAMIN) 1,000 mcg, Oral     warfarin ANTICOAGULANT (COUMADIN) 2.5 MG tablet Take by mouth 2.5 mg (2.5 mg x 1) every Mon, Wed, Fri; 3.75 mg (2.5 mg x 1.5) all other days      Allergies: Amiodarone, Atenolol, Beta adrenergic blockers, Demerol [meperidine], Percocet [oxycodone-acetaminophen], Pioglitazone, and Toprol xl [metoprolol]     Immunization History   Administered Date(s) Administered     COVID-19,PF,Pfizer (12+ Yrs) 02/24/2021, 03/17/2021, 09/29/2021     Flu, Unspecified 10/11/2018, 11/26/2019, 10/01/2020     Influenza (High Dose) 3 valent vaccine 10/29/2014, 11/07/2015, 11/05/2016, 11/06/2017, 10/11/2018,  11/26/2019, 10/07/2020     Influenza (IIV3) PF 09/30/2009, 11/23/2010, 10/15/2012     Influenza, Quad, High Dose, Pf, 65yr+ (Fluzone HD) 10/07/2020, 10/13/2021     Pneumococcal 23 valent 01/01/1998, 08/20/2008, 11/14/2008, 08/26/2013     Zoster vaccine recombinant adjuvanted (SHINGRIX) 11/30/2019, 04/21/2021     Zoster vaccine, live 09/01/2013      Objective:     Wt Readings from Last 3 Encounters:   01/25/22 93.9 kg (207 lb)   07/15/21 93.9 kg (207 lb)   11/18/20 95.7 kg (211 lb)     BP Readings from Last 3 Encounters:   01/25/22 134/70   07/15/21 112/66   11/18/20 138/88     Vision Screening:  No exam data present     PHYSICAL EXAM  /70   Pulse 80   Wt 93.9 kg (207 lb)   SpO2 97%   Breastfeeding No   BMI 33.41 kg/m     Patient declines an undressed exam.   The patient is comfortable, no acute distress.  Mood good.  Insight is good.  No skin lesions or nodules of concern.  Ears clear.  Eyes are nonicteric.  Pupils equal and reactive.  Throat is clear.  Neck is supple without mass, no thyromegaly. No cervical or epitrochlear adenopathy.  No axillary lymph nodes. Heart regular rate and rhythm.  Lungs clear to auscultation bilaterally.  Respiratory effort good.  Abdomen soft and nontender.  No hepatosplenomegaly.  Extremities show no edema.  There is positive impingement signs on the right without any obvious rotator cuff weakness.  She does have pain and squaring of the CMC joint.  No effusion.    Diagnostics:     Orders Only on 01/24/2022   Component Date Value Ref Range Status     INR HOME MONITORING 01/24/2022 2.30  2.000 - 3.000 Final       Results for orders placed or performed in visit on 01/25/22   Hemoglobin A1c     Status: Abnormal   Result Value Ref Range    Hemoglobin A1C 7.4 (H) 0.0 - 5.6 %   CBC with platelets and differential     Status: Abnormal   Result Value Ref Range    WBC Count 8.9 4.0 - 11.0 10e3/uL    RBC Count 4.80 3.80 - 5.20 10e6/uL    Hemoglobin 14.0 11.7 - 15.7 g/dL    Hematocrit  43.8 35.0 - 47.0 %    MCV 91 78 - 100 fL    MCH 29.2 26.5 - 33.0 pg    MCHC 32.0 31.5 - 36.5 g/dL    RDW 16.8 (H) 10.0 - 15.0 %    Platelet Count 215 150 - 450 10e3/uL    % Neutrophils 72 %    % Lymphocytes 20 %    % Monocytes 6 %    % Eosinophils 1 %    % Basophils 1 %    % Immature Granulocytes 0 %    Absolute Neutrophils 6.4 1.6 - 8.3 10e3/uL    Absolute Lymphocytes 1.8 0.8 - 5.3 10e3/uL    Absolute Monocytes 0.5 0.0 - 1.3 10e3/uL    Absolute Eosinophils 0.1 0.0 - 0.7 10e3/uL    Absolute Basophils 0.1 0.0 - 0.2 10e3/uL    Absolute Immature Granulocytes 0.0 <=0.4 10e3/uL   CBC with Platelets & Differential     Status: Abnormal    Narrative    The following orders were created for panel order CBC with Platelets & Differential.  Procedure                               Abnormality         Status                     ---------                               -----------         ------                     CBC with platelets and d...[[id number]]  Abnormal            Final result                 Please view results for these tests on the individual orders.       Assessment:     1. Medicare annual wellness visit, subsequent    2. CLL (chronic lymphocytic leukemia) (H)-CBC checked today.  Continue to follow with hematology.   3. Chronic diastolic heart failure (H)-no signs of worsening.   4. Chronic atrial fibrillation (H)-doing well on current management plan.   5. Coagulopathy (H)-no signs of bleeding issues.   6. Stage 3a chronic kidney disease (H)-this has been stable.  Minimal proteinuria.   7. Type 2 diabetes mellitus with other skin complication, without long-term current use of insulin (H)-check A1c and continue to monitor.   8. Exposure to 2019 novel coronavirus    9. Impingement syndrome, shoulder, right    10. Primary osteoarthritis of first carpometacarpal joint of right hand    11. Atrial fibrillation, permanent (H)    12. Primary hypertension    13. Cardiac pacemaker in situ    14. Choledocholithiasis, RECURRENT     15. Peripheral sensory neuropathy    16. MATILDE (obstructive sleep apnea)         Plan:     1. Blood work done today.  2. Covid testing done today.  3. Corticosteroid injection into the thumb and the right shoulder done today.  4. Up-to-date on other preventive care.  5. Likely will want to see her back in 6 months, possibly 12 months if blood work stellar.  6. Continue current medications  7. Follow up sooner if issues.      A personalized health plan based on the identified health risks was provided to the patient on the AVS.       Devon Ball MD  General Internal Medicine  [facility]      Return in about 6 months (around 7/25/2022), or if symptoms worsen or fail to improve, for follow up visit.     No future appointments.

## 2022-01-25 NOTE — PATIENT INSTRUCTIONS
Preventive Health Recommendations    See your health care provider every year (or as recommended) to:    Review health changes.     Discuss preventive care.      Review your medicines and supplements.    Consider having a mammogram at least every 2 years between the ages of 50 and 75 years old.  Yearly if desired.    Consider colon cancer screening between the ages of 50 and 75 years old if necessary.      Cholesterol and diabetes testing as necessary.    At age 65, consider haveing a bone density scan (DEXA) to check for osteoporosis.    Shots:    COVID vaccination if you have not had it yet.    Get a flu shot each year.    Get a tetanus shot every 10 years if you are under the age of 80 years old.    Talk to your doctor about a one time pneumonia vaccine that is recommended at the age of 65 years old.    Talk to your pharmacist about the shingles vaccine.  This is often better covered in the pharmacy through your insurance than if you have it in the clinic.    Lifestyle    Exercise at least 150 minutes a week (30 minutes a day, 5 days a week) if you are able. This will help you control your weight and prevent disease.    Limit alcohol to one drink per day.    No smoking.     Wear sunscreen to prevent skin cancer.     See your dentist twice a year for an exam and cleaning.    See your eye doctor every 1 to 2 years to screen for conditions such as glaucoma, macular degeneration and cataracts.

## 2022-01-26 LAB — SARS-COV-2 RNA RESP QL NAA+PROBE: NOT DETECTED

## 2022-01-26 ASSESSMENT — PATIENT HEALTH QUESTIONNAIRE - PHQ9: SUM OF ALL RESPONSES TO PHQ QUESTIONS 1-9: 1

## 2022-01-31 ENCOUNTER — DOCUMENTATION ONLY (OUTPATIENT)
Dept: INTERNAL MEDICINE | Facility: CLINIC | Age: 87
End: 2022-01-31
Payer: MEDICARE

## 2022-01-31 DIAGNOSIS — I48.21 ATRIAL FIBRILLATION, PERMANENT (H): Primary | ICD-10-CM

## 2022-01-31 LAB — INR HOME MONITORING: 2.1 (ref 2–3)

## 2022-01-31 NOTE — PROGRESS NOTES
ANTICOAGULATION  MANAGEMENT-Home Monitor Managed by Exception    Reba AKI Calderon 86 year old female is on warfarin with therapeutic INR result. (Goal INR 2.0-3.0)    Recent labs: (last 7 days)     01/31/22  0000   INR 2.10         Previous INR was Therapeutic    Medication, diet, health changes since last INR:chart reviewed; none identified    Contacted within the last 12 weeks by phone on 12/13/21      BELGICA     Reba was NOT contacted regarding therapeutic result today per home monitoring policy manage by exception agreement.   Current warfarin dose is to be continued:     Summary  As of 1/31/2022    Full warfarin instructions:  3.75 mg every Mon, Wed, Fri; 2.5 mg all other days   Next INR check:  2/7/2022           ?   Crys Simpson RN  Anticoagulation Clinic  1/31/2022    _______________________________________________________________________     Anticoagulation Episode Summary     Current INR goal:  2.0-3.0   TTR:  90.4 % (6.3 mo)   Target end date:  7/15/2036   Send INR reminders to:  EDWIN MONROE    Indications    Atrial fibrillation  permanent (H) [I48.21]           Comments:  [employer] home monitor. Managed by exception.         Anticoagulation Care Providers     Provider Role Specialty Phone number    Devon Ball MD Referring Internal Medicine [phone]

## 2022-02-07 ENCOUNTER — DOCUMENTATION ONLY (OUTPATIENT)
Dept: ANTICOAGULATION | Facility: CLINIC | Age: 87
End: 2022-02-07
Payer: MEDICARE

## 2022-02-07 DIAGNOSIS — I48.21 ATRIAL FIBRILLATION, PERMANENT (H): Primary | ICD-10-CM

## 2022-02-07 LAB — INR HOME MONITORING: 2.5 (ref 2–3)

## 2022-02-09 ENCOUNTER — TRANSFERRED RECORDS (OUTPATIENT)
Dept: HEALTH INFORMATION MANAGEMENT | Facility: CLINIC | Age: 87
End: 2022-02-09
Payer: MEDICARE

## 2022-02-14 ENCOUNTER — ANTICOAGULATION THERAPY VISIT (OUTPATIENT)
Dept: INTERNAL MEDICINE | Facility: CLINIC | Age: 87
End: 2022-02-14
Payer: MEDICARE

## 2022-02-14 DIAGNOSIS — I48.21 ATRIAL FIBRILLATION, PERMANENT (H): Primary | ICD-10-CM

## 2022-02-14 LAB — INR HOME MONITORING: 1.5 (ref 2–3)

## 2022-02-14 NOTE — PROGRESS NOTES
ANTICOAGULATION MANAGEMENT     Reba Calderon 86 year old female is on warfarin with subtherapeutic INR result. (Goal INR 2.0-3.0)    Recent labs: (last 7 days)     02/14/22  0000   INR 1.50*       ASSESSMENT     Source(s): Chart Review and Patient/Caregiver Call       Warfarin doses taken: Warfarin taken as instructed    Diet: Increased greens/vitamin K in diet; plans to resume previous intake, patient had 3 salads instead of one, used randolph in the last 2 salads, ice romero in the first.    New illness, injury, or hospitalization: No    Medication/supplement changes: None noted    Signs or symptoms of bleeding or clotting: Yes: patient reports some bruising, will monitor    Previous INR: Therapeutic last 2(+) visits    Additional findings: None     PLAN     Recommended plan for temporary change(s) affecting INR     Dosing Instructions: Booster dose then continue your current warfarin dose with next INR in 1 week       Summary  As of 2/14/2022    Full warfarin instructions:  3.75 mg every Mon, Wed, Fri; 2.5 mg all other days   Next INR check:               Telephone call with Reba who verbalizes understanding and agrees to plan    Patient to recheck with home meter    Education provided: Please call back if any changes to your diet, medications or how you've been taking warfarin    Plan made per [facility] anticoagulation protocol    Carly Amador RN  Anticoagulation Clinic  2/14/2022    _______________________________________________________________________     Anticoagulation Episode Summary     Current INR goal:  2.0-3.0   TTR:  89.4 % (6.8 mo)   Target end date:  7/15/2036   Send INR reminders to:  EDWIN MONROE    Indications    Atrial fibrillation  permanent (H) [I48.21]           Comments:  [employer] home monitor. Managed by exception.         Anticoagulation Care Providers     Provider Role Specialty Phone number    Devon Ball MD Referring Internal Medicine [phone]

## 2022-02-21 ENCOUNTER — ANTICOAGULATION THERAPY VISIT (OUTPATIENT)
Dept: INTERNAL MEDICINE | Facility: CLINIC | Age: 87
End: 2022-02-21
Payer: MEDICARE

## 2022-02-21 DIAGNOSIS — I48.21 ATRIAL FIBRILLATION, PERMANENT (H): Primary | ICD-10-CM

## 2022-02-21 LAB — INR HOME MONITORING: 2.3 (ref 2–3)

## 2022-02-21 NOTE — PROGRESS NOTES
Anticoagulation Management    Unable to reach Reba today.    Today's INR result of 2.3 is therapeutic (goal INR of 2.0-3.0).  Result received from: Home Monitor    Follow up required to assess for changes     Spoke to ; patient currently out shopping so will try back later      Anticoagulation clinic to follow up    Palak Velázquez RN

## 2022-02-21 NOTE — PROGRESS NOTES
ANTICOAGULATION MANAGEMENT     Reba Calderon 86 year old female is on warfarin with therapeutic INR result. (Goal INR 2.0-3.0)    Recent labs: (last 7 days)     02/21/22  0000   INR 2.30       ASSESSMENT     Source(s): Chart Review and Patient/Caregiver Call       Warfarin doses taken: Warfarin taken as instructed    Diet: No new diet changes identified    New illness, injury, or hospitalization: No    Medication/supplement changes: None noted    Signs or symptoms of bleeding or clotting: No    Previous INR: Subtherapeutic    Additional findings: None     PLAN     Recommended plan for no diet, medication or health factor changes affecting INR     Dosing Instructions: Continue your current warfarin dose with next INR in 1 week       Summary  As of 2/21/2022    Full warfarin instructions:  3.75 mg every Mon, Wed, Fri; 2.5 mg all other days   Next INR check:  2/28/2022             Telephone call with Reba who verbalizes understanding and agrees to plan    Patient to recheck with home meter    Education provided: Contact [phone]  with any changes, questions or concerns.     Plan made per [facility] anticoagulation protocol    Bre Trejo RN  Anticoagulation Clinic  2/21/2022    _______________________________________________________________________     Anticoagulation Episode Summary     Current INR goal:  2.0-3.0   TTR:  87.6 % (7 mo)   Target end date:  7/15/2036   Send INR reminders to:  EDWIN MONROE    Indications    Atrial fibrillation  permanent (H) [I48.21]           Comments:  [employer] home monitor. Managed by exception.         Anticoagulation Care Providers     Provider Role Specialty Phone number    Devon Ball MD Referring Internal Medicine [phone]          Bre Alexander RN, BSN, PHN  Anticoagulation Nurse  [phone]

## 2022-02-28 ENCOUNTER — DOCUMENTATION ONLY (OUTPATIENT)
Dept: ANTICOAGULATION | Facility: CLINIC | Age: 87
End: 2022-02-28
Payer: MEDICARE

## 2022-02-28 DIAGNOSIS — I48.21 ATRIAL FIBRILLATION, PERMANENT (H): Primary | ICD-10-CM

## 2022-02-28 LAB — INR HOME MONITORING: 2.1 (ref 2–3)

## 2022-02-28 NOTE — PROGRESS NOTES
ANTICOAGULATION  MANAGEMENT-Home Monitor Managed by Exception    Reba AKI Calderon 86 year old female is on warfarin with therapeutic INR result. (Goal INR 2.0-3.0)    Recent labs: (last 7 days)     02/28/22  0000   INR 2.10         Previous INR was Therapeutic    Medication, diet, health changes since last INR:chart reviewed; none identified    Contacted within the last 12 weeks by phone on 2/21/22      BELGICA     Reba was NOT contacted regarding therapeutic result today per home monitoring policy manage by exception agreement.   Current warfarin dose is to be continued:     Summary  As of 2/28/2022    Full warfarin instructions:  3.75 mg every Mon, Wed, Fri; 2.5 mg all other days   Next INR check:  3/7/2022           ?   Cameron aTo RN  Anticoagulation Clinic  2/28/2022    _______________________________________________________________________     Anticoagulation Episode Summary     Current INR goal:  2.0-3.0   TTR:  88.0 % (7.3 mo)   Target end date:  7/15/2036   Send INR reminders to:  EDWIN MONROE    Indications    Atrial fibrillation  permanent (H) [I48.21]           Comments:  [employer] home monitor. Managed by exception.         Anticoagulation Care Providers     Provider Role Specialty Phone number    Devon Ball MD Referring Internal Medicine [phone]

## 2022-03-07 ENCOUNTER — ANTICOAGULATION THERAPY VISIT (OUTPATIENT)
Dept: ANTICOAGULATION | Facility: CLINIC | Age: 87
End: 2022-03-07
Payer: MEDICARE

## 2022-03-07 DIAGNOSIS — I48.21 ATRIAL FIBRILLATION, PERMANENT (H): Primary | ICD-10-CM

## 2022-03-07 LAB — INR HOME MONITORING: 1.4 (ref 2–3)

## 2022-03-07 NOTE — PROGRESS NOTES
ANTICOAGULATION MANAGEMENT     Reba Calderon 86 year old female is on warfarin with subtherapeutic INR result. (Goal INR 2.0-3.0)    Recent labs: (last 7 days)     03/07/22  0000   INR 1.40*       ASSESSMENT       Source(s): Chart Review and Patient/Caregiver Call       Warfarin doses taken: Warfarin taken as instructed    Diet: patient had a salad on Saturday with aleja tomatoes, cumcumbers no skin, lettuce, dill weed, celery, carrots    New illness, injury, or hospitalization: No    Medication/supplement changes: None noted    Signs or symptoms of bleeding or clotting: No    Previous INR: Therapeutic last 2(+) visits    Additional findings: patient had company last week and had been doing a lot of cleaning       PLAN     Recommended plan for temporary change(s) affecting INR     Dosing Instructions: Continue your current warfarin dose with next INR in 1 week       Summary  As of 3/7/2022    Full warfarin instructions:  3/7: 7.5 mg; Otherwise 3.75 mg every Mon, Wed, Fri; 2.5 mg all other days   Next INR check:  3/14/2022             Telephone call with Reba who verbalizes understanding and agrees to plan    Patient to recheck with home meter    Education provided: Importance of consistent vitamin K intake, Goal range and significance of current result, Importance of therapeutic range, Importance of following up at instructed interval, Importance of taking warfarin as instructed, Monitoring for clotting signs and symptoms and When to seek medical attention/emergency care    Plan made per [facility] anticoagulation protocol    Meredith Langley RN  Anticoagulation Clinic  3/7/2022    _______________________________________________________________________     Anticoagulation Episode Summary     Current INR goal:  2.0-3.0   TTR:  85.7 % (7.5 mo)   Target end date:  7/15/2036   Send INR reminders to:  EDWIN MONROE    Indications    Atrial fibrillation  permanent (H) [I48.21]           Comments:  [employer] home monitor.  Managed by [employer].         Anticoagulation Care Providers     Provider Role Specialty Phone number    Devon Ball MD Referring Internal Medicine [phone]

## 2022-03-08 ENCOUNTER — TRANSFERRED RECORDS (OUTPATIENT)
Dept: HEALTH INFORMATION MANAGEMENT | Facility: CLINIC | Age: 87
End: 2022-03-08
Payer: MEDICARE

## 2022-03-08 LAB — RETINOPATHY: NEGATIVE

## 2022-03-14 ENCOUNTER — ANTICOAGULATION THERAPY VISIT (OUTPATIENT)
Dept: ANTICOAGULATION | Facility: CLINIC | Age: 87
End: 2022-03-14
Payer: MEDICARE

## 2022-03-14 DIAGNOSIS — I48.21 ATRIAL FIBRILLATION, PERMANENT (H): Primary | ICD-10-CM

## 2022-03-14 LAB — INR HOME MONITORING: 1.9 (ref 2–3)

## 2022-03-14 NOTE — PROGRESS NOTES
ANTICOAGULATION MANAGEMENT     Reba Calderon 86 year old female is on warfarin with subtherapeutic INR result. (Goal INR 2.0-3.0)    Recent labs: (last 7 days)     03/14/22  0000   INR 1.90*       ASSESSMENT       Source(s): Chart Review and Patient/Caregiver Call       Warfarin doses taken: Warfarin taken as instructed    Diet: No new diet changes identified    New illness, injury, or hospitalization: No    Medication/supplement changes: None noted    Signs or symptoms of bleeding or clotting: No    Previous INR: Subtherapeutic    Additional findings: None       PLAN     Recommended plan for no diet, medication or health factor changes affecting INR     Dosing Instructions:  Increase your warfarin dose (5.9% change) with next INR in 1 week       Summary  As of 3/14/2022    Full warfarin instructions:  2.5 mg every Sun, Tue, Thu; 3.75 mg all other days   Next INR check:               Telephone call with Reba who verbalizes understanding and agrees to plan    Patient to recheck with home meter    Education provided: Please call back if any changes to your diet, medications or how you've been taking warfarin    Plan made per [facility] anticoagulation protocol    Carly Amador, RN  Anticoagulation Clinic  3/14/2022    _______________________________________________________________________     Anticoagulation Episode Summary     Current INR goal:  2.0-3.0   TTR:  83.2 % (7.7 mo)   Target end date:  7/15/2036   Send INR reminders to:  EDWIN MONROE    Indications    Atrial fibrillation  permanent (H) [I48.21]           Comments:  [employer] home monitor. Managed by exception.         Anticoagulation Care Providers     Provider Role Specialty Phone number    Devon Ball MD Referring Internal Medicine [phone]

## 2022-03-21 ENCOUNTER — ANTICOAGULATION THERAPY VISIT (OUTPATIENT)
Dept: ANTICOAGULATION | Facility: CLINIC | Age: 87
End: 2022-03-21
Payer: MEDICARE

## 2022-03-21 DIAGNOSIS — I48.21 ATRIAL FIBRILLATION, PERMANENT (H): Primary | ICD-10-CM

## 2022-03-21 LAB — INR HOME MONITORING: 2.2 (ref 2–3)

## 2022-03-21 NOTE — PROGRESS NOTES
ANTICOAGULATION MANAGEMENT     Reba Calderon 86 year old female is on warfarin with therapeutic INR result. (Goal INR 2.0-3.0)    Recent labs: (last 7 days)     03/21/22  0000   INR 2.20       ASSESSMENT       Source(s): Chart Review and Patient/Caregiver Call       Warfarin doses taken: Warfarin taken as instructed    Diet: No new diet changes identified    New illness, injury, or hospitalization: No    Medication/supplement changes: None noted    Signs or symptoms of bleeding or clotting: No    Previous INR: Therapeutic last 2(+) visits    Additional findings: None       PLAN     Recommended plan for no diet, medication or health factor changes affecting INR     Dosing Instructions: Continue your current warfarin dose with next INR in 1 week       Summary  As of 3/21/2022    Full warfarin instructions:  2.5 mg every Sun, Tue, Thu; 3.75 mg all other days   Next INR check:               Telephone call with Reba who verbalizes understanding and agrees to plan    Patient to recheck with home meter    Education provided: None required and Resume manage by exception with home monitor. Continue to submit INR results to home monitor company.You will only be called when your result is out of range. Please call and notify [facility] if new medication started, dose missed, signs or symptoms of bleeding or clotting, or a surgery/procedure is scheduled.    Plan made per [facility] anticoagulation protocol    Crys Simpson RN  Anticoagulation Clinic  3/21/2022    _______________________________________________________________________     Anticoagulation Episode Summary     Current INR goal:  2.0-3.0   TTR:  82.7 % (8 mo)   Target end date:  7/15/2036   Send INR reminders to:  EDWIN MONROE    Indications    Atrial fibrillation  permanent (H) [I48.21]           Comments:  [employer] home monitor. Managed by exception.         Anticoagulation Care Providers     Provider Role Specialty Phone number    Devon Ball MD Referring Internal  Medicine [phone]

## 2022-03-28 ENCOUNTER — DOCUMENTATION ONLY (OUTPATIENT)
Dept: ANTICOAGULATION | Facility: CLINIC | Age: 87
End: 2022-03-28
Payer: MEDICARE

## 2022-03-28 DIAGNOSIS — I48.21 ATRIAL FIBRILLATION, PERMANENT (H): Primary | ICD-10-CM

## 2022-03-28 LAB — INR HOME MONITORING: 2.5 (ref 2–3)

## 2022-03-28 NOTE — PROGRESS NOTES
ANTICOAGULATION  MANAGEMENT-Home Monitor Managed by Exception    Reba Calderon 86 year old female is on warfarin with therapeutic INR result. (Goal INR 2.0-3.0)    Recent labs: (last 7 days)     03/28/22  0000   INR 2.50         Previous INR was Therapeutic    Medication, diet, health changes since last INR:chart reviewed; none identified    Contacted within the last 12 weeks by phone on 3/21/22      BELGICA Britton was NOT contacted regarding therapeutic result today per home monitoring policy manage by exception agreement.   Current warfarin dose is to be continued:     Summary  As of 3/28/2022    Full warfarin instructions:  2.5 mg every Sun, Tue, Thu; 3.75 mg all other days   Next INR check:  4/4/2022           ?   Bre Trejo RN  Anticoagulation Clinic  3/28/2022    _______________________________________________________________________     Anticoagulation Episode Summary     Current INR goal:  2.0-3.0   TTR:  83.2 % (8.2 mo)   Target end date:  7/15/2036   Send INR reminders to:  EDWIN MONROE    Indications    Atrial fibrillation  permanent (H) [I48.21]           Comments:  [employer] home monitor. Managed by exception.         Anticoagulation Care Providers     Provider Role Specialty Phone number    Devon Ball MD Referring Internal Medicine [phone]          Bre Alexander RN, BSN, PHN  Anticoagulation Nurse  [phone]

## 2022-04-04 ENCOUNTER — DOCUMENTATION ONLY (OUTPATIENT)
Dept: ANTICOAGULATION | Facility: CLINIC | Age: 87
End: 2022-04-04
Payer: MEDICARE

## 2022-04-04 DIAGNOSIS — I48.21 ATRIAL FIBRILLATION, PERMANENT (H): Primary | ICD-10-CM

## 2022-04-04 LAB — INR HOME MONITORING: 2.2 (ref 2–3)

## 2022-04-04 NOTE — PROGRESS NOTES
ANTICOAGULATION  MANAGEMENT-Home Monitor Managed by Exception    Reba AKI Calderon 86 year old female is on warfarin with therapeutic INR result. (Goal INR 2.0-3.0)    Recent labs: (last 7 days)     04/04/22  0000   INR 2.20         Previous INR was Therapeutic    Medication, diet, health changes since last INR:chart reviewed; none identified    Contacted within the last 12 weeks by phone on 3/21/22      BELGICA     Reba was NOT contacted regarding therapeutic result today per home monitoring policy manage by exception agreement.   Current warfarin dose is to be continued:     Summary  As of 4/4/2022    Full warfarin instructions:  2.5 mg every Sun, Tue, Thu; 3.75 mg all other days   Next INR check:  4/11/2022           ?   Crys Simpson RN  Anticoagulation Clinic  4/4/2022    _______________________________________________________________________     Anticoagulation Episode Summary     Current INR goal:  2.0-3.0   TTR:  83.6 % (8.4 mo)   Target end date:  7/15/2036   Send INR reminders to:  EDWIN MONROE    Indications    Atrial fibrillation  permanent (H) [I48.21]           Comments:  [employer] home monitor. Managed by exception.         Anticoagulation Care Providers     Provider Role Specialty Phone number    Devon Ball MD Referring Internal Medicine [phone]

## 2022-04-11 ENCOUNTER — DOCUMENTATION ONLY (OUTPATIENT)
Dept: ANTICOAGULATION | Facility: CLINIC | Age: 87
End: 2022-04-11
Payer: MEDICARE

## 2022-04-11 DIAGNOSIS — I48.21 ATRIAL FIBRILLATION, PERMANENT (H): Primary | ICD-10-CM

## 2022-04-11 LAB — INR HOME MONITORING: 2.6 (ref 2–3)

## 2022-04-11 NOTE — PROGRESS NOTES
ANTICOAGULATION  MANAGEMENT-Home Monitor Managed by Exception    Reba Calderon 86 year old female is on warfarin with therapeutic INR result. (Goal INR 2.0-3.0)    Recent labs: (last 7 days)     04/11/22  0000   INR 2.60         Previous INR was Therapeutic    Medication, diet, health changes since last INR:chart reviewed; none identified    Contacted within the last 12 weeks by phone on 3/21/22      BELGICA Britton was NOT contacted regarding therapeutic result today per home monitoring policy manage by exception agreement.   Current warfarin dose is to be continued:     Summary  As of 4/11/2022    Full warfarin instructions:  2.5 mg every Sun, Tue, Thu; 3.75 mg all other days   Next INR check:  4/25/2022           ?   Bre Trejo RN  Anticoagulation Clinic  4/11/2022    _______________________________________________________________________     Anticoagulation Episode Summary     Current INR goal:  2.0-3.0   TTR:  84.1 % (8.7 mo)   Target end date:  7/15/2036   Send INR reminders to:  EDWIN MONROE    Indications    Atrial fibrillation  permanent (H) [I48.21]           Comments:  [employer] home monitor. Managed by exception.         Anticoagulation Care Providers     Provider Role Specialty Phone number    Devon Ball MD Referring Internal Medicine [phone]          Bre Alexander RN, BSN, PHN  Anticoagulation Nurse  [phone]

## 2022-04-18 ENCOUNTER — DOCUMENTATION ONLY (OUTPATIENT)
Dept: ANTICOAGULATION | Facility: CLINIC | Age: 87
End: 2022-04-18
Payer: MEDICARE

## 2022-04-18 DIAGNOSIS — I48.21 ATRIAL FIBRILLATION, PERMANENT (H): Primary | ICD-10-CM

## 2022-04-18 LAB — INR HOME MONITORING: 2.3 (ref 2–3)

## 2022-04-18 NOTE — PROGRESS NOTES
ANTICOAGULATION  MANAGEMENT-Home Monitor Managed by Exception    Reba AKI Calderon 86 year old female is on warfarin with therapeutic INR result. (Goal INR 2.0-3.0)    Recent labs: (last 7 days)     04/18/22  0000   INR 2.30         Previous INR was Therapeutic    Medication, diet, health changes since last INR:chart reviewed; none identified    Contacted within the last 12 weeks by phone on 3/21/22      BELGICA     Reba was NOT contacted regarding therapeutic result today per home monitoring policy manage by exception agreement.   Current warfarin dose is to be continued:     Summary  As of 4/18/2022    Full warfarin instructions:  2.5 mg every Sun, Tue, Thu; 3.75 mg all other days   Next INR check:  4/25/2022           ?   Crys Simpson RN  Anticoagulation Clinic  4/18/2022    _______________________________________________________________________     Anticoagulation Episode Summary     Current INR goal:  2.0-3.0   TTR:  84.5 % (8.9 mo)   Target end date:  7/15/2036   Send INR reminders to:  EDWIN MONROE    Indications    Atrial fibrillation  permanent (H) [I48.21]           Comments:  [employer] home monitor. Managed by exception.         Anticoagulation Care Providers     Provider Role Specialty Phone number    Devon Ball MD Referring Internal Medicine [phone]

## 2022-04-25 ENCOUNTER — DOCUMENTATION ONLY (OUTPATIENT)
Dept: ANTICOAGULATION | Facility: CLINIC | Age: 87
End: 2022-04-25
Payer: MEDICARE

## 2022-04-25 DIAGNOSIS — I48.21 ATRIAL FIBRILLATION, PERMANENT (H): Primary | ICD-10-CM

## 2022-04-25 LAB — INR HOME MONITORING: 2.2 (ref 2–3)

## 2022-04-25 NOTE — PROGRESS NOTES
ANTICOAGULATION  MANAGEMENT-Home Monitor Managed by Exception    Reba AKI Calderon 86 year old female is on warfarin with therapeutic INR result. (Goal INR 2.0-3.0)    Recent labs: (last 7 days)     04/25/22  0000   INR 2.20         Previous INR was Therapeutic    Medication, diet, health changes since last INR:chart reviewed; none identified    Contacted within the last 12 weeks by phone on 3/21/22      BELGICA     Reba was NOT contacted regarding therapeutic result today per home monitoring policy manage by exception agreement.   Current warfarin dose is to be continued:     Summary  As of 4/25/2022    Full warfarin instructions:  2.5 mg every Sun, Tue, Thu; 3.75 mg all other days   Next INR check:  5/2/2022           ?   Crys Simpson RN  Anticoagulation Clinic  4/25/2022    _______________________________________________________________________     Anticoagulation Episode Summary     Current INR goal:  2.0-3.0   TTR:  84.9 % (9.1 mo)   Target end date:  7/15/2036   Send INR reminders to:  EDWIN MONROE    Indications    Atrial fibrillation  permanent (H) [I48.21]           Comments:  [employer] home monitor. Managed by exception.         Anticoagulation Care Providers     Provider Role Specialty Phone number    Devon Ball MD Referring Internal Medicine [phone]

## 2022-05-02 ENCOUNTER — DOCUMENTATION ONLY (OUTPATIENT)
Dept: ANTICOAGULATION | Facility: CLINIC | Age: 87
End: 2022-05-02
Payer: MEDICARE

## 2022-05-02 DIAGNOSIS — I48.21 ATRIAL FIBRILLATION, PERMANENT (H): Primary | ICD-10-CM

## 2022-05-02 LAB — INR HOME MONITORING: 2.3 (ref 2–3)

## 2022-05-02 NOTE — PROGRESS NOTES
ANTICOAGULATION  MANAGEMENT-Home Monitor Managed by Exception    Reba AKI Calderon 86 year old female is on warfarin with therapeutic INR result. (Goal INR 2.0-3.0)    Recent labs: (last 7 days)     05/02/22  0000   INR 2.30         Previous INR was Therapeutic    Medication, diet, health changes since last INR:chart reviewed; none identified    Contacted within the last 12 weeks by phone on 3/21/22      BELGICA     Reba was NOT contacted regarding therapeutic result today per home monitoring policy manage by exception agreement.   Current warfarin dose is to be continued:     Summary  As of 5/2/2022    Full warfarin instructions:  2.5 mg every Sun, Tue, Thu; 3.75 mg all other days   Next INR check:  5/9/2022           ?   Crys Simpson RN  Anticoagulation Clinic  5/2/2022    _______________________________________________________________________     Anticoagulation Episode Summary     Current INR goal:  2.0-3.0   TTR:  85.2 % (9.4 mo)   Target end date:  7/15/2036   Send INR reminders to:  EDWIN MONROE    Indications    Atrial fibrillation  permanent (H) [I48.21]           Comments:  [employer] home monitor. Managed by exception.         Anticoagulation Care Providers     Provider Role Specialty Phone number    Devon Ball MD Referring Internal Medicine [phone]

## 2022-05-09 ENCOUNTER — ANTICOAGULATION THERAPY VISIT (OUTPATIENT)
Dept: ANTICOAGULATION | Facility: CLINIC | Age: 87
End: 2022-05-09
Payer: MEDICARE

## 2022-05-09 DIAGNOSIS — I48.21 ATRIAL FIBRILLATION, PERMANENT (H): Primary | ICD-10-CM

## 2022-05-09 LAB — INR HOME MONITORING: 1.7 (ref 2–3)

## 2022-05-09 NOTE — PROGRESS NOTES
ANTICOAGULATION MANAGEMENT     Reba Calderon 86 year old female is on warfarin with subtherapeutic INR result. (Goal INR 2.0-3.0)    Recent labs: (last 7 days)     05/09/22  0000   INR 1.70*       ASSESSMENT       Source(s): Chart Review and Patient/Caregiver Call       Warfarin doses taken: Warfarin taken as instructed    Diet: Increased greens/vitamin K in diet; plans to resume previous intake    New illness, injury, or hospitalization: No    Medication/supplement changes: None noted    Signs or symptoms of bleeding or clotting: No    Previous INR: Therapeutic last 2(+) visits    Additional findings: None       PLAN     Recommended plan for temporary change(s) affecting INR     Dosing Instructions: continue your current warfarin dose with next INR in 1 week       Summary  As of 5/9/2022    Full warfarin instructions:  2.5 mg every Sun, Tue, Thu; 3.75 mg all other days   Next INR check:  5/16/2022             Telephone call with Reba who verbalizes understanding and agrees to plan    Patient to recheck with home meter    Education provided: Please call back if any changes to your diet, medications or how you've been taking warfarin, Importance of consistent vitamin K intake, Monitoring for bleeding signs and symptoms and Monitoring for clotting signs and symptoms    Plan made per [facility] anticoagulation protocol    Crys Simpson RN  Anticoagulation Clinic  5/9/2022    _______________________________________________________________________     Anticoagulation Episode Summary     Current INR goal:  2.0-3.0   TTR:  84.3 % (9.6 mo)   Target end date:  7/15/2036   Send INR reminders to:  EDWIN MONROE    Indications    Atrial fibrillation  permanent (H) [I48.21]           Comments:  [employer] home monitor. Managed by exception.         Anticoagulation Care Providers     Provider Role Specialty Phone number    Devon Ball MD Referring Internal Medicine [phone]

## 2022-05-16 ENCOUNTER — ANTICOAGULATION THERAPY VISIT (OUTPATIENT)
Dept: ANTICOAGULATION | Facility: CLINIC | Age: 87
End: 2022-05-16
Payer: MEDICARE

## 2022-05-16 DIAGNOSIS — I48.21 ATRIAL FIBRILLATION, PERMANENT (H): Primary | ICD-10-CM

## 2022-05-16 LAB — INR HOME MONITORING: 2.6 (ref 2–3)

## 2022-05-16 NOTE — PROGRESS NOTES
ANTICOAGULATION MANAGEMENT     Reba Calderon 86 year old female is on warfarin with therapeutic INR result. (Goal INR 2.0-3.0)    Recent labs: (last 7 days)     05/16/22  0000   INR 2.60       ASSESSMENT       Source(s): Chart Review and Patient/Caregiver Call       Warfarin doses taken: Warfarin taken as instructed    Diet: No new diet changes identified    New illness, injury, or hospitalization: No    Medication/supplement changes: None noted    Signs or symptoms of bleeding or clotting: No    Previous INR: Subtherapeutic    Additional findings: None       PLAN     Recommended plan for no diet, medication or health factor changes affecting INR     Dosing Instructions: continue your current warfarin dose with next INR in 1 week       Summary  As of 5/16/2022    Full warfarin instructions:  2.5 mg every Sun, Tue, Thu; 3.75 mg all other days   Next INR check:  5/23/2022             Telephone call with Reba who verbalizes understanding and agrees to plan    Patient to recheck with home meter    Education provided: Please call back if any changes to your diet, medications or how you've been taking warfarin    Plan made per [facility] anticoagulation protocol    Crys Simpson RN  Anticoagulation Clinic  5/16/2022    _______________________________________________________________________     Anticoagulation Episode Summary     Current INR goal:  2.0-3.0   TTR:  84.0 % (9.8 mo)   Target end date:  7/15/2036   Send INR reminders to:  EDWIN MONROE    Indications    Atrial fibrillation  permanent (H) [I48.21]           Comments:  [employer] home monitor. Managed by exception.         Anticoagulation Care Providers     Provider Role Specialty Phone number    Devon Ball MD Referring Internal Medicine [phone]

## 2022-05-24 ENCOUNTER — DOCUMENTATION ONLY (OUTPATIENT)
Dept: ANTICOAGULATION | Facility: CLINIC | Age: 87
End: 2022-05-24
Payer: MEDICARE

## 2022-05-24 DIAGNOSIS — I48.21 ATRIAL FIBRILLATION, PERMANENT (H): Primary | ICD-10-CM

## 2022-05-24 LAB — INR HOME MONITORING: 2.1 (ref 2–3)

## 2022-05-24 NOTE — PROGRESS NOTES
ANTICOAGULATION  MANAGEMENT-Home Monitor Managed by Exception    Reba AKI Calderon 86 year old female is on warfarin with therapeutic INR result. (Goal INR 2.0-3.0)    Recent labs: (last 7 days)     05/24/22  0000   INR 2.10         Previous INR was Therapeutic    Medication, diet, health changes since last INR:chart reviewed; none identified    Contacted within the last 12 weeks by phone on 5/16/22      BELGICA     Reba was NOT contacted regarding therapeutic result today per home monitoring policy manage by exception agreement.   Current warfarin dose is to be continued:       ?   Rosa Maria Neil RN  Anticoagulation Clinic  5/24/2022    _______________________________________________________________________     Anticoagulation Episode Summary     Current INR goal:  2.0-3.0   TTR:  84.4 % (10.1 mo)   Target end date:  7/15/2036   Send INR reminders to:  EDWIN MONROE    Indications    Atrial fibrillation  permanent (H) [I48.21]           Comments:  [employer] home monitor. Managed by exception.         Anticoagulation Care Providers     Provider Role Specialty Phone number    Devon Ball MD Referring Internal Medicine [phone]

## 2022-05-30 LAB — INR HOME MONITORING: 2 (ref 2–3)

## 2022-05-31 ENCOUNTER — ANTICOAGULATION THERAPY VISIT (OUTPATIENT)
Dept: ANTICOAGULATION | Facility: CLINIC | Age: 87
End: 2022-05-31
Payer: MEDICARE

## 2022-05-31 DIAGNOSIS — I48.21 ATRIAL FIBRILLATION, PERMANENT (H): Primary | ICD-10-CM

## 2022-05-31 NOTE — PROGRESS NOTES
ANTICOAGULATION MANAGEMENT     Reba Calderon 86 year old female is on warfarin with therapeutic INR result. (Goal INR 2.0-3.0)    Recent labs: (last 7 days)     05/30/22  0000   INR 2.00       ASSESSMENT       Source(s): Chart Review and Patient/Caregiver Call       Warfarin doses taken: Warfarin taken as instructed    Diet: No new diet changes identified    New illness, injury, or hospitalization: Yes: seen in the ER on 5/29/22 for urinary tract infection     Medication/supplement changes: Keflex started 5/29/22 and patient will take this for 10 days    Signs or symptoms of bleeding or clotting: No    Previous INR: Therapeutic last 2(+) visits    Additional findings: None       PLAN     Recommended plan for temporary change(s) affecting INR     Dosing Instructions: continue your current warfarin dose with next INR in 1 week       Summary  As of 5/31/2022    Full warfarin instructions:  2.5 mg every Sun, Tue, Thu; 3.75 mg all other days   Next INR check:  6/6/2022             Telephone call with Reba who verbalizes understanding and agrees to plan    Patient to recheck with home meter    Education provided: Please call back if any changes to your diet, medications or how you've been taking warfarin, Potential interaction between warfarin and keflex, Monitoring for bleeding signs and symptoms and Monitoring for clotting signs and symptoms    Plan made with [facility] Pharmacist Yusra Simpson, SANDER  Anticoagulation Clinic  5/31/2022    _______________________________________________________________________     Anticoagulation Episode Summary     Current INR goal:  2.0-3.0   TTR:  84.7 % (10.3 mo)   Target end date:  7/15/2036   Send INR reminders to:  EDWIN MONROE    Indications    Atrial fibrillation  permanent (H) [I48.21]           Comments:  [employer] home monitor. Managed by exception.         Anticoagulation Care Providers     Provider Role Specialty Phone number    Devon Ball MD Referring  Internal Medicine [phone]

## 2022-06-06 ENCOUNTER — ANTICOAGULATION THERAPY VISIT (OUTPATIENT)
Dept: ANTICOAGULATION | Facility: CLINIC | Age: 87
End: 2022-06-06
Payer: MEDICARE

## 2022-06-06 ENCOUNTER — DOCUMENTATION ONLY (OUTPATIENT)
Dept: ANTICOAGULATION | Facility: CLINIC | Age: 87
End: 2022-06-06
Payer: MEDICARE

## 2022-06-06 DIAGNOSIS — I48.21 ATRIAL FIBRILLATION, PERMANENT (H): Primary | ICD-10-CM

## 2022-06-06 LAB — INR HOME MONITORING: 2.1 (ref 2–3)

## 2022-06-06 NOTE — PROGRESS NOTES
ANTICOAGULATION MANAGEMENT     Reba Calderon 86 year old female is on warfarin with therapeutic INR result. (Goal INR 2.0-3.0)    Recent labs: (last 7 days)     06/06/22  0000   INR 2.10       ASSESSMENT       Source(s): Chart Review and Patient/Caregiver Call       Warfarin doses taken: Warfarin taken as instructed    Diet: No new diet changes identified    New illness, injury, or hospitalization: Yes: Patient was in the ER on 5/29/22 for a urinary tract infection. Patient states the symptoms have subsisded.    Medication/supplement changes: Keflex through 6/8/22, does not appear to have affected INR    Signs or symptoms of bleeding or clotting: No    Previous INR: Therapeutic last 2(+) visits    Additional findings: None       PLAN     Recommended plan for no diet, medication or health factor changes affecting INR     Dosing Instructions: continue your current warfarin dose with next INR in 1 week       Summary  As of 6/6/2022    Full warfarin instructions:  2.5 mg every Sun, Tue, Thu; 3.75 mg all other days   Next INR check:  6/20/2022             Telephone call with Reba who verbalizes understanding and agrees to plan    Patient to recheck with home meter    Education provided: Please call back if any changes to your diet, medications or how you've been taking warfarin and Resume manage by exception with home monitor. Continue to submit INR results to home monitor company.You will only be called when your result is out of range. Please call and notify [facility] if new medication started, dose missed, signs or symptoms of bleeding or clotting, or a surgery/procedure is scheduled.    Plan made per [facility] anticoagulation protocol    Crys Simpson RN  Anticoagulation Clinic  6/6/2022    _______________________________________________________________________     Anticoagulation Episode Summary     Current INR goal:  2.0-3.0   TTR:  85.0 % (10.5 mo)   Target end date:  7/15/2036   Send INR reminders to:  EDWIN MONROE     Indications    Atrial fibrillation  permanent (H) [I48.21]           Comments:  [employer] home monitor. Managed by exception.         Anticoagulation Care Providers     Provider Role Specialty Phone number    Devon Ball MD Referring Internal Medicine [phone]

## 2022-06-06 NOTE — PROGRESS NOTES
ANTICOAGULATION MANAGEMENT      Reba Calderon due for annual renewal of referral to anticoagulation monitoring. Order pended for your review and signature.      ANTICOAGULATION SUMMARY      Warfarin indication(s)     Atrial fibrillation    Heart valve present?  NO       Current goal range   INR: 2.0-3.0     Goal appropriate for indication? Yes, INR 2-3 appropriate for hx of DVT, PE, hypercoagulable state, Afib, LVAD, or bileaflet AVR without risk factors     Current duration of therapy Indefinite/long term therapy   Time in Therapeutic Range (TTR)  (Goal > 60%) 85%       Office visit with referring provider's group within last year yes on 1/25/22       Crys Simpson RN

## 2022-06-13 ENCOUNTER — ANTICOAGULATION THERAPY VISIT (OUTPATIENT)
Dept: ANTICOAGULATION | Facility: CLINIC | Age: 87
End: 2022-06-13
Payer: MEDICARE

## 2022-06-13 DIAGNOSIS — I48.21 ATRIAL FIBRILLATION, PERMANENT (H): Primary | ICD-10-CM

## 2022-06-13 LAB — INR HOME MONITORING: 1.9 (ref 2–3)

## 2022-06-13 NOTE — PROGRESS NOTES
ANTICOAGULATION MANAGEMENT     Reba Calderon 86 year old female is on warfarin with subtherapeutic INR result. (Goal INR 2.0-3.0)    Recent labs: (last 7 days)     06/13/22  0000   INR 1.9*       ASSESSMENT       Source(s): Chart Review and Patient/Caregiver Call       Warfarin doses taken: Warfarin taken as instructed    Diet: No new diet changes identified    New illness, injury, or hospitalization: No    Medication/supplement changes: None noted    Signs or symptoms of bleeding or clotting: No    Previous INR: Therapeutic last 2(+) visits    Additional findings: None       PLAN     Recommended plan for no diet, medication or health factor changes affecting INR     Dosing Instructions: continue your current warfarin dose with next INR in 1 week. Patient declines stating she would like to boost today given that her INR has been running on the lower end of her range for the last few weeks.       Summary  As of 6/13/2022    Full warfarin instructions:  6/13: 5 mg; Otherwise 2.5 mg every Sun, Tue, Thu; 3.75 mg all other days   Next INR check:  6/20/2022             Telephone call with Reba who verbalizes understanding and agrees to plan    Patient to recheck with home meter    Education provided: Please call back if any changes to your diet, medications or how you've been taking warfarin, Importance of consistent vitamin K intake, Monitoring for bleeding signs and symptoms and Monitoring for clotting signs and symptoms    Plan made per [facility] anticoagulation protocol    Crys Simpson RN  Anticoagulation Clinic  6/13/2022    _______________________________________________________________________     Anticoagulation Episode Summary     Current INR goal:  2.0-3.0   TTR:  84.2 % (10.8 mo)   Target end date:  7/15/2036   Send INR reminders to:  EDWIN MONROE    Indications    Atrial fibrillation  permanent (H) [I48.21]           Comments:  [employer] home monitor. Managed by exception.         Anticoagulation Care Providers      Provider Role Specialty Phone number    Devon Ball MD Referring Internal Medicine [phone]    Leti Brower NP Referring  [phone]

## 2022-06-16 DIAGNOSIS — I50.32 CHRONIC DIASTOLIC (CONGESTIVE) HEART FAILURE (H): ICD-10-CM

## 2022-06-16 RX ORDER — FUROSEMIDE 40 MG
TABLET ORAL
Qty: 180 TABLET | Refills: 0 | Status: SHIPPED | OUTPATIENT
Start: 2022-06-16 | End: 2022-07-19

## 2022-06-20 ENCOUNTER — ANTICOAGULATION THERAPY VISIT (OUTPATIENT)
Dept: ANTICOAGULATION | Facility: CLINIC | Age: 87
End: 2022-06-20
Payer: MEDICARE

## 2022-06-20 DIAGNOSIS — I48.21 ATRIAL FIBRILLATION, PERMANENT (H): Primary | ICD-10-CM

## 2022-06-20 LAB — INR HOME MONITORING: 2.6 (ref 2–3)

## 2022-06-20 NOTE — PROGRESS NOTES
ANTICOAGULATION MANAGEMENT     Reba Calderon 86 year old female is on warfarin with therapeutic INR result. (Goal INR 2.0-3.0)    Recent labs: (last 7 days)     06/20/22  0000   INR 2.6       ASSESSMENT       Source(s): Chart Review and Patient/Caregiver Call       Warfarin doses taken: Warfarin taken as instructed    Diet: No new diet changes identified    New illness, injury, or hospitalization: No    Medication/supplement changes: None noted    Signs or symptoms of bleeding or clotting: No    Previous INR: Subtherapeutic    Additional findings: None       PLAN     Recommended plan for no diet, medication or health factor changes affecting INR     Dosing Instructions: continue your current warfarin dose with next INR in 1 week       Summary  As of 6/20/2022    Full warfarin instructions:  2.5 mg every Sun, Tue, Thu; 3.75 mg all other days   Next INR check:  6/27/2022             Telephone call with Reba who verbalizes understanding and agrees to plan    Patient to recheck with home meter    Education provided: Please call back if any changes to your diet, medications or how you've been taking warfarin and Resume manage by exception with home monitor. Continue to submit INR results to home monitor company.You will only be called when your result is out of range. Please call and notify [facility] if new medication started, dose missed, signs or symptoms of bleeding or clotting, or a surgery/procedure is scheduled.    Plan made per [facility] anticoagulation protocol    Crys Simpson RN  Anticoagulation Clinic  6/20/2022    _______________________________________________________________________     Anticoagulation Episode Summary     Current INR goal:  2.0-3.0   TTR:  84.3 % (11 mo)   Target end date:  7/15/2036   Send INR reminders to:  EDWIN MONROE    Indications    Atrial fibrillation  permanent (H) [I48.21]           Comments:  [employer] home monitor. Managed by exception.         Anticoagulation Care Providers     Provider  Role Specialty Phone number    Devon Ball MD Referring Internal Medicine [phone]    Leti Brower NP Referring  [phone]

## 2022-06-27 ENCOUNTER — DOCUMENTATION ONLY (OUTPATIENT)
Dept: ANTICOAGULATION | Facility: CLINIC | Age: 87
End: 2022-06-27

## 2022-06-27 DIAGNOSIS — I48.21 ATRIAL FIBRILLATION, PERMANENT (H): Primary | ICD-10-CM

## 2022-06-27 LAB — INR HOME MONITORING: 2.2 (ref 2–3)

## 2022-06-27 NOTE — PROGRESS NOTES
ANTICOAGULATION  MANAGEMENT-Home Monitor Managed by Exception    Reba Calderon 86 year old female is on warfarin with therapeutic INR result. (Goal INR 2.0-3.0)    Recent labs: (last 7 days)     06/27/22  0000   INR 2.2         Previous INR was Therapeutic    Medication, diet, health changes since last INR:chart reviewed; none identified    Contacted within the last 12 weeks by phone on 6/20/22      BELGICA Britton was NOT contacted regarding therapeutic result today per home monitoring policy manage by exception agreement.   Current warfarin dose is to be continued:     Summary  As of 6/27/2022    Full warfarin instructions:  2.5 mg every Sun, Tue, Thu; 3.75 mg all other days   Next INR check:  7/5/2022           ?   Crys Simpson RN  Anticoagulation Clinic  6/27/2022    _______________________________________________________________________     Anticoagulation Episode Summary     Current INR goal:  2.0-3.0   TTR:  84.6 % (11.2 mo)   Target end date:  7/15/2036   Send INR reminders to:  EDWIN MONROE    Indications    Atrial fibrillation  permanent (H) [I48.21]           Comments:  [employer] home monitor. Managed by exception.         Anticoagulation Care Providers     Provider Role Specialty Phone number    Devon Ball MD Referring Internal Medicine [phone]    Leti Brower NP Referring  [phone]

## 2022-07-04 LAB — INR HOME MONITORING: 2.5 (ref 2–3)

## 2022-07-05 ENCOUNTER — DOCUMENTATION ONLY (OUTPATIENT)
Dept: ANTICOAGULATION | Facility: CLINIC | Age: 87
End: 2022-07-05

## 2022-07-05 DIAGNOSIS — I48.21 ATRIAL FIBRILLATION, PERMANENT (H): Primary | ICD-10-CM

## 2022-07-05 NOTE — PROGRESS NOTES
ANTICOAGULATION  MANAGEMENT-Home Monitor Managed by Exception    Reba Calderon 86 year old female is on warfarin with therapeutic INR result. (Goal INR 2.0-3.0)    Recent labs: (last 7 days)     07/04/22  0000   INR 2.5         Previous INR was Therapeutic    Medication, diet, health changes since last INR:chart reviewed; none identified    Contacted within the last 12 weeks by phone on 6/20/22      BELGICA Britton was NOT contacted regarding therapeutic result today per home monitoring policy manage by exception agreement.   Current warfarin dose is to be continued:     Summary  As of 7/5/2022    Full warfarin instructions:  2.5 mg every Sun, Tue, Thu; 3.75 mg all other days   Next INR check:  7/11/2022           ?   Crys Simpson RN  Anticoagulation Clinic  7/5/2022    _______________________________________________________________________     Anticoagulation Episode Summary     Current INR goal:  2.0-3.0   TTR:  84.9 % (11.5 mo)   Target end date:  7/15/2036   Send INR reminders to:  EDWIN MONROE    Indications    Atrial fibrillation  permanent (H) [I48.21]           Comments:  [employer] home monitor. Managed by exception.         Anticoagulation Care Providers     Provider Role Specialty Phone number    Devon Ball MD Referring Internal Medicine [phone]    Leti Brower NP Referring  [phone]

## 2022-07-06 DIAGNOSIS — I48.21 ATRIAL FIBRILLATION, PERMANENT (H): Primary | ICD-10-CM

## 2022-07-11 ENCOUNTER — ANTICOAGULATION THERAPY VISIT (OUTPATIENT)
Dept: ANTICOAGULATION | Facility: CLINIC | Age: 87
End: 2022-07-11

## 2022-07-11 DIAGNOSIS — I48.21 ATRIAL FIBRILLATION, PERMANENT (H): Primary | ICD-10-CM

## 2022-07-11 LAB — INR HOME MONITORING: 1.5 (ref 2–3)

## 2022-07-11 NOTE — PROGRESS NOTES
ANTICOAGULATION MANAGEMENT     Reba Calderon 86 year old female is on warfarin with subtherapeutic INR result. (Goal INR 2.0-3.0)    Recent labs: (last 7 days)     07/11/22  0000   INR 1.5*       ASSESSMENT       Source(s): Chart Review and Patient/Caregiver Call       Warfarin doses taken: Warfarin taken as instructed    Diet: Increased greens/vitamin K in diet; ongoing change    New illness, injury, or hospitalization: No    Medication/supplement changes: None noted    Signs or symptoms of bleeding or clotting: No    Previous INR: Therapeutic last 2(+) visits    Additional findings: None       PLAN     Recommended plan for ongoing change(s) affecting INR     Dosing Instructions: booster dose then Increase your warfarin dose (16.7% change) with next INR in 1 week       Summary  As of 7/11/2022    Full warfarin instructions:  7/11: 5 mg; Otherwise 3.75 mg every day   Next INR check:  7/18/2022             Telephone call with Reba who verbalizes understanding and agrees to plan    Patient to recheck with home meter    Education provided: Importance of consistent vitamin K intake, Impact of vitamin K foods on INR and Vitamin K content of foods    Plan made per [facility] anticoagulation protocol    Meredith Langley, RN  Anticoagulation Clinic  7/11/2022    _______________________________________________________________________     Anticoagulation Episode Summary     Current INR goal:  2.0-3.0   TTR:  84.1 % (11.7 mo)   Target end date:  7/15/2036   Send INR reminders to:  EDWIN MONROE    Indications    Atrial fibrillation  permanent (H) [I48.21]           Comments:  [employer] home monitor. Managed by exception.         Anticoagulation Care Providers     Provider Role Specialty Phone number    Devon Ball MD Referring Internal Medicine [phone]    Leti Brower NP Referring  [phone]

## 2022-07-18 ENCOUNTER — ANTICOAGULATION THERAPY VISIT (OUTPATIENT)
Dept: ANTICOAGULATION | Facility: CLINIC | Age: 87
End: 2022-07-18

## 2022-07-18 DIAGNOSIS — I48.21 ATRIAL FIBRILLATION, PERMANENT (H): Primary | ICD-10-CM

## 2022-07-18 LAB — INR HOME MONITORING: 3.1 (ref 2–3)

## 2022-07-18 NOTE — PROGRESS NOTES
ANTICOAGULATION MANAGEMENT     Reba Calderon 86 year old female is on warfarin with supratherapeutic INR result. (Goal INR 2.0-3.0)    Recent labs: (last 7 days)     07/18/22  0000   INR 3.1*       ASSESSMENT       Source(s): Chart Review and Patient/Caregiver Call       Warfarin doses taken: Warfarin taken as instructed    Diet: Increased greens/vitamin K in diet; ongoing change-patient has been eating a lot of strawberries and musk melon, but will decrease how much she is eating because they are also affecting there BG's.    New illness, injury, or hospitalization: No    Medication/supplement changes: None noted    Signs or symptoms of bleeding or clotting: No    Previous INR: Subtherapeutic    Additional findings: patient requested to go back to 3.75 mg four days per week and 2.5 mg the rest of the week.  Advised patient this would be too much of a decrease based on her INR result today.  Patient replied she just doesn't want to bruise or bleed.         PLAN     Recommended plan for ongoing change(s) affecting INR     Dosing Instructions: decrease your warfarin dose (4.8% change) with next INR in 1 week       Summary  As of 7/18/2022    Full warfarin instructions:  2.5 mg every Mon; 3.75 mg all other days   Next INR check:  7/25/2022             Telephone call with Reba who verbalizes understanding and agrees to plan. Patient was reluctant to only decrease one day, but advised since she is on the lower end of the high range decreasing one day should be sufficient to bring her into range.    Patient to recheck with home meter    Education provided: Importance of consistent vitamin K intake, Impact of vitamin K foods on INR and Vitamin K content of foods    Plan made per [facility] anticoagulation protocol    Meredith Langley, RN  Anticoagulation Clinic  7/18/2022    _______________________________________________________________________     Anticoagulation Episode Summary     Current INR goal:  2.0-3.0   TTR:  83.7 %  (11.9 mo)   Target end date:  7/15/2036   Send INR reminders to:  EDWIN MONROE    Indications    Atrial fibrillation  permanent (H) [I48.21]           Comments:  [employer] home monitor. Managed by exception.         Anticoagulation Care Providers     Provider Role Specialty Phone number    Devon Ball MD Referring Internal Medicine [phone]    Leti Brower NP Referring  [phone]

## 2022-07-19 ENCOUNTER — OFFICE VISIT (OUTPATIENT)
Dept: INTERNAL MEDICINE | Facility: CLINIC | Age: 87
End: 2022-07-19
Payer: MEDICARE

## 2022-07-19 VITALS
HEIGHT: 67 IN | WEIGHT: 203 LBS | HEART RATE: 86 BPM | BODY MASS INDEX: 31.86 KG/M2 | OXYGEN SATURATION: 96 % | TEMPERATURE: 97.4 F | DIASTOLIC BLOOD PRESSURE: 74 MMHG | SYSTOLIC BLOOD PRESSURE: 122 MMHG

## 2022-07-19 DIAGNOSIS — I48.21 PERMANENT ATRIAL FIBRILLATION (H): ICD-10-CM

## 2022-07-19 DIAGNOSIS — D69.2 SENILE PURPURA (H): Primary | ICD-10-CM

## 2022-07-19 DIAGNOSIS — D68.9 COAGULOPATHY (H): ICD-10-CM
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
## 2022-07-19 DIAGNOSIS — I50.32 CHRONIC DIASTOLIC (CONGESTIVE) HEART FAILURE (H): ICD-10-CM

## 2022-07-19 DIAGNOSIS — R30.0 DYSURIA: ICD-10-CM

## 2022-07-19 DIAGNOSIS — Z79.01 LONG TERM (CURRENT) USE OF ANTICOAGULANTS: ICD-10-CM

## 2022-07-19 DIAGNOSIS — I10 ESSENTIAL HYPERTENSION: ICD-10-CM

## 2022-07-19 DIAGNOSIS — E11.628 TYPE 2 DIABETES MELLITUS WITH OTHER SKIN COMPLICATION, WITHOUT LONG-TERM CURRENT USE OF INSULIN (H): ICD-10-CM

## 2022-07-19 LAB
ALBUMIN SERPL BCG-MCNC: 4.4 G/DL (ref 3.5–5.2)
ALP SERPL-CCNC: 74 U/L (ref 35–104)
ALT SERPL W P-5'-P-CCNC: 11 U/L (ref 10–35)
ANION GAP SERPL CALCULATED.3IONS-SCNC: 12 MMOL/L (ref 7–15)
AST SERPL W P-5'-P-CCNC: 26 U/L (ref 10–35)
BILIRUB DIRECT SERPL-MCNC: <0.2 MG/DL (ref 0–0.3)
BILIRUB SERPL-MCNC: 0.6 MG/DL
BUN SERPL-MCNC: 24 MG/DL (ref 8–23)
CALCIUM SERPL-MCNC: 9.9 MG/DL (ref 8.8–10.2)
CHLORIDE SERPL-SCNC: 99 MMOL/L (ref 98–107)
CREAT SERPL-MCNC: 0.84 MG/DL (ref 0.51–0.95)
DEPRECATED HCO3 PLAS-SCNC: 30 MMOL/L (ref 22–29)
GFR SERPL CREATININE-BSD FRML MDRD: 67 ML/MIN/1.73M2
GLUCOSE SERPL-MCNC: 165 MG/DL (ref 70–99)
HBA1C MFR BLD: 7.2 % (ref 0–5.6)
POTASSIUM SERPL-SCNC: 3.9 MMOL/L (ref 3.4–5.3)
PROT SERPL-MCNC: 7 G/DL (ref 6.4–8.3)
SODIUM SERPL-SCNC: 141 MMOL/L (ref 136–145)

## 2022-07-19 PROCEDURE — 36415 COLL VENOUS BLD VENIPUNCTURE: CPT | Performed by: INTERNAL MEDICINE

## 2022-07-19 PROCEDURE — 83036 HEMOGLOBIN GLYCOSYLATED A1C: CPT | Performed by: INTERNAL MEDICINE

## 2022-07-19 PROCEDURE — 82248 BILIRUBIN DIRECT: CPT | Performed by: INTERNAL MEDICINE

## 2022-07-19 PROCEDURE — 99215 OFFICE O/P EST HI 40 MIN: CPT | Performed by: INTERNAL MEDICINE

## 2022-07-19 PROCEDURE — 99207 PR FOOT EXAM NO CHARGE: CPT | Mod: 25 | Performed by: INTERNAL MEDICINE

## 2022-07-19 PROCEDURE — 80053 COMPREHEN METABOLIC PANEL: CPT | Performed by: INTERNAL MEDICINE

## 2022-07-19 RX ORDER — FUROSEMIDE 40 MG
TABLET ORAL
Qty: 180 TABLET | Refills: 3 | Status: SHIPPED | OUTPATIENT
Start: 2022-07-19 | End: 2023-08-21

## 2022-07-19 RX ORDER — WARFARIN SODIUM 2.5 MG/1
TABLET ORAL
Qty: 120 TABLET | Refills: 1 | Status: SHIPPED | OUTPATIENT
Start: 2022-07-19 | End: 2022-12-30

## 2022-07-19 RX ORDER — POTASSIUM CHLORIDE 750 MG/1
10 TABLET, EXTENDED RELEASE ORAL DAILY
Qty: 90 TABLET | Refills: 3 | Status: SHIPPED | OUTPATIENT
Start: 2022-07-19 | End: 2023-06-27

## 2022-07-19 NOTE — PATIENT INSTRUCTIONS
Please follow up if you have any further issues.    You may contact me by phone or MyChart if you are worsening or if things are not improving.    ______________________________________________________________________     Please remember that you can call [phone] to schedule an appointment.     You can schedule appointments 24 hours a day, 7 days a week.  Sometimes the best time to schedule an appointment is after clinic hours when less people are calling in.  Weekends are another option for calling in to schedule appointments.

## 2022-07-19 NOTE — PROGRESS NOTES
[facility] - [facility]ists    Comprehensive and complex medical care - Chronic disease management - Shared decision making - Care coordination - Compassionate care    Patient advocacy - Rational deprescribing - Minimally disruptive medicine - Ethical focus - Customized care         Date of Service: 7/19/2022  Primary Provider: Devon Ball    Patient Care Team:  Devon Ball MD as PCP - General (Internal Medicine)  Sherwin Coats MD as Physician (Hematology)  Louis Jacobsen MD as MD (Ophthalmology)  Shyam Barrett MD as MD (Colon & Rectal)  DANGELO Pagan as MD  Devon Ball MD as Assigned PCP          Patient's Pharmacy:    [facility]/pharmacy #[id number] - [place], [place] - [address]  [address] [address] [facility] [address]  Phone: [phone] Fax: [phone]     Patient's Contacts:  Name Home Phone Work Phone Mobile Phone Relationship Lgl Grd   XIMENA GUPTA [phone]   Spouse    JYOTHI SHEIKH   [phone] Daughter      Patient's Insurance:    Payor: MEDICARE / Plan: MEDICARE / Product Type: Medicare /           Active Problem List:  Problem List as of 7/19/2022 Reviewed: 7/19/2022  1:02 PM by Devon Ball MD       High    Nonsmoker    Atrial fibrillation, permanent (H)    CLL (chronic lymphocytic leukemia) (H)       Medium    Cardiac pacemaker in situ    HTN (hypertension)    Chronic diastolic heart failure (H)    Actinomycosis    Chronic kidney disease, stage 3 (H)    H/O atrioventricular jerzy ablation    CHB (complete heart block) (H)    Anticoagulation monitoring, INR range 2-3    Choledocholithiasis, RECURRENT       Low    Peripheral sensory neuropathy    MATILDE (obstructive sleep apnea)    Perirectal abscess    Coagulopathy (H) - due to warfarin    Senile purpura (H)           Current Outpatient Medications   Medication Instructions     atorvastatin (LIPITOR) 10 MG tablet TAKE 1 TABLET BY MOUTH EVERY EVENING     biotin 2,500 mcg, Oral      blood glucose (CONTOUR TEST) test strip 1 strip, In Vitro, DAILY     Calcium Carb-Ergocalciferol 500-200 MG-UNIT TABS 1 tablet, Oral     Cholecalciferol (VITAMIN D3 PO) 2,000 Units, Oral, DAILY     FOLIC ACID PO 1 mg, DAILY     furosemide (LASIX) 40 MG tablet TAKE 2 TABLETS BY MOUTH EVERY MORNING     FUROSEMIDE PO 40 mg, DAILY     glucosamine-chondroitinoitin 500-400 MG tablet 1 tablet, Oral     lisinopril (ZESTRIL) 10 mg, Oral, DAILY     magnesium chloride 64 mg, Oral     metFORMIN (GLUCOPHAGE) 500 mg, Oral, 2 TIMES DAILY WITH MEALS     nitroGLYcerin (NITROSTAT) 0.4 mg, Sublingual     pantoprazole (PROTONIX) 40 MG EC tablet TAKE 1 TABLET BY MOUTH EVERY DAY     polyethylene glycol (MIRALAX) 17 GM/Dose powder MIX 1 CAPFUL (17 GRAMS) IN LIQUID AND DRINK BY MOUTH DAILY. (OTC NC)     potassium chloride ER (K-TAB/KLOR-CON) 10 MEQ CR tablet 10 mEq, Oral, DAILY     triamcinolone (KENALOG) 0.1 % ointment Topical     UNABLE TO FIND MEDICATION NAME: Hair skin and nails   2500     vitamin (B COMPLEX) capsule 1 capsule, Oral     vitamin B-12 (CYANOCOBALAMIN) 1,000 mcg, Oral     warfarin ANTICOAGULANT (COUMADIN) 2.5 MG tablet TAKE BY MOUTH 2.5 MG (2.5 MG X 1) EVERY MON, WED, FRI 3.75 MG (2.5 MG X 1.5) ALL OTHER DAYS     Social History     Social History Narrative           Subjective:     Reba Calderon is a 86 year old female who comes in today for:    Chief Complaint   Patient presents with     RECHECK     Right shoulder pain joints are okay but has had a few tumbles, memory issues, Afib      Patient comes in today for follow-up of a number of issues.    We reviewed her blood sugars and they have been up at times when she is not feeling well.  We are following up on this today.    She has had a couple of falls in the last couple of weeks.  They have been bothersome for her.  She had 1 time where she hit a rock and tripped on it.  She also fell about a week ago.  There was no syncope with this.  She has had some  "injuries with this including to her left hip left rib right head and right rear.  None of these have been severe and have been improving.    She had an episode of dysuria.  It was fairly extreme for 3 hours and then resolved.  She wonders whether she passed a kidney stone.  She is had no recurrence.  No CT scans at that time.    She does have some gait issues and some mobility issues in part related to a right ankle compound fracture she had remotely.  It does have issues with bending.    She does get some vertigo type symptoms when putting her head back.    She does get some easy bruising in her upper extremities.    We reviewed her medications today.    We reviewed her other issues noted in the assessment but not specifically addressed in the HPI above.     Objective:     Wt Readings from Last 3 Encounters:   07/19/22 92.1 kg (203 lb)   01/25/22 93.9 kg (207 lb)   07/15/21 93.9 kg (207 lb)     BP Readings from Last 3 Encounters:   07/19/22 122/74   01/25/22 134/70   07/15/21 112/66     /74   Pulse 86   Temp 97.4  F (36.3  C) (Oral)   Ht 1.702 m (5' 7\")   Wt 92.1 kg (203 lb)   SpO2 96%   BMI 31.79 kg/m     The patient is comfortable, no acute distress.  Mood good.  Insight good.  Eyes are nonicteric.  Neck is supple without mass.  No cervical adenopathy.  No thyromegaly. Heart irregular rate and rhythm.  Lungs clear to auscultation bilaterally.  Respiratory effort is good.  Abdomen soft and nontender.  No hepatosplenomegaly.  Extremities no edema.  Senile purpura on the forearms.      Diagnostics:     Orders Only on 07/18/2022   Component Date Value Ref Range Status     INR HOME MONITORING 07/18/2022 3.1 (A) 2.000 - 3.000 Final       Results for orders placed or performed in visit on 07/19/22   Hemoglobin A1c     Status: Abnormal   Result Value Ref Range    Hemoglobin A1C 7.2 (H) 0.0 - 5.6 %   Basic metabolic panel     Status: Abnormal   Result Value Ref Range    Creatinine 0.84 0.51 - 0.95 mg/dL    " Sodium 141 136 - 145 mmol/L    Potassium 3.9 3.4 - 5.3 mmol/L    Urea Nitrogen 24.0 (H) 8.0 - 23.0 mg/dL    Chloride 99 98 - 107 mmol/L    Carbon Dioxide (CO2) 30 (H) 22 - 29 mmol/L    Anion Gap 12 7 - 15 mmol/L    Glucose 165 (H) 70 - 99 mg/dL    GFR Estimate 67 >60 mL/min/1.73m2    Calcium 9.9 8.8 - 10.2 mg/dL   Hepatic function panel     Status: Normal   Result Value Ref Range    Protein Total 7.0 6.4 - 8.3 g/dL    Albumin 4.4 3.5 - 5.2 g/dL    Bilirubin Total 0.6 <=1.2 mg/dL    Alkaline Phosphatase 74 35 - 104 U/L    AST 26 10 - 35 U/L    ALT 11 10 - 35 U/L    Bilirubin Direct <0.20 0.00 - 0.30 mg/dL     ECG results from 03/20/19   EKG 12-lead, tracing only     Value    Systolic Blood Pressure     Diastolic Blood Pressure     Ventricular Rate 89    Atrial Rate 88    IA Interval     QRS Duration 158        QTc 489    P Axis 79    R AXIS -57    T Axis 106    Interpretation ECG      Atrial fibrillation with a demand pacemaker  Abnormal ECG  When compared with ECG of 12-MAR-2010 12:09,  Atrial fibrillation with a demand pacemaker has replaced Sinus rhythm  Confirmed by MAURA BLISS MD LOC:JN ([id number]) on 3/20/2019 11:59:28 AM          Assessment:     1. Senile purpura (H)     - - Continue to monitor - - no signs of worsening.    2. Essential hypertension    3. Chronic diastolic (congestive) heart failure (H)     - - Continue to monitor - - continue current medications.    4. Permanent atrial fibrillation (H)     - - Continue to monitor - - no change in treatment.    5. Long term (current) use of anticoagulants    6. Type 2 diabetes mellitus with other skin complication, without long-term current use of insulin (H)     - - Continue to monitor - - blood work done today.    7. Coagulopathy (H)     - - Continue to monitor - - no signs of significant bleeding.    8. Fall, initial encounter        Plan:     1. Check blood work today.     2. Refilled medications.  3. Follow up hematology related to chronic  lymphocytic leukemia (CLL).  4. Continue current medications otherwise.  5. Follow up sooner if issues.    Orders Placed This Encounter   Procedures     AL FOOT EXAM NO CHARGE     Hemoglobin A1c     Basic metabolic panel     Hepatic function panel      40 minutes or greater was spent today on the patient's care on the day of service.      This includes time for chart preparation, reviewing medical tests done before or during the visit, talking with the patient, review of quality indicators, required documentation, and other elements of care.        Devon Ball MD  General Internal Medicine  [facility]    Return in about 6 months (around 1/19/2023), or if symptoms worsen or fail to improve, for visit and blood work.     No future appointments.

## 2022-07-25 ENCOUNTER — ANTICOAGULATION THERAPY VISIT (OUTPATIENT)
Dept: ANTICOAGULATION | Facility: CLINIC | Age: 87
End: 2022-07-25

## 2022-07-25 ENCOUNTER — TELEPHONE (OUTPATIENT)
Dept: INTERNAL MEDICINE | Facility: CLINIC | Age: 87
End: 2022-07-25

## 2022-07-25 DIAGNOSIS — I48.21 ATRIAL FIBRILLATION, PERMANENT (H): Primary | ICD-10-CM

## 2022-07-25 LAB — INR HOME MONITORING: 3 (ref 2–3)

## 2022-07-25 NOTE — TELEPHONE ENCOUNTER
I could see her back in clinic tomorrow at 4:30 pm for this if she would like to recheck on this.    Devon Ball MD  General Internal Medicine  [facility]  7/25/2022, 9:43 AM

## 2022-07-25 NOTE — PROGRESS NOTES
ANTICOAGULATION MANAGEMENT     Reba Calderon 86 year old female is on warfarin with therapeutic INR result. (Goal INR 2.0-3.0)    Recent labs: (last 7 days)     07/25/22  0000   INR 3.0       ASSESSMENT       Source(s): Chart Review and Patient/Caregiver Call       Warfarin doses taken: Warfarin taken as instructed    Diet: No new diet changes identified    New illness, injury, or hospitalization: Yes: patient reports her vertigo has worsened since 7/19/22. Patient was seen by primary care provider that day, no concerns then per patient. Patient also reports she went to urgent care 7/24/22 for dizziness, and they did not find a cause or give her any medication. Encounter sent to triage and primary care provider- see TE from 7/25/22. Patient will call back with further concerns.    Medication/supplement changes: None noted    Signs or symptoms of bleeding or clotting: No    Previous INR: Supratherapeutic    Additional findings: None       PLAN     Recommended plan for no diet, medication or health factor changes affecting INR     Dosing Instructions: continue your current warfarin dose with next INR in 1 week       Summary  As of 7/25/2022    Full warfarin instructions:  2.5 mg every Mon; 3.75 mg all other days   Next INR check:  8/1/2022             Telephone call with Reba who verbalizes understanding and agrees to plan    Patient to recheck with home meter    Education provided: Please call back if any changes to your diet, medications or how you've been taking warfarin    Plan made per [facility] anticoagulation protocol    Crys Simpson RN  Anticoagulation Clinic  7/25/2022    _______________________________________________________________________     Anticoagulation Episode Summary     Current INR goal:  2.0-3.0   TTR:  82.2 % (1 y)   Target end date:  7/15/2036   Send INR reminders to:  EDWIN MONROE    Indications    Atrial fibrillation  permanent (H) [I48.21]           Comments:  [employer] home monitor. Managed by  exception.         Anticoagulation Care Providers     Provider Role Specialty Phone number    Devon Ball MD Referring Internal Medicine [phone]    Leti Brower NP Referring  [phone]

## 2022-07-25 NOTE — TELEPHONE ENCOUNTER
While speaking with [facility] today, patient complained that her dizziness has continued since 7/19/22. Patient reports she was seen in urgent care on 7/24/22. No cause of dizziness was found, and no medication was given to patient. Patient reports she is starting to feel unsafe walking due to the dizziness. Will route to patient's triage team for further triage.

## 2022-07-26 ENCOUNTER — TRANSFERRED RECORDS (OUTPATIENT)
Dept: HEALTH INFORMATION MANAGEMENT | Facility: CLINIC | Age: 87
End: 2022-07-26

## 2022-07-26 ENCOUNTER — OFFICE VISIT (OUTPATIENT)
Dept: INTERNAL MEDICINE | Facility: CLINIC | Age: 87
End: 2022-07-26
Payer: MEDICARE

## 2022-07-26 VITALS
BODY MASS INDEX: 31.64 KG/M2 | DIASTOLIC BLOOD PRESSURE: 78 MMHG | TEMPERATURE: 97.8 F | RESPIRATION RATE: 18 BRPM | WEIGHT: 202 LBS | OXYGEN SATURATION: 95 % | HEART RATE: 81 BPM | SYSTOLIC BLOOD PRESSURE: 124 MMHG

## 2022-07-26 DIAGNOSIS — H81.11 BENIGN PAROXYSMAL POSITIONAL VERTIGO, RIGHT: Primary | ICD-10-CM

## 2022-07-26 DIAGNOSIS — H93.13 TINNITUS, BILATERAL: ICD-10-CM

## 2022-07-26 PROCEDURE — 99214 OFFICE O/P EST MOD 30 MIN: CPT | Performed by: INTERNAL MEDICINE

## 2022-07-26 RX ORDER — MECLIZINE HYDROCHLORIDE 25 MG/1
25 TABLET ORAL 3 TIMES DAILY PRN
Qty: 40 TABLET | Refills: 1 | Status: SHIPPED | OUTPATIENT
Start: 2022-07-26 | End: 2023-09-14

## 2022-07-26 NOTE — PATIENT INSTRUCTIONS
Please follow up if you have any further issues.    You may contact me by phone or MyChart if you are worsening or if things are not improving.    ______________________________________________________________________     You can call [phone] during weekday hours to get a hold of our team coordinators if you need to get a message to me.    There are 3 people in our building taking phone calls for me.  Be sure to listen to the prompts to get a hold of them.    You first press 1 for English (press another number for a different language) and then press 2 to get the .    They can then take you message and forward it onto me.    ______________________________________________________________________     Please remember that you can call [phone] ANYTIME to schedule an appointment.     You can schedule appointments 24 hours a day, 7 days a week.      Sometimes the best time to schedule an appointment is after clinic hours when less people are calling in.      Weekends are another option for calling in to schedule appointments.

## 2022-07-28 ENCOUNTER — TELEPHONE (OUTPATIENT)
Dept: INTERNAL MEDICINE | Facility: CLINIC | Age: 87
End: 2022-07-28

## 2022-07-28 DIAGNOSIS — H81.10 BENIGN PAROXYSMAL POSITIONAL VERTIGO, UNSPECIFIED LATERALITY: Primary | ICD-10-CM

## 2022-07-28 DIAGNOSIS — H81.11 BENIGN PAROXYSMAL POSITIONAL VERTIGO, RIGHT: ICD-10-CM

## 2022-07-28 NOTE — TELEPHONE ENCOUNTER
Notify the patient that a referral was placed.    Could try calling to schedule:    [address], [address]  [place] [address] [phone]    Otherwise, there are a number of other sites in the system who can provide this service.    Let us know if there are still issues with scheduling.    Devon Ball MD  General Internal Medicine  [facility]  7/28/2022, 11:05 AM

## 2022-07-28 NOTE — TELEPHONE ENCOUNTER
Order/Referral Request    Who is requesting: Patient    Orders being requested: PT/OT    Reason service is needed/diagnosis: Vertigo    When are orders needed by: As soon as possible    Has this been discussed with Provider: Yes    Does patient have a preference on a Group/Provider/Facility? [facility]    Does patient have an appointment scheduled?: No    Where to send orders: N/A    Could we send this information to you in [facility] or would you prefer to receive a phone call?:   Patient would prefer a phone call   Okay to leave a detailed message?: Yes at Home number on file [phone] (home)

## 2022-07-30 NOTE — PROGRESS NOTES
[facility] - [facility]    Comprehensive and complex medical care - Chronic disease management - Shared decision making - Care coordination - Compassionate care    Patient advocacy - Rational deprescribing - Minimally disruptive medicine - Ethical focus - Customized care         Date of Service: 7/26/2022  Primary Provider: Devon Ball    Patient Care Team:  Devon Ball MD as PCP - General (Internal Medicine)  Sherwin Coats MD as Physician (Hematology)  Louis Jacobsen MD as MD (Ophthalmology)  Shyam Barrett MD as MD (Colon & Rectal)  DANGELO Pagan as Devon Oro MD as Assigned PCP          Patient's Pharmacy:    [facility]/pharmacy #[id number] - [facility] [place], [place] - [address]  [address] [address]  Phone: [phone] Fax: [phone]     Patient's Contacts:  Name Home Phone Work Phone Mobile Phone Relationship Lgl Grd   XIMENA GUPTA [phone]   Spouse    JYOTHI SHEIKH   [phone] Daughter      Patient's Insurance:    Payor: MEDICARE / Plan: MEDICARE / Product Type: Medicare /           Active Problem List:  Problem List as of 7/26/2022 Reviewed: 7/19/2022  1:02 PM by Devon Ball MD       High    Nonsmoker    Atrial fibrillation, permanent (H)    CLL (chronic lymphocytic leukemia) (H)       Medium    Cardiac pacemaker in situ    HTN (hypertension)    Chronic kidney disease, stage 3 (H)    H/O atrioventricular jerzy ablation    CHB (complete heart block) (H)    Anticoagulation monitoring, INR range 2-3    Choledocholithiasis, RECURRENT       Low    Peripheral sensory neuropathy    MATILDE (obstructive sleep apnea)    Perirectal abscess    Coagulopathy (H) - due to warfarin    Senile purpura (H)       Other    Chronic diastolic heart failure (H)    Actinomycosis           Current Outpatient Medications   Medication Instructions     atorvastatin (LIPITOR) 10 MG tablet TAKE 1 TABLET BY MOUTH EVERY EVENING     biotin 2,500 mcg,  Oral     blood glucose (CONTOUR TEST) test strip 1 strip, In Vitro, DAILY     Cholecalciferol (VITAMIN D3 PO) 2,000 Units, Oral, DAILY     FOLIC ACID PO 1 mg, DAILY     furosemide (LASIX) 40 MG tablet TAKE 2 TABLETS BY MOUTH EVERY MORNING     glucosamine-chondroitinoitin 500-400 MG tablet 1 tablet, Oral     lisinopril (ZESTRIL) 10 mg, Oral, DAILY     magnesium chloride 64 mg, Oral     meclizine (ANTIVERT) 25 mg, Oral, 3 TIMES DAILY PRN     metFORMIN (GLUCOPHAGE) 500 mg, Oral, 2 TIMES DAILY WITH MEALS     nitroGLYcerin (NITROSTAT) 0.4 mg, Sublingual     pantoprazole (PROTONIX) 40 MG EC tablet TAKE 1 TABLET BY MOUTH EVERY DAY     polyethylene glycol (MIRALAX) 17 GM/Dose powder MIX 1 CAPFUL (17 GRAMS) IN LIQUID AND DRINK BY MOUTH DAILY. (OTC NC)     potassium chloride ER (K-TAB/KLOR-CON) 10 MEQ CR tablet 10 mEq, Oral, DAILY     UNABLE TO FIND MEDICATION NAME: Hair skin and nails   2500     vitamin B-12 (CYANOCOBALAMIN) 1,000 mcg, Oral     warfarin ANTICOAGULANT (COUMADIN) 2.5 MG tablet TAKE BY MOUTH 2.5 MG (2.5 MG X 1) EVERY MON, WED, FRI 3.75 MG (2.5 MG X 1.5) ALL OTHER DAYS     Social History     Social History Narrative           Subjective:     Reba Calderon is a 86 year old female who comes in today for:    Chief Complaint   Patient presents with     RECHECK     Dizziness since 7/19/22     Results      Patient comes in today for new symptoms since I last saw her.    On Sunday, became dizzy after [employer].  Continued to be dizzy.  No headaches and no acute hearing loss.  Has chronic tinnitus in both ears.    Worse sitting to standing.  Also when laying down.  No staggering but being careful with walking.  No occurring necessarily with turning head.    No visual symptoms.    Was seen at [facility] for this and had the CT scan below.  Looked great.  Symptoms are episodic not constant but persist.    We reviewed her other issues noted in the assessment but not specifically addressed in the HPI above.      Objective:     Wt Readings from Last 3 Encounters:   07/26/22 91.6 kg (202 lb)   07/19/22 92.1 kg (203 lb)   01/25/22 93.9 kg (207 lb)     BP Readings from Last 3 Encounters:   07/26/22 124/78   07/19/22 122/74   01/25/22 134/70     /78   Pulse 81   Temp 97.8  F (36.6  C) (Oral)   Resp 18   Wt 91.6 kg (202 lb)   SpO2 95%   BMI 31.64 kg/m     The patient is comfortable, no acute distress.  Mood good.  Insight good.  Eyes are nonicteric.  Neck is supple without mass.  No cervical adenopathy.  No thyromegaly. Heart regular rate and rhythm.  Lungs clear to auscultation bilaterally.  Respiratory effort is good.  Extremities no edema.  Cerebellar function intact.  No focal weakness.  No arm drift.  Ears clear.  [place] Hall[place] maneuver is positive to the right with nystagmus.      Diagnostics:     Orders Only on 07/25/2022   Component Date Value Ref Range Status     INR HOME MONITORING 07/25/2022 3.0  2.000 - 3.000 Final     ______________________________________________________________________     CT ANGIO HEAD AND NECK CAROTID    Anatomical Region Laterality Modality   BRAIN -- Computed Tomography   NECK -- --       Impression    HEAD CT:   1.  No CT evidence for acute intracranial process.   2.  Brain atrophy and presumed chronic microvascular ischemic changes as above.     HEAD CTA:   1.  Mild plaque within the carotid siphons and distal left vertebral artery.   2.  No significant large artery stenosis or occlusion.   3.  Nondominant right vertebral artery does not provide significant intracranial contribution.     NECK CTA:   1.  Mild plaque within the aortic arch and right carotid bifurcation.   2.  No measurable carotid or vertebral artery stenosis.     Narrative    For Patients: As a result of the 21st Century Cures Act, medical imaging exams and procedure reports are released immediately into your electronic medical record. You may view this report before your referring provider. If you have  questions, please contact your health care provider.     EXAM: CT ANGIO HEAD AND NECK CAROTID   [facility]   DATE/TIME: 07/24/2022, 1:58 PM     INDICATION: Dizziness, nonspecific.   COMPARISON: CT head and cervical spine 10/10/2021.   CONTRAST: IOPAMIDOL 300 MG/ML  ML BOTTLE: 100 mL.   TECHNIQUE: Head and neck CT angiogram with IV contrast. Noncontrast head CT followed by axial helical CT images of the head and neck vessels obtained during the arterial phase of intravenous contrast administration. Axial 2D reconstructed images and multiplanar 3D MIP reconstructed images of the head and neck vessels were performed by the technologist. Dose reduction techniques were used. All stenosis measurements made according to NASCET criteria unless otherwise specified.     FINDINGS:   NONCONTRAST HEAD CT:   INTRACRANIAL CONTENTS: No intracranial hemorrhage, extra-axial collection, or mass effect.  No CT evidence of acute infarct. Mild presumed chronic small vessel ischemic changes. Mild generalized volume loss. No hydrocephalus. Mild calcified plaque within the carotid siphons and distal left vertebral artery.     VISUALIZED ORBITS/SINUSES/MASTOIDS: No intraorbital abnormality. No paranasal sinus mucosal disease. No middle ear or mastoid effusion.     BONES/SOFT TISSUES: No acute abnormality.     HEAD CTA:   ANTERIOR CIRCULATION: Scattered calcified plaque within the carotid siphons. No significant large artery stenosis or occlusion. No aneurysm or vascular malformation. Developmentally hypoplastic left A1 anterior cerebral artery segment.     POSTERIOR CIRCULATION: Dominant left vertebral artery supplies the posterior circulation. No significant stenosis or occlusion. No aneurysm or vascular malformation. The nondominant right vertebral artery does not provide significant intracranial contribution.     DURAL VENOUS SINUSES: Expected enhancement of the major dural venous sinuses.     NECK  CTA:   RIGHT CAROTID: Minimal plaque at the carotid bifurcation without measurable stenosis or dissection.     LEFT CAROTID: No measurable stenosis or dissection.     VERTEBRAL ARTERIES: No focal stenosis or dissection. Dominant left and smaller right vertebral arteries.     AORTIC ARCH: Scattered mild plaque within the aortic arch. No stenosis of the great vessels.     NONVASCULAR STRUCTURES: Bulky multinodular thyroid gland with dominant left-sided nodule measuring 2.6 x 2.7 cm. The overall pattern is similar compared to chest CT 12/12/2014. Left-sided cardiac device. Mild cervical spondylosis.      Assessment:     1. Benign paroxysmal positional vertigo, right    2. Tinnitus, bilateral        Plan:     1. Meclizine for use as needed.  2. Given information on vertigo.  3. Physical therapy/ occupational therapy for vertigo therapy if needed.  4. Continue current medications otherwise.  5. Follow up sooner if issues.    30 minutes or greater was spent today on the patient's care on the day of service.      This includes time for chart preparation, reviewing medical tests done before or during the visit, talking with the patient, review of quality indicators, required documentation, and other elements of care.        Devon Ball MD  General Internal Medicine  [facility]    Return in about 6 months (around 1/19/2023), or if symptoms worsen or fail to improve, for follow up visit.     Future Appointments   Date Time Provider Department Center   8/3/2022  8:00 AM [facility]

## 2022-08-01 ENCOUNTER — ANTICOAGULATION THERAPY VISIT (OUTPATIENT)
Dept: ANTICOAGULATION | Facility: CLINIC | Age: 87
End: 2022-08-01

## 2022-08-01 DIAGNOSIS — I48.21 ATRIAL FIBRILLATION, PERMANENT (H): Primary | ICD-10-CM

## 2022-08-01 LAB — INR HOME MONITORING: 3.3 (ref 2–3)

## 2022-08-01 NOTE — PROGRESS NOTES
ANTICOAGULATION MANAGEMENT     Reba Calderon 86 year old female is on warfarin with supratherapeutic INR result. (Goal INR 2.0-3.0)    Recent labs: (last 7 days)     08/01/22  0000   INR 3.3*       ASSESSMENT       Source(s): Chart Review and Patient/Caregiver Call       Warfarin doses taken: Warfarin taken as instructed    Diet: No new diet changes identified    New illness, injury, or hospitalization: yes. Patient having vertigo symptoms. Will be going in Weds for Canalith repositioning    Medication/supplement changes: None noted    Signs or symptoms of bleeding or clotting: No    Previous INR: Therapeutic last visit; previously outside of goal range    Additional findings: None       PLAN     Recommended plan for no diet, medication or health factor changes affecting INR     Dosing Instructions: decrease your warfarin dose (10%% change) with next INR in 1 week. Patient would like to decrease to previous dosing which  Is within protocol.       Summary  As of 8/1/2022    Full warfarin instructions:  2.5 mg every Mon, Wed, Fri; 3.75 mg all other days   Next INR check:  8/8/2022             Telephone call with Reba who verbalizes understanding and agrees to plan and who agrees to plan and repeated back plan correctly    Patient to recheck with home meter    Education provided: Please call back if any changes to your diet, medications or how you've been taking warfarin    Plan made per [facility] anticoagulation protocol    Selam Calixto, RN  Anticoagulation Clinic  8/1/2022    _______________________________________________________________________     Anticoagulation Episode Summary     Current INR goal:  2.0-3.0   TTR:  80.3 % (1 y)   Target end date:  7/15/2036   Send INR reminders to:  EDWIN MONROE    Indications    Atrial fibrillation  permanent (H) [I48.21]           Comments:  [employer] home monitor. Managed by exception.         Anticoagulation Care Providers     Provider Role Specialty Phone number    Ardolconstance,  MD Devon Referring Internal Medicine [phone]    Leti Brower NP Referring  [phone]

## 2022-08-03 ENCOUNTER — HOSPITAL ENCOUNTER (OUTPATIENT)
Dept: PHYSICAL THERAPY | Facility: CLINIC | Age: 87
Setting detail: THERAPIES SERIES
Discharge: HOME | End: 2022-08-03
Attending: INTERNAL MEDICINE
Payer: MEDICARE

## 2022-08-03 DIAGNOSIS — H81.11 BENIGN PAROXYSMAL POSITIONAL VERTIGO, RIGHT: ICD-10-CM

## 2022-08-03 PROCEDURE — 97161 PT EVAL LOW COMPLEX 20 MIN: CPT | Mod: GP | Performed by: PHYSICAL THERAPIST

## 2022-08-03 PROCEDURE — 95992 CANALITH REPOSITIONING PROC: CPT | Mod: GP | Performed by: PHYSICAL THERAPIST

## 2022-08-04 NOTE — PROGRESS NOTES
[facility]                                                                                   OUTPATIENT PHYSICAL THERAPY FUNCTIONAL EVALUATION  PLAN OF TREATMENT FOR OUTPATIENT REHABILITATION  (COMPLETE FOR INITIAL CLAIMS ONLY)  Patient's Last Name, First Name, M.I.  YOB: 1935  Reba Calderon     Provider's Name   [facility]   Medical Record No.  [id number]     Start of Care Date:  08/03/22   Onset Date:  07/20/22   Type:     _X__PT   ____OT  ____SLP Medical Diagnosis:  Benign paroxysmal positional vertigo, right (H81.11)     PT Diagnosis:  dizziness with position changes Visits from SOC:  1                              __________________________________________________________________________________  Plan of Treatment/Functional Goals:  balance training, gait training, neuromuscular re-education (CRT)           GOALS  DHI  Pt report decreased dizziness with daily activities as evidenced byy DHI <42/100  10/31/22    25' walk  Pt demo improved stability with household ambulation per pt report and ability to walk 25' in <9.32 secs.  10/31/22    HEP  Pt demo indep with HEP for balance to improve confidence with daily activities and reduce falls risk.  10/31/22          Therapy Frequency:   (anticipate 2-5 followup visits)   Predicted Duration of Therapy Intervention:  90 days    Tashia Iverson, PT                                    I CERTIFY THE NEED FOR THESE SERVICES FURNISHED UNDER        THIS PLAN OF TREATMENT AND WHILE UNDER MY CARE     (Physician co-signature of this document indicates review and certification of the therapy plan).                Certification Date From:  08/03/22   Certification Date To:  10/31/22    Referring Provider:  Devon Ball    Initial Assessment  See Epic Evaluation- Start of Care Date: 08/03/22

## 2022-08-04 NOTE — PROGRESS NOTES
"   08/03/22 0700   Quick Adds   Type of Visit Initial OP PT Evaluation   General Information   Start of Care Date 08/03/22   Referring Physician Devon Ball   Orders Evaluate and Treat as Indicated   Order Date 07/28/22   Medical Diagnosis Benign paroxysmal positional vertigo, right (H81.11)   Onset of illness/injury or Date of Surgery 07/20/22   Precautions/Limitations fall precautions   Surgical/Medical history reviewed Yes   Pertinent history of current vestibular problem (include personal factors and/or comorbidities that impact the POC)  Hearing loss  (very mild - 2% loss per last testing)   Pertinent history of current problem (include personal factors and/or comorbidities that impact the POC) Woke up on Weds AM 7/20, sat up and went to get up. Had to hold on to walls. Feels \"shifting, things are moving that should be still.\" Feels like world is moving. Lasts for seconds up to 1 min. Usually when standing, can be still. If I bend over for something I always hang onto something, feels \"off\" when standing up. Nothing like this before. B tinnitus - getting high frequency more often than usually do, not any worse since dizziness started. Has not had hearing checked. No headaches. Kids coming into town this weekend - anxious to feel better. Per chart: On 7/24 became dizzy after Yazidism. Noheadaches, no hearing loss, chronic tinittus B. Worse with sit/stand, but also present laying down. Went to [facility] - CT normal.  PCP gave meclizine on 7/26/22. PMHx: afib, chronic lymphocytic leukemia, HTN, pacemaker, CKD stage 3, anticoagulation, choledocholithasis, DM with neuropathy, MATILDE, chronic diastolic heart failure.   Pertinent Visual History  glasses   Patient role/Employment history Retired  (artist, sold furniture)   Living environment House/townhome   Home/Community Accessibility Comments , has a cleaning person e/o week. Daughter comes and helps sometimes. Doing all B/IADLS indep.   ADL Devices " Shower/Tub Grab Bar;Wall Grab Bar  (tub/shower - feels ok stepping in/out)   Patient/Family Goals Statement Get rid of dizziness.   Fall Risk Screen   Fall screen completed by PT   Have you fallen 2 or more times in the past year? Yes   Have you fallen and had an injury in the past year? Yes   Is patient a fall risk? Yes   Fall screen comments 1 mo ago had a fall out of friends house, transition sidewalk to [place]. Foot caught a rock and I went flying. Had on slippers had for years ([place]) was emptying  - was stepping to side and slipper didn't move with foot and I went down - 3 weeks ago. Also had a fall in March - was on a deck, looked up to look at sticks on roof and I fell backwards, hit head on swing.   System Outcome Measures   Dizziness Handicap Inventory (score out of 100) A decrease in score by 17.18 or greater indicates a clinically significant change in symptoms. 62   Pain   Pain comments Has some intermittent back pain, R ankle had compound fracture.   Vitals Signs   Heart Rate 76   Blood Pressure 142/64   Vital Signs Comments Standin/59, HR 77   Cognitive Status Examination   Orientation orientation to person, place and time   Level of Consciousness alert   Follows Commands and Answers Questions 100% of the time;able to follow multistep instructions   Personal Safety and Judgment intact   Memory intact   Transfer Skills   Transfer Comments Able to stand without use of UEs.   Gait   Gait Comments Pt amb with very slow christophe, increased double stance time, minimal arm swing and eyes fixated during gait.   Gait Special Tests   Gait Special Tests 25 FOOT TIMED WALK   Gait Special Tests 25 Foot Timed Walk   Seconds 11.65   Comments no AD   Balance Special Tests Modified CTSIB Conditions   Condition 1, seconds 30 Seconds   Condition 2, seconds 18 Seconds   Condition 4, seconds 10 Seconds   Condition 5, seconds 1 Seconds   Modified CTSIB Comments Pt notes h/o R ankle fracture, does not  walk out on grass in her yard. States mild neuropathy in feet, but can feel if something is in her shoe.   Sensory Examination   Sensory Perception Comments Not formally tested - pt notes mild neuropathy B feet.   Oculomotor Exam   Smooth Pursuit Normal   Smooth Pursuit Comment mild saccadic intrusions all directions, WNL for age   Saccades Normal   Saccades Comments mild saccadic intrusions all directions, WNL for age   VOR Normal   VOR Comments no sx   Rapid Head Thrust Normal   Rapid Head Thrust Comments no sx   Other Oculomotor Exam Comments glasses on   Infrared Goggle Exam or Frenzel Lense Exam   Vestibular Suppressant in Last 24 Hours? No  (called pt day before to ensure no meds)   Exam completed with Infrared Goggles   Spontaneous Nystagmus Negative   Gaze Evoked Nystagmus Negative   Head Shake Horizontal Nystagmus Negative   Positional Testing comments Also had pt transition from usual sleeping position on L side to sitting and then standing with goggles donned - did not provoke sx or nystagmus   Gwendolyn-Hallpike (right) Other  (? 2 very small up beats with ~10 sec delay, pt noted mild dizziness)   [place]-Hallpike (right) comments Pt noted when she had this done in PCP office, neck was rotated and ext to max position - completed this as well per pt request, which did not provoke sx. Educ re: this is not anatomically appropriate for BPPV testing.   [place]-Hallpike (Left) Negative   HSCC Supine Roll Test (Right) Negative   HSCC Supine Roll Test (Left) Negative   BPPV Canal(s) R Posterior   BPPV Type Canalithasis   Other Infrared Goggle Exam or Frenzel Lense Exam Comments questionable - completed treatment as possible diagnostic. See daily doc.   Planned Therapy Interventions   Planned Therapy Interventions balance training;gait training;neuromuscular re-education  (CRT)   Clinical Impression   Criteria for Skilled Therapeutic Interventions Met yes, treatment indicated   PT Diagnosis dizziness with position changes    Influenced by the following impairments dizziness with positional changes   Functional limitations due to impairments getting out of bed, bending over, walking.   Clinical Presentation Stable/Uncomplicated   Clinical Presentation Rationale Persistent for several weeks but not worse or better   Clinical Decision Making (Complexity) Low complexity   Therapy Frequency   (anticipate 2-5 followup visits)   Predicted Duration of Therapy Intervention (days/wks) 90 days   Risk & Benefits of therapy have been explained Yes   Patient, Family & other staff in agreement with plan of care Yes   Clinical Impression Comments Unclear etiology due to testing today - not clearly BPPV although this is most likely given provoking activities and previous positive Hallpike with PCP per pt report. Not orthostatic today although complains of dizziness with standing and walking. Negative vestib hypofunction testing today.   Goal 1   Goal Identifier DHI   Goal Description Pt report decreased dizziness with daily activities as evidenced byy DHI <42/100   Target Date 10/31/22   Goal 2   Goal Identifier 25' walk   Goal Description Pt demo improved stability with household ambulation per pt report and ability to walk 25' in <9.32 secs.   Target Date 10/31/22   Goal 3   Goal Identifier HEP   Goal Description Pt demo indep with HEP for balance to improve confidence with daily activities and reduce falls risk.   Target Date 10/31/22   Total Evaluation Time   PT Nayeli Low Complexity Minutes (12615) 35   Therapy Certification   Certification date from 08/03/22   Certification date to 10/31/22   Medical Diagnosis Benign paroxysmal positional vertigo, right (H81.11)       Juana Iverson DPT, NCS  Physical Therapist  [facility] [place]  [address] [place] [address] [address]  [place] [address]  [email or web address]  Office: [phone]  Fax: [phone]

## 2022-08-05 ENCOUNTER — HOSPITAL ENCOUNTER (OUTPATIENT)
Dept: PHYSICAL THERAPY | Facility: CLINIC | Age: 87
Setting detail: THERAPIES SERIES
Discharge: HOME | End: 2022-08-05
Attending: INTERNAL MEDICINE
Payer: MEDICARE

## 2022-08-05 PROCEDURE — 97112 NEUROMUSCULAR REEDUCATION: CPT | Mod: GP,59 | Performed by: PHYSICAL THERAPIST

## 2022-08-05 PROCEDURE — 95992 CANALITH REPOSITIONING PROC: CPT | Mod: GP | Performed by: PHYSICAL THERAPIST

## 2022-08-08 ENCOUNTER — ANTICOAGULATION THERAPY VISIT (OUTPATIENT)
Dept: ANTICOAGULATION | Facility: CLINIC | Age: 87
End: 2022-08-08

## 2022-08-08 DIAGNOSIS — I48.21 ATRIAL FIBRILLATION, PERMANENT (H): Primary | ICD-10-CM

## 2022-08-08 LAB — INR HOME MONITORING: 2.8 (ref 2–3)

## 2022-08-08 NOTE — PROGRESS NOTES
ANTICOAGULATION MANAGEMENT     Reba Calderon 86 year old female is on warfarin with therapeutic INR result. (Goal INR 2.0-3.0)    Recent labs: (last 7 days)     08/08/22  0000   INR 2.8       ASSESSMENT       Source(s): Chart Review and Patient/Caregiver Call       Warfarin doses taken: Warfarin taken differently, but did not change total weekly dose    Diet: No new diet changes identified    New illness, injury, or hospitalization: No    Medication/supplement changes: None noted    Signs or symptoms of bleeding or clotting: No    Previous INR: Supratherapeutic    Additional findings: None       PLAN     Recommended plan for no diet, medication or health factor changes affecting INR     Dosing Instructions: Continue your current warfarin dose with next INR in 1 week       Summary  As of 8/8/2022    Full warfarin instructions:  2.5 mg every Sun, Tue, Thu; 3.75 mg all other days   Next INR check:  8/15/2022             Telephone call with Reba who verbalizes understanding and agrees to plan    Patient to recheck with home meter    Education provided: Please call back if any changes to your diet, medications or how you've been taking warfarin    Plan made per [facility] anticoagulation protocol    Heena Hogan RN  Anticoagulation Clinic  8/8/2022    _______________________________________________________________________     Anticoagulation Episode Summary     Current INR goal:  2.0-3.0   TTR:  79.2 % (1 y)   Target end date:  7/15/2036   Send INR reminders to:  EDWIN MONROE    Indications    Atrial fibrillation  permanent (H) [I48.21]           Comments:  [employer] home monitor. Managed by exception.         Anticoagulation Care Providers     Provider Role Specialty Phone number    Devon Ball MD Referring Internal Medicine [phone]    Leti Brower NP Referring  [phone]

## 2022-08-15 ENCOUNTER — ANTICOAGULATION THERAPY VISIT (OUTPATIENT)
Dept: ANTICOAGULATION | Facility: CLINIC | Age: 87
End: 2022-08-15

## 2022-08-15 DIAGNOSIS — I48.21 ATRIAL FIBRILLATION, PERMANENT (H): Primary | ICD-10-CM

## 2022-08-15 LAB — INR HOME MONITORING: 3.1 (ref 2–3)

## 2022-08-15 NOTE — PROGRESS NOTES
ANTICOAGULATION MANAGEMENT     Reba Calderon 86 year old female is on warfarin with supratherapeutic INR result. (Goal INR 2.0-3.0)    Recent labs: (last 7 days)     08/15/22  0000   INR 3.1*       ASSESSMENT       Source(s): Chart Review and Home Care/Facility Nurse       Warfarin doses taken: Warfarin taken as instructed    Diet: Decreased greens/vitamin K in diet; plans to resume previous intake    New illness, injury, or hospitalization: No    Medication/supplement changes: None noted    Signs or symptoms of bleeding or clotting: No    Previous INR: Therapeutic last visit; previously outside of goal range    Additional findings:  had a heart attack last week so patient has been under increased stress.  is home from the hospital now.       PLAN     Recommended plan for temporary change(s) affecting INR     Dosing Instructions: Continue your current warfarin dose with next INR in 1 week       Summary  As of 8/15/2022    Full warfarin instructions:  2.5 mg every Sun, Tue, Thu; 3.75 mg all other days   Next INR check:  8/22/2022             Telephone call with Reba who verbalizes understanding and agrees to plan    Patient to recheck with home meter    Education provided: Goal range and significance of current result, impact of stress on INR.    Plan made per [facility] anticoagulation protocol    Pramod Calderon RN  Anticoagulation Clinic  8/15/2022    _______________________________________________________________________     Anticoagulation Episode Summary     Current INR goal:  2.0-3.0   TTR:  78.5 % (1 y)   Target end date:  7/15/2036   Send INR reminders to:  EDWIN MONROE    Indications    Atrial fibrillation  permanent (H) [I48.21]           Comments:  [employer] home monitor. Managed by exception.         Anticoagulation Care Providers     Provider Role Specialty Phone number    Devon Ball MD Referring Internal Medicine [phone]    Leti Brower NP Referring  [phone]

## 2022-08-22 ENCOUNTER — ANTICOAGULATION THERAPY VISIT (OUTPATIENT)
Dept: ANTICOAGULATION | Facility: CLINIC | Age: 87
End: 2022-08-22

## 2022-08-22 DIAGNOSIS — I48.21 ATRIAL FIBRILLATION, PERMANENT (H): Primary | ICD-10-CM

## 2022-08-22 LAB — INR HOME MONITORING: 2.7 (ref 2–3)

## 2022-08-22 NOTE — PROGRESS NOTES
ANTICOAGULATION MANAGEMENT     Reba Calderon 86 year old female is on warfarin with therapeutic INR result. (Goal INR 2.0-3.0)    Recent labs: (last 7 days)     08/22/22  0000   INR 2.7       ASSESSMENT       Source(s): Chart Review and Patient/Caregiver Call       Warfarin doses taken: Warfarin taken as instructed    Diet: No new diet changes identified    New illness, injury, or hospitalization: No    Medication/supplement changes: None noted    Signs or symptoms of bleeding or clotting: No    Previous INR: Supratherapeutic    Additional findings: None       PLAN     Recommended plan for no diet, medication or health factor changes affecting INR     Dosing Instructions: Continue your current warfarin dose with next INR in 1 week       Summary  As of 8/22/2022    Full warfarin instructions:  2.5 mg every Sun, Tue, Thu; 3.75 mg all other days   Next INR check:               Telephone call with Reba who verbalizes understanding and agrees to plan    Patient to recheck with home meter    Education provided: Please call back if any changes to your diet, medications or how you've been taking warfarin and Resume manage by exception with home monitor. Continue to submit INR results to home monitor company.You will only be called when your result is out of range. Please call and notify [facility] if new medication started, dose missed, signs or symptoms of bleeding or clotting, or a surgery/procedure is scheduled.    Plan made per [facility] anticoagulation protocol    Crys Simpson RN  Anticoagulation Clinic  8/22/2022    _______________________________________________________________________     Anticoagulation Episode Summary     Current INR goal:  2.0-3.0   TTR:  78.3 % (1 y)   Target end date:  7/15/2036   Send INR reminders to:  EDWIN MONROE    Indications    Atrial fibrillation  permanent (H) [I48.21]           Comments:  [employer] home monitor. Managed by exception.         Anticoagulation Care Providers     Provider Role  Specialty Phone number    Devon Ball MD Referring Internal Medicine [phone]    Leti Brower NP Referring  [phone]

## 2022-08-29 ENCOUNTER — DOCUMENTATION ONLY (OUTPATIENT)
Dept: ANTICOAGULATION | Facility: CLINIC | Age: 87
End: 2022-08-29

## 2022-08-29 DIAGNOSIS — I48.21 ATRIAL FIBRILLATION, PERMANENT (H): Primary | ICD-10-CM

## 2022-08-29 LAB — INR HOME MONITORING: 2.3 (ref 2–3)

## 2022-08-29 NOTE — PROGRESS NOTES
ANTICOAGULATION  MANAGEMENT-Home Monitor Managed by Exception    Reba AKI Calderon 86 year old female is on warfarin with therapeutic INR result. (Goal INR 2.0-3.0)    Recent labs: (last 7 days)     08/29/22  0000   INR 2.3         Previous INR was Therapeutic    Medication, diet, health changes since last INR:chart reviewed; none identified    Contacted within the last 12 weeks by phone on 8/22/22      BELGICA     Reba was NOT contacted regarding therapeutic result today per home monitoring policy manage by exception agreement.   Current warfarin dose is to be continued:     Summary  As of 8/29/2022    Full warfarin instructions:  2.5 mg every Sun, Tue, Thu; 3.75 mg all other days   Next INR check:  9/12/2022           ?   Crys Simpson RN  Anticoagulation Clinic  8/29/2022    _______________________________________________________________________     Anticoagulation Episode Summary     Current INR goal:  2.0-3.0   TTR:  79.6 % (1 y)   Target end date:  7/15/2036   Send INR reminders to:  EDWIN MONROE    Indications    Atrial fibrillation  permanent (H) [I48.21]           Comments:  [employer] home monitor. Managed by exception.         Anticoagulation Care Providers     Provider Role Specialty Phone number    Devon Ball MD Referring Internal Medicine [phone]    Leti Brower NP Referring  [phone]

## 2022-09-05 LAB — INR HOME MONITORING: 2.6 (ref 2–3)

## 2022-09-06 ENCOUNTER — DOCUMENTATION ONLY (OUTPATIENT)
Dept: ANTICOAGULATION | Facility: CLINIC | Age: 87
End: 2022-09-06

## 2022-09-06 DIAGNOSIS — I48.21 ATRIAL FIBRILLATION, PERMANENT (H): Primary | ICD-10-CM

## 2022-09-06 NOTE — PROGRESS NOTES
ANTICOAGULATION  MANAGEMENT-Home Monitor Managed by Exception    Reba AKI Calderon 86 year old female is on warfarin with therapeutic INR result. (Goal INR 2.0-3.0)    Recent labs: (last 7 days)     09/05/22  0000   INR 2.6         Previous INR was Therapeutic    Medication, diet, health changes since last INR:chart reviewed; none identified    Contacted within the last 12 weeks by phone on 8/22/22      BELGICA     Reba was NOT contacted regarding therapeutic result today per home monitoring policy manage by exception agreement.   Current warfarin dose is to be continued:     Summary  As of 9/6/2022    Full warfarin instructions:  2.5 mg every Sun, Tue, Thu; 3.75 mg all other days   Next INR check:  9/12/2022           ?   Crys Simpson RN  Anticoagulation Clinic  9/6/2022    _______________________________________________________________________     Anticoagulation Episode Summary     Current INR goal:  2.0-3.0   TTR:  79.6 % (1 y)   Target end date:  7/15/2036   Send INR reminders to:  EDWIN MONROE    Indications    Atrial fibrillation  permanent (H) [I48.21]           Comments:  [employer] home monitor. Managed by exception.         Anticoagulation Care Providers     Provider Role Specialty Phone number    Devon Ball MD Referring Internal Medicine [phone]    Leti Brower NP Referring  [phone]

## 2022-09-07 DIAGNOSIS — E11.628 TYPE 2 DIABETES MELLITUS WITH OTHER SKIN COMPLICATION, WITHOUT LONG-TERM CURRENT USE OF INSULIN (H): ICD-10-CM

## 2022-09-07 RX ORDER — CARVEDILOL 25 MG/1
TABLET, FILM COATED ORAL
Qty: 100 STRIP | Refills: 3 | Status: SHIPPED | OUTPATIENT
Start: 2022-09-07 | End: 2023-10-16

## 2022-09-07 NOTE — TELEPHONE ENCOUNTER
"Routing refill request to provider for review/approval because:  Drug not active on patient's medication list    Last Written Prescription Date:    Last Fill Quantity: ,  # refills:    Last office visit provider:  7/26/22     Requested Prescriptions   Pending Prescriptions Disp Refills     CONTOUR TEST test strip [Pharmacy Med Name: CONTOUR TEST STRIP] 100 strip 3     Sig: USE 1 STRIP TO TEST BLOOD SUGAR DAILY       Diabetic Supplies Protocol Failed - 9/7/2022 12:00 AM        Failed - Medication is active on med list        Passed - Patient is 18 years of age or older        Passed - Recent (6 mo) or future (30 days) visit within the authorizing provider's specialty     Patient had office visit in the last 6 months or has a visit in the next 30 days with authorizing provider.  See \"Patient Info\" tab in inbasket, or \"Choose Columns\" in Meds & Orders section of the refill encounter.                 Radha Ba RN 09/07/22 2:31 PM  "

## 2022-09-12 ENCOUNTER — DOCUMENTATION ONLY (OUTPATIENT)
Dept: ANTICOAGULATION | Facility: CLINIC | Age: 87
End: 2022-09-12

## 2022-09-12 DIAGNOSIS — I48.21 ATRIAL FIBRILLATION, PERMANENT (H): Primary | ICD-10-CM

## 2022-09-12 LAB — INR HOME MONITORING: 2.7 (ref 2–3)

## 2022-09-12 NOTE — PROGRESS NOTES
ANTICOAGULATION  MANAGEMENT-Home Monitor Managed by Exception    Rbea AKI Calderon 86 year old female is on warfarin with therapeutic INR result. (Goal INR 2.0-3.0)    Recent labs: (last 7 days)     09/12/22  0000   INR 2.7         Previous INR was Therapeutic    Medication, diet, health changes since last INR:chart reviewed; none identified    Contacted within the last 12 weeks by phone on 8/22/22      BELGICA     Reba was NOT contacted regarding therapeutic result today per home monitoring policy manage by exception agreement.   Current warfarin dose is to be continued:     Summary  As of 9/12/2022    Full warfarin instructions:  2.5 mg every Sun, Tue, Thu; 3.75 mg all other days   Next INR check:  9/26/2022           ?   Crys Simpson RN  Anticoagulation Clinic  9/12/2022    _______________________________________________________________________     Anticoagulation Episode Summary     Current INR goal:  2.0-3.0   TTR:  79.6 % (1 y)   Target end date:  7/15/2036   Send INR reminders to:  EDWIN MONROE    Indications    Atrial fibrillation  permanent (H) [I48.21]           Comments:  [employer] home monitor. Managed by exception.         Anticoagulation Care Providers     Provider Role Specialty Phone number    Devon Ball MD Referring Internal Medicine [phone]    Leti Brower NP Referring  [phone]

## 2022-09-19 ENCOUNTER — ANTICOAGULATION THERAPY VISIT (OUTPATIENT)
Dept: ANTICOAGULATION | Facility: CLINIC | Age: 87
End: 2022-09-19

## 2022-09-19 DIAGNOSIS — I48.21 ATRIAL FIBRILLATION, PERMANENT (H): Primary | ICD-10-CM

## 2022-09-19 LAB — INR HOME MONITORING: 1.9 (ref 2–3)

## 2022-09-19 NOTE — PROGRESS NOTES
ANTICOAGULATION MANAGEMENT     Reba Calderon 86 year old female is on warfarin with subtherapeutic INR result. (Goal INR 2.0-3.0)    Recent labs: (last 7 days)     09/19/22  0000   INR 1.9*       ASSESSMENT       Source(s): Chart Review and Patient/Caregiver Call       Warfarin doses taken: Warfarin taken as instructed    Diet: Increased greens/vitamin K in diet; plans to resume previous intake    New illness, injury, or hospitalization: No    Medication/supplement changes: None noted    Signs or symptoms of bleeding or clotting: No    Previous INR: Therapeutic last 2(+) visits    Additional findings: None       PLAN     Recommended plan for temporary change(s) affecting INR     Dosing Instructions: Continue your current warfarin dose with next INR in 1 week       Summary  As of 9/19/2022    Full warfarin instructions:  9/19: 5 mg; Otherwise 2.5 mg every Sun, Tue, Thu; 3.75 mg all other days   Next INR check:  9/26/2022             Telephone call with Reba who verbalizes understanding and agrees to plan    Patient to recheck with home meter    Education provided: Please call back if any changes to your diet, medications or how you've been taking warfarin, Monitoring for bleeding signs and symptoms, Monitoring for clotting signs and symptoms and Contact [phone]  with any changes, questions or concerns.     Plan made per [facility] anticoagulation protocol    Crys Simpson RN  Anticoagulation Clinic  9/19/2022    _______________________________________________________________________     Anticoagulation Episode Summary     Current INR goal:  2.0-3.0   TTR:  79.2 % (1 y)   Target end date:  7/15/2036   Send INR reminders to:  [facility]ROE    Indications    Atrial fibrillation  permanent (H) [I48.21]           Comments:  [employer] home monitor. Managed by exception.         Anticoagulation Care Providers     Provider Role Specialty Phone number    Devon Ball MD Referring Internal Medicine [phone]    Inocente  CIERA Landeros Referring  [phone]

## 2022-09-20 NOTE — PROGRESS NOTES
ANTICOAGULATION  MANAGEMENT-Home Monitor Managed by Exception    Reba AKI Calderon 85 year old female is on warfarin with therapeutic INR result. (Goal INR 2.0-3.0)    Recent labs: (last 7 days)     11/29/21  0000   INR 2.4*         Previous INR was Therapeutic    Medication, diet, health changes since last INR:chart reviewed; none identified    Contacted within the last 12 weeks by phone on 10/8/21      BELGICA     Reba was NOT contacted regarding therapeutic result today per home monitoring policy manage by exception agreement.   Current warfarin dose is to be continued:     Summary  As of 11/29/2021    Full warfarin instructions:  2.5 mg every Mon, Wed, Fri; 3.75 mg all other days   Next INR check:  12/6/2021           ?   Rosa Maria Neil RN  Anticoagulation Clinic  11/29/2021    _______________________________________________________________________     Anticoagulation Episode Summary     Current INR goal:  2.0-3.0   TTR:  88.7 % (4.2 mo)   Target end date:  7/15/2036   Send INR reminders to:  [facility] HODGE    Indications    Atrial fibrillation  permanent (H) [I48.21]           Comments:  [employer] home monitor. Managed by exception.         Anticoagulation Care Providers     Provider Role Specialty Phone number    Devon Ball MD Referring Internal Medicine [phone]           Impression: Dry eye syndrome of bilateral lacrimal glands: H04.123. Bilateral. Plan: Discussed diagnosis in detail with patient. Warm compresses with lid massage from top / down, bottom / up, and sweep from inside / out x 2. Patient instructed to use emulsive base lubricant 3-4 x a day. Increase omega foods/nuts and/or Borage/Fish/Krill oil supplement 1500-2500mg capsule orally per day. stated

## 2022-09-26 ENCOUNTER — ANTICOAGULATION THERAPY VISIT (OUTPATIENT)
Dept: ANTICOAGULATION | Facility: CLINIC | Age: 87
End: 2022-09-26

## 2022-09-26 DIAGNOSIS — I48.21 ATRIAL FIBRILLATION, PERMANENT (H): Primary | ICD-10-CM

## 2022-09-26 LAB — INR HOME MONITORING: 2.6 (ref 2–3)

## 2022-09-26 NOTE — PROGRESS NOTES
ANTICOAGULATION MANAGEMENT     Reba Calderon 86 year old female is on warfarin with therapeutic INR result. (Goal INR 2.0-3.0)    Recent labs: (last 7 days)     09/26/22  0000   INR 2.6       ASSESSMENT       Source(s): Chart Review and Patient/Caregiver Call       Warfarin doses taken: Warfarin taken as instructed    Diet: No new diet changes identified    New illness, injury, or hospitalization: No    Medication/supplement changes: None noted    Signs or symptoms of bleeding or clotting: No    Previous INR: Subtherapeutic    Additional findings: None       PLAN     Recommended plan for no diet, medication or health factor changes affecting INR     Dosing Instructions: Continue your current warfarin dose with next INR in 1 week       Summary  As of 9/26/2022    Full warfarin instructions:  2.5 mg every Sun, Tue, Thu; 3.75 mg all other days   Next INR check:  10/3/2022             Telephone call with Reba who verbalizes understanding and agrees to plan    Patient to recheck with home meter    Education provided: Please call back if any changes to your diet, medications or how you've been taking warfarin    Plan made per [facility] anticoagulation protocol    Mignon Randall RN  Anticoagulation Clinic  9/26/2022    _______________________________________________________________________     Anticoagulation Episode Summary     Current INR goal:  2.0-3.0   TTR:  79.0 % (1 y)   Target end date:  7/15/2036   Send INR reminders to:  [facility]    Indications    Atrial fibrillation  permanent (H) [I48.21]           Comments:  [employer] home monitor. Managed by exception.         Anticoagulation Care Providers     Provider Role Specialty Phone number    Devon Ball MD Referring Internal Medicine [phone]    Leti Brower NP Referring  [phone]

## 2022-10-03 ENCOUNTER — DOCUMENTATION ONLY (OUTPATIENT)
Dept: ANTICOAGULATION | Facility: CLINIC | Age: 87
End: 2022-10-03

## 2022-10-03 DIAGNOSIS — I48.21 ATRIAL FIBRILLATION, PERMANENT (H): Primary | ICD-10-CM

## 2022-10-03 LAB — INR HOME MONITORING: 2.3 (ref 2–3)

## 2022-10-03 NOTE — PROGRESS NOTES
ANTICOAGULATION  MANAGEMENT-Home Monitor Managed by Exception    Reba AKI Calderon 86 year old female is on warfarin with therapeutic INR result. (Goal INR 2.0-3.0)    Recent labs: (last 7 days)     10/03/22  0000   INR 2.3         Previous INR was Therapeutic    Medication, diet, health changes since last INR:chart reviewed; none identified    Contacted within the last 12 weeks by phone on 9/26/22      BELGICA     Reba was NOT contacted regarding therapeutic result today per home monitoring policy manage by exception agreement.   Current warfarin dose is to be continued:     Summary  As of 10/3/2022    Full warfarin instructions:  2.5 mg every Sun, Tue, Thu; 3.75 mg all other days   Next INR check:  10/10/2022           ?   Crys Simpson RN  Anticoagulation Clinic  10/3/2022    _______________________________________________________________________     Anticoagulation Episode Summary     Current INR goal:  2.0-3.0   TTR:  79.0 % (1 y)   Target end date:  7/15/2036   Send INR reminders to:  [facility]    Indications    Atrial fibrillation  permanent (H) [I48.21]           Comments:  [employer] home monitor. Managed by exception.         Anticoagulation Care Providers     Provider Role Specialty Phone number    Devon Ball MD Referring Internal Medicine [phone]    Leti Brower NP Referring  [phone]

## 2022-10-10 NOTE — PROGRESS NOTES
"Patient called to report that her machine is not working. Patient reports it is measuring \"29.6 and 28.1\" and reports \"I would be dead if this was accurate.\"  Writer advised that machine may have reset when patient took it to pharmacy to measure PT instead of INR and that patient should call [phone] number on bottom of machine to troubleshoot. Patient reports she needs her daughter to help with this and she will be back next week. Patient was advised that INR was in range at last check and that she is not required to recheck until 10/17/22.  Patient will retest at this time.    Carly Amador RN    "

## 2022-10-11 DIAGNOSIS — I10 ESSENTIAL HYPERTENSION: ICD-10-CM

## 2022-10-11 RX ORDER — LISINOPRIL 10 MG/1
TABLET ORAL
Qty: 90 TABLET | Refills: 2 | Status: SHIPPED | OUTPATIENT
Start: 2022-10-11 | End: 2023-05-12

## 2022-10-11 NOTE — TELEPHONE ENCOUNTER
"Last Written Prescription Date:  8/11/21  Last Fill Quantity: 90,  # refills: 3   Last office visit provider:  7/26/22     Requested Prescriptions   Pending Prescriptions Disp Refills     lisinopril (ZESTRIL) 10 MG tablet [Pharmacy Med Name: LISINOPRIL 10 MG TABLET] 90 tablet 3     Sig: TAKE 1 TABLET BY MOUTH EVERY DAY       ACE Inhibitors (Including Combos) Protocol Passed - 10/11/2022 12:21 PM        Passed - Blood pressure under 140/90 in past 12 months     BP Readings from Last 3 Encounters:   07/26/22 124/78   07/19/22 122/74   01/25/22 134/70                 Passed - Recent (12 mo) or future (30 days) visit within the authorizing provider's specialty     Patient has had an office visit with the authorizing provider or a provider within the authorizing providers department within the previous 12 mos or has a future within next 30 days. See \"Patient Info\" tab in inbasket, or \"Choose Columns\" in Meds & Orders section of the refill encounter.              Passed - Medication is active on med list        Passed - Patient is age 18 or older        Passed - No active pregnancy on record        Passed - Normal serum creatinine on file in past 12 months     Recent Labs   Lab Test 07/19/22  1013   CR 0.84       Ok to refill medication if creatinine is low          Passed - Normal serum potassium on file in past 12 months     Recent Labs   Lab Test 07/19/22  1013   POTASSIUM 3.9             Passed - No positive pregnancy test within past 12 months             Radha Ba, RN 10/11/22 4:52 PM  "

## 2022-10-17 ENCOUNTER — DOCUMENTATION ONLY (OUTPATIENT)
Dept: ANTICOAGULATION | Facility: CLINIC | Age: 87
End: 2022-10-17

## 2022-10-17 DIAGNOSIS — I48.21 ATRIAL FIBRILLATION, PERMANENT (H): Primary | ICD-10-CM

## 2022-10-17 LAB — INR HOME MONITORING: 2.2 (ref 2–3)

## 2022-10-17 NOTE — PROGRESS NOTES
ANTICOAGULATION  MANAGEMENT-Home Monitor Managed by Exception    Reba Calderon 86 year old female is on warfarin with therapeutic INR result. (Goal INR 2.0-3.0)    Recent labs: (last 7 days)     10/17/22  0000   INR 2.2         Previous INR was Therapeutic    Medication, diet, health changes since last INR:chart reviewed; none identified    Contacted within the last 12 weeks by phone on 9/26/22      BELGICA     Reba was NOT contacted regarding therapeutic result today per home monitoring policy manage by exception agreement.   Current warfarin dose is to be continued:     Summary  As of 10/17/2022    Full warfarin instructions:  2.5 mg every Sun, Tue, Thu; 3.75 mg all other days   Next INR check:  10/31/2022           ?   Monica Sanford RN  Anticoagulation Clinic  10/17/2022    _______________________________________________________________________     Anticoagulation Episode Summary     Current INR goal:  2.0-3.0   TTR:  79.0 % (1 y)   Target end date:  7/15/2036   Send INR reminders to:  [facility]    Indications    Atrial fibrillation  permanent (H) [I48.21]           Comments:  [employer] home monitor. Managed by exception.         Anticoagulation Care Providers     Provider Role Specialty Phone number    Devon Ball MD Referring Internal Medicine [phone]    Leti Brower NP Referring  [phone]

## 2022-10-24 ENCOUNTER — DOCUMENTATION ONLY (OUTPATIENT)
Dept: ANTICOAGULATION | Facility: CLINIC | Age: 87
End: 2022-10-24

## 2022-10-24 DIAGNOSIS — I48.21 ATRIAL FIBRILLATION, PERMANENT (H): Primary | ICD-10-CM

## 2022-10-24 LAB — INR HOME MONITORING: 2.2 (ref 2–3)

## 2022-10-24 NOTE — PROGRESS NOTES
ANTICOAGULATION  MANAGEMENT-Home Monitor Managed by Exception    Reba AKI Calderon 86 year old female is on warfarin with therapeutic INR result. (Goal INR 2.0-3.0)    Recent labs: (last 7 days)     10/24/22  0000   INR 2.2         Previous INR was Therapeutic    Medication, diet, health changes since last INR:chart reviewed; none identified    Contacted within the last 12 weeks by phone on 9/26/22      BELGICA     Reba was NOT contacted regarding therapeutic result today per home monitoring policy manage by exception agreement.   Current warfarin dose is to be continued:     Summary  As of 10/24/2022    Full warfarin instructions:  2.5 mg every Sun, Tue, Thu; 3.75 mg all other days; Starting 10/24/2022   Next INR check:  11/7/2022           ?   Crys Simpson RN  Anticoagulation Clinic  10/24/2022    _______________________________________________________________________     Anticoagulation Episode Summary     Current INR goal:  2.0-3.0   TTR:  79.0 % (1 y)   Target end date:  7/15/2036   Send INR reminders to:  [facility]    Indications    Atrial fibrillation  permanent (H) [I48.21]           Comments:  [employer] home monitor. Managed by exception.         Anticoagulation Care Providers     Provider Role Specialty Phone number    Devon Ball MD Referring Internal Medicine [phone]    Leti Brower NP Referring  [phone]

## 2022-10-29 ENCOUNTER — HEALTH MAINTENANCE LETTER (OUTPATIENT)
Age: 87
End: 2022-10-29

## 2022-10-31 ENCOUNTER — DOCUMENTATION ONLY (OUTPATIENT)
Dept: ANTICOAGULATION | Facility: CLINIC | Age: 87
End: 2022-10-31

## 2022-10-31 DIAGNOSIS — I48.21 ATRIAL FIBRILLATION, PERMANENT (H): Primary | ICD-10-CM

## 2022-10-31 LAB — INR HOME MONITORING: 2.2 (ref 2–3)

## 2022-10-31 NOTE — PROGRESS NOTES
ANTICOAGULATION  MANAGEMENT-Home Monitor Managed by Exception    Reba AKI Calderon 86 year old female is on warfarin with therapeutic INR result. (Goal INR 2.0-3.0)    Recent labs: (last 7 days)     10/31/22  0000   INR 2.2         Previous INR was Therapeutic    Medication, diet, health changes since last INR:chart reviewed; none identified    Contacted within the last 12 weeks by phone on 9/26/22      BELGICA     Reba was NOT contacted regarding therapeutic result today per home monitoring policy manage by exception agreement.   Current warfarin dose is to be continued:     Summary  As of 10/31/2022    Full warfarin instructions:  2.5 mg every Sun, Tue, Thu; 3.75 mg all other days; Starting 10/31/2022   Next INR check:  11/7/2022           ?   Crys Simpson RN  Anticoagulation Clinic  10/31/2022    _______________________________________________________________________     Anticoagulation Episode Summary     Current INR goal:  2.0-3.0   TTR:  80.6 % (1 y)   Target end date:  7/15/2036   Send INR reminders to:  [facility]    Indications    Atrial fibrillation  permanent (H) [I48.21]           Comments:  [employer] home monitor. Managed by exception.         Anticoagulation Care Providers     Provider Role Specialty Phone number    Devon Ball MD Referring Internal Medicine [phone]    Leti Brower NP Referring  [phone]

## 2022-11-07 ENCOUNTER — DOCUMENTATION ONLY (OUTPATIENT)
Dept: ANTICOAGULATION | Facility: CLINIC | Age: 87
End: 2022-11-07

## 2022-11-07 DIAGNOSIS — I48.21 ATRIAL FIBRILLATION, PERMANENT (H): Primary | ICD-10-CM

## 2022-11-07 LAB — INR HOME MONITORING: 2.3 (ref 2–3)

## 2022-11-07 NOTE — PROGRESS NOTES
ANTICOAGULATION  MANAGEMENT-Home Monitor Managed by Exception    Reba AKI Calderon 86 year old female is on warfarin with therapeutic INR result. (Goal INR 2.0-3.0)    Recent labs: (last 7 days)     11/07/22  0000   INR 2.3         Previous INR was Therapeutic    Medication, diet, health changes since last INR:chart reviewed; none identified    Contacted within the last 12 weeks by phone on 9/26/22      BELGICA     Reba was NOT contacted regarding therapeutic result today per home monitoring policy manage by exception agreement.   Current warfarin dose is to be continued:     Summary  As of 11/7/2022    Full warfarin instructions:  2.5 mg every Sun, Tue, Thu; 3.75 mg all other days; Starting 11/7/2022   Next INR check:  11/14/2022           ?   Crys Simpson RN  Anticoagulation Clinic  11/7/2022    _______________________________________________________________________     Anticoagulation Episode Summary     Current INR goal:  2.0-3.0   TTR:  81.3 % (1 y)   Target end date:  7/15/2036   Send INR reminders to:  [facility]    Indications    Atrial fibrillation  permanent (H) [I48.21]           Comments:  [employer] home monitor. Managed by exception.         Anticoagulation Care Providers     Provider Role Specialty Phone number    Devon Ball MD Referring Internal Medicine [phone]    Leti Brower NP Referring  [phone]

## 2022-11-14 ENCOUNTER — DOCUMENTATION ONLY (OUTPATIENT)
Dept: ANTICOAGULATION | Facility: CLINIC | Age: 87
End: 2022-11-14

## 2022-11-14 DIAGNOSIS — I48.21 ATRIAL FIBRILLATION, PERMANENT (H): Primary | ICD-10-CM

## 2022-11-14 LAB — INR HOME MONITORING: 2.1 (ref 2–3)

## 2022-11-14 NOTE — PROGRESS NOTES
ANTICOAGULATION  MANAGEMENT-Home Monitor Managed by Exception    Reba AKI Calderon 86 year old female is on warfarin with therapeutic INR result. (Goal INR 2.0-3.0)    Recent labs: (last 7 days)     11/14/22  0000   INR 2.1         Previous INR was Therapeutic    Medication, diet, health changes since last INR:chart reviewed; none identified    Contacted within the last 12 weeks by phone on 9/26/22      BELGICA     Reba was NOT contacted regarding therapeutic result today per home monitoring policy manage by exception agreement.   Current warfarin dose is to be continued:     Summary  As of 11/14/2022    Full warfarin instructions:  2.5 mg every Sun, Tue, Thu; 3.75 mg all other days; Starting 11/14/2022   Next INR check:  11/21/2022           ?   Crys Simpson RN  Anticoagulation Clinic  11/14/2022    _______________________________________________________________________     Anticoagulation Episode Summary     Current INR goal:  2.0-3.0   TTR:  81.3 % (1 y)   Target end date:  7/15/2036   Send INR reminders to:  [facility]    Indications    Atrial fibrillation  permanent (H) [I48.21]           Comments:  [employer] home monitor. Managed by exception.         Anticoagulation Care Providers     Provider Role Specialty Phone number    Devon Ball MD Referring Internal Medicine [phone]    Leti Brower NP Referring  [phone]

## 2022-11-16 ENCOUNTER — TRANSFERRED RECORDS (OUTPATIENT)
Dept: HEALTH INFORMATION MANAGEMENT | Facility: CLINIC | Age: 87
End: 2022-11-16

## 2022-11-21 ENCOUNTER — DOCUMENTATION ONLY (OUTPATIENT)
Dept: ANTICOAGULATION | Facility: CLINIC | Age: 87
End: 2022-11-21

## 2022-11-21 DIAGNOSIS — I48.21 ATRIAL FIBRILLATION, PERMANENT (H): Primary | ICD-10-CM

## 2022-11-21 LAB — INR HOME MONITORING: 2.3 (ref 2–3)

## 2022-11-21 NOTE — PROGRESS NOTES
ANTICOAGULATION  MANAGEMENT-Home Monitor Managed by Exception    Reba AKI Calderon 86 year old female is on warfarin with therapeutic INR result. (Goal INR 2.0-3.0)    Recent labs: (last 7 days)     11/21/22  0000   INR 2.3         Previous INR was Therapeutic    Medication, diet, health changes since last INR:chart reviewed; none identified    Contacted within the last 12 weeks by phone on 9/26/22      BELGICA     Reba was NOT contacted regarding therapeutic result today per home monitoring policy manage by exception agreement.   Current warfarin dose is to be continued:     Summary  As of 11/21/2022    Full warfarin instructions:  2.5 mg every Sun, Tue, Thu; 3.75 mg all other days; Starting 11/21/2022   Next INR check:  11/28/2022           ?   Crys Simpson RN  Anticoagulation Clinic  11/21/2022    _______________________________________________________________________     Anticoagulation Episode Summary     Current INR goal:  2.0-3.0   TTR:  81.3 % (1 y)   Target end date:  7/15/2036   Send INR reminders to:  [facility]    Indications    Atrial fibrillation  permanent (H) [I48.21]           Comments:  [employer] home monitor. Managed by exception.         Anticoagulation Care Providers     Provider Role Specialty Phone number    Devon Ball MD Referring Internal Medicine [phone]    Leti Brower NP Referring  [phone]

## 2022-11-28 ENCOUNTER — DOCUMENTATION ONLY (OUTPATIENT)
Dept: ANTICOAGULATION | Facility: CLINIC | Age: 87
End: 2022-11-28

## 2022-11-28 DIAGNOSIS — I48.21 ATRIAL FIBRILLATION, PERMANENT (H): Primary | ICD-10-CM

## 2022-11-28 LAB — INR HOME MONITORING: 2.2 (ref 2–3)

## 2022-11-28 NOTE — PROGRESS NOTES
ANTICOAGULATION  MANAGEMENT-Home Monitor Managed by Exception    Reba Calderon 86 year old female is on warfarin with therapeutic INR result. (Goal INR 2.0-3.0)    Recent labs: (last 7 days)     11/28/22  0000   INR 2.2         Previous INR was Therapeutic    Medication, diet, health changes since last INR:chart reviewed; none identified    Contacted within the last 12 weeks by phone on 9/26/22      BELGICA     Reba was NOT contacted regarding therapeutic result today per home monitoring policy manage by exception agreement.   Current warfarin dose is to be continued:     Summary  As of 11/28/2022    Full warfarin instructions:  2.5 mg every Sun, Tue, Thu; 3.75 mg all other days; Starting 11/28/2022   Next INR check:  12/5/2022           ?   Palak Velázquez RN  Anticoagulation Clinic  11/28/2022    _______________________________________________________________________     Anticoagulation Episode Summary     Current INR goal:  2.0-3.0   TTR:  81.4 % (1 y)   Target end date:  7/15/2036   Send INR reminders to:  [facility]    Indications    Atrial fibrillation  permanent (H) [I48.21]           Comments:  [employer] home monitor. Managed by exception.         Anticoagulation Care Providers     Provider Role Specialty Phone number    Devon Ball MD Referring Internal Medicine [phone]    Leti Brower NP Referring  [phone]

## 2022-12-05 ENCOUNTER — DOCUMENTATION ONLY (OUTPATIENT)
Dept: ANTICOAGULATION | Facility: CLINIC | Age: 87
End: 2022-12-05

## 2022-12-05 DIAGNOSIS — I48.21 ATRIAL FIBRILLATION, PERMANENT (H): Primary | ICD-10-CM

## 2022-12-05 LAB — INR HOME MONITORING: 2.9 (ref 2–3)

## 2022-12-05 NOTE — PROGRESS NOTES
ANTICOAGULATION  MANAGEMENT-Home Monitor Managed by Exception    Reba AKI Calderon 87 year old female is on warfarin with therapeutic INR result. (Goal INR 2.0-3.0)    Recent labs: (last 7 days)     12/05/22  0000   INR 2.9         Previous INR was Therapeutic    Medication, diet, health changes since last INR:chart reviewed; none identified    Contacted within the last 12 weeks by phone on 9/26/22      BELGICA     Reba was NOT contacted regarding therapeutic result today per home monitoring policy manage by exception agreement.   Current warfarin dose is to be continued:     Summary  As of 12/5/2022    Full warfarin instructions:  2.5 mg every Sun, Tue, Thu; 3.75 mg all other days; Starting 12/5/2022   Next INR check:  12/12/2022           ?   Crys Simpson RN  Anticoagulation Clinic  12/5/2022    _______________________________________________________________________     Anticoagulation Episode Summary     Current INR goal:  2.0-3.0   TTR:  81.4 % (1 y)   Target end date:  7/15/2036   Send INR reminders to:  [facility]    Indications    Atrial fibrillation  permanent (H) [I48.21]           Comments:  [employer] home monitor. Managed by exception.         Anticoagulation Care Providers     Provider Role Specialty Phone number    Devon Ball MD Referring Internal Medicine [phone]    Leti Brower NP Referring  [phone]

## 2022-12-07 ENCOUNTER — TRANSFERRED RECORDS (OUTPATIENT)
Dept: HEALTH INFORMATION MANAGEMENT | Facility: CLINIC | Age: 87
End: 2022-12-07

## 2022-12-12 ENCOUNTER — DOCUMENTATION ONLY (OUTPATIENT)
Dept: ANTICOAGULATION | Facility: CLINIC | Age: 87
End: 2022-12-12

## 2022-12-12 DIAGNOSIS — I48.21 ATRIAL FIBRILLATION, PERMANENT (H): Primary | ICD-10-CM

## 2022-12-12 LAB — INR HOME MONITORING: 2.2 (ref 2–3)

## 2022-12-12 NOTE — PROGRESS NOTES
ANTICOAGULATION  MANAGEMENT-Home Monitor Managed by Exception    Reba AKI Calderon 87 year old female is on warfarin with therapeutic INR result. (Goal INR 2.0-3.0)    Recent labs: (last 7 days)     12/12/22  0000   INR 2.2         Previous INR was Therapeutic    Medication, diet, health changes since last INR:chart reviewed; none identified    Contacted within the last 12 weeks by phone on 9/26/22      BELGICA     Reba was NOT contacted regarding therapeutic result today per home monitoring policy manage by exception agreement.   Current warfarin dose is to be continued:     Summary  As of 12/12/2022    Full warfarin instructions:  2.5 mg every Sun, Tue, Thu; 3.75 mg all other days; Starting 12/12/2022   Next INR check:  12/19/2022           ?   Crys Simpson RN  Anticoagulation Clinic  12/12/2022    _______________________________________________________________________     Anticoagulation Episode Summary     Current INR goal:  2.0-3.0   TTR:  82.4 % (1 y)   Target end date:  7/15/2036   Send INR reminders to:  [facility]    Indications    Atrial fibrillation  permanent (H) [I48.21]           Comments:  [employer] home monitor. Managed by exception.         Anticoagulation Care Providers     Provider Role Specialty Phone number    Devon Ball MD Referring Internal Medicine [phone]    Leti Brower NP Referring  [phone]

## 2022-12-16 NOTE — PROGRESS NOTES
[facility]    Outpatient Physical Therapy Discharge Note  Patient: Reba Calderon  : 1935    Beginning/End Dates of Reporting Period:  8/3/22 to 22 (2 visits)    Referring Provider: Devon Ball    Therapy Diagnosis: dizziness with position changes     Client Self Report: When I bend down and stand back up or sit down and bend hand back. I have company coming tomorrow and will be here for 20 days. After last session balance was off. I think I am walking better. But still not 100%. Pretty good lying down: when getting out of bed can get dizzy.    Goals:  Goal Identifier DHI   Goal Description Pt report decreased dizziness with daily activities as evidenced byy DHI <42/100   Target Date 10/31/22   Date Met      Progress (detail required for progress note):  Not retested at final attended session.     Goal Identifier 25' walk   Goal Description Pt demo improved stability with household ambulation per pt report and ability to walk 25' in <9.32 secs.   Target Date 10/31/22   Date Met      Progress (detail required for progress note):  Not retested at final attended session.     Goal Identifier HEP   Goal Description Pt demo indep with HEP for balance to improve confidence with daily activities and reduce falls risk.   Target Date 10/31/22   Date Met      Progress (detail required for progress note):  Not retested at final attended session.         Plan:  Discharge from therapy.    Discharge:    Reason for Discharge: Patient has failed to schedule further appointments.    Discharge Plan: Return to clinic with new order as appropriate.

## 2022-12-20 ENCOUNTER — TELEPHONE (OUTPATIENT)
Dept: INTERNAL MEDICINE | Facility: CLINIC | Age: 87
End: 2022-12-20

## 2022-12-20 ENCOUNTER — ANTICOAGULATION THERAPY VISIT (OUTPATIENT)
Dept: ANTICOAGULATION | Facility: CLINIC | Age: 87
End: 2022-12-20

## 2022-12-20 DIAGNOSIS — I48.21 ATRIAL FIBRILLATION, PERMANENT (H): Primary | ICD-10-CM

## 2022-12-20 LAB — INR (EXTERNAL): 2.1 (ref 0.9–1.1)

## 2022-12-20 NOTE — TELEPHONE ENCOUNTER
Reason for Call:  Other call back    Detailed comments: Pt left a voicemail but wasn't for sure if it worked. She stated that her INR result was 2.1. Please call cell phone with any dosing changes, home phone is not working.    Phone Number Patient can be reached at: Cell number on file:    Telephone Information:   Mobile [phone]       Best Time: any    Can we leave a detailed message on this number? YES    Call taken on 12/20/2022 at 12:18 PM by Yara Ferreira

## 2022-12-20 NOTE — PROGRESS NOTES
ANTICOAGULATION MANAGEMENT     Reba Calderon 87 year old female is on warfarin with therapeutic INR result. (Goal INR 2.0-3.0)    Recent labs: (last 7 days)     12/20/22  0000   INR 2.1*       ASSESSMENT       Source(s): Chart Review and Patient/Caregiver Call       Warfarin doses taken: Warfarin taken as instructed    Diet: No new diet changes identified    New illness, injury, or hospitalization: No    Medication/supplement changes: None noted    Signs or symptoms of bleeding or clotting: No    Previous INR: Therapeutic last 2(+) visits    Additional findings: None       PLAN     Recommended plan for no diet, medication or health factor changes affecting INR     Dosing Instructions: Continue your current warfarin dose with next INR in 1 week       Summary  As of 12/20/2022    Full warfarin instructions:  2.5 mg every Sun, Tue, Thu; 3.75 mg all other days; Starting 12/20/2022   Next INR check:  12/26/2022             Telephone call with Reba who verbalizes understanding and agrees to plan    Patient to recheck with home meter    Education provided:     Please call back if any changes to your diet, medications or how you've been taking warfarin    Resume manage by exception with home monitor. Continue to submit INR results to home monitor company.You will only be called when your result is out of range. Please call and notify [facility] if new medication started, dose missed, signs or symptoms of bleeding or clotting, or a surgery/procedure is scheduled.    Plan made per [facility] anticoagulation protocol    Meredith Langley RN  Anticoagulation Clinic  12/20/2022    _______________________________________________________________________     Anticoagulation Episode Summary     Current INR goal:  2.0-3.0   TTR:  82.4 % (1 y)   Target end date:  7/15/2036   Send INR reminders to:  [facility]    Indications    Atrial fibrillation  permanent (H) [I48.21]           Comments:  [employer] home monitor. Managed by  exception.         Anticoagulation Care Providers     Provider Role Specialty Phone number    Devon Ball MD Referring Internal Medicine [phone]    Leti Brower NP Referring  [phone]

## 2022-12-20 NOTE — TELEPHONE ENCOUNTER
Returned call to patient in separate TE encounter.    BRIDGER EscalanteN, RN  Anticoagulation Clinic

## 2022-12-26 ENCOUNTER — DOCUMENTATION ONLY (OUTPATIENT)
Dept: ANTICOAGULATION | Facility: CLINIC | Age: 87
End: 2022-12-26

## 2022-12-26 DIAGNOSIS — I48.21 ATRIAL FIBRILLATION, PERMANENT (H): Primary | ICD-10-CM

## 2022-12-26 LAB — INR HOME MONITORING: 2.5 (ref 2–3)

## 2022-12-26 NOTE — PROGRESS NOTES
ANTICOAGULATION  MANAGEMENT-Home Monitor Managed by Exception    Reba AKI Calderon 87 year old female is on warfarin with therapeutic INR result. (Goal INR 2.0-3.0)    Recent labs: (last 7 days)     12/26/22  0000   INR 2.5         Previous INR was Therapeutic    Medication, diet, health changes since last INR:chart reviewed; none identified    Contacted within the last 12 weeks by phone on 12/20      [place]     Reba was NOT contacted regarding therapeutic result today per home monitoring policy manage by exception agreement.   Current warfarin dose is to be continued:     Summary  As of 12/26/2022    Full warfarin instructions:  2.5 mg every Sun, Tue, Thu; 3.75 mg all other days   Next INR check:  1/2/2023           ?   Selam Calixto, RN  Anticoagulation Clinic  12/26/2022    _______________________________________________________________________     Anticoagulation Episode Summary     Current INR goal:  2.0-3.0   TTR:  82.4 % (1 y)   Target end date:  7/15/2036   Send INR reminders to:  [facility]    Indications    Atrial fibrillation  permanent (H) [I48.21]           Comments:  [employer] home monitor. Managed by exception.         Anticoagulation Care Providers     Provider Role Specialty Phone number    Devon Ball MD Referring Internal Medicine [phone]    Leti Brower NP Referring  [phone]

## 2022-12-30 DIAGNOSIS — E78.5 HYPERLIPIDEMIA, UNSPECIFIED: ICD-10-CM

## 2022-12-30 DIAGNOSIS — K21.9 GASTRO-ESOPHAGEAL REFLUX DISEASE WITHOUT ESOPHAGITIS: ICD-10-CM

## 2022-12-30 DIAGNOSIS — Z79.01 LONG TERM (CURRENT) USE OF ANTICOAGULANTS: ICD-10-CM

## 2022-12-30 DIAGNOSIS — I48.21 PERMANENT ATRIAL FIBRILLATION (H): ICD-10-CM

## 2022-12-30 RX ORDER — ATORVASTATIN CALCIUM 10 MG/1
TABLET, FILM COATED ORAL
Qty: 90 TABLET | Refills: 3 | Status: SHIPPED | OUTPATIENT
Start: 2022-12-30 | End: 2024-01-23

## 2022-12-30 RX ORDER — WARFARIN SODIUM 2.5 MG/1
TABLET ORAL
Qty: 120 TABLET | Refills: 1 | Status: SHIPPED | OUTPATIENT
Start: 2022-12-30 | End: 2023-08-21

## 2022-12-30 RX ORDER — PANTOPRAZOLE SODIUM 40 MG/1
TABLET, DELAYED RELEASE ORAL
Qty: 90 TABLET | Refills: 3 | Status: SHIPPED | OUTPATIENT
Start: 2022-12-30 | End: 2024-01-23

## 2023-01-02 ENCOUNTER — DOCUMENTATION ONLY (OUTPATIENT)
Dept: ANTICOAGULATION | Facility: CLINIC | Age: 88
End: 2023-01-02

## 2023-01-02 DIAGNOSIS — I48.21 ATRIAL FIBRILLATION, PERMANENT (H): Primary | ICD-10-CM

## 2023-01-02 LAB — INR HOME MONITORING: 2.6 (ref 2–3)

## 2023-01-02 NOTE — PROGRESS NOTES
ANTICOAGULATION  MANAGEMENT-Home Monitor Managed by Exception    Reba AKI Calderon 87 year old female is on warfarin with therapeutic INR result. (Goal INR 2.0-3.0)    Recent labs: (last 7 days)     01/02/23  0000   INR 2.6         Previous INR was Therapeutic    Medication, diet, health changes since last INR:chart reviewed; none identified    Contacted within the last 12 weeks by phone on 12/20/22      PLAN     Reba was NOT contacted regarding therapeutic result today per home monitoring policy manage by exception agreement.   Current warfarin dose is to be continued:     Summary  As of 1/2/2023    Full warfarin instructions:  2.5 mg every Sun, Tue, Thu; 3.75 mg all other days   Next INR check:  1/9/2023           ?   Crys Simpson RN  Anticoagulation Clinic  1/2/2023    _______________________________________________________________________     Anticoagulation Episode Summary     Current INR goal:  2.0-3.0   TTR:  82.4 % (1 y)   Target end date:  7/15/2036   Send INR reminders to:  [facility]    Indications    Atrial fibrillation  permanent (H) [I48.21]           Comments:  [employer] home monitor. Managed by exception.         Anticoagulation Care Providers     Provider Role Specialty Phone number    Devon Ball MD Referring Internal Medicine [phone]    Leti Brower NP Referring  [phone]

## 2023-01-09 ENCOUNTER — DOCUMENTATION ONLY (OUTPATIENT)
Dept: ANTICOAGULATION | Facility: CLINIC | Age: 88
End: 2023-01-09

## 2023-01-09 DIAGNOSIS — I48.21 ATRIAL FIBRILLATION, PERMANENT (H): Primary | ICD-10-CM

## 2023-01-09 LAB — INR HOME MONITORING: 2.3 (ref 2–3)

## 2023-01-09 NOTE — PROGRESS NOTES
ANTICOAGULATION  MANAGEMENT-Home Monitor Managed by Exception    Reba AKI Calderon 87 year old female is on warfarin with therapeutic INR result. (Goal INR 2.0-3.0)    Recent labs: (last 7 days)     01/09/23  0000   INR 2.3         Previous INR was Therapeutic    Medication, diet, health changes since last INR:chart reviewed; none identified    Contacted within the last 12 weeks by phone on 12/20/22      PLAN     Reba was NOT contacted regarding therapeutic result today per home monitoring policy manage by exception agreement.   Current warfarin dose is to be continued:     Summary  As of 1/9/2023    Full warfarin instructions:  2.5 mg every Sun, Tue, Thu; 3.75 mg all other days   Next INR check:  1/16/2023           ?   Crys Simpson RN  Anticoagulation Clinic  1/9/2023    _______________________________________________________________________     Anticoagulation Episode Summary     Current INR goal:  2.0-3.0   TTR:  82.3 % (1 y)   Target end date:  7/15/2036   Send INR reminders to:  [facility]    Indications    Atrial fibrillation  permanent (H) [I48.21]           Comments:  [employer] home monitor. Managed by exception.         Anticoagulation Care Providers     Provider Role Specialty Phone number    Devon Ball MD Referring Internal Medicine [phone]    Leti Brower NP Referring  [phone]

## 2023-01-16 ENCOUNTER — DOCUMENTATION ONLY (OUTPATIENT)
Dept: ANTICOAGULATION | Facility: CLINIC | Age: 88
End: 2023-01-16

## 2023-01-16 DIAGNOSIS — I48.21 ATRIAL FIBRILLATION, PERMANENT (H): Primary | ICD-10-CM

## 2023-01-16 LAB — INR HOME MONITORING: 2.4 (ref 2–3)

## 2023-01-16 NOTE — PROGRESS NOTES
ANTICOAGULATION  MANAGEMENT-Home Monitor Managed by Exception    Reba AKI Calderon 87 year old female is on warfarin with therapeutic INR result. (Goal INR 2.0-3.0)    Recent labs: (last 7 days)     01/16/23  0000   INR 2.4         Previous INR was Therapeutic    Medication, diet, health changes since last INR:chart reviewed; none identified    Contacted within the last 12 weeks by phone on 12/20/22      PLAN     Reba was NOT contacted regarding therapeutic result today per home monitoring policy manage by exception agreement.   Current warfarin dose is to be continued:     Summary  As of 1/16/2023    Full warfarin instructions:  2.5 mg every Sun, Tue, Thu; 3.75 mg all other days   Next INR check:  1/23/2023           ?   Crys Simpson RN  Anticoagulation Clinic  1/16/2023    _______________________________________________________________________     Anticoagulation Episode Summary     Current INR goal:  2.0-3.0   TTR:  82.4 % (1 y)   Target end date:  7/15/2036   Send INR reminders to:  [facility]    Indications    Atrial fibrillation  permanent (H) [I48.21]           Comments:  [employer] home monitor. Managed by exception.         Anticoagulation Care Providers     Provider Role Specialty Phone number    Devon Ball MD Referring Internal Medicine [phone]    Leti Brower NP Referring  [phone]

## 2023-01-23 ENCOUNTER — DOCUMENTATION ONLY (OUTPATIENT)
Dept: ANTICOAGULATION | Facility: CLINIC | Age: 88
End: 2023-01-23
Payer: MEDICARE

## 2023-01-23 DIAGNOSIS — I48.21 ATRIAL FIBRILLATION, PERMANENT (H): Primary | ICD-10-CM

## 2023-01-23 LAB — INR HOME MONITORING: 2.6 (ref 2–3)

## 2023-01-23 NOTE — PROGRESS NOTES
ANTICOAGULATION  MANAGEMENT-Home Monitor Managed by Exception    Reba AKI Calderon 87 year old female is on warfarin with therapeutic INR result. (Goal INR 2.0-3.0)    Recent labs: (last 7 days)     01/23/23  0000   INR 2.6         Previous INR was Therapeutic    Medication, diet, health changes since last INR:chart reviewed; none identified    Contacted within the last 12 weeks by phone on 12/20/22      PLAN     Reba was NOT contacted regarding therapeutic result today per home monitoring policy manage by exception agreement.   Current warfarin dose is to be continued:     Summary  As of 1/23/2023    Full warfarin instructions:  2.5 mg every Sun, Tue, Thu; 3.75 mg all other days   Next INR check:  1/30/2023           ?   Crys Simpson RN  Anticoagulation Clinic  1/23/2023    _______________________________________________________________________     Anticoagulation Episode Summary     Current INR goal:  2.0-3.0   TTR:  82.4 % (1 y)   Target end date:  7/15/2036   Send INR reminders to:  [facility]    Indications    Atrial fibrillation  permanent (H) [I48.21]           Comments:  [employer] home monitor. Managed by exception.         Anticoagulation Care Providers     Provider Role Specialty Phone number    Devon Ball MD Referring Internal Medicine [phone]    Leti Brower NP Referring  [phone]

## 2023-01-26 ENCOUNTER — TRANSFERRED RECORDS (OUTPATIENT)
Dept: HEALTH INFORMATION MANAGEMENT | Facility: CLINIC | Age: 88
End: 2023-01-26

## 2023-01-30 ENCOUNTER — DOCUMENTATION ONLY (OUTPATIENT)
Dept: ANTICOAGULATION | Facility: CLINIC | Age: 88
End: 2023-01-30
Payer: MEDICARE

## 2023-01-30 DIAGNOSIS — I48.21 ATRIAL FIBRILLATION, PERMANENT (H): Primary | ICD-10-CM

## 2023-01-30 LAB — INR HOME MONITORING: 2.7 (ref 2–3)

## 2023-01-30 NOTE — PROGRESS NOTES
ANTICOAGULATION  MANAGEMENT-Home Monitor Managed by Exception    Reba AKI Calderon 87 year old female is on warfarin with therapeutic INR result. (Goal INR 2.0-3.0)    Recent labs: (last 7 days)     01/30/23  0000   INR 2.7         Previous INR was Therapeutic    Medication, diet, health changes since last INR:chart reviewed; none identified    Contacted within the last 12 weeks by phone on 12/20/22      PLAN     Reba was NOT contacted regarding therapeutic result today per home monitoring policy manage by exception agreement.   Current warfarin dose is to be continued:     Summary  As of 1/30/2023    Full warfarin instructions:  2.5 mg every Sun, Tue, Thu; 3.75 mg all other days   Next INR check:  2/6/2023           ?   Crys Simpson RN  Anticoagulation Clinic  1/30/2023    _______________________________________________________________________     Anticoagulation Episode Summary     Current INR goal:  2.0-3.0   TTR:  82.4 % (1 y)   Target end date:  7/15/2036   Send INR reminders to:  [facility]    Indications    Atrial fibrillation  permanent (H) [I48.21]           Comments:  [employer] home monitor. Managed by exception.         Anticoagulation Care Providers     Provider Role Specialty Phone number    Devon Ball MD Referring Internal Medicine [phone]    Leti Brower NP Referring  [phone]

## 2023-02-02 ENCOUNTER — OFFICE VISIT (OUTPATIENT)
Dept: INTERNAL MEDICINE | Facility: CLINIC | Age: 88
End: 2023-02-02
Payer: MEDICARE

## 2023-02-02 VITALS
OXYGEN SATURATION: 95 % | WEIGHT: 196 LBS | HEART RATE: 64 BPM | TEMPERATURE: 97.6 F | SYSTOLIC BLOOD PRESSURE: 125 MMHG | BODY MASS INDEX: 30.76 KG/M2 | HEIGHT: 67 IN | DIASTOLIC BLOOD PRESSURE: 71 MMHG

## 2023-02-02 DIAGNOSIS — M25.571 RIGHT ANKLE PAIN, UNSPECIFIED CHRONICITY: ICD-10-CM

## 2023-02-02 DIAGNOSIS — G60.8 PERIPHERAL SENSORY NEUROPATHY: ICD-10-CM

## 2023-02-02 DIAGNOSIS — I48.21 PERMANENT ATRIAL FIBRILLATION (H): ICD-10-CM

## 2023-02-02 DIAGNOSIS — M18.11 PRIMARY OSTEOARTHRITIS OF FIRST CARPOMETACARPAL JOINT OF RIGHT HAND: ICD-10-CM

## 2023-02-02 DIAGNOSIS — M67.431 GANGLION CYST OF WRIST, RIGHT: ICD-10-CM

## 2023-02-02 DIAGNOSIS — E11.628 TYPE 2 DIABETES MELLITUS WITH OTHER SKIN COMPLICATION, WITHOUT LONG-TERM CURRENT USE OF INSULIN (H): ICD-10-CM

## 2023-02-02 DIAGNOSIS — Z13.220 SCREENING FOR HYPERLIPIDEMIA: ICD-10-CM

## 2023-02-02 DIAGNOSIS — I50.32 CHRONIC DIASTOLIC (CONGESTIVE) HEART FAILURE (H): ICD-10-CM

## 2023-02-02 DIAGNOSIS — M21.41 PES PLANUS OF RIGHT FOOT: ICD-10-CM

## 2023-02-02 DIAGNOSIS — D69.2 SENILE PURPURA (H): ICD-10-CM

## 2023-02-02 DIAGNOSIS — K80.50 CHOLEDOCHOLITHIASIS: ICD-10-CM

## 2023-02-02 DIAGNOSIS — N18.31 STAGE 3A CHRONIC KIDNEY DISEASE (H): ICD-10-CM

## 2023-02-02 DIAGNOSIS — G47.33 OSA (OBSTRUCTIVE SLEEP APNEA): ICD-10-CM

## 2023-02-02 DIAGNOSIS — Z95.0 CARDIAC PACEMAKER IN SITU: Chronic | ICD-10-CM

## 2023-02-02 DIAGNOSIS — I10 PRIMARY HYPERTENSION: Chronic | ICD-10-CM

## 2023-02-02 DIAGNOSIS — Z00.00 ENCOUNTER FOR MEDICARE ANNUAL WELLNESS EXAM: Primary | ICD-10-CM

## 2023-02-02 DIAGNOSIS — C91.10 CLL (CHRONIC LYMPHOCYTIC LEUKEMIA) (H): ICD-10-CM

## 2023-02-02 DIAGNOSIS — I48.21 ATRIAL FIBRILLATION, PERMANENT (H): ICD-10-CM

## 2023-02-02 PROBLEM — E11.9 DIABETES MELLITUS, TYPE 2 (H): Status: ACTIVE | Noted: 2018-03-08

## 2023-02-02 LAB
ALBUMIN SERPL BCG-MCNC: 4.4 G/DL (ref 3.5–5.2)
ALP SERPL-CCNC: 70 U/L (ref 35–104)
ALT SERPL W P-5'-P-CCNC: 14 U/L (ref 10–35)
ANION GAP SERPL CALCULATED.3IONS-SCNC: 14 MMOL/L (ref 7–15)
AST SERPL W P-5'-P-CCNC: 28 U/L (ref 10–35)
BILIRUB DIRECT SERPL-MCNC: <0.2 MG/DL (ref 0–0.3)
BILIRUB SERPL-MCNC: 0.8 MG/DL
BUN SERPL-MCNC: 19.1 MG/DL (ref 8–23)
CALCIUM SERPL-MCNC: 10.1 MG/DL (ref 8.8–10.2)
CHLORIDE SERPL-SCNC: 100 MMOL/L (ref 98–107)
CHOLEST SERPL-MCNC: 215 MG/DL
CREAT SERPL-MCNC: 0.89 MG/DL (ref 0.51–0.95)
DEPRECATED HCO3 PLAS-SCNC: 28 MMOL/L (ref 22–29)
ERYTHROCYTE [DISTWIDTH] IN BLOOD BY AUTOMATED COUNT: 16.7 % (ref 10–15)
GFR SERPL CREATININE-BSD FRML MDRD: 62 ML/MIN/1.73M2
GLUCOSE SERPL-MCNC: 147 MG/DL (ref 70–99)
HBA1C MFR BLD: 7.6 % (ref 0–5.6)
HCT VFR BLD AUTO: 43.1 % (ref 35–47)
HDLC SERPL-MCNC: 43 MG/DL
HGB BLD-MCNC: 14.1 G/DL (ref 11.7–15.7)
LDLC SERPL CALC-MCNC: 144 MG/DL
MCH RBC QN AUTO: 29.1 PG (ref 26.5–33)
MCHC RBC AUTO-ENTMCNC: 32.7 G/DL (ref 31.5–36.5)
MCV RBC AUTO: 89 FL (ref 78–100)
NONHDLC SERPL-MCNC: 172 MG/DL
PLATELET # BLD AUTO: 220 10E3/UL (ref 150–450)
POTASSIUM SERPL-SCNC: 4.3 MMOL/L (ref 3.4–5.3)
PROT SERPL-MCNC: 7.2 G/DL (ref 6.4–8.3)
RBC # BLD AUTO: 4.85 10E6/UL (ref 3.8–5.2)
SODIUM SERPL-SCNC: 142 MMOL/L (ref 136–145)
TRIGL SERPL-MCNC: 138 MG/DL
TSH SERPL DL<=0.005 MIU/L-ACNC: 1.67 UIU/ML (ref 0.3–4.2)
WBC # BLD AUTO: 7.3 10E3/UL (ref 4–11)

## 2023-02-02 PROCEDURE — 84443 ASSAY THYROID STIM HORMONE: CPT | Performed by: INTERNAL MEDICINE

## 2023-02-02 PROCEDURE — 99214 OFFICE O/P EST MOD 30 MIN: CPT | Mod: 25 | Performed by: INTERNAL MEDICINE

## 2023-02-02 PROCEDURE — 80053 COMPREHEN METABOLIC PANEL: CPT | Performed by: INTERNAL MEDICINE

## 2023-02-02 PROCEDURE — 83036 HEMOGLOBIN GLYCOSYLATED A1C: CPT | Performed by: INTERNAL MEDICINE

## 2023-02-02 PROCEDURE — 85027 COMPLETE CBC AUTOMATED: CPT | Performed by: INTERNAL MEDICINE

## 2023-02-02 PROCEDURE — G0439 PPPS, SUBSEQ VISIT: HCPCS | Performed by: INTERNAL MEDICINE

## 2023-02-02 PROCEDURE — 80061 LIPID PANEL: CPT | Performed by: INTERNAL MEDICINE

## 2023-02-02 PROCEDURE — 36415 COLL VENOUS BLD VENIPUNCTURE: CPT | Performed by: INTERNAL MEDICINE

## 2023-02-02 PROCEDURE — 82248 BILIRUBIN DIRECT: CPT | Performed by: INTERNAL MEDICINE

## 2023-02-02 ASSESSMENT — ENCOUNTER SYMPTOMS
BREAST MASS: 0
WEAKNESS: 0
EYE PAIN: 0
HEARTBURN: 0
FEVER: 0
COUGH: 1
PARESTHESIAS: 0
DYSURIA: 0
CHILLS: 0
DIZZINESS: 0
SORE THROAT: 0
HEADACHES: 0
ABDOMINAL PAIN: 0
JOINT SWELLING: 1
HEMATOCHEZIA: 0
DIARRHEA: 0
NAUSEA: 0
FREQUENCY: 0
NERVOUS/ANXIOUS: 0
MYALGIAS: 0
SHORTNESS OF BREATH: 0
CONSTIPATION: 0
PALPITATIONS: 0
HEMATURIA: 0
ARTHRALGIAS: 1

## 2023-02-02 ASSESSMENT — ACTIVITIES OF DAILY LIVING (ADL): CURRENT_FUNCTION: NO ASSISTANCE NEEDED

## 2023-02-02 ASSESSMENT — PAIN SCALES - GENERAL: PAINLEVEL: MODERATE PAIN (4)

## 2023-02-02 NOTE — PROGRESS NOTES
"  Patient has been advised of split billing requirements and indicates understanding: Yes     Are you in the first 12 months of your Medicare coverage?  No    Healthy Habits:     In general, how would you rate your overall health?  Good    Frequency of exercise:  None    Do you usually eat at least 4 servings of fruit and vegetables a day, include whole grains    & fiber and avoid regularly eating high fat or \"junk\" foods?  No    Taking medications regularly:  Yes    Ability to successfully perform activities of daily living:  No assistance needed    Home Safety:  No safety concerns identified    Hearing Impairment:  No hearing concerns    In the past 6 months, have you been bothered by leaking of urine?  No    In general, how would you rate your overall mental or emotional health?  Good      PHQ-2 Total Score: 0    Additional concerns today:  Yes      Have you ever done Advance Care Planning? (For example, a Health Directive, POLST, or a discussion with a medical provider or your loved ones about your wishes): Yes, patient states has an Advance Care Planning document and will bring a copy to the clinic.       Fall risk  Fallen 2 or more times in the past year?: Yes  Any fall with injury in the past year?: Yes    Cognitive Screening   1) Repeat 3 items (Leader, Season, Table)    2) Clock draw: NORMAL  3) 3 item recall: Recalls 3 objects  Results: 3 items recalled: COGNITIVE IMPAIRMENT LESS LIKELY    Mini-CogTM Copyright S [employer]. Licensed by the author for use in [facility]; reprinted with permission (tessa@.[email or web address]). All rights reserved.      Do you have sleep apnea, excessive snoring or daytime drowsiness?: no  Review of Systems   Constitutional: Negative for chills and fever.   HENT: Positive for congestion. Negative for ear pain, hearing loss and sore throat.    Eyes: Negative for pain and visual disturbance.   Respiratory: Positive for cough. Negative for shortness of breath.    Cardiovascular: " Negative for chest pain, palpitations and peripheral edema.   Gastrointestinal: Negative for abdominal pain, constipation, diarrhea, heartburn, hematochezia and nausea.   Breasts:  Negative for breast mass and discharge.   Genitourinary: Negative for dysuria, frequency, genital sores, hematuria, pelvic pain, urgency and vaginal discharge.   Musculoskeletal: Positive for arthralgias and joint swelling. Negative for myalgias.   Skin: Negative for rash.   Neurological: Negative for dizziness, weakness, headaches and paresthesias.   Psychiatric/Behavioral: Negative for mood changes. The patient is not nervous/anxious.

## 2023-02-02 NOTE — PROGRESS NOTES
[facility]    Comprehensive and complex medical care - Chronic disease management - Shared decision making - Care coordination - Compassionate care    Patient advocacy - Rational deprescribing - Minimally disruptive medicine - Ethical focus - Customized care         Date of Service: 2/2/2023  Primary Provider: Devon Ball    Patient Care Team:  Devon Ball MD as PCP - General (Internal Medicine)  Sherwin Coats MD as Physician (Hematology)  Louis Jacobsen MD as MD (Ophthalmology)  Shyam Barrett MD as MD (Colon & Rectal)  DANGELO Pagan MD as MD  Devon Ball MD as Assigned PCP          Patient's Pharmacy:    [id number] - [facility], [place] - [address]  [address] [address]  Phone: [phone] Fax: [phone]     Patient's Contacts:  Name Home Phone Work Phone Mobile Phone Relationship Lgl XIMENA Beckford [phone]   Spouse    JYOTHI SHEIKH   [phone] Daughter      Patient's Insurance:    Payor: MEDICARE / Plan: MEDICARE / Product Type: Medicare /      Subjective:     History of present illness:    Reba Calderon is an 87 year old here for an annual wellness visit.    The issues she would like to address at today's visit include the following:    Chief Complaint   Patient presents with     Physical     Cold Symptoms       Patient comes in today for annual wellness visit and for other issues.    She has had a little bit of sinus issues for the last week.  She has been blowing her nose.  She has had no temperature associated with this, headache, or sore throat.  She has had some nasal congestion.  She has been using Robitussin or Vicks with good success.    She has been dealing with issues with her right ankle.  She had a history of a fracture in the past.  This is remote.  She has been [place] [facility] and has seen Dr. Marcos for this.  She has had corticosteroid injection remotely  but nothing recently.  Her pain is mainly over the medial ankle.  They had talked to her about doing an MRI but this was not done.  Apparently they can have this done at [facility] if needed.  They do deal with pacemakers that are MRI safe.  She was felt to have some posterior tibial tendinitis by podiatry.  She is limping with this.  She is using a brace or boot with this.    We reviewed her CLL and she has seen Dr. Coats and has had no concerning findings related to this.    We reviewed her right thumb arthritis.  This has been bothering her more.  She has had an injection in the past with good success.  She would like this done again.    She does have a ganglion cyst which has shown up on her right wrist as well.  Its not painful and not affecting function.    We reviewed her other issues noted in the assessment but not specifically addressed in the HPI above.           Active Problem List:  Problem List as of 2/2/2023 Reviewed: 7/19/2022  1:02 PM by Devon Ball MD       High    Nonsmoker    Atrial fibrillation, permanent (H)    CLL (chronic lymphocytic leukemia) (H)       Medium    Chronic kidney disease, stage 3 (H)    Cardiac pacemaker in situ    HTN (hypertension)    H/O atrioventricular jerzy ablation    CHB (complete heart block) (H)    Anticoagulation monitoring, INR range 2-3    Choledocholithiasis, RECURRENT    Diabetes mellitus, type 2 (H)       Low    Coagulopathy (H) - due to warfarin    Peripheral sensory neuropathy    MATILDE (obstructive sleep apnea)    Perirectal abscess    Senile purpura (H)       Other    Chronic diastolic heart failure (H)    Actinomycosis        Past Medical History:   Diagnosis Date     Carotid stenosis, asymptomatic      Choledocholithiasis, RECURRENT      CLL (chronic lymphocytic leukemia) (H)      DMII (diabetes mellitus, type 2) (H)      GERD (gastroesophageal reflux disease)      Hyperlipidemia      Hypertension      Nonsmoker      Pacemaker       Permanent atrial fibrillation (H)       Past Surgical History:   Procedure Laterality Date     ARTHROSCOPY KNEE       BIOPSY BREAST       CATARACT EXTRACTION       CHOLECYSTECTOMY  08/24/2013     D & C       ERCP W/ SPHINCTEROTOMY AND BALLOON DILATION  11/2017, 10/27/2014     H ABLATION AV NODE       H ABLATION FOCAL AFIB      also for Atrial Flutter     HEMANGIOMA EXCISION  03/29/2019    Nasal     IMPLANT PACEMAKER       INCISIONAL BREAST BIOPSY       LASER YAG CAPSULOTOMY Right 07/23/2015    Procedure: LASER YAG CAPSULOTOMY;  Surgeon: Louis Jacobsen MD;  Location: [facility]     NASAL ENDOSCOPY  03/29/2019     NOSE SURGERY  03/29/2019    nasal mucosal flap reconstruction     PHACOEMULSIFICATION CLEAR CORNEA WITH TORIC INTRAOCULAR LENS IMPLANT  04/03/2014    Procedure: PHACOEMULSIFICATION CLEAR CORNEA WITH TORIC INTRAOCULAR LENS IMPLANT;  RIGHT PHACOEMULSIFICATION CLEAR CORNEA WITH TORIC INTRAOCULAR LENS IMPLANT  ;  Surgeon: Louis Jacobsen MD;  Location: [facility]     PHACOEMULSIFICATION CLEAR CORNEA WITH TORIC INTRAOCULAR LENS IMPLANT  05/01/2014    Procedure: PHACOEMULSIFICATION CLEAR CORNEA WITH TORIC INTRAOCULAR LENS IMPLANT;  Surgeon: Louis Jacobsen MD;  Location: [facility]     RECTOCELE REPAIR      CYSTOCELE AS WELL     TONSILLECTOMY        No family history on file.   Family history is otherwise noncontributory.    Social History     Occupational History     Not on file   Tobacco Use     Smoking status: Never     Smokeless tobacco: Never   Substance and Sexual Activity     Alcohol use: Yes     Comment: Alcoholic Drinks/day: occasional     Drug use: No     Sexual activity: Not on file      Social History     Social History Narrative          Current Outpatient Medications   Medication Instructions     atorvastatin (LIPITOR) 10 MG tablet TAKE 1 TABLET BY MOUTH EVERY DAY IN THE EVENING     biotin 2,500 mcg, Oral     Cholecalciferol (VITAMIN D3 PO) 2,000 Units, Oral, DAILY     CONTOUR TEST test strip USE 1 STRIP  TO TEST BLOOD SUGAR DAILY     FOLIC ACID PO 1 mg, DAILY     furosemide (LASIX) 40 MG tablet TAKE 2 TABLETS BY MOUTH EVERY MORNING     glucosamine-chondroitinoitin 500-400 MG tablet 1 tablet, Oral     lisinopril (ZESTRIL) 10 MG tablet TAKE 1 TABLET BY MOUTH EVERY DAY     magnesium chloride 64 mg, Oral     meclizine (ANTIVERT) 25 mg, Oral, 3 TIMES DAILY PRN     metFORMIN (GLUCOPHAGE) 500 mg, Oral, 2 TIMES DAILY WITH MEALS     nitroGLYcerin (NITROSTAT) 0.4 mg, Sublingual     pantoprazole (PROTONIX) 40 MG EC tablet TAKE 1 TABLET BY MOUTH EVERY DAY     polyethylene glycol (MIRALAX) 17 GM/Dose powder MIX 1 CAPFUL (17 GRAMS) IN LIQUID AND DRINK BY MOUTH DAILY. (OTC NC)     potassium chloride ER (K-TAB/KLOR-CON) 10 MEQ CR tablet 10 mEq, Oral, DAILY     UNABLE TO FIND MEDICATION NAME: Hair skin and nails   2500     vitamin B-12 (CYANOCOBALAMIN) 1,000 mcg, Oral     warfarin ANTICOAGULANT (COUMADIN) 2.5 MG tablet TAKE BY MOUTH 2.5 MG (2.5 MG X 1) EVERY MON, WED, FRI 3.75 MG (2.5 MG X 1.5) ALL OTHER DAYS      Allergies: Amiodarone, Atenolol, Beta adrenergic blockers, Demerol [meperidine], Percocet [oxycodone-acetaminophen], Pioglitazone, and Toprol xl [metoprolol]     Immunization History   Administered Date(s) Administered     COVID-19 Vaccine 12+ (Pfizer 2022) 04/13/2022     COVID-19 Vaccine 12+ (Pfizer) 02/24/2021, 03/17/2021, 09/29/2021     COVID-19 Vaccine Bivalent Booster 12+ (Pfizer) 09/23/2022     FLUAD(HD)65+ QUAD 09/23/2022     Flu, Unspecified 10/11/2018, 11/26/2019, 10/01/2020     Influenza (High Dose) 3 valent vaccine 10/29/2014, 11/07/2015, 11/05/2016, 11/06/2017, 10/11/2018, 11/26/2019, 10/07/2020     Influenza (IIV3) PF 09/30/2009, 11/23/2010, 10/15/2012     Influenza Vaccine 65+ (Fluzone HD) 10/07/2020, 10/13/2021     Pneumococcal 23 valent 01/01/1998, 08/20/2008, 11/14/2008, 08/26/2013     Zoster vaccine recombinant adjuvanted (SHINGRIX) 11/30/2019, 04/21/2021     Zoster vaccine, live 09/01/2013     "  Objective:     Wt Readings from Last 3 Encounters:   02/02/23 88.9 kg (196 lb)   07/26/22 91.6 kg (202 lb)   07/19/22 92.1 kg (203 lb)     BP Readings from Last 3 Encounters:   02/02/23 125/71   07/26/22 124/78   07/19/22 122/74       PHYSICAL EXAM  /71 (BP Location: Right arm, Patient Position: Right side, Cuff Size: Adult Regular)   Pulse 64   Temp 97.6  F (36.4  C) (Temporal)   Ht 1.689 m (5' 6.5\")   Wt 88.9 kg (196 lb)   SpO2 95%   Breastfeeding No   BMI 31.16 kg/m     The patient is comfortable, no acute distress.  Mood good.  Insight is good.  No skin lesions or nodules of concern.  Ears clear.  Eyes are nonicteric.  Pupils equal and reactive.  Throat is clear.  Neck is supple without mass, no thyromegaly. No cervical or epitrochlear adenopathy.  No axillary lymph nodes. Heart regular rate and rhythm.  Lungs clear to auscultation bilaterally.  Respiratory effort good.  Abdomen soft and nontender.  No hepatosplenomegaly.  Extremities show no edema.  She has some medial ankle pain with some mild swelling.  There is pes planus of the right foot.  Her right wrist shows a ganglion cyst and she has CMC squaring and degenerative change without effusion or synovitis.    Diagnostics:     Orders Only on 01/30/2023   Component Date Value Ref Range Status     INR HOME MONITORING 01/30/2023 2.7  2.000 - 3.000 Final       Results for orders placed or performed in visit on 02/02/23   TSH WITH FREE T4 REFLEX     Status: Normal   Result Value Ref Range    TSH 1.67 0.30 - 4.20 uIU/mL   HEMOGLOBIN A1C     Status: Abnormal   Result Value Ref Range    Hemoglobin A1C 7.6 (H) 0.0 - 5.6 %   BASIC METABOLIC PANEL     Status: Abnormal   Result Value Ref Range    Sodium 142 136 - 145 mmol/L    Potassium 4.3 3.4 - 5.3 mmol/L    Chloride 100 98 - 107 mmol/L    Carbon Dioxide (CO2) 28 22 - 29 mmol/L    Anion Gap 14 7 - 15 mmol/L    Urea Nitrogen 19.1 8.0 - 23.0 mg/dL    Creatinine 0.89 0.51 - 0.95 mg/dL    Calcium 10.1 8.8 - " 10.2 mg/dL    Glucose 147 (H) 70 - 99 mg/dL    GFR Estimate 62 >60 mL/min/1.73m2   Lipid panel reflex to direct LDL Non-fasting     Status: Abnormal   Result Value Ref Range    Cholesterol 215 (H) <200 mg/dL    Triglycerides 138 <150 mg/dL    Direct Measure HDL 43 (L) >=50 mg/dL    LDL Cholesterol Calculated 144 (H) <=100 mg/dL    Non HDL Cholesterol 172 (H) <130 mg/dL    Narrative    Cholesterol  Desirable:  <200 mg/dL    Triglycerides  Normal:  Less than 150 mg/dL  Borderline High:  150-199 mg/dL  High:  200-499 mg/dL  Very High:  Greater than or equal to 500 mg/dL    Direct Measure HDL  Female:  Greater than or equal to 50 mg/dL   Male:  Greater than or equal to 40 mg/dL    LDL Cholesterol  Desirable:  <100mg/dL  Above Desirable:  100-129 mg/dL   Borderline High:  130-159 mg/dL   High:  160-189 mg/dL   Very High:  >= 190 mg/dL    Non HDL Cholesterol  Desirable:  130 mg/dL  Above Desirable:  130-159 mg/dL  Borderline High:  160-189 mg/dL  High:  190-219 mg/dL  Very High:  Greater than or equal to 220 mg/dL   CBC with Platelets     Status: Abnormal   Result Value Ref Range    WBC Count 7.3 4.0 - 11.0 10e3/uL    RBC Count 4.85 3.80 - 5.20 10e6/uL    Hemoglobin 14.1 11.7 - 15.7 g/dL    Hematocrit 43.1 35.0 - 47.0 %    MCV 89 78 - 100 fL    MCH 29.1 26.5 - 33.0 pg    MCHC 32.7 31.5 - 36.5 g/dL    RDW 16.7 (H) 10.0 - 15.0 %    Platelet Count 220 150 - 450 10e3/uL   Hepatic function panel     Status: Normal   Result Value Ref Range    Protein Total 7.2 6.4 - 8.3 g/dL    Albumin 4.4 3.5 - 5.2 g/dL    Bilirubin Total 0.8 <=1.2 mg/dL    Alkaline Phosphatase 70 35 - 104 U/L    AST 28 10 - 35 U/L    ALT 14 10 - 35 U/L    Bilirubin Direct <0.20 0.00 - 0.30 mg/dL     Assessment and Plan:     1. Encounter for Medicare annual wellness exam  Health issues reviewed closely today.    2. Type 2 diabetes mellitus with other skin complication, without long-term current use of insulin (H)  Currently doing well.  No major concerns.   Check blood work.    - TSH WITH FREE T4 REFLEX; Future  - HEMOGLOBIN A1C; Future  - Lipid panel reflex to direct LDL Non-fasting; Future  - Hepatic function panel; Future  - TSH WITH FREE T4 REFLEX  - HEMOGLOBIN A1C  - Lipid panel reflex to direct LDL Non-fasting  - Hepatic function panel    3. Stage 3a chronic kidney disease (H)  Generally stable.  Continue to monitor.  Check blood work.    - BASIC METABOLIC PANEL; Future  - CBC with Platelets; Future  - BASIC METABOLIC PANEL  - CBC with Platelets    4. CLL (chronic lymphocytic leukemia) (H)  Follows with oncology.  Continue to monitor.    5. Chronic diastolic (congestive) heart failure (H)  No signs of worsening.  Continue to monitor.    6. Permanent atrial fibrillation (H)  No major complications from this.  Continue to monitor.    7. Senile purpura (H)  No signs of worsening.  Continue to monitor.    8. Screening for hyperlipidemia    - Lipid panel reflex to direct LDL Non-fasting; Future  - Lipid panel reflex to direct LDL Non-fasting    9. Primary osteoarthritis of first carpometacarpal joint of right hand  Joint injected today by myself.    - methylPREDNISolone (DEPO-MEDROL) injection 20 mg  - DRAIN/INJECT SMALL JOINT/BURSA    10. Atrial fibrillation, permanent (H)    11. Cardiac pacemaker in situ    12. Primary hypertension    13. Choledocholithiasis, RECURRENT  No signs of recurrence.    14. Peripheral sensory neuropathy    15. MATILDE (obstructive sleep apnea)  On CPAP.    16. Right ankle pain, unspecified chronicity  Following up with [facility] orth[facility]ics related to this.  Could get MRI in the future if needed.    17. Pes planus of right foot    18. Ganglion cyst of wrist, right    Recommended follow-up in 6 months for general check with annual wellness visit in 1 year.     A personalized health plan based on the identified health risks was provided to the patient on the AVS.       Devon Ball MD  General Internal Medicine  [facility]  [facility]      Return in 1 year (on 2/2/2024) for annual wellness visit.     No future appointments.      Health Maintenance   Topic Date Due     ANNUAL REVIEW OF HM ORDERS  Never done     HF ACTION PLAN  07/19/2062 (Originally 1935)     EYE EXAM  03/08/2023     DIABETIC FOOT EXAM  07/19/2023     A1C  08/02/2023     BMP  08/02/2023     MEDICARE ANNUAL WELLNESS VISIT  02/02/2024     ALT  02/02/2024     LIPID  02/02/2024     TSH W/FREE T4 REFLEX  02/02/2024     FALL RISK ASSESSMENT  02/02/2024     CBC  02/02/2024     HEMOGLOBIN  02/02/2024     ADVANCE CARE PLANNING  02/02/2028     PHQ-2 (once per calendar year)  Completed     INFLUENZA VACCINE  Completed     URINALYSIS  Completed     ZOSTER IMMUNIZATION  Completed     COVID-19 Vaccine  Completed     IPV IMMUNIZATION  Aged Out     MENINGITIS IMMUNIZATION  Aged Out     MICROALBUMIN  Discontinued     Pneumococcal Vaccine: 65+ Years  Discontinued     DTAP/TDAP/TD IMMUNIZATION  Discontinued          She is at risk for lack of exercise and has been provided with information to increase physical activity for the benefit of her well-being.  The patient was counseled and encouraged to consider modifying their diet and eating habits. She was provided with information on recommended healthy diet options.  She is at risk for falling and has been provided with information to reduce the risk of falling at home.

## 2023-02-03 PROCEDURE — 20600 DRAIN/INJ JOINT/BURSA W/O US: CPT | Mod: F5 | Performed by: INTERNAL MEDICINE

## 2023-02-03 RX ADMIN — METHYLPREDNISOLONE ACETATE 20 MG: 40 INJECTION, SUSPENSION INTRA-ARTICULAR; INTRALESIONAL; INTRAMUSCULAR; SOFT TISSUE at 11:25

## 2023-02-06 ENCOUNTER — DOCUMENTATION ONLY (OUTPATIENT)
Dept: ANTICOAGULATION | Facility: CLINIC | Age: 88
End: 2023-02-06
Payer: MEDICARE

## 2023-02-06 DIAGNOSIS — I48.21 ATRIAL FIBRILLATION, PERMANENT (H): Primary | ICD-10-CM

## 2023-02-06 LAB — INR HOME MONITORING: 2.5 (ref 2–3)

## 2023-02-06 RX ORDER — METHYLPREDNISOLONE ACETATE 40 MG/ML
20 INJECTION, SUSPENSION INTRA-ARTICULAR; INTRALESIONAL; INTRAMUSCULAR; SOFT TISSUE ONCE
Status: COMPLETED | OUTPATIENT
Start: 2023-02-03 | End: 2023-02-03

## 2023-02-06 NOTE — PROGRESS NOTES
[facility]    Procedure: DRAIN/INJECT SMALL JOINT/BURSA    Date/Time: 2/3/2023 11:24 AM  Performed by: Devon Ball MD  Authorized by: Devon Ball MD       UNIVERSAL PROTOCOL   Site Marked: Yes  Prior Images Obtained and Reviewed:  Yes  Required items: Required blood products, implants, devices and special equipment available    Patient identity confirmed:  Verbally with patient  NA - No sedation, light sedation, or local anesthesia  Confirmation Checklist:  Correct equipment/implants were available  Universal Protocol: the Joint Commission Universal Protocol was followed      PROCEDURE  Describe Procedure: Procedure:  CMC joint injection.    Diagnosis:  CMC arthritis.    Description of procedure: Patient was given informed consent prior to proceeding with corticosteroid injection.  The patient had the joint identified and marked.  The area was cleaned with an alcohol swab.  20 mg of Depo-Medrol and 0.2 cc of lidocaine without epi were drawn up.  Using a 27-gauge 1/2 inch needle the joint space was found.  The fluid was injected without difficulty.  No immediate complications.  Area was dressed with a bandage.  Aftercare instructions were given.   Length of time physician/provider present for 1:1 monitoring during sedation: 0

## 2023-02-06 NOTE — PROGRESS NOTES
ANTICOAGULATION  MANAGEMENT-Home Monitor Managed by Exception    Reba AKI Calderon 87 year old female is on warfarin with therapeutic INR result. (Goal INR 2.0-3.0)    Recent labs: (last 7 days)     02/06/23  0000   INR 2.5         Previous INR was Therapeutic    Medication, diet, health changes since last INR:chart reviewed; none identified    Contacted within the last 12 weeks by phone on 12/20/22      PLAN     Reba was NOT contacted regarding therapeutic result today per home monitoring policy manage by exception agreement.   Current warfarin dose is to be continued:     Summary  As of 2/6/2023    Full warfarin instructions:  2.5 mg every Sun, Tue, Thu; 3.75 mg all other days   Next INR check:  2/13/2023           ?   Crys Simpson RN  Anticoagulation Clinic  2/6/2023    _______________________________________________________________________     Anticoagulation Episode Summary     Current INR goal:  2.0-3.0   TTR:  82.4 % (1 y)   Target end date:  7/15/2036   Send INR reminders to:  [facility]IT[facility]    Indications    Atrial fibrillation  permanent (H) [I48.21]           Comments:  [employer] home monitor. Managed by exception.         Anticoagulation Care Providers     Provider Role Specialty Phone number    Devon Ball MD Referring Internal Medicine [phone]    Leti Brower NP Referring  [phone]

## 2023-02-13 ENCOUNTER — DOCUMENTATION ONLY (OUTPATIENT)
Dept: ANTICOAGULATION | Facility: CLINIC | Age: 88
End: 2023-02-13
Payer: MEDICARE

## 2023-02-13 DIAGNOSIS — I48.21 ATRIAL FIBRILLATION, PERMANENT (H): Primary | ICD-10-CM

## 2023-02-13 LAB — INR HOME MONITORING: 2.5 (ref 2–3)

## 2023-02-13 NOTE — PROGRESS NOTES
ANTICOAGULATION  MANAGEMENT-Home Monitor Managed by Exception    Reba AKI Calderon 87 year old female is on warfarin with therapeutic INR result. (Goal INR 2.0-3.0)    Recent labs: (last 7 days)     02/13/23  0000   INR 2.5         Previous INR was Therapeutic    Medication, diet, health changes since last INR:chart reviewed; none identified    Contacted within the last 12 weeks by phone on 12/20/22      PLAN     Reba was NOT contacted regarding therapeutic result today per home monitoring policy manage by exception agreement.   Current warfarin dose is to be continued:     Summary  As of 2/13/2023    Full warfarin instructions:  2.5 mg every Sun, Tue, Thu; 3.75 mg all other days   Next INR check:  2/20/2023           ?   Crys Simpson RN  Anticoagulation Clinic  2/13/2023    _______________________________________________________________________     Anticoagulation Episode Summary     Current INR goal:  2.0-3.0   TTR:  83.1 % (1 y)   Target end date:  7/15/2036   Send INR reminders to:  [facility]    Indications    Atrial fibrillation  permanent (H) [I48.21]           Comments:  [employer] home monitor. Managed by exception.         Anticoagulation Care Providers     Provider Role Specialty Phone number    Devon Ball MD Referring Internal Medicine [phone]    Leti Brower NP Referring  [phone]

## 2023-02-20 ENCOUNTER — DOCUMENTATION ONLY (OUTPATIENT)
Dept: ANTICOAGULATION | Facility: CLINIC | Age: 88
End: 2023-02-20
Payer: MEDICARE

## 2023-02-20 DIAGNOSIS — I48.21 ATRIAL FIBRILLATION, PERMANENT (H): Primary | ICD-10-CM

## 2023-02-20 LAB — INR HOME MONITORING: 2.7 (ref 2–3)

## 2023-02-27 ENCOUNTER — DOCUMENTATION ONLY (OUTPATIENT)
Dept: ANTICOAGULATION | Facility: CLINIC | Age: 88
End: 2023-02-27
Payer: MEDICARE

## 2023-02-27 DIAGNOSIS — I48.21 ATRIAL FIBRILLATION, PERMANENT (H): Primary | ICD-10-CM

## 2023-02-27 LAB — INR HOME MONITORING: 2.9 (ref 2–3)

## 2023-02-27 NOTE — PROGRESS NOTES
ANTICOAGULATION  MANAGEMENT-Home Monitor Managed by Exception    Reba AKI Calderon 87 year old female is on warfarin with therapeutic INR result. (Goal INR 2.0-3.0)    Recent labs: (last 7 days)     02/27/23  0000   INR 2.9         Previous INR was Therapeutic    Medication, diet, health changes since last INR:chart reviewed; none identified    Contacted within the last 12 weeks by phone on 12/20/22      PLAN     Reba was NOT contacted regarding therapeutic result today per home monitoring policy manage by exception agreement.   Current warfarin dose is to be continued:     Summary  As of 2/27/2023    Full warfarin instructions:  2.5 mg every Sun, Tue, Thu; 3.75 mg all other days   Next INR check:  3/6/2023           ?   Crys Simpson RN  Anticoagulation Clinic  2/27/2023    _______________________________________________________________________     Anticoagulation Episode Summary     Current INR goal:  2.0-3.0   TTR:  84.6 % (1 y)   Target end date:  7/15/2036   Send INR reminders to:  [facility]    Indications    Atrial fibrillation  permanent (H) [I48.21]           Comments:  [employer] home monitor. Managed by exception.         Anticoagulation Care Providers     Provider Role Specialty Phone number    Devon Ball MD Referring Internal Medicine [phone]    Leti Brower NP Referring  [phone]

## 2023-03-06 ENCOUNTER — DOCUMENTATION ONLY (OUTPATIENT)
Dept: ANTICOAGULATION | Facility: CLINIC | Age: 88
End: 2023-03-06
Payer: MEDICARE

## 2023-03-06 DIAGNOSIS — I48.21 ATRIAL FIBRILLATION, PERMANENT (H): Primary | ICD-10-CM

## 2023-03-06 LAB — INR HOME MONITORING: 2.3 (ref 2–3)

## 2023-03-06 NOTE — PROGRESS NOTES
ANTICOAGULATION  MANAGEMENT-Home Monitor Managed by Exception    Reba AKI Calderon 87 year old female is on warfarin with therapeutic INR result. (Goal INR 2.0-3.0)    Recent labs: (last 7 days)     03/06/23  0000   INR 2.3         Previous INR was Therapeutic    Medication, diet, health changes since last INR:chart reviewed; none identified    Contacted within the last 12 weeks by phone on 12/20/22      PLAN     Reba was NOT contacted regarding therapeutic result today per home monitoring policy manage by exception agreement.   Current warfarin dose is to be continued:     Summary  As of 3/6/2023    Full warfarin instructions:  2.5 mg every Sun, Tue, Thu; 3.75 mg all other days   Next INR check:  3/13/2023           ?   Crys Simpson RN  Anticoagulation Clinic  3/6/2023    _______________________________________________________________________     Anticoagulation Episode Summary     Current INR goal:  2.0-3.0   TTR:  85.9 % (1 y)   Target end date:  7/15/2036   Send INR reminders to:  [facility]    Indications    Atrial fibrillation  permanent (H) [I48.21]           Comments:  [employer] home monitor. Managed by exception.         Anticoagulation Care Providers     Provider Role Specialty Phone number    Devon Ball MD Referring Internal Medicine [phone]    Leti Brower NP Referring  [phone]

## 2023-03-13 ENCOUNTER — ANTICOAGULATION THERAPY VISIT (OUTPATIENT)
Dept: ANTICOAGULATION | Facility: CLINIC | Age: 88
End: 2023-03-13
Payer: MEDICARE

## 2023-03-13 DIAGNOSIS — I48.21 ATRIAL FIBRILLATION, PERMANENT (H): Primary | ICD-10-CM

## 2023-03-13 LAB — INR HOME MONITORING: 1.7 (ref 2–3)

## 2023-03-13 NOTE — PROGRESS NOTES
ANTICOAGULATION MANAGEMENT     Reba Calderon 87 year old female is on warfarin with subtherapeutic INR result. (Goal INR 2.0-3.0)    Recent labs: (last 7 days)     03/13/23  0000   INR 1.7*       ASSESSMENT       Source(s): Chart Review and Patient/Caregiver Call       Warfarin doses taken: Missed dose(s) may be affecting INR    Diet: No new diet changes identified    New illness, injury, or hospitalization: No    Medication/supplement changes: None noted    Signs or symptoms of bleeding or clotting: No    Previous INR: Therapeutic last 2(+) visits    Additional findings: None         PLAN     Recommended plan for temporary change(s) affecting INR     Dosing Instructions: booster dose then continue your current warfarin dose with next INR in 1 week       Summary  As of 3/13/2023    Full warfarin instructions:  3/13: 5 mg; Otherwise 2.5 mg every Sun, Tue, Thu; 3.75 mg all other days   Next INR check:  3/20/2023             Telephone call with Reba who verbalizes understanding and agrees to plan    Patient to recheck with home meter    Education provided:     Please call back if any changes to your diet, medications or how you've been taking warfarin    Symptom monitoring: monitoring for bleeding signs and symptoms and monitoring for clotting signs and symptoms    Contact [phone]  with any changes, questions or concerns.     Plan made per [facility] anticoagulation protocol    Crys Simpson RN  Anticoagulation Clinic  3/13/2023    _______________________________________________________________________     Anticoagulation Episode Summary     Current INR goal:  2.0-3.0   TTR:  86.8 % (1 y)   Target end date:  7/15/2036   Send INR reminders to:  [facility] MONITORING    Indications    Atrial fibrillation  permanent (H) [I48.21]           Comments:  [employer] home monitor. Managed by exception.         Anticoagulation Care Providers     Provider Role Specialty Phone number    Devon aBll MD Referring Internal Medicine  [phone]    Leti Brower, NP Referring  [phone]

## 2023-03-20 ENCOUNTER — ANTICOAGULATION THERAPY VISIT (OUTPATIENT)
Dept: ANTICOAGULATION | Facility: CLINIC | Age: 88
End: 2023-03-20
Payer: MEDICARE

## 2023-03-20 DIAGNOSIS — I48.21 ATRIAL FIBRILLATION, PERMANENT (H): Primary | ICD-10-CM

## 2023-03-20 LAB — INR HOME MONITORING: 2.3 (ref 2–3)

## 2023-03-20 NOTE — PROGRESS NOTES
ANTICOAGULATION MANAGEMENT     Reba Calderon 87 year old female is on warfarin with therapeutic INR result. (Goal INR 2.0-3.0)    Recent labs: (last 7 days)     03/20/23  0000   INR 2.3       ASSESSMENT       Source(s): Chart Review and Patient/Caregiver Call       Warfarin doses taken: Warfarin taken as instructed    Diet: No new diet changes identified    New illness, injury, or hospitalization: No    Medication/supplement changes: None noted    Signs or symptoms of bleeding or clotting: No    Previous INR: Subtherapeutic    Additional findings: None         PLAN     Recommended plan for no diet, medication or health factor changes affecting INR     Dosing Instructions: Continue your current warfarin dose with next INR in 1 week       Summary  As of 3/20/2023    Full warfarin instructions:  2.5 mg every Sun, Tue, Thu; 3.75 mg all other days   Next INR check:               Telephone call with Reba who agrees to plan and repeated back plan correctly    Patient to recheck with home meter    Education provided:     Please call back if any changes to your diet, medications or how you've been taking warfarin    Resume manage by exception with home monitor. Continue to submit INR results to home monitor company.You will only be called when your result is out of range. Please call and notify [facility] if new medication started, dose missed, signs or symptoms of bleeding or clotting, or a surgery/procedure is scheduled.    Contact [phone]  with any changes, questions or concerns.     Plan made per [facility] anticoagulation protocol    Crys Simpson RN  Anticoagulation Clinic  3/20/2023    _______________________________________________________________________     Anticoagulation Episode Summary     Current INR goal:  2.0-3.0   TTR:  86.8 % (1 y)   Target end date:  7/15/2036   Send INR reminders to:  [facility]    Indications    Atrial fibrillation  permanent (H) [I48.21]           Comments:  [employer] home monitor.  Managed by leeanna.         Anticoagulation Care Providers     Provider Role Specialty Phone number    Devon Ball MD Referring Internal Medicine [phone]    Leti Brower NP Referring  [phone]

## 2023-03-27 ENCOUNTER — DOCUMENTATION ONLY (OUTPATIENT)
Dept: ANTICOAGULATION | Facility: CLINIC | Age: 88
End: 2023-03-27
Payer: MEDICARE

## 2023-03-27 DIAGNOSIS — I48.21 ATRIAL FIBRILLATION, PERMANENT (H): Primary | ICD-10-CM

## 2023-03-27 LAB — INR HOME MONITORING: 2.3 (ref 2–3)

## 2023-03-27 NOTE — PROGRESS NOTES
ANTICOAGULATION  MANAGEMENT-Home Monitor Managed by Exception    Reba AKI Calderon 87 year old female is on warfarin with therapeutic INR result. (Goal INR 2.0-3.0)    Recent labs: (last 7 days)     03/27/23  0000   INR 2.3         Previous INR was Therapeutic    Medication, diet, health changes since last INR:chart reviewed; none identified    Contacted within the last 12 weeks by phone on 3/20/23      [facility]     Reba was NOT contacted regarding therapeutic result today per home monitoring policy manage by exception agreement.   Current warfarin dose is to be continued:     Summary  As of 3/27/2023    Full warfarin instructions:  2.5 mg every Sun, Tue, Thu; 3.75 mg all other days   Next INR check:  4/3/2023           ?   Crys Simpson RN  Anticoagulation Clinic  3/27/2023    _______________________________________________________________________     Anticoagulation Episode Summary     Current INR goal:  2.0-3.0   TTR:  86.8 % (1 y)   Target end date:  7/15/2036   Send INR reminders to:  [facility]    Indications    Atrial fibrillation  permanent (H) [I48.21]           Comments:  [employer] home monitor. Managed by exception.         Anticoagulation Care Providers     Provider Role Specialty Phone number    Devon Ball MD Referring Internal Medicine [phone]    Leti Brower NP Referring  [phone]

## 2023-03-28 ENCOUNTER — TRANSFERRED RECORDS (OUTPATIENT)
Dept: HEALTH INFORMATION MANAGEMENT | Facility: CLINIC | Age: 88
End: 2023-03-28

## 2023-04-10 ENCOUNTER — DOCUMENTATION ONLY (OUTPATIENT)
Dept: ANTICOAGULATION | Facility: CLINIC | Age: 88
End: 2023-04-10
Payer: MEDICARE

## 2023-04-10 DIAGNOSIS — I48.21 ATRIAL FIBRILLATION, PERMANENT (H): Primary | ICD-10-CM

## 2023-04-10 LAB — INR HOME MONITORING: 2.1 (ref 2–3)

## 2023-04-10 NOTE — PROGRESS NOTES
ANTICOAGULATION  MANAGEMENT-Home Monitor Managed by Exception    Reba AKI Calderon 87 year old female is on warfarin with therapeutic INR result. (Goal INR 2.0-3.0)    Recent labs: (last 7 days)     04/10/23  0000   INR 2.1         Previous INR was Therapeutic    Medication, diet, health changes since last INR:chart reviewed; none identified    Contacted within the last 12 weeks by phone on 3/20/23      [facility]     Reba was NOT contacted regarding therapeutic result today per home monitoring policy manage by exception agreement.   Current warfarin dose is to be continued:     Summary  As of 4/10/2023    Full warfarin instructions:  2.5 mg every Sun, Tue, Thu; 3.75 mg all other days   Next INR check:  4/24/2023           ?   Crys Simpson RN  Anticoagulation Clinic  4/10/2023    _______________________________________________________________________     Anticoagulation Episode Summary     Current INR goal:  2.0-3.0   TTR:  86.9 % (1 y)   Target end date:  7/15/2036   Send INR reminders to:  [facility]    Indications    Atrial fibrillation  permanent (H) [I48.21]           Comments:  [employer] home monitor. Managed by exception.         Anticoagulation Care Providers     Provider Role Specialty Phone number    Devon Ball MD Referring Internal Medicine [phone]    Leti Brower NP Referring  [phone]

## 2023-04-17 ENCOUNTER — DOCUMENTATION ONLY (OUTPATIENT)
Dept: ANTICOAGULATION | Facility: CLINIC | Age: 88
End: 2023-04-17
Payer: MEDICARE

## 2023-04-17 LAB — INR HOME MONITORING: 2.1 (ref 2–3)

## 2023-04-17 NOTE — PROGRESS NOTES
ANTICOAGULATION  MANAGEMENT-Home Monitor Managed by Exception    Reba Calderon 87 year old female is on warfarin with therapeutic INR result. (Goal INR 2.0-3.0)    Recent labs: (last 7 days)     04/17/23  0000   INR 2.1         Previous INR was Therapeutic    Medication, diet, health changes since last INR:chart reviewed; none identified    Contacted within the last 12 weeks by phone on 3/20/23          [facility]     Reba was NOT contacted regarding therapeutic result today per home monitoring policy manage by exception agreement.   Current warfarin dose is to be continued:     Summary  As of 4/17/2023    Full warfarin instructions:  2.5 mg every Sun, Tue, Thu; 3.75 mg all other days   Next INR check:  5/1/2023           ?   Maegan Norton RN  Anticoagulation Clinic  4/17/2023    _______________________________________________________________________     Anticoagulation Episode Summary     Current INR goal:  2.0-3.0   TTR:  86.8 % (1 y)   Target end date:  7/15/2036   Send INR reminders to:  [facility]    Indications    Atrial fibrillation  permanent (H) [I48.21]           Comments:  [employer] home monitor. Managed by exception.         Anticoagulation Care Providers     Provider Role Specialty Phone number    Devon Ball MD Referring Internal Medicine [phone]    Leti Brower NP Referring  [phone]

## 2023-04-24 ENCOUNTER — DOCUMENTATION ONLY (OUTPATIENT)
Dept: ANTICOAGULATION | Facility: CLINIC | Age: 88
End: 2023-04-24
Payer: MEDICARE

## 2023-04-24 DIAGNOSIS — I48.21 ATRIAL FIBRILLATION, PERMANENT (H): Primary | ICD-10-CM

## 2023-04-24 LAB — INR HOME MONITORING: 2.1 (ref 2–3)

## 2023-04-24 NOTE — PROGRESS NOTES
ANTICOAGULATION  MANAGEMENT-Home Monitor Managed by Exception    Reba AKI Calderon 87 year old female is on warfarin with therapeutic INR result. (Goal INR 2.0-3.0)    Recent labs: (last 7 days)     04/24/23  0000   INR 2.1         Previous INR was Therapeutic    Medication, diet, health changes since last INR:chart reviewed; none identified    Contacted within the last 12 weeks by phone on 3/20/23      BELGICA     Reba was NOT contacted regarding therapeutic result today per home monitoring policy manage by exception agreement.   Current warfarin dose is to be continued:     Summary  As of 4/24/2023    Full warfarin instructions:  2.5 mg every Sun, Tue, Thu; 3.75 mg all other days   Next INR check:  5/1/2023           ?   Crys Simpson RN  Anticoagulation Clinic  4/24/2023    _______________________________________________________________________     Anticoagulation Episode Summary     Current INR goal:  2.0-3.0   TTR:  86.8 % (1 y)   Target end date:  7/15/2036   Send INR reminders to:  [facility]    Indications    Atrial fibrillation  permanent (H) [I48.21]           Comments:  [employer] home monitor. Managed by exception.         Anticoagulation Care Providers     Provider Role Specialty Phone number    Devon Ball MD Referring Internal Medicine [phone]    Leti Brower NP Referring  [phone]

## 2023-04-26 ENCOUNTER — NURSE TRIAGE (OUTPATIENT)
Dept: NURSING | Facility: CLINIC | Age: 88
End: 2023-04-26
Payer: MEDICARE

## 2023-04-26 ENCOUNTER — TELEPHONE (OUTPATIENT)
Dept: INTERNAL MEDICINE | Facility: CLINIC | Age: 88
End: 2023-04-26
Payer: MEDICARE

## 2023-04-26 DIAGNOSIS — R04.2 HEMOPTYSIS: ICD-10-CM

## 2023-04-26 DIAGNOSIS — R05.1 ACUTE COUGH: Primary | ICD-10-CM

## 2023-04-26 NOTE — TELEPHONE ENCOUNTER
Contacted patient and reviewed message below.  Patient speaking in full sentences, no obvious distress.  Patient feels she can wait until tomorrow.  She was scheduled and advised to come early so she can get xray done prior.  Patient verbalized understanding.

## 2023-04-26 NOTE — TELEPHONE ENCOUNTER
Reason for Call:   Patient  Wanted to let Dr Ball know she took a covid test and it was negative    Detailed comments: N/A    Phone Number Patient can be reached at: Cell number on file:    Telephone Information:   Mobile [phone]       Best Time: anytime    Can we leave a detailed message on this number? YES    Call taken on 4/26/2023 at 5:03 PM by ZAHIDA COYNE

## 2023-04-26 NOTE — TELEPHONE ENCOUNTER
"Nurse Triage SBAR    Is this a 2nd Level Triage? YES, LICENSED PRACTITIONER REVIEW IS REQUIRED    Situation: Pt called with concerns for coughing up blood.    Background: Reba states she has had a cough since Friday the 21 st. Today the phlegm she is coughing up is streaked with blood. She is on blood thinners for afib. She does state having a hx of bronchitis 60+ years ago.     Assessment: Reba has 'not been feeling well' for almost a week. She is coughing up \"yellow, jello consistency\" phlegm which has become blood streaked today. She states her chest feels 'full'. She does c/o severe difficulty breathing 'at times'. Coughing is worse at night when laying down. Ice water helps. She has been taking tylenol, Vicks, Robitussin-DM, and Bengay as well.  Denies fever and testing for COVID.    Protocol Recommended Disposition:   See in Office Today    Recommendation: Please review and contact pt to discuss POC.    Reason for Disposition    Taking Coumadin (warfarin) or other strong blood thinner, or known bleeding disorder (e.g., thrombocytopenia)    Additional Information    Negative: SEVERE difficulty breathing (e.g., struggling for each breath, speaks in single words)    Negative: Sounds like a life-threatening emergency to the triager    Negative: Passed out (i.e., lost consciousness, collapsed and was not responding)    Negative: Difficult to awaken or acting confused (e.g., disoriented, slurred speech)    Negative: Chest pain and difficulty breathing    Negative: MODERATE difficulty breathing (e.g., speaks in phrases, SOB even at rest, pulse 100-120) and still present when not coughing    Negative: Bluish (or gray) lips or face now    Negative: Shock suspected (e.g., cold/pale/clammy skin, too weak to stand, low BP, rapid pulse)    Negative: Recent chest injury (i.e., past 24 hours)    Negative: Coughed up blood and large amount (Such as: 'a half cup of blood')    Negative: Chest pain    Negative: Unclear to " triager if the patient is coughing up blood or vomiting blood    Negative: Pregnant or postpartum (from 0 to 6 weeks after delivery)    Negative: History of inherited increased risk of blood clots (e.g., Factor 5 Leiden, Anti-thrombin 3, Protein C or Protein S deficiency, Prothrombin mutation)    Negative: History of prior 'blood clot' in leg or lungs (i.e., deep vein thrombosis, pulmonary embolism)    Negative: Hip or leg fracture (broken bone) in past month (or had cast on leg or ankle in past month)    Negative: Bedridden (e.g., nursing home patient, CVA, chronic illness, recovering from surgery)    Negative: Patient sounds very sick or weak to the triager    Negative: MILD difficulty breathing (e.g., minimal/no SOB at rest, SOB with walking, pulse <100) and still present when not coughing (Exception: No change from usual, chronic shortness of breath.)    Negative: Coughed up blood and > 1 tablespoon (15 ml)  (Exception: Blood-tinged sputum.)    Negative: Fever > 103 F (39.4 C)    Negative: Fever > 101 F (38.3 C) and age > 60 years    Negative: Fever > 100.0 F (37.8 C) and diabetes mellitus or weak immune system (e.g., HIV positive, cancer chemo, splenectomy, organ transplant, chronic steroids)    Negative: Long-distance travel in past month (e.g., car, bus, train, plane; with trip lasting 6 or more hours)    Negative: Has underlying lung disease (e.g., COPD, chronic bronchitis or emphysema) and sputum has turned yellow or green in color    Negative: Coughing up phi-colored sputum    Negative: Coughing up yellow or green sputum and present > 5 days    Negative: Fever present > 3 days (72 hours)    Protocols used: COUGHING UP BLOOD-A-OH    SANDER Suresh [facility] Nurse Advisor   4/26/2023  3:13 PM

## 2023-04-26 NOTE — TELEPHONE ENCOUNTER
Call patient back.    Please see how bad she is doing.  I am not in the office today.      Please see if she has done a COVID test yet.    She should be seen by [facility] or [facility] in the meantime if doing poorly.    If this is something that can wait, then may add into my schedule at 2:15 pm tomorrow.  I ordered a chest x-ray (CXR) as well and she could do this before the visit.    Thank you,    Devon Ball MD  General Internal Medicine  [facility]  4/26/2023, 3:47 PM

## 2023-04-27 ENCOUNTER — OFFICE VISIT (OUTPATIENT)
Dept: INTERNAL MEDICINE | Facility: CLINIC | Age: 88
End: 2023-04-27
Payer: MEDICARE

## 2023-04-27 ENCOUNTER — HOSPITAL ENCOUNTER (OUTPATIENT)
Dept: GENERAL RADIOLOGY | Facility: HOSPITAL | Age: 88
Discharge: HOME | End: 2023-04-27
Attending: INTERNAL MEDICINE | Admitting: INTERNAL MEDICINE
Payer: MEDICARE

## 2023-04-27 VITALS
HEART RATE: 81 BPM | SYSTOLIC BLOOD PRESSURE: 153 MMHG | TEMPERATURE: 98.4 F | RESPIRATION RATE: 18 BRPM | BODY MASS INDEX: 30.65 KG/M2 | OXYGEN SATURATION: 94 % | DIASTOLIC BLOOD PRESSURE: 81 MMHG | WEIGHT: 195.3 LBS | HEIGHT: 67 IN

## 2023-04-27 DIAGNOSIS — R04.2 HEMOPTYSIS: ICD-10-CM

## 2023-04-27 DIAGNOSIS — R05.1 ACUTE COUGH: ICD-10-CM

## 2023-04-27 DIAGNOSIS — C91.10 CLL (CHRONIC LYMPHOCYTIC LEUKEMIA) (H): ICD-10-CM

## 2023-04-27 DIAGNOSIS — J18.9 PNEUMONIA OF RIGHT MIDDLE LOBE DUE TO INFECTIOUS ORGANISM: Primary | ICD-10-CM

## 2023-04-27 PROCEDURE — 71046 X-RAY EXAM CHEST 2 VIEWS: CPT

## 2023-04-27 PROCEDURE — 99215 OFFICE O/P EST HI 40 MIN: CPT | Performed by: INTERNAL MEDICINE

## 2023-04-27 RX ORDER — BENZONATATE 100 MG/1
100 CAPSULE ORAL 3 TIMES DAILY PRN
Qty: 30 CAPSULE | Refills: 1 | Status: SHIPPED | OUTPATIENT
Start: 2023-04-27 | End: 2023-09-14

## 2023-04-27 RX ORDER — CODEINE PHOSPHATE AND GUAIFENESIN 10; 100 MG/5ML; MG/5ML
1-2 SOLUTION ORAL EVERY 6 HOURS PRN
Qty: 236 ML | Refills: 1 | Status: SHIPPED | OUTPATIENT
Start: 2023-04-27 | End: 2023-09-14

## 2023-04-27 RX ORDER — CEFUROXIME AXETIL 500 MG/1
500 TABLET ORAL 2 TIMES DAILY
Qty: 14 TABLET | Refills: 0 | Status: SHIPPED | OUTPATIENT
Start: 2023-04-27 | End: 2023-05-04

## 2023-04-27 ASSESSMENT — PAIN SCALES - GENERAL: PAINLEVEL: MODERATE PAIN (4)

## 2023-04-27 NOTE — PROGRESS NOTES
[facility] - Primary Care Specialists    Comprehensive and complex medical care - Chronic disease management - Shared decision making - Care coordination - Compassionate care    Patient advocacy - Rational deprescribing - Minimally disruptive medicine - Ethical focus - Customized care         Date of Service: 4/27/2023  Primary Provider: Devon Ball    Patient Care Team:  Devon Ball MD as PCP - General (Internal Medicine)  Sheriwn Coats MD as Physician (Hematology)  Louis Jacobsen MD as MD (Ophthalmology)  Shyam Barrett MD as MD (Colon & Rectal)  DANGELO Pagan MD as MD  Devon Ball MD as Assigned PCP          Patient's Pharmacy:    [facility]/pharmacy #[id number] - [facility], [place] - [address]  [address] [address]  Phone: [phone] Fax: [phone]     Patient's Contacts:  Name Home Phone Work Phone Mobile Phone Relationship Lgl Grd   XIMENA GUPTA [phone]   Spouse    JYOTHI SHEIKH   [phone] Daughter      Patient's Insurance:    Payor: MEDICARE / Plan: MEDICARE / Product Type: Medicare /            Active Problem List:  Problem List as of 4/27/2023 Reviewed: 2/6/2023 11:15 AM by Devon Ball MD       High    Nonsmoker    Atrial fibrillation, permanent (H)    CLL (chronic lymphocytic leukemia) (H)       Medium    Chronic kidney disease, stage 3 (H)    Cardiac pacemaker in situ    HTN (hypertension)    H/O atrioventricular jerzy ablation    CHB (complete heart block) (H)    Anticoagulation monitoring, INR range 2-3    Choledocholithiasis, RECURRENT    Diabetes mellitus, type 2 (H)       Low    Coagulopathy (H) - due to warfarin    Peripheral sensory neuropathy    MATILDE (obstructive sleep apnea)    Perirectal abscess    Senile purpura (H)       Other    Chronic diastolic heart failure (H)    Actinomycosis        Current Outpatient Medications   Medication Instructions     atorvastatin (LIPITOR) 10 MG tablet TAKE 1 TABLET BY MOUTH  EVERY DAY IN THE EVENING     benzonatate (TESSALON) 100 mg, Oral, 3 TIMES DAILY PRN     biotin 2,500 mcg, Oral     cefuroxime (CEFTIN) 500 mg, Oral, 2 TIMES DAILY     Cholecalciferol (VITAMIN D3 PO) 2,000 Units, Oral, DAILY     CONTOUR TEST test strip USE 1 STRIP TO TEST BLOOD SUGAR DAILY     FOLIC ACID PO 1 mg, DAILY     furosemide (LASIX) 40 MG tablet TAKE 2 TABLETS BY MOUTH EVERY MORNING     glucosamine-chondroitinoitin 500-400 MG tablet 1 tablet, Oral     guaiFENesin-codeine (ROBITUSSIN AC) 100-10 MG/5ML solution 5-10 mLs, Oral, EVERY 6 HOURS PRN     lisinopril (ZESTRIL) 10 MG tablet TAKE 1 TABLET BY MOUTH EVERY DAY     magnesium chloride 64 mg, Oral     meclizine (ANTIVERT) 25 mg, Oral, 3 TIMES DAILY PRN     metFORMIN (GLUCOPHAGE) 500 mg, Oral, 2 TIMES DAILY WITH MEALS     nitroGLYcerin (NITROSTAT) 0.4 mg, Sublingual     pantoprazole (PROTONIX) 40 MG EC tablet TAKE 1 TABLET BY MOUTH EVERY DAY     polyethylene glycol (MIRALAX) 17 GM/Dose powder MIX 1 CAPFUL (17 GRAMS) IN LIQUID AND DRINK BY MOUTH DAILY. (OTC NC)     potassium chloride ER (K-TAB/KLOR-CON) 10 MEQ CR tablet 10 mEq, Oral, DAILY     UNABLE TO FIND MEDICATION NAME: Hair skin and nails   2500     vitamin B-12 (CYANOCOBALAMIN) 1,000 mcg, Oral     warfarin ANTICOAGULANT (COUMADIN) 2.5 MG tablet TAKE BY MOUTH 2.5 MG (2.5 MG X 1) EVERY MON, WED, FRI 3.75 MG (2.5 MG X 1.5) ALL OTHER DAYS      Social History     Social History Narrative           Subjective:     Reba Calderon is a 87 year old female who comes in today for:    Chief Complaint   Patient presents with     Cough     Pt states blood-tinged          2/2/2023    12:20 PM   Additional Questions   Roomed by Nikki     Comes in today with cough since April 14th.  Woke up that night with cough.  Coughing has been severe.  COVID testing negative.    Deep cough.  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    Coughs more with  "talking.  Does get easily sweaty.  Some shortness of breath.  Grandson (22 years old) did have a serious cough about 3 weeks ago and was around her.    Notes some wheezing.  Chest congestion.  No sore throat.  Tired.  Some nasal congestion as well.    Some minimal blood tinge in the sputum for last 2 days.  Sputum is yellow and thick.    We reviewed her other issues noted in the assessment but not specifically addressed in the HPI above.     Objective:     Wt Readings from Last 3 Encounters:   04/27/23 88.6 kg (195 lb 4.8 oz)   02/02/23 88.9 kg (196 lb)   07/26/22 91.6 kg (202 lb)     BP Readings from Last 3 Encounters:   04/27/23 (!) 153/81   02/02/23 125/71   07/26/22 124/78     BP (!) 153/81 (BP Location: Right arm, Patient Position: Sitting, Cuff Size: Adult Regular)   Pulse 81   Temp 98.4  F (36.9  C) (Oral)   Resp 18   Ht 1.689 m (5' 6.5\")   Wt 88.6 kg (195 lb 4.8 oz)   LMP  (LMP Unknown)   SpO2 94%   BMI 31.05 kg/m     The patient is mildly uncomfortable, no acute distress.  Mood good.  Insight good.  Eyes are nonicteric.  Neck is supple without mass.  No cervical adenopathy.  No thyromegaly. Heart regular rate and rhythm.  Lungs shows occasionally wheeze on  auscultation bilaterally.  Respiratory effort is good.  Abdomen soft and nontender.  No hepatosplenomegaly.  Extremities no edema.      Diagnostics:     Orders Only on 04/24/2023   Component Date Value Ref Range Status     INR HOME MONITORING 04/24/2023 2.1  2.000 - 3.000 Final       Results for orders placed or performed during the hospital encounter of 04/27/23   XR Chest 2 Views     Status: None    Narrative    EXAM: XR CHEST 2 VIEWS  LOCATION: [facility]  DATE/TIME: 4/27/2023 2:11 PM CDT    INDICATION: Acute cough, Hemoptysis.  COMPARISON: 12/28/2018.      Impression    IMPRESSION: Trace amount of bilateral perihilar airspace opacity has developed in both lungs that is most evident in the right middle lobe. Findings " would be compatible with pneumonia in the proper clinical setting. Lungs otherwise clear. No pleural   effusion. Mild generalized cardiac enlargement. Pulmonary vascularity within normal limits. Pacer with lead tips in the RA and RV. Calcified tortuous thoracic aorta. Bridging endplate osteophyte formation thoracic spine.        Assessment:     1. Pneumonia of right middle lobe due to infectious organism    2. Acute cough    3. CLL (chronic lymphocytic leukemia) (H)    4. Hemoptysis        Plan:     1. Started cefuroxime (Ceftin) for pneumonia.  2. Follow up with me on how she is doing.  3. Benzonatate (TESSALON) and codeine cough syrup for cough.  4. Will need follow up imaging.   5. Suspect small amount of blood due to infection plus anticoagulation, but monitor.  6. Continue current medications otherwise.  7. Follow up sooner if issues.    Orders Placed This Encounter   Medications     cefuroxime (CEFTIN) 500 MG tablet     Sig: Take 1 tablet (500 mg) by mouth 2 times daily for 7 days     Dispense:  14 tablet     Refill:  0     benzonatate (TESSALON) 100 MG capsule     Sig: Take 1 capsule (100 mg) by mouth 3 times daily as needed for cough     Dispense:  30 capsule     Refill:  1     guaiFENesin-codeine (ROBITUSSIN AC) 100-10 MG/5ML solution     Sig: Take 5-10 mLs by mouth every 6 hours as needed for cough     Dispense:  236 mL     Refill:  1         40 minutes or greater was spent today on the patient's care on the day of service.      This includes time for chart preparation, reviewing medical tests done before or during the visit, talking with the patient, review of quality indicators, required documentation, and other elements of care.        Devon Ball MD  General Internal Medicine  [facility]    Return in about 4 weeks (around 5/25/2023) for follow up visit.     No future appointments.

## 2023-04-28 NOTE — PATIENT INSTRUCTIONS
Please follow up if you have any further issues.    You may contact me by phone or MyChart if you are worsening or if things are not improving.    ______________________________________________________________________     You can call [phone] during weekday hours to get a hold of our team coordinators if you need to get a message to me.    There are 3 people in our building taking phone calls for me.  Be sure to listen to the prompts to get a hold of them.    You first press 1 for English (press another number for a different language) and then press 2 to get the .    They can then take your message and forward it onto me.    ______________________________________________________________________     Please remember that you can call [phone] ANYTIME to schedule an appointment.     You can schedule appointments 24 hours a day, 7 days a week.      Sometimes the best time to schedule an appointment is after clinic hours when less people are calling in.      Weekends are another option for calling in to schedule appointments.

## 2023-05-01 ENCOUNTER — DOCUMENTATION ONLY (OUTPATIENT)
Dept: ANTICOAGULATION | Facility: CLINIC | Age: 88
End: 2023-05-01
Payer: MEDICARE

## 2023-05-01 ENCOUNTER — ANTICOAGULATION THERAPY VISIT (OUTPATIENT)
Dept: ANTICOAGULATION | Facility: CLINIC | Age: 88
End: 2023-05-01
Payer: MEDICARE

## 2023-05-01 DIAGNOSIS — I48.21 ATRIAL FIBRILLATION, PERMANENT (H): Primary | ICD-10-CM

## 2023-05-01 LAB — INR HOME MONITORING: 2.8 (ref 2–3)

## 2023-05-01 NOTE — PROGRESS NOTES
ANTICOAGULATION CLINIC REFERRAL RENEWAL REQUEST       An annual renewal order is required for all patients referred to [facility].?  Please review and sign the pended referral order for Reba Calderon.       ANTICOAGULATION SUMMARY      Warfarin indication(s)   Atrial Fibrillation    Mechanical heart valve present?  NO       Current goal range   INR: 2.0-3.0     Goal appropriate for indication? Goal INR 2-3, standard for indication(s) above     Time in Therapeutic Range (TTR)  (Goal > 60%) 87%       Office visit with referring provider's group within last year yes on 4/27/23       Crys Simpson RN  [facility]

## 2023-05-01 NOTE — PROGRESS NOTES
ANTICOAGULATION MANAGEMENT     Reba Calderon 87 year old female is on warfarin with therapeutic INR result. (Goal INR 2.0-3.0)    Recent labs: (last 7 days)     05/01/23  0000   INR 2.8       ASSESSMENT       Source(s): Chart Review and Patient/Caregiver Call       Warfarin doses taken: Warfarin taken as instructed    Diet: Decreased greens/vitamin K in diet; plans to resume previous intake. Patient states since she is not feeling well, her appetite has been decreased.    Medication/supplement changes: Ceftin 4/27/23-5/4/23, this can increase INR. Patient is also started on tessalon and robitussin/    New illness, injury, or hospitalization: Yes: patient was diagnosed with pneumonia on 4/27/23. Patient reports she is starting to feel better.    Signs or symptoms of bleeding or clotting: No    Previous result: Therapeutic last 2(+) visits    Additional findings: Patient would like a call on 5/8/23         PLAN     Recommended plan for temporary change(s) affecting INR     Dosing Instructions: Continue your current warfarin dose with next INR in 1 week. Patient reports she will have an extra serving of greens today.       Summary  As of 5/1/2023    Full warfarin instructions:  2.5 mg every Sun, Tue, Thu; 3.75 mg all other days   Next INR check:  5/8/2023             Telephone call with Reba who agrees to plan and repeated back plan correctly    Patient to recheck with home meter    Education provided:     Please call back if any changes to your diet, medications or how you've been taking warfarin    Dietary considerations: importance of consistent vitamin K intake    Symptom monitoring: monitoring for bleeding signs and symptoms    Contact [phone]  with any changes, questions or concerns.     Plan made per [facility] anticoagulation protocol    Crys Simpson, RN  Anticoagulation Clinic  5/1/2023    _______________________________________________________________________     Anticoagulation Episode Summary     Current  INR goal:  2.0-3.0   TTR:  86.8 % (1 y)   Target end date:  7/15/2036   Send INR reminders to:  [facility]    Indications    Atrial fibrillation  permanent (H) [I48.21]           Comments:  [employer] home monitor. Managed by exception.         Anticoagulation Care Providers     Provider Role Specialty Phone number    Devon Ball MD Referring Internal Medicine [phone]    Leti Brower NP Referring  [phone]

## 2023-05-04 ENCOUNTER — HOSPITAL ENCOUNTER (OUTPATIENT)
Dept: CT IMAGING | Facility: HOSPITAL | Age: 88
Discharge: HOME | End: 2023-05-04
Attending: INTERNAL MEDICINE | Admitting: INTERNAL MEDICINE
Payer: MEDICARE

## 2023-05-04 ENCOUNTER — NURSE TRIAGE (OUTPATIENT)
Dept: NURSING | Facility: CLINIC | Age: 88
End: 2023-05-04
Payer: MEDICARE

## 2023-05-04 DIAGNOSIS — R05.3 CHRONIC COUGH: ICD-10-CM

## 2023-05-04 DIAGNOSIS — R04.2 HEMOPTYSIS: ICD-10-CM

## 2023-05-04 DIAGNOSIS — R91.8 INFILTRATE OF RIGHT LUNG PRESENT ON CHEST X-RAY: ICD-10-CM

## 2023-05-04 DIAGNOSIS — R05.1 ACUTE COUGH: Primary | ICD-10-CM

## 2023-05-04 PROCEDURE — G1010 CDSM STANSON: HCPCS

## 2023-05-04 NOTE — TELEPHONE ENCOUNTER
Nurse Triage SBAR    Is this a 2nd Level Triage? YES, LICENSED PRACTITIONER REVIEW IS REQUIRED    Situation:   Patient calling regarding pneumonia symptoms not getting better and wanting to get in touch with provider.    Background:   Pneumonia diagnosis on 4/27/23. Worsening symptoms and stated provider told her to call if not getting better. Has dental appointment and worried about transmission.    Assessment:   Pressure in chest when coughing, but denies chest pain. Coughing up phlem, not bloody. Denies severe difficulty breathing, states difficulty breathing when coughing.    Protocol Recommended Disposition:   Call PCP Now    Recommendation:   Will route to provider and care team. Advised to continue taking prescribed medications. Drink warm fluids, but patient states cold is more tolerable. Gave call back instructions to call back or go to ER/[facility] if she doesn't hear back within 1 hour.    Routed to provider    Does the patient meet one of the following criteria for ADS visit consideration? 16+ years old, with an [facility] PCP     TIP  Providers, please consider if this condition is appropriate for management at one of our Acute and Diagnostic Services sites.     If patient is a good candidate, please use dotphrase <dot>triageresponse and select Refer to ADS to document.Pneumonia      Reason for Disposition    [1] Taking antibiotic > 24 hours for pneumonia AND [2] breathing is worse    Additional Information    Negative: Severe difficulty breathing (e.g., struggling for each breath, speaks in single words)    Negative: Bluish (or gray) lips or face now    Negative: Difficult to awaken or acting confused (e.g., disoriented, slurred speech)    Negative: Slow, shallow and weak breathing    Negative: Stridor    Negative: Sounds like a life-threatening emergency to the triager    Negative: Recently discharged from hospital with diagnosis of pneumonia    Negative: New onset or worsening chest pain    Negative: Fever >  104 F (40 C)    Negative: [1] Coughed up blood AND [2] large amount or blood clots (Exception: blood-tinged sputum)    Negative: MODERATE difficulty breathing (e.g., speaks in phrases, SOB even at rest, pulse 100-120)    Negative: [1] Taking antibiotic > 24 hours for pneumonia AND [2] fever > 103 F (39.4 C)    Negative: [1] Diabetes mellitus or weak immune system (e.g., HIV positive, cancer chemo, splenectomy, organ transplant, chronic steroids) AND [2] new onset of fever > 100.0 F (37.8 C)    Negative: Patient sounds very sick or weak to the triager    Protocols used: PNEUMONIA ON ANTIBIOTIC FOLLOW-UP CALL-TOÑO-BERNARDA Bangura RN on 5/4/2023 at 10:03 AM

## 2023-05-04 NOTE — TELEPHONE ENCOUNTER
Spoke with pt. Relayed below to pt from PCP. Pt scheduled for tomorrow. Will call imaging scheduling to schedule CT.     Pt thanked for the call.

## 2023-05-04 NOTE — TELEPHONE ENCOUNTER
May schedule for follow up with me tomorrow at 1:15 pm.  Be seen sooner if worsening.    I think we should go ahead and get a CT scan done to evaluate to make sure nothing more serious is going on.    She can call [phone] and hopefully get this scheduled as soon as possible.    Devon Ball MD  General Internal Medicine  [facility]  5/4/2023, 10:20 AM

## 2023-05-05 ENCOUNTER — TELEPHONE (OUTPATIENT)
Dept: INTERNAL MEDICINE | Facility: CLINIC | Age: 88
End: 2023-05-05

## 2023-05-05 ENCOUNTER — OFFICE VISIT (OUTPATIENT)
Dept: INTERNAL MEDICINE | Facility: CLINIC | Age: 88
End: 2023-05-05
Payer: MEDICARE

## 2023-05-05 VITALS
BODY MASS INDEX: 30.84 KG/M2 | WEIGHT: 194 LBS | OXYGEN SATURATION: 95 % | SYSTOLIC BLOOD PRESSURE: 122 MMHG | TEMPERATURE: 98.2 F | HEART RATE: 87 BPM | DIASTOLIC BLOOD PRESSURE: 60 MMHG

## 2023-05-05 DIAGNOSIS — J18.9 PNEUMONIA OF RIGHT MIDDLE LOBE DUE TO INFECTIOUS ORGANISM: Primary | ICD-10-CM

## 2023-05-05 DIAGNOSIS — C91.10 CLL (CHRONIC LYMPHOCYTIC LEUKEMIA) (H): ICD-10-CM

## 2023-05-05 DIAGNOSIS — R06.2 WHEEZING: ICD-10-CM

## 2023-05-05 DIAGNOSIS — R05.1 ACUTE COUGH: ICD-10-CM

## 2023-05-05 PROCEDURE — 99214 OFFICE O/P EST MOD 30 MIN: CPT | Performed by: INTERNAL MEDICINE

## 2023-05-05 RX ORDER — AZITHROMYCIN 250 MG/1
TABLET, FILM COATED ORAL
Qty: 6 TABLET | Refills: 0 | Status: SHIPPED | OUTPATIENT
Start: 2023-05-05 | End: 2023-05-10

## 2023-05-05 RX ORDER — PREDNISONE 10 MG/1
30 TABLET ORAL DAILY
Qty: 15 TABLET | Refills: 0 | Status: SHIPPED | OUTPATIENT
Start: 2023-05-05 | End: 2023-05-10

## 2023-05-05 ASSESSMENT — PAIN SCALES - GENERAL: PAINLEVEL: NO PAIN (0)

## 2023-05-05 NOTE — PATIENT INSTRUCTIONS
Future Appointments   Date Time Provider Department Center   5/11/2023  3:30 PM [facility]

## 2023-05-05 NOTE — TELEPHONE ENCOUNTER
ANTICOAGULATION  MANAGEMENT     Interacting Medication Review    Interacting medication(s): Azithromycin (Zithromax, Z-linda) with warfarin.     Prednisone with warfarin (duration:30 mg by mouth daily X 5 days-5/5/23 to 5/10/23) Can raise or lower INR    Duration: 5 days (5/5/23 to 5/10/23)    Indication: Pneumonia    New medication?: Yes, interaction may increase INR and risk of bleeding. Closer INR monitoring recommended.        PLAN     Continue your current warfarin dose with next INR in 3 days      2.5 mg on Sundays,Tuesdays,Thursdays and 3.75 mg all other days    Pt has been eating greens every couple of days to help regulate INR      Telephone call with  Reba and her daughter who verbalizes understanding and agrees to plan    No adjustment to anticoagulation calendar was required    Plan made per [facility] anticoagulation protocol    Maegan Norton, RN  Anticoagulation Clinic

## 2023-05-05 NOTE — PROGRESS NOTES
[facility] - Primary Care Specialists    Comprehensive and complex medical care - Chronic disease management - Shared decision making - Care coordination - Compassionate care    Patient advocacy - Rational deprescribing - Minimally disruptive medicine - Ethical focus - Customized care         Date of Service: 5/5/2023  Primary Provider: Devon Ball    Patient Care Team:  Devon Ball MD as PCP - General (Internal Medicine)  Sherwin oCats MD as Physician (Hematology)  Louis Jacobsen MD as MD (Ophthalmology)  Shyam Barrett MD as MD (Colon & Rectal)  DANGELO Pagan MD as MD  Devon Ball MD as Assigned PCP          Patient's Pharmacy:    [facility]/pharmacy #[id number] - [place], [place] - [address]  [address] [address]  Phone: [phone] Fax: [phone]     Patient's Contacts:  Name Home Phone Work Phone Mobile Phone Relationship Lgl Grd   XIMENA GUPTA [phone]   Spouse    JYOTHI SHEIKH   [phone] Daughter      Patient's Insurance:    Payor: MEDICARE / Plan: MEDICARE / Product Type: Medicare /           Active Problem List:  Problem List as of 5/5/2023 Reviewed: 4/28/2023  7:10 AM by Devon Ball MD       High    Nonsmoker    Atrial fibrillation, permanent (H)    CLL (chronic lymphocytic leukemia) (H)       Medium    Chronic diastolic heart failure (H)    Actinomycosis    Chronic kidney disease, stage 3 (H)    Cardiac pacemaker in situ    HTN (hypertension)    H/O atrioventricular jerzy ablation    CHB (complete heart block) (H)    Anticoagulation monitoring, INR range 2-3    Choledocholithiasis, RECURRENT    Diabetes mellitus, type 2 (H)       Low    Coagulopathy (H) - due to warfarin    Peripheral sensory neuropathy    MATILDE (obstructive sleep apnea)    Perirectal abscess    Senile purpura (H)        Current Outpatient Medications   Medication Instructions     atorvastatin (LIPITOR) 10 MG tablet TAKE 1 TABLET BY MOUTH EVERY DAY IN THE  EVENING     azithromycin (ZITHROMAX) 250 MG tablet Take 2 tablets (500 mg) by mouth daily for 1 day, THEN 1 tablet (250 mg) daily for 4 days.     benzonatate (TESSALON) 100 mg, Oral, 3 TIMES DAILY PRN     biotin 2,500 mcg, Oral     Cholecalciferol (VITAMIN D3 PO) 2,000 Units, Oral, DAILY     CONTOUR TEST test strip USE 1 STRIP TO TEST BLOOD SUGAR DAILY     FOLIC ACID PO 1 mg, DAILY     furosemide (LASIX) 40 MG tablet TAKE 2 TABLETS BY MOUTH EVERY MORNING     glucosamine-chondroitinoitin 500-400 MG tablet 1 tablet, Oral     guaiFENesin-codeine (ROBITUSSIN AC) 100-10 MG/5ML solution 5-10 mLs, Oral, EVERY 6 HOURS PRN     lisinopril (ZESTRIL) 10 MG tablet TAKE 1 TABLET BY MOUTH EVERY DAY     magnesium chloride 64 mg, Oral     meclizine (ANTIVERT) 25 mg, Oral, 3 TIMES DAILY PRN     metFORMIN (GLUCOPHAGE) 500 mg, Oral, 2 TIMES DAILY WITH MEALS     nitroGLYcerin (NITROSTAT) 0.4 mg, Sublingual     pantoprazole (PROTONIX) 40 MG EC tablet TAKE 1 TABLET BY MOUTH EVERY DAY     polyethylene glycol (MIRALAX) 17 GM/Dose powder MIX 1 CAPFUL (17 GRAMS) IN LIQUID AND DRINK BY MOUTH DAILY. (OTC NC)     potassium chloride ER (K-TAB/KLOR-CON) 10 MEQ CR tablet 10 mEq, Oral, DAILY     predniSONE (DELTASONE) 30 mg, Oral, DAILY     UNABLE TO FIND MEDICATION NAME: Hair skin and nails   2500     vitamin B-12 (CYANOCOBALAMIN) 1,000 mcg, Oral     warfarin ANTICOAGULANT (COUMADIN) 2.5 MG tablet TAKE BY MOUTH 2.5 MG (2.5 MG X 1) EVERY MON, WED, FRI 3.75 MG (2.5 MG X 1.5) ALL OTHER DAYS     Social History     Social History Narrative           Subjective:     Reba Calderon is a 87 year old female who comes in today for:    Chief Complaint   Patient presents with     Follow Up     Still feeling the effects of pneumonia. No longer coughing up blood, but she still is coughing up phlegm.          5/5/2023     1:11 PM   Additional Questions   Roomed by Louis ROBERTS CMA   Accompanied by Daughter     Patient also accompanied by her .    She  continues to have a fairly strong cough.  She is not having hemoptysis anymore.    We reviewed her CT scan which showed improved infiltrate compared to her plain film.  She does have signs of residual infiltrate.    She has previous adenopathy noted on CT scan in 2017.  She has CLL.    She does cough more with talking.  She still gets some slimy clear to yellowish sputum.  She does blow her nose quite a bit.  Yesterday she felt some sensitivity on her upper and lower teeth on the right.    She does not notice any difficulty with shortness of breath.  She denies any reflux.  The cough syrups have not helped a lot.  The Ceftin has not helped her a whole lot.    We reviewed her other issues noted in the assessment but not specifically addressed in the HPI above.     Objective:     Wt Readings from Last 3 Encounters:   05/05/23 88 kg (194 lb)   04/27/23 88.6 kg (195 lb 4.8 oz)   02/02/23 88.9 kg (196 lb)     BP Readings from Last 3 Encounters:   05/05/23 122/60   04/27/23 (!) 153/81   02/02/23 125/71     /60 (BP Location: Right arm, Patient Position: Sitting, Cuff Size: Adult Regular)   Pulse 87   Temp 98.2  F (36.8  C)   Wt 88 kg (194 lb)   LMP  (LMP Unknown)   SpO2 95%   BMI 30.84 kg/m     The patient is comfortable, no acute distress.  Mood good.  Insight good.  Eyes are nonicteric.  Neck is supple without mass.  No cervical adenopathy.  No thyromegaly. Heart regular rate and rhythm.  Lungs show expiratory wheezing on auscultation bilaterally.  Respiratory effort is good.  Abdomen soft and nontender.  No hepatosplenomegaly.  Extremities no edema.      Diagnostics:     ______________________________________________________________________     Pertinent radiology for this visit includes the following:    CT Chest w/o Contrast  Narrative: EXAM: CT CHEST W/O CONTRAST  LOCATION: [facility]  DATE/TIME: 5/4/2023 3:45 PM CDT    INDICATION: Cough, hemoptysis, right lung infiltrate.  Not  improving.  COMPARISON: 11/12/2017 CT.  4/27/2023 radiograph.  TECHNIQUE: CT chest without IV contrast. Multiplanar reformats were obtained. Dose reduction techniques were used.  CONTRAST: None.    FINDINGS:   LUNGS AND PLEURA: Subtle right upper lobe and right lower lobe groundglass opacities noted.  There is mild interlobular septal thickening.    MEDIASTINUM/AXILLAE: Heterogeneous enlargement of the thyroid gland. 16 mm right lower paratracheal lymph node (previously 15 mm). 11 mm subcarinal lymph node (previously 10 mm).  There is a left chest wall pacemaker.    CORONARY ARTERY CALCIFICATION: Severe.    UPPER ABDOMEN: No significant finding.    MUSCULOSKELETAL: Unremarkable.  Impression: IMPRESSION:   1.  The pulmonary opacities seen on the comparison radiograph have nearly resolved and were likely an infectious or inflammatory process. Mediastinal lymphadenopathy is likely reactive.        ______________________________________________________________________      Assessment:     1. Pneumonia of right middle lobe due to infectious organism    2. Acute cough    3. Wheezing    4. CLL (chronic lymphocytic leukemia) (H)        Plan:     1. Zithromax for atypical coverage.  We will try this instead of a different antibiotic at this point.  2. Prednisone for bronchospasm.  3. Assess whether lisinopril may be contributing to this.  4. CLL makes her more prone to infectious issues.  5. Follow-up scheduled, may cancel if improved.  6. Continue current medications otherwise.  7. Follow up sooner if issues.    No orders of the defined types were placed in this encounter.          Devon Ball MD  General Internal Medicine  [facility]    Return in about 6 days (around 5/11/2023) for follow up visit.     Future Appointments   Date Time Provider Department Center   5/11/2023  3:30 PM [facility]

## 2023-05-08 ENCOUNTER — ANTICOAGULATION THERAPY VISIT (OUTPATIENT)
Dept: ANTICOAGULATION | Facility: CLINIC | Age: 88
End: 2023-05-08
Payer: MEDICARE

## 2023-05-08 DIAGNOSIS — I48.21 ATRIAL FIBRILLATION, PERMANENT (H): Primary | ICD-10-CM

## 2023-05-08 LAB — INR HOME MONITORING: 3.5 (ref 2–3)

## 2023-05-08 NOTE — PROGRESS NOTES
ANTICOAGULATION MANAGEMENT     Reba Calderon 87 year old female is on warfarin with supratherapeutic INR result. (Goal INR 2.0-3.0)    Recent labs: (last 7 days)     05/08/23  0000   INR 3.5*       ASSESSMENT       Source(s): Chart Review and Patient/Caregiver Call       Warfarin doses taken: Warfarin taken as instructed    Diet: Decreased greens/vitamin K in diet; plans to resume previous intake    Medication/supplement changes: azithromycin & prednisone through 5/10/23    New illness, injury, or hospitalization: Yes: currently being treated for pneumonia, patient reports she is started to feel better    Signs or symptoms of bleeding or clotting: No    Previous result: Therapeutic last 2(+) visits    Additional findings: None         PLAN     Recommended plan for temporary change(s) affecting INR     Dosing Instructions: partial hold then continue your current warfarin dose with next INR in 3 days       Summary  As of 5/8/2023    Full warfarin instructions:  5/8: 1.25 mg; Otherwise 2.5 mg every Sun, Tue, Thu; 3.75 mg all other days   Next INR check:  5/11/2023             Telephone call with Reba who agrees to plan and repeated back plan correctly    Patient to recheck with home meter    Education provided:     Please call back if any changes to your diet, medications or how you've been taking warfarin    Symptom monitoring: monitoring for bleeding signs and symptoms and monitoring for clotting signs and symptoms    Contact [phone]  with any changes, questions or concerns.     Plan made per [facility] anticoagulation protocol    Crys Simpson RN  Anticoagulation Clinic  5/8/2023    _______________________________________________________________________     Anticoagulation Episode Summary     Current INR goal:  2.0-3.0   TTR:  86.2 % (1 y)   Target end date:  7/15/2036   Send INR reminders to:  [facility]    Indications    Atrial fibrillation  permanent (H) [I48.21]           Comments:  [employer] home  monitor. Managed by leeanna.         Anticoagulation Care Providers     Provider Role Specialty Phone number    Devon Ball MD Referring Internal Medicine [phone]    Leti Brower NP Referring  [phone]

## 2023-05-11 ENCOUNTER — ANTICOAGULATION THERAPY VISIT (OUTPATIENT)
Dept: ANTICOAGULATION | Facility: CLINIC | Age: 88
End: 2023-05-11

## 2023-05-11 ENCOUNTER — OFFICE VISIT (OUTPATIENT)
Dept: INTERNAL MEDICINE | Facility: CLINIC | Age: 88
End: 2023-05-11
Payer: MEDICARE

## 2023-05-11 VITALS
TEMPERATURE: 97.7 F | HEIGHT: 67 IN | OXYGEN SATURATION: 96 % | HEART RATE: 71 BPM | SYSTOLIC BLOOD PRESSURE: 115 MMHG | RESPIRATION RATE: 16 BRPM | BODY MASS INDEX: 30.45 KG/M2 | WEIGHT: 194 LBS | DIASTOLIC BLOOD PRESSURE: 74 MMHG

## 2023-05-11 DIAGNOSIS — I10 PRIMARY HYPERTENSION: Chronic | ICD-10-CM

## 2023-05-11 DIAGNOSIS — C91.10 CLL (CHRONIC LYMPHOCYTIC LEUKEMIA) (H): ICD-10-CM

## 2023-05-11 DIAGNOSIS — I48.21 ATRIAL FIBRILLATION, PERMANENT (H): Primary | ICD-10-CM

## 2023-05-11 DIAGNOSIS — R05.3 PERSISTENT COUGH: Primary | ICD-10-CM

## 2023-05-11 DIAGNOSIS — J18.9 PNEUMONIA OF RIGHT MIDDLE LOBE DUE TO INFECTIOUS ORGANISM: ICD-10-CM

## 2023-05-11 LAB — INR HOME MONITORING: 2.5 (ref 2–3)

## 2023-05-11 PROCEDURE — 99214 OFFICE O/P EST MOD 30 MIN: CPT | Performed by: INTERNAL MEDICINE

## 2023-05-11 ASSESSMENT — PAIN SCALES - GENERAL: PAINLEVEL: MILD PAIN (2)

## 2023-05-11 NOTE — PROGRESS NOTES
ANTICOAGULATION MANAGEMENT     Reba Calderon 87 year old female is on warfarin with therapeutic INR result. (Goal INR 2.0-3.0)    Recent labs: (last 7 days)     05/11/23  0000   INR 2.5       ASSESSMENT       Source(s): Chart Review and Patient/Caregiver Call       Warfarin doses taken: Warfarin taken as instructed    Diet: No new diet changes identified    Medication/supplement changes: Pt completed courses of Azithromycin & Prednisone, reports that she took her last dose this AM.    New illness, injury, or hospitalization: Yes: Pt has been treated for pneumonia, she states that she is still slightly fatigued but overall improving. She has a follow up with provider this afternoon.     Signs or symptoms of bleeding or clotting: No    Previous result: Supratherapeutic    Additional findings: None         PLAN     Recommended plan for temporary change(s) affecting INR     Dosing Instructions: Continue your current warfarin dose with next INR in 4 days       Summary  As of 5/11/2023    Full warfarin instructions:  2.5 mg every Sun, Tue, Thu; 3.75 mg all other days   Next INR check:  5/15/2023             Telephone call with Reba who verbalizes understanding and agrees to plan    Patient to recheck with home meter    Education provided:     Importance of notifying anticoagulation clinic for: changes in medications; a sooner lab recheck maybe needed    Plan made per [facility] anticoagulation protocol    Cameron Tao RN  Anticoagulation Clinic  5/11/2023    _______________________________________________________________________     Anticoagulation Episode Summary     Current INR goal:  2.0-3.0   TTR:  86.7 % (1 y)   Target end date:  7/15/2036   Send INR reminders to:  [facility]    Indications    Atrial fibrillation  permanent (H) [I48.21]           Comments:  [employer] home monitor. Managed by exception.         Anticoagulation Care Providers     Provider Role Specialty Phone number    Devon Ball MD Referring  Internal Medicine [phone]    Leti Brower NP Referring  [phone]

## 2023-05-11 NOTE — PROGRESS NOTES
Wt Readings from Last 20 Encounters:   05/11/23 88 kg (194 lb)   05/05/23 88 kg (194 lb)   04/27/23 88.6 kg (195 lb 4.8 oz)   02/02/23 88.9 kg (196 lb)   07/26/22 91.6 kg (202 lb)   07/19/22 92.1 kg (203 lb)   01/25/22 93.9 kg (207 lb)   07/15/21 93.9 kg (207 lb)   11/18/20 95.7 kg (211 lb)   01/03/20 96.6 kg (213 lb)   12/27/19 96.2 kg (212 lb 1.3 oz)   06/26/19 97.1 kg (214 lb 1.3 oz)   03/20/19 97.6 kg (215 lb 1.9 oz)   09/12/18 99.4 kg (219 lb 0.6 oz)   07/25/18 97.6 kg (215 lb 1.3 oz)   07/12/18 97.6 kg (215 lb 1.3 oz)   03/08/18 98 kg (216 lb)   11/24/17 97.6 kg (215 lb 1.9 oz)   05/24/17 98.5 kg (217 lb 1.9 oz)   08/24/16 97.6 kg (215 lb 1.9 oz)     BP Readings from Last 20 Encounters:   05/11/23 115/74   05/05/23 122/60   04/27/23 (!) 153/81   02/02/23 125/71   07/26/22 124/78   07/19/22 122/74   01/25/22 134/70   07/15/21 112/66   11/18/20 138/88   01/03/20 122/62   12/27/19 (!) 152/74   07/23/15 147/86   01/02/15 131/66   05/01/14 160/76   04/03/14 173/85      Pulse Readings from Last 20 Encounters:   05/11/23 71   05/05/23 87   04/27/23 81   02/02/23 64   07/26/22 81   07/19/22 86   01/25/22 80   07/15/21 61   11/18/20 96   01/03/20 63   12/27/19 87   07/23/15 67   01/02/15 65     SpO2 Readings from Last 20 Encounters:   05/11/23 96%   05/05/23 95%   04/27/23 94%   02/02/23 95%   07/26/22 95%   07/19/22 96%   01/25/22 97%   07/15/21 97%   01/03/20 98%   12/27/19 97%   07/23/15 95%   05/01/14 98%   04/03/14 95%

## 2023-05-11 NOTE — PROGRESS NOTES
[facility]ists    Comprehensive and complex medical care - Chronic disease management - Shared decision making - Care coordination - Compassionate care    Patient advocacy - Rational deprescribing - Minimally disruptive medicine - Ethical focus - Customized care         Date of Service: 5/11/2023  Primary Provider: Devon Ball    Patient Care Team:  Devon Ball MD as PCP - General (Internal Medicine)  Sherwin Coats MD as Physician (Hematology)  Louis Jacobsen MD as MD (Ophthalmology)  Shyam Barrett MD as MD (Colon & Rectal)  DANGELO Pagan MD as MD  Devon Ball MD as Assigned PCP          Patient's Pharmacy:    [facility]/pharmacy #[id number] - [facility]– [place], [place] - [address]  [address] [facility] [address]  Phone: [phone] Fax: [phone]     Patient's Contacts:  Name Home Phone Work Phone Mobile Phone Relationship Lgl Grd   XIMENA GUPTA [phone]   Spouse    JYOTHI SHEIKH   [phone] Daughter      Patient's Insurance:    Payor: MEDICARE / Plan: MEDICARE / Product Type: Medicare /           Active Problem List:  Problem List as of 5/11/2023 Reviewed: 4/28/2023  7:10 AM by Devon Ball MD       High    Nonsmoker    Atrial fibrillation, permanent (H)    CLL (chronic lymphocytic leukemia) (H)       Medium    Chronic kidney disease, stage 3 (H)    Cardiac pacemaker in situ    HTN (hypertension)    H/O atrioventricular jerzy ablation    CHB (complete heart block) (H)    Anticoagulation monitoring, INR range 2-3    Choledocholithiasis, RECURRENT    Diabetes mellitus, type 2 (H)       Low    Coagulopathy (H) - due to warfarin    Peripheral sensory neuropathy    MATILDE (obstructive sleep apnea)    Perirectal abscess    Senile purpura (H)       Other    Chronic diastolic heart failure (H)    Actinomycosis        Current Outpatient Medications   Medication Instructions     atorvastatin (LIPITOR) 10 MG tablet TAKE 1 TABLET BY MOUTH  EVERY DAY IN THE EVENING     benzonatate (TESSALON) 100 mg, Oral, 3 TIMES DAILY PRN     biotin 2,500 mcg, Oral     Cholecalciferol (VITAMIN D3 PO) 2,000 Units, Oral, DAILY     CONTOUR TEST test strip USE 1 STRIP TO TEST BLOOD SUGAR DAILY     FOLIC ACID PO 1 mg, DAILY     furosemide (LASIX) 40 MG tablet TAKE 2 TABLETS BY MOUTH EVERY MORNING     glucosamine-chondroitinoitin 500-400 MG tablet 1 tablet, Oral     guaiFENesin-codeine (ROBITUSSIN AC) 100-10 MG/5ML solution 5-10 mLs, Oral, EVERY 6 HOURS PRN     magnesium chloride 64 mg, Oral     meclizine (ANTIVERT) 25 mg, Oral, 3 TIMES DAILY PRN     metFORMIN (GLUCOPHAGE) 500 mg, Oral, 2 TIMES DAILY WITH MEALS     nitroGLYcerin (NITROSTAT) 0.4 mg, Sublingual     pantoprazole (PROTONIX) 40 MG EC tablet TAKE 1 TABLET BY MOUTH EVERY DAY     polyethylene glycol (MIRALAX) 17 GM/Dose powder MIX 1 CAPFUL (17 GRAMS) IN LIQUID AND DRINK BY MOUTH DAILY. (OTC NC)     potassium chloride ER (K-TAB/KLOR-CON) 10 MEQ CR tablet 10 mEq, Oral, DAILY     UNABLE TO FIND MEDICATION NAME: Hair skin and nails   2500     vitamin B-12 (CYANOCOBALAMIN) 1,000 mcg, Oral     warfarin ANTICOAGULANT (COUMADIN) 2.5 MG tablet TAKE BY MOUTH 2.5 MG (2.5 MG X 1) EVERY MON, WED, FRI 3.75 MG (2.5 MG X 1.5) ALL OTHER DAYS     Social History     Social History Narrative           Subjective:     Reba Calderon is a 87 year old female who comes in today for:    Chief Complaint   Patient presents with     Follow Up          5/11/2023     3:18 PM   Additional Questions   Roomed by Hans CASTELLANO   Accompanied by      First in for follow up of the cough and pneumonia.    Cough is better but still an issue.  Occurs after talking, eating and laying down more.  No shortness of breath.  No gastroesophageal reflux disease (GERD).  Nasal congestion improving.      Coughs up some globs in the past but not so much now.    Breathing feels better.  CT scan reviewed.    Discussed possible role of trying off the  "lisinopril.  Has been through 2 courses of antibiotics and 1 course of prednisone.    We reviewed her other issues noted in the assessment but not specifically addressed in the HPI above.     Objective:     Wt Readings from Last 3 Encounters:   05/11/23 88 kg (194 lb)   05/05/23 88 kg (194 lb)   04/27/23 88.6 kg (195 lb 4.8 oz)     BP Readings from Last 3 Encounters:   05/11/23 115/74   05/05/23 122/60   04/27/23 (!) 153/81     /74 (BP Location: Right arm, Patient Position: Sitting, Cuff Size: Adult Regular)   Pulse 71   Temp 97.7  F (36.5  C) (Oral)   Resp 16   Ht 1.689 m (5' 6.5\")   Wt 88 kg (194 lb)   LMP  (LMP Unknown)   SpO2 96%   BMI 30.84 kg/m     The patient is comfortable, no acute distress.  Mood good.  Insight good.  Eyes are nonicteric.  Not injected.  Mild to moderate rhinitis.  Throat clear.  Neck is supple without mass.  No cervical adenopathy.  No thyromegaly. Heart regular rate and rhythm.  Lungs clear to auscultation bilaterally.  Respiratory effort is good.  Extremities no edema.      Diagnostics:     Orders Only on 05/11/2023   Component Date Value Ref Range Status     INR HOME MONITORING 05/11/2023 2.5  2.000 - 3.000 Final       Results for orders placed or performed in visit on 05/11/23   INR (External Result)     Status: None   Result Value Ref Range    INR HOME MONITORING 2.5 2.000 - 3.000     ______________________________________________________________________     Pertinent radiology for this visit includes the following:    CT Chest w/o Contrast  Narrative: EXAM: CT CHEST W/O CONTRAST  LOCATION: [facility]  DATE/TIME: 5/4/2023 3:45 PM CDT    INDICATION: Cough, hemoptysis, right lung infiltrate.  Not improving.  COMPARISON: 11/12/2017 CT.  4/27/2023 radiograph.  TECHNIQUE: CT chest without IV contrast. Multiplanar reformats were obtained. Dose reduction techniques were used.  CONTRAST: None.    FINDINGS:   LUNGS AND PLEURA: Subtle right upper lobe and " right lower lobe groundglass opacities noted.  There is mild interlobular septal thickening.    MEDIASTINUM/AXILLAE: Heterogeneous enlargement of the thyroid gland. 16 mm right lower paratracheal lymph node (previously 15 mm). 11 mm subcarinal lymph node (previously 10 mm).  There is a left chest wall pacemaker.    CORONARY ARTERY CALCIFICATION: Severe.    UPPER ABDOMEN: No significant finding.    MUSCULOSKELETAL: Unremarkable.  Impression: IMPRESSION:   1.  The pulmonary opacities seen on the comparison radiograph have nearly resolved and were likely an infectious or inflammatory process. Mediastinal lymphadenopathy is likely reactive.        ______________________________________________________________________      Assessment:     1. Persistent cough    2. Pneumonia of right middle lobe due to infectious organism    3. CLL (chronic lymphocytic leukemia) (H)    4. Primary hypertension        Plan:     1. Try off of lisinopril.  2. Follow up appointment scheduled.   3. Consider other follow up if not improving.  4. Continue current medications otherwise.  5. Follow up sooner if issues.    30 minutes or greater was spent today on the patient's care on the day of service.      This includes time for chart preparation, reviewing medical tests done before or during the visit, talking with the patient, review of quality indicators, required documentation, and other elements of care.         Devon Ball MD  General Internal Medicine  [facility]    Return in about 11 days (around 5/22/2023) for follow up visit.     Future Appointments   Date Time Provider Department Center   5/22/2023 10:00 AM Devon Ball MD [facility]

## 2023-05-12 NOTE — PATIENT INSTRUCTIONS
Stop the lisinopril     Future Appointments   Date Time Provider Department Center   5/22/2023 10:00 AM [facility]

## 2023-05-15 ENCOUNTER — ANTICOAGULATION THERAPY VISIT (OUTPATIENT)
Dept: ANTICOAGULATION | Facility: CLINIC | Age: 88
End: 2023-05-15
Payer: MEDICARE

## 2023-05-15 DIAGNOSIS — I48.21 ATRIAL FIBRILLATION, PERMANENT (H): Primary | ICD-10-CM

## 2023-05-15 LAB — INR HOME MONITORING: 2.8 (ref 2–3)

## 2023-05-15 NOTE — PROGRESS NOTES
ANTICOAGULATION MANAGEMENT     Reba Calderon 87 year old female is on warfarin with therapeutic INR result. (Goal INR 2.0-3.0)    Recent labs: (last 7 days)     05/15/23  0000   INR 2.8       ASSESSMENT       Source(s): Chart Review and Patient/Caregiver Call       Warfarin doses taken: Warfarin taken as instructed    Diet: Eating better now may be affecting diet and INR    Medication/supplement changes: Noted that patient completed azitromycin and prednisone course on 5/11/23 for pneumonia    Lisinopril discontinued- no interaction anticipated.    New illness, injury, or hospitalization: Yes: 5/11/23 OV for persistent cough    Signs or symptoms of bleeding or clotting: No    Previous result: Therapeutic last 2(+) visits    Additional findings: None         PLAN     Recommended plan for temporary change(s) affecting INR     Dosing Instructions: Continue your current warfarin dose with next INR in 1 week       Summary  As of 5/15/2023    Full warfarin instructions:  2.5 mg every Sun, Tue, Thu; 3.75 mg all other days   Next INR check:  5/22/2023             Telephone call with Reba who verbalizes understanding and agrees to plan    Patient to recheck with home meter    Education provided:     Goal range and lab monitoring: goal range and significance of current result, Importance of therapeutic range and Importance of following up at instructed interval    Importance of notifying anticoagulation clinic for: changes in medications; a sooner lab recheck maybe needed    Resume manage by exception with home monitor. Continue to submit INR results to home monitor company.You will only be called when your result is out of range. Please call and notify [facility] if new medication started, dose missed, signs or symptoms of bleeding or clotting, or a surgery/procedure is scheduled.    Contact [phone]  with any changes, questions or concerns.     Plan made per [facility] anticoagulation protocol    Crys Fontanez RN  Anticoagulation  Clinic  5/15/2023    _______________________________________________________________________     Anticoagulation Episode Summary     Current INR goal:  2.0-3.0   TTR:  86.7 % (1 y)   Target end date:  7/15/2036   Send INR reminders to:  ANTICOAG HOME MONITORING    Indications    Atrial fibrillation  permanent (H) [I48.21]           Comments:  [employer] home monitor. Managed by exception.         Anticoagulation Care Providers     Provider Role Specialty Phone number    Devon Ball MD Referring Internal Medicine [phone]    Leti Brower NP Referring  [phone]

## 2023-05-22 ENCOUNTER — ANTICOAGULATION THERAPY VISIT (OUTPATIENT)
Dept: ANTICOAGULATION | Facility: CLINIC | Age: 88
End: 2023-05-22

## 2023-05-22 ENCOUNTER — OFFICE VISIT (OUTPATIENT)
Dept: INTERNAL MEDICINE | Facility: CLINIC | Age: 88
End: 2023-05-22
Payer: MEDICARE

## 2023-05-22 VITALS
DIASTOLIC BLOOD PRESSURE: 76 MMHG | SYSTOLIC BLOOD PRESSURE: 130 MMHG | WEIGHT: 191.6 LBS | HEIGHT: 67 IN | RESPIRATION RATE: 20 BRPM | TEMPERATURE: 97.8 F | HEART RATE: 77 BPM | OXYGEN SATURATION: 95 % | BODY MASS INDEX: 30.07 KG/M2

## 2023-05-22 DIAGNOSIS — R05.3 PERSISTENT COUGH: Primary | ICD-10-CM

## 2023-05-22 DIAGNOSIS — I10 PRIMARY HYPERTENSION: ICD-10-CM

## 2023-05-22 DIAGNOSIS — I48.21 ATRIAL FIBRILLATION, PERMANENT (H): ICD-10-CM

## 2023-05-22 LAB
ANION GAP SERPL CALCULATED.3IONS-SCNC: 11 MMOL/L (ref 7–15)
BUN SERPL-MCNC: 15.9 MG/DL (ref 8–23)
CALCIUM SERPL-MCNC: 10.3 MG/DL (ref 8.8–10.2)
CHLORIDE SERPL-SCNC: 100 MMOL/L (ref 98–107)
CREAT SERPL-MCNC: 0.78 MG/DL (ref 0.51–0.95)
DEPRECATED HCO3 PLAS-SCNC: 31 MMOL/L (ref 22–29)
GFR SERPL CREATININE-BSD FRML MDRD: 73 ML/MIN/1.73M2
GLUCOSE SERPL-MCNC: 185 MG/DL (ref 70–99)
INR HOME MONITORING: 3.5 (ref 2–3)
POTASSIUM SERPL-SCNC: 4.2 MMOL/L (ref 3.4–5.3)
SODIUM SERPL-SCNC: 142 MMOL/L (ref 136–145)

## 2023-05-22 PROCEDURE — 36415 COLL VENOUS BLD VENIPUNCTURE: CPT | Performed by: INTERNAL MEDICINE

## 2023-05-22 PROCEDURE — 99214 OFFICE O/P EST MOD 30 MIN: CPT | Performed by: INTERNAL MEDICINE

## 2023-05-22 PROCEDURE — 80048 BASIC METABOLIC PNL TOTAL CA: CPT | Performed by: INTERNAL MEDICINE

## 2023-05-22 ASSESSMENT — PAIN SCALES - GENERAL: PAINLEVEL: NO PAIN (0)

## 2023-05-22 NOTE — PROGRESS NOTES
ANTICOAGULATION MANAGEMENT     Reba Calderon 87 year old female is on warfarin with supratherapeutic INR result. (Goal INR 2.0-3.0)    Recent labs: (last 7 days)     05/22/23  0000   INR 3.5*       ASSESSMENT       Source(s): Chart Review and Patient/Caregiver Call       Warfarin doses taken: Warfarin taken as instructed    Diet: No new diet changes identified    Medication/supplement changes: Lisinopril was stopped last week due to persistent cough    New illness, injury, or hospitalization: Yes: Pt saw PCP today due to cough. No new medication changes    Signs or symptoms of bleeding or clotting: No    Previous result: Therapeutic last visit; previously outside of goal range    Additional findings: Pt reports that she ate broccoli for lunch today         PLAN       Dosing Instructions: partial hold then decrease your warfarin dose (5.6% change) with next INR in 1 week       Summary  As of 5/22/2023    Full warfarin instructions:  5/22: 1.25 mg; Otherwise 3.75 mg every Sun, Tue, Thu; 2.5 mg all other days   Next INR check:  5/30/2023             Telephone call with Reba who agrees to plan and repeated back plan correctly    Patient to recheck with home meter    Education provided:     Contact [phone]  with any changes, questions or concerns.     Plan made per [facility] anticoagulation protocol    Maegan Norton RN  Anticoagulation Clinic  5/22/2023    _______________________________________________________________________     Anticoagulation Episode Summary     Current INR goal:  2.0-3.0   TTR:  85.3 % (1 y)   Target end date:  7/15/2036   Send INR reminders to:  ANTICOAG HOME MONITORING    Indications    Atrial fibrillation  permanent (H) [I48.21]           Comments:  [employer] home monitor. Managed by exception.         Anticoagulation Care Providers     Provider Role Specialty Phone number    Devon Ball MD Referring Internal Medicine [phone]    Leti Brower NP Referring  [phone]

## 2023-05-22 NOTE — PATIENT INSTRUCTIONS
Please follow up if you have any further issues.    You may contact me by phone or MyChart if you are worsening or if things are not improving.    ______________________________________________________________________     You can call [phone] during weekday hours to get a hold of our team coordinators if you need to get a message to me.    There are 3 people in our building taking phone calls for me.  Be sure to listen to the prompts to get a hold of them.    You first press 1 for English (press another number for a different language) and then press 2 to get the .    They can then take your message and forward it onto me.    ______________________________________________________________________     Please remember that you can call [phone] ANYTIME to schedule an appointment.     You can schedule appointments 24 hours a day, 7 days a week.      Sometimes the best time to schedule an appointment is after clinic hours when less people are calling in.      Weekends are another option for calling in to schedule appointments.

## 2023-05-22 NOTE — PROGRESS NOTES
[facility]    Comprehensive and complex medical care - Chronic disease management - Shared decision making - Care coordination - Compassionate care    Patient advocacy - Rational deprescribing - Minimally disruptive medicine - Ethical focus - Customized care         Date of Service: 5/22/2023  Primary Provider: Devon Ball    Patient Care Team:  Devon Ball MD as PCP - General (Internal Medicine)  Sherwin Coats MD as Physician (Hematology)  Louis Jacobsen MD as MD (Ophthalmology)  Shyam Barrett MD as MD (Colon & Rectal)  DANGELO Pagan MD as MD  Devon Ball MD as Assigned PCP          Patient's Pharmacy:    [facility]/pharmacy #[id number] - [facility], [place] - [address]  [address] [address]  Phone: [phone] Fax: [phone]     Patient's Contacts:  Name Home Phone Work Phone Mobile Phone Relationship Lgl Grd   XIMENA GUPTA [phone]   Spouse    JYOTHI SHEIKH   [phone] Daughter      Patient's Insurance:    Payor: MEDICARE / Plan: MEDICARE / Product Type: Medicare /           Active Problem List:  Problem List as of 5/22/2023 Reviewed: 4/28/2023  7:10 AM by Devon Ball MD       High    Nonsmoker    Atrial fibrillation, permanent (H)    CLL (chronic lymphocytic leukemia) (H)       Medium    Chronic diastolic heart failure (H)    Actinomycosis    Chronic kidney disease, stage 3 (H)    Cardiac pacemaker in situ    HTN (hypertension)    H/O atrioventricular jerzy ablation    CHB (complete heart block) (H)    Anticoagulation monitoring, INR range 2-3    Choledocholithiasis, RECURRENT    Diabetes mellitus, type 2 (H)       Low    Coagulopathy (H) - due to warfarin    Peripheral sensory neuropathy    MATILDE (obstructive sleep apnea)    Perirectal abscess    Senile purpura (H)        Current Outpatient Medications   Medication Instructions     atorvastatin (LIPITOR) 10 MG tablet TAKE 1 TABLET BY MOUTH EVERY DAY IN  THE EVENING     benzonatate (TESSALON) 100 mg, Oral, 3 TIMES DAILY PRN     biotin 2,500 mcg, Oral     Cholecalciferol (VITAMIN D3 PO) 2,000 Units, Oral, DAILY     CONTOUR TEST test strip USE 1 STRIP TO TEST BLOOD SUGAR DAILY     FOLIC ACID PO 1 mg, DAILY     furosemide (LASIX) 40 MG tablet TAKE 2 TABLETS BY MOUTH EVERY MORNING     glucosamine-chondroitinoitin 500-400 MG tablet 1 tablet, Oral     guaiFENesin-codeine (ROBITUSSIN AC) 100-10 MG/5ML solution 5-10 mLs, Oral, EVERY 6 HOURS PRN     magnesium chloride 64 mg, Oral     meclizine (ANTIVERT) 25 mg, Oral, 3 TIMES DAILY PRN     metFORMIN (GLUCOPHAGE) 500 mg, Oral, 2 TIMES DAILY WITH MEALS     nitroGLYcerin (NITROSTAT) 0.4 mg, Sublingual     pantoprazole (PROTONIX) 40 MG EC tablet TAKE 1 TABLET BY MOUTH EVERY DAY     polyethylene glycol (MIRALAX) 17 GM/Dose powder MIX 1 CAPFUL (17 GRAMS) IN LIQUID AND DRINK BY MOUTH DAILY. (OTC NC)     potassium chloride ER (K-TAB/KLOR-CON) 10 MEQ CR tablet 10 mEq, Oral, DAILY     UNABLE TO FIND MEDICATION NAME: Hair skin and nails   2500     vitamin B-12 (CYANOCOBALAMIN) 1,000 mcg, Oral     warfarin ANTICOAGULANT (COUMADIN) 2.5 MG tablet TAKE BY MOUTH 2.5 MG (2.5 MG X 1) EVERY MON, WED, FRI 3.75 MG (2.5 MG X 1.5) ALL OTHER DAYS     Social History     Social History Narrative           Subjective:     Reba Calderon is a 87 year old female who comes in today for:    Chief Complaint   Patient presents with     Follow Up     On Bronchial pneumonia.      Recheck Medication     Off of lisinopril-going well.           5/22/2023     9:47 AM   Additional Questions   Roomed by KEILY Stover   Accompanied by      Patient comes in today for follow-up of a few issues.    We reviewed her persistent cough.  This is doing a lot better.  She is off of the lisinopril.  This seemed to make a difference for her.  She is able to talk without coughing now.  Her breathing has been doing good.  She has no major concerns.  She might have  "a slight cough still but nothing severe.  No heartburn or postnasal drip.    Reviewed her high blood pressure.  Her blood pressure is doing okay off of the lisinopril.  She has no other issues with her other medications.  We should check her Chem-8 in relationship to this as her potassium is a little bit on the low side.  She does take supplemental potassium.    We reviewed her other issues noted in the assessment but not specifically addressed in the HPI above.     Objective:     Wt Readings from Last 3 Encounters:   05/22/23 86.9 kg (191 lb 9.6 oz)   05/11/23 88 kg (194 lb)   05/05/23 88 kg (194 lb)     BP Readings from Last 3 Encounters:   05/22/23 130/76   05/11/23 115/74   05/05/23 122/60     /76 (BP Location: Right arm, Patient Position: Sitting, Cuff Size: Adult Regular)   Pulse 77   Temp 97.8  F (36.6  C) (Oral)   Resp 20   Ht 1.689 m (5' 6.5\")   Wt 86.9 kg (191 lb 9.6 oz)   LMP  (LMP Unknown)   SpO2 95%   Breastfeeding No   BMI 30.46 kg/m     The patient is comfortable, no acute distress.  Mood good.  Insight good.  Eyes are nonicteric.  Neck is supple without mass.  No cervical adenopathy.  No thyromegaly. Heart regular rate and rhythm.  Lungs clear to auscultation bilaterally.  Respiratory effort is good.  Extremities no edema.      Diagnostics:     Orders Only on 05/22/2023   Component Date Value Ref Range Status     INR HOME MONITORING 05/22/2023 3.5 (H)  2.000 - 3.000 Final       Results for orders placed or performed in visit on 05/22/23   INR (External Result)     Status: Abnormal   Result Value Ref Range    INR HOME MONITORING 3.5 (H) 2.000 - 3.000       Assessment:     1. Persistent cough    2. Primary hypertension    3. Atrial fibrillation, permanent (H)        Plan:     1. Doing better in relationship to cough.  2. Blood work checked today.  3. Continue other blood pressure medications as is and adjust as necessary.  4. Continue current medications otherwise.  5. Follow up sooner " if issues.    Orders Placed This Encounter   Procedures     Basic metabolic panel           Devon Ball MD  General Internal Medicine  [facility]    Return in about 8 months (around 2/3/2024) for annual wellness visit.     No future appointments.

## 2023-05-30 ENCOUNTER — ANTICOAGULATION THERAPY VISIT (OUTPATIENT)
Dept: ANTICOAGULATION | Facility: CLINIC | Age: 88
End: 2023-05-30
Payer: MEDICARE

## 2023-05-30 DIAGNOSIS — I48.21 ATRIAL FIBRILLATION, PERMANENT (H): Primary | ICD-10-CM

## 2023-05-30 LAB — INR HOME MONITORING: 2.3 (ref 2–3)

## 2023-05-30 NOTE — PROGRESS NOTES
ANTICOAGULATION MANAGEMENT     Reba Calderon 87 year old female is on warfarin with therapeutic INR result. (Goal INR 2.0-3.0)    Recent labs: (last 7 days)     05/30/23  0000   INR 2.3       ASSESSMENT       Source(s): Chart Review and Patient/Caregiver Call       Warfarin doses taken: Warfarin taken as instructed    Diet: No new diet changes identified    Medication/supplement changes: None noted    New illness, injury, or hospitalization: No    Signs or symptoms of bleeding or clotting: No    Previous result: Supratherapeutic    Additional findings: None         PLAN     Recommended plan for no diet, medication or health factor changes affecting INR     Dosing Instructions: Continue your current warfarin dose with next INR in 2 weeks       Summary  As of 5/30/2023    Full warfarin instructions:  3.75 mg every Sun, Tue, Thu; 2.5 mg all other days   Next INR check:  6/13/2023             Telephone call with Reba who verbalizes understanding and agrees to plan    Patient to recheck with home meter    Education provided:     Please call back if any changes to your diet, medications or how you've been taking warfarin    Plan made per [facility] anticoagulation protocol    Dannielle Lee, RN  Anticoagulation Clinic  5/30/2023    _______________________________________________________________________     Anticoagulation Episode Summary     Current INR goal:  2.0-3.0   TTR:  84.4 % (1 y)   Target end date:  7/15/2036   Send INR reminders to:  ANTICOAG HOME MONITORING    Indications    Atrial fibrillation  permanent (H) [I48.21]           Comments:  [employer] home monitor. Managed by exception.         Anticoagulation Care Providers     Provider Role Specialty Phone number    Devon Ball MD Referring Internal Medicine [phone]    Leti Brower NP Referring  [phone]

## 2023-06-12 ENCOUNTER — ANTICOAGULATION THERAPY VISIT (OUTPATIENT)
Dept: ANTICOAGULATION | Facility: CLINIC | Age: 88
End: 2023-06-12
Payer: MEDICARE

## 2023-06-12 DIAGNOSIS — I48.21 ATRIAL FIBRILLATION, PERMANENT (H): Primary | ICD-10-CM

## 2023-06-12 LAB — INR HOME MONITORING: 1.8 (ref 2–3)

## 2023-06-12 NOTE — PROGRESS NOTES
ANTICOAGULATION MANAGEMENT     Reba Calderon 87 year old female is on warfarin with subtherapeutic INR result. (Goal INR 2.0-3.0)    Recent labs: (last 7 days)     06/12/23  0000   INR 1.8*       ASSESSMENT       Source(s): Chart Review and Patient/Caregiver Call       Warfarin doses taken: Warfarin taken as instructed    Diet: Increased greens/vitamin K in diet; plans to resume previous intake    Medication/supplement changes: None noted    New illness, injury, or hospitalization: No    Signs or symptoms of bleeding or clotting: No    Previous result: Therapeutic last visit; previously outside of goal range    Additional findings: None         PLAN     Recommended plan for temporary change(s) affecting INR     Dosing Instructions: booster dose then continue your current warfarin dose with next INR in 2 weeks       Summary  As of 6/12/2023    Full warfarin instructions:  6/12: 3.75 mg; Otherwise 3.75 mg every Sun, Tue, Thu; 2.5 mg all other days   Next INR check:  6/26/2023             Telephone call with Reba who verbalizes understanding and agrees to plan    Patient to recheck with home meter    Education provided:     Dietary considerations: importance of consistent vitamin K intake and impact of vitamin K foods on INR    Plan made per [facility] anticoagulation protocol    Crys Fontanez RN  Anticoagulation Clinic  6/12/2023    _______________________________________________________________________     Anticoagulation Episode Summary     Current INR goal:  2.0-3.0   TTR:  83.6 % (1 y)   Target end date:  7/15/2036   Send INR reminders to:  ANTICOAG HOME MONITORING    Indications    Atrial fibrillation  permanent (H) [I48.21]           Comments:  [employer] home monitor. Managed by exception.         Anticoagulation Care Providers     Provider Role Specialty Phone number    Devon Ball MD Referring Internal Medicine [phone]    Leti Brower NP Referring  [phone]

## 2023-06-13 ENCOUNTER — TRANSFERRED RECORDS (OUTPATIENT)
Dept: HEALTH INFORMATION MANAGEMENT | Facility: CLINIC | Age: 88
End: 2023-06-13
Payer: MEDICARE

## 2023-06-22 ENCOUNTER — TRANSFERRED RECORDS (OUTPATIENT)
Dept: HEALTH INFORMATION MANAGEMENT | Facility: CLINIC | Age: 88
End: 2023-06-22
Payer: MEDICARE

## 2023-06-26 ENCOUNTER — ANTICOAGULATION THERAPY VISIT (OUTPATIENT)
Dept: ANTICOAGULATION | Facility: CLINIC | Age: 88
End: 2023-06-26
Payer: MEDICARE

## 2023-06-26 DIAGNOSIS — I48.21 ATRIAL FIBRILLATION, PERMANENT (H): Primary | ICD-10-CM

## 2023-06-26 LAB — INR HOME MONITORING: 1.5 (ref 2–3)

## 2023-06-26 NOTE — PROGRESS NOTES
ANTICOAGULATION MANAGEMENT     Reba Calderon 87 year old female is on warfarin with subtherapeutic INR result. (Goal INR 2.0-3.0)    Recent labs: (last 7 days)     06/26/23  0000   INR 1.5*       ASSESSMENT       Source(s): Chart Review and Patient/Caregiver Call       Warfarin doses taken: Warfarin taken as instructed    Diet: Increased greens/vitamin K in diet; plans to resume previous intake    Medication/supplement changes: None noted    New illness, injury, or hospitalization: No    Signs or symptoms of bleeding or clotting: No    Previous result: Subtherapeutic  Additional findings: Patient requests only increasing maintenance dose by 6% today. Patient understands this is less than the recommended increase today.       PLAN     Recommended plan for ongoing change(s) affecting INR     Dosing Instructions: booster dose then Increase your warfarin dose (6% change) with next INR in 1 week. This is less than the recommended dose increase per patient request.       Summary  As of 6/26/2023    Full warfarin instructions:  6/26: 5 mg; Otherwise 2.5 mg every Sun, Tue, Thu; 3.75 mg all other days   Next INR check:  7/3/2023             Telephone call with Reba who agrees to plan and repeated back plan correctly    Patient to recheck with home meter    Education provided:     Please call back if any changes to your diet, medications or how you've been taking warfarin    Dietary considerations: importance of consistent vitamin K intake and impact of vitamin K foods on INR    Symptom monitoring: monitoring for bleeding signs and symptoms and monitoring for clotting signs and symptoms    Contact [phone]  with any changes, questions or concerns.     Plan made per [facility] anticoagulation protocol    Crys Simpson RN  Anticoagulation Clinic  6/26/2023    _______________________________________________________________________     Anticoagulation Episode Summary     Current INR goal:  2.0-3.0   TTR:  80.5 % (1 y)   Target end  date:  7/15/2036   Send INR reminders to:  [facility]    Indications    Atrial fibrillation  permanent (H) [I48.21]           Comments:  [employer] home monitor. Managed by exception.         Anticoagulation Care Providers     Provider Role Specialty Phone number    Devon Ball MD Referring Internal Medicine [phone]    Leti Brower NP Referring  [phone]

## 2023-06-27 DIAGNOSIS — E11.628 TYPE 2 DIABETES MELLITUS WITH OTHER SKIN COMPLICATION, WITHOUT LONG-TERM CURRENT USE OF INSULIN (H): ICD-10-CM

## 2023-06-27 DIAGNOSIS — I10 ESSENTIAL HYPERTENSION: ICD-10-CM

## 2023-06-27 RX ORDER — POTASSIUM CHLORIDE 750 MG/1
TABLET, EXTENDED RELEASE ORAL
Qty: 90 TABLET | Refills: 3 | Status: SHIPPED | OUTPATIENT
Start: 2023-06-27 | End: 2024-09-13

## 2023-06-27 NOTE — TELEPHONE ENCOUNTER
"Last Written Prescription Date:  7/19/2022  Last Fill Quantity: 90,  # refills: 3   Last office visit provider:  5/22/2023     Requested Prescriptions   Pending Prescriptions Disp Refills     potassium chloride ER (K-TAB/KLOR-CON) 10 MEQ CR tablet [Pharmacy Med Name: POTASSIUM CL ER 10 MEQ TABLET] 90 tablet 3     Sig: TAKE 1 TABLET BY MOUTH EVERY DAY       Potassium Supplements Protocol Passed - 6/27/2023  3:49 AM        Passed - Recent (12 mo) or future (30 days) visit within the authorizing provider's department     Patient has had an office visit with the authorizing provider or a provider within the authorizing providers department within the previous 12 mos or has a future within next 30 days. See \"Patient Info\" tab in inbasket, or \"Choose Columns\" in Meds & Orders section of the refill encounter.              Passed - Medication is active on med list        Passed - Patient is age 18 or older        Passed - Normal serum potassium in past 12 months     Recent Labs   Lab Test 05/22/23  1051   POTASSIUM 4.2                  Last Written Prescription Date:  7/19/2022  Last Fill Quantity: 180,  # refills: 3   Last office visit provider:  5/22/2023     metFORMIN (GLUCOPHAGE) 500 MG tablet [Pharmacy Med Name: METFORMIN  MG TABLET] 180 tablet 3     Sig: TAKE 1 TABLET BY MOUTH TWICE A DAY WITH MEALS       Biguanide Agents Passed - 6/27/2023  3:49 AM        Passed - Patient is age 10 or older        Passed - Patient has documented A1c within the specified period of time.     If HgbA1C is 8 or greater, it needs to be on file within the past 3 months.  If less than 8, must be on file within the past 6 months.     Recent Labs   Lab Test 02/02/23  1327   A1C 7.6*             Passed - Patient's CR is NOT>1.4 OR Patient's EGFR is NOT<45 within past 12 mos.     Recent Labs   Lab Test 05/22/23  1051 01/25/22  1038 09/14/20  1059   GFRESTIMATED 73   < > 57*   GFRESTBLACK  --   --  >60    < > = values in this interval not " "displayed.       Recent Labs   Lab Test 05/22/23  1051   CR 0.78             Passed - Patient does NOT have a diagnosis of CHF.        Passed - Medication is active on med list        Passed - Patient is not pregnant        Passed - Patient has not had a positive pregnancy test within the past 12 mos.         Passed - Recent (6 mo) or future (30 days) visit within the authorizing provider's specialty     Patient had office visit in the last 6 months or has a visit in the next 30 days with authorizing provider or within the authorizing provider's specialty.  See \"Patient Info\" tab in inbasket, or \"Choose Columns\" in Meds & Orders section of the refill encounter.                 Elham Baumann RN 06/27/23 12:35 PM  "

## 2023-07-03 ENCOUNTER — ANTICOAGULATION THERAPY VISIT (OUTPATIENT)
Dept: ANTICOAGULATION | Facility: CLINIC | Age: 88
End: 2023-07-03
Payer: MEDICARE

## 2023-07-03 DIAGNOSIS — I48.21 ATRIAL FIBRILLATION, PERMANENT (H): Primary | ICD-10-CM

## 2023-07-03 LAB — INR HOME MONITORING: 2.1 (ref 2–3)

## 2023-07-03 NOTE — PROGRESS NOTES
ANTICOAGULATION MANAGEMENT     Reba Calderon 87 year old female is on warfarin with therapeutic INR result. (Goal INR 2.0-3.0)    Recent labs: (last 7 days)     07/03/23  0000   INR 2.1       ASSESSMENT       Source(s): Chart Review    Previous INR was Subtherapeutic    Medication, diet, health changes since last INR chart reviewed; none identified             PLAN     Recommended plan for no diet, medication or health factor changes affecting INR     Dosing Instructions: Continue your current warfarin dose with next INR in 2 weeks       Summary  As of 7/3/2023    Full warfarin instructions:  2.5 mg every Sun, Tue, Thu; 3.75 mg all other days   Next INR check:  7/17/2023             Detailed voice message left for  Reba with dosing instructions and follow up date.     Patient to recheck with home meter    Education provided:     Please call back if any changes to your diet, medications or how you've been taking warfarin    Symptom monitoring: monitoring for bleeding signs and symptoms and monitoring for clotting signs and symptoms    Contact [phone]  with any changes, questions or concerns.     Plan made per [facility] protocol    Crys Simpson RN  Anticoagulation Clinic  7/3/2023    _______________________________________________________________________     Anticoagulation Episode Summary     Current INR goal:  2.0-3.0   TTR:  78.9 % (1 y)   Target end date:  7/15/2036   Send INR reminders to:  [facility]    Indications    Atrial fibrillation  permanent (H) [I48.21]           Comments:  [employer] home monitor. Managed by exception.         Anticoagulation Care Providers     Provider Role Specialty Phone number    Devon Ball MD Referring Internal Medicine [phone]    Leti Brower NP Referring  [phone]

## 2023-07-03 NOTE — PROGRESS NOTES
Patient calls back confirming she has not had any changes to diet, health, medications, or any unusual signs of bruising or bleeding. Patient agrees to plan and will call back with further concerns.

## 2023-07-17 ENCOUNTER — ANTICOAGULATION THERAPY VISIT (OUTPATIENT)
Dept: ANTICOAGULATION | Facility: CLINIC | Age: 88
End: 2023-07-17
Payer: MEDICARE

## 2023-07-17 DIAGNOSIS — I48.21 ATRIAL FIBRILLATION, PERMANENT (H): Primary | ICD-10-CM

## 2023-07-17 LAB
INR HOME MONITORING: 1.5 (ref 2–3)
INR HOME MONITORING: 1.5 (ref 2–3)

## 2023-07-17 NOTE — PROGRESS NOTES
"Called patient to discuss results and patient immediately stated she had a hard time believing her INR was 1.5 because after she used her lancet, the \"blood shot out of my finger onto the cupboard and cabinet and its never done that before\".    After speaking with patient she did not use the first drop of blood because she cleaned up the blood off her finger and countertops and then \"squeezed again\".  Educated patient on using the first drop of blood and since this was not done, could explain the lower INR today.    Patient is currently out running errands but will check her INR again later this afternoon and report results.    BRIDGER EscalanteN, RN  Anticoagulation Clinic    "

## 2023-07-17 NOTE — PROGRESS NOTES
ANTICOAGULATION MANAGEMENT     Reba Calderon 87 year old female is on warfarin with subtherapeutic INR result. (Goal INR 2.0-3.0)    Recent labs: (last 7 days)     07/17/23  0000   INR 1.5*  1.5*       ASSESSMENT       Source(s): Chart Review and Patient/Caregiver Call       Warfarin doses taken: Warfarin taken as instructed    Diet: Increased greens/vitamin K in diet; ongoing change-has been having blueberries in pancakes, strawberry pie and peaches    Medication/supplement changes: None noted    New illness, injury, or hospitalization: No    Signs or symptoms of bleeding or clotting: No    Previous result: Therapeutic last visit; previously outside of goal range    Additional findings: None         PLAN     Recommended plan for ongoing change(s) affecting INR     Dosing Instructions: Increase your warfarin dose (11.1% change) with next INR in 1 week       Summary  As of 7/17/2023    Full warfarin instructions:  2.5 mg every Sun; 3.75 mg all other days   Next INR check:  7/24/2023             Telephone call with Reba who verbalizes understanding and agrees to plan and who agrees to plan and repeated back plan correctly  Sent [employer] message with dosing and follow up instructions    Patient to recheck with home meter    Education provided:     Dietary considerations: vitamin K content of foods    Plan made per [facility] protocol    Meredith Langley, RN  Anticoagulation Clinic  7/17/2023    _______________________________________________________________________     Anticoagulation Episode Summary     Current INR goal:  2.0-3.0   TTR:  77.2 % (1 y)   Target end date:  7/15/2036   Send INR reminders to:  [facility]    Indications    Atrial fibrillation  permanent (H) [I48.21]           Comments:  [employer] home monitor. Managed by exception.         Anticoagulation Care Providers     Provider Role Specialty Phone number    Devon Ball MD Referring Internal Medicine [phone]    Inocente  CIERA Landeros Referring  [phone]

## 2023-07-20 NOTE — PROGRESS NOTES
ANTICOAGULATION  MANAGEMENT-Home Monitor Managed by Exception    Reba AKI Calderon 86 year old female is on warfarin with therapeutic INR result. (Goal INR 2.0-3.0)    Recent labs: (last 7 days)     02/07/22  0000   INR 2.50         Previous INR was Therapeutic    Medication, diet, health changes since last INR:chart reviewed; none identified    Contacted within the last 12 weeks by phone on 12/13/2021      [facility]     Reba was NOT contacted regarding therapeutic result today per home monitoring policy manage by exception agreement.   Current warfarin dose is to be continued:     Summary  As of 2/7/2022    Full warfarin instructions:  3.75 mg every Mon, Wed, Fri; 2.5 mg all other days   Next INR check:  2/14/2022           ?   Meredith Langley RN  Anticoagulation Clinic  2/7/2022    _______________________________________________________________________     Anticoagulation Episode Summary     Current INR goal:  2.0-3.0   TTR:  90.8 % (6.6 mo)   Target end date:  7/15/2036   Send INR reminders to:  [facility]    Indications    Atrial fibrillation  permanent (H) [I48.21]           Comments:  [employer] home monitor. Managed by exception.         Anticoagulation Care Providers     Provider Role Specialty Phone number    Devon Ball MD Referring Internal Medicine [phone]           unable to assess

## 2023-07-24 ENCOUNTER — ANTICOAGULATION THERAPY VISIT (OUTPATIENT)
Dept: ANTICOAGULATION | Facility: CLINIC | Age: 88
End: 2023-07-24
Payer: MEDICARE

## 2023-07-24 DIAGNOSIS — I48.21 ATRIAL FIBRILLATION, PERMANENT (H): Primary | ICD-10-CM

## 2023-07-24 LAB — INR HOME MONITORING: 2.2 (ref 2–3)

## 2023-07-24 NOTE — PROGRESS NOTES
ANTICOAGULATION MANAGEMENT     Reba Calderon 87 year old female is on warfarin with therapeutic INR result. (Goal INR 2.0-3.0)    Recent labs: (last 7 days)     07/24/23  0000   INR 2.2       ASSESSMENT     Source(s): Chart Review and Patient/Caregiver Call     Warfarin doses taken: Warfarin taken as instructed  Diet: No new diet changes identified  Medication/supplement changes: None noted  New illness, injury, or hospitalization: No  Signs or symptoms of bleeding or clotting: No  Previous result: Subtherapeutic  Additional findings: None       PLAN     Recommended plan for no diet, medication or health factor changes affecting INR     Dosing Instructions: Continue your current warfarin dose with next INR in 1 week       Summary  As of 7/24/2023      Full warfarin instructions:  2.5 mg every Sun; 3.75 mg all other days   Next INR check:  7/31/2023               Telephone call with Reba who verbalizes understanding and agrees to plan    Patient to recheck with home meter    Education provided:   Please call back if any changes to your diet, medications or how you've been taking warfarin    Plan made per [facility]agulation protocol    Dannielle Lee RN  Anticoagulation Clinic  7/24/2023    _______________________________________________________________________     Anticoagulation Episode Summary       Current INR goal:  2.0-3.0   TTR:  77.6 % (1 y)   Target end date:  7/15/2036   Send INR reminders to:  [facility]    Indications    Atrial fibrillation  permanent (H) [I48.21]             Comments:  [employer] home monitor. Managed by exception.             Anticoagulation Care Providers       Provider Role Specialty Phone number    Devon Ball MD Referring Internal Medicine [phone]    Leti Brower NP Referring  [phone]

## 2023-07-31 ENCOUNTER — DOCUMENTATION ONLY (OUTPATIENT)
Dept: ANTICOAGULATION | Facility: CLINIC | Age: 88
End: 2023-07-31
Payer: MEDICARE

## 2023-07-31 DIAGNOSIS — I48.21 ATRIAL FIBRILLATION, PERMANENT (H): Primary | ICD-10-CM

## 2023-07-31 LAB — INR HOME MONITORING: 2 (ref 2–3)

## 2023-07-31 NOTE — PROGRESS NOTES
ANTICOAGULATION  MANAGEMENT-Home Monitor Managed by Exception    Reba AKI Calderon 87 year old female is on warfarin with therapeutic INR result. (Goal INR 2.0-3.0)    Recent labs: (last 7 days)     07/31/23  0000   INR 2.0         Previous INR was Therapeutic    Medication, diet, health changes since last INR:chart reviewed; none identified    Contacted within the last 12 weeks by phone on 7/24/23      [facility]     Reba was NOT contacted regarding therapeutic result today per home monitoring policy manage by exception agreement.   Current warfarin dose is to be continued:     Summary  As of 7/31/2023    Full warfarin instructions:  2.5 mg every Sun; 3.75 mg all other days   Next INR check:  8/14/2023           ?   Carly Amador RN  Anticoagulation Clinic  7/31/2023    _______________________________________________________________________     Anticoagulation Episode Summary     Current INR goal:  2.0-3.0   TTR:  79.5 % (1 y)   Target end date:  7/15/2036   Send INR reminders to:  [facility]    Indications    Atrial fibrillation  permanent (H) [I48.21]           Comments:  [employer] home monitor. Managed by exception.         Anticoagulation Care Providers     Provider Role Specialty Phone number    Devon Ball MD Referring Internal Medicine [phone]    Leti Brower NP Referring  [phone]

## 2023-08-08 ENCOUNTER — DOCUMENTATION ONLY (OUTPATIENT)
Dept: ANTICOAGULATION | Facility: CLINIC | Age: 88
End: 2023-08-08
Payer: MEDICARE

## 2023-08-08 DIAGNOSIS — I48.21 ATRIAL FIBRILLATION, PERMANENT (H): Primary | ICD-10-CM

## 2023-08-08 LAB — INR HOME MONITORING: 2.6 (ref 2–3)

## 2023-08-08 NOTE — PROGRESS NOTES
ANTICOAGULATION  MANAGEMENT-Home Monitor Managed by Exception    Reba Calderon 87 year old female is on warfarin with therapeutic INR result. (Goal INR 2.0-3.0)    Recent labs: (last 7 days)     08/08/23  0000   INR 2.6       Previous INR was Therapeutic  Medication, diet, health changes since last INR:chart reviewed; none identified  Contacted within the last 12 weeks by phone on 7/24/23      [facility]     Reba was NOT contacted regarding therapeutic result today per home monitoring policy manage by exception agreement.   Current warfarin dose is to be continued:     Summary  As of 8/8/2023      Full warfarin instructions:  2.5 mg every Sun; 3.75 mg all other days   Next INR check:  8/15/2023             ?   Palak Velázquez RN  Anticoagulation Clinic  8/8/2023    _______________________________________________________________________     Anticoagulation Episode Summary       Current INR goal:  2.0-3.0   TTR:  81.0 % (1 y)   Target end date:  7/15/2036   Send INR reminders to:  [facility]    Indications    Atrial fibrillation  permanent (H) [I48.21]             Comments:  [employer] home monitor. Managed by exception.             Anticoagulation Care Providers       Provider Role Specialty Phone number    Devon Ball MD Referring Internal Medicine [phone]    Leti Brower NP Referring  [phone]

## 2023-08-14 ENCOUNTER — DOCUMENTATION ONLY (OUTPATIENT)
Dept: ANTICOAGULATION | Facility: CLINIC | Age: 88
End: 2023-08-14
Payer: MEDICARE

## 2023-08-14 DIAGNOSIS — I48.21 ATRIAL FIBRILLATION, PERMANENT (H): Primary | ICD-10-CM

## 2023-08-14 LAB — INR HOME MONITORING: 2.2 (ref 2–3)

## 2023-08-14 NOTE — PROGRESS NOTES
ANTICOAGULATION  MANAGEMENT-Home Monitor Managed by Exception    Reba AKI Calderon 87 year old female is on warfarin with therapeutic INR result. (Goal INR 2.0-3.0)    Recent labs: (last 7 days)     08/14/23  0000   INR 2.2       Previous INR was Therapeutic  Medication, diet, health changes since last INR:chart reviewed; none identified  Contacted within the last 12 weeks by phone on 7/24/23      [facility]     Reba was NOT contacted regarding therapeutic result today per home monitoring policy manage by exception agreement.   Current warfarin dose is to be continued:     Summary  As of 8/14/2023      Full warfarin instructions:  2.5 mg every Sun; 3.75 mg all other days   Next INR check:  8/21/2023             ?   Crys Fontanez RN  Anticoagulation Clinic  8/14/2023    _______________________________________________________________________     Anticoagulation Episode Summary       Current INR goal:  2.0-3.0   TTR:  81.3 % (1 y)   Target end date:  7/15/2036   Send INR reminders to:  [facility]    Indications    Atrial fibrillation  permanent (H) [I48.21]             Comments:  [employer] home monitor. Managed by exception.             Anticoagulation Care Providers       Provider Role Specialty Phone number    Devon Ball MD Referring Internal Medicine [phone]    Leti Brower NP Referring  [phone]

## 2023-08-21 ENCOUNTER — OFFICE VISIT (OUTPATIENT)
Dept: INTERNAL MEDICINE | Facility: CLINIC | Age: 88
End: 2023-08-21
Payer: MEDICARE

## 2023-08-21 VITALS
BODY MASS INDEX: 31.31 KG/M2 | OXYGEN SATURATION: 96 % | HEART RATE: 77 BPM | RESPIRATION RATE: 18 BRPM | WEIGHT: 199.5 LBS | SYSTOLIC BLOOD PRESSURE: 152 MMHG | DIASTOLIC BLOOD PRESSURE: 70 MMHG | HEIGHT: 67 IN | TEMPERATURE: 97.8 F

## 2023-08-21 DIAGNOSIS — R26.2 DIFFICULTY WALKING: ICD-10-CM

## 2023-08-21 DIAGNOSIS — Z79.01 LONG TERM (CURRENT) USE OF ANTICOAGULANTS: ICD-10-CM

## 2023-08-21 DIAGNOSIS — B35.1 FUNGAL NAIL INFECTION: ICD-10-CM

## 2023-08-21 DIAGNOSIS — I50.32 CHRONIC DIASTOLIC (CONGESTIVE) HEART FAILURE (H): ICD-10-CM

## 2023-08-21 DIAGNOSIS — I48.21 PERMANENT ATRIAL FIBRILLATION (H): ICD-10-CM

## 2023-08-21 DIAGNOSIS — M19.071 PRIMARY OSTEOARTHRITIS OF RIGHT ANKLE: ICD-10-CM

## 2023-08-21 DIAGNOSIS — M25.552 HIP PAIN, LEFT: Primary | ICD-10-CM

## 2023-08-21 DIAGNOSIS — C91.10 CLL (CHRONIC LYMPHOCYTIC LEUKEMIA) (H): ICD-10-CM

## 2023-08-21 DIAGNOSIS — E11.628 TYPE 2 DIABETES MELLITUS WITH OTHER SKIN COMPLICATION, WITHOUT LONG-TERM CURRENT USE OF INSULIN (H): ICD-10-CM

## 2023-08-21 LAB
ANION GAP SERPL CALCULATED.3IONS-SCNC: 12 MMOL/L (ref 7–15)
BASOPHILS # BLD AUTO: 0 10E3/UL (ref 0–0.2)
BASOPHILS NFR BLD AUTO: 1 %
BUN SERPL-MCNC: 17.5 MG/DL (ref 8–23)
CALCIUM SERPL-MCNC: 9.4 MG/DL (ref 8.8–10.2)
CHLORIDE SERPL-SCNC: 102 MMOL/L (ref 98–107)
CREAT SERPL-MCNC: 0.79 MG/DL (ref 0.51–0.95)
DEPRECATED HCO3 PLAS-SCNC: 29 MMOL/L (ref 22–29)
EOSINOPHIL # BLD AUTO: 0.1 10E3/UL (ref 0–0.7)
EOSINOPHIL NFR BLD AUTO: 2 %
ERYTHROCYTE [DISTWIDTH] IN BLOOD BY AUTOMATED COUNT: 17.3 % (ref 10–15)
FERRITIN SERPL-MCNC: 59 NG/ML (ref 11–328)
GFR SERPL CREATININE-BSD FRML MDRD: 72 ML/MIN/1.73M2
GLUCOSE SERPL-MCNC: 179 MG/DL (ref 70–99)
HBA1C MFR BLD: 7.8 % (ref 0–5.6)
HCT VFR BLD AUTO: 38.8 % (ref 35–47)
HGB BLD-MCNC: 12.8 G/DL (ref 11.7–15.7)
IMM GRANULOCYTES # BLD: 0 10E3/UL
IMM GRANULOCYTES NFR BLD: 0 %
IRON BINDING CAPACITY (ROCHE): 419 UG/DL (ref 240–430)
IRON SATN MFR SERPL: 21 % (ref 15–46)
IRON SERPL-MCNC: 88 UG/DL (ref 37–145)
LYMPHOCYTES # BLD AUTO: 1.2 10E3/UL (ref 0.8–5.3)
LYMPHOCYTES NFR BLD AUTO: 19 %
MCH RBC QN AUTO: 30.8 PG (ref 26.5–33)
MCHC RBC AUTO-ENTMCNC: 33 G/DL (ref 31.5–36.5)
MCV RBC AUTO: 93 FL (ref 78–100)
MONOCYTES # BLD AUTO: 0.4 10E3/UL (ref 0–1.3)
MONOCYTES NFR BLD AUTO: 7 %
NEUTROPHILS # BLD AUTO: 4.7 10E3/UL (ref 1.6–8.3)
NEUTROPHILS NFR BLD AUTO: 72 %
PLATELET # BLD AUTO: 223 10E3/UL (ref 150–450)
POTASSIUM SERPL-SCNC: 4.2 MMOL/L (ref 3.4–5.3)
RBC # BLD AUTO: 4.16 10E6/UL (ref 3.8–5.2)
SODIUM SERPL-SCNC: 143 MMOL/L (ref 136–145)
WBC # BLD AUTO: 6.6 10E3/UL (ref 4–11)

## 2023-08-21 PROCEDURE — 83036 HEMOGLOBIN GLYCOSYLATED A1C: CPT | Performed by: INTERNAL MEDICINE

## 2023-08-21 PROCEDURE — 85025 COMPLETE CBC W/AUTO DIFF WBC: CPT | Performed by: INTERNAL MEDICINE

## 2023-08-21 PROCEDURE — 83540 ASSAY OF IRON: CPT | Performed by: INTERNAL MEDICINE

## 2023-08-21 PROCEDURE — 82728 ASSAY OF FERRITIN: CPT | Performed by: INTERNAL MEDICINE

## 2023-08-21 PROCEDURE — 36415 COLL VENOUS BLD VENIPUNCTURE: CPT | Performed by: INTERNAL MEDICINE

## 2023-08-21 PROCEDURE — 80048 BASIC METABOLIC PNL TOTAL CA: CPT | Performed by: INTERNAL MEDICINE

## 2023-08-21 PROCEDURE — 83550 IRON BINDING TEST: CPT | Performed by: INTERNAL MEDICINE

## 2023-08-21 PROCEDURE — 99215 OFFICE O/P EST HI 40 MIN: CPT | Performed by: INTERNAL MEDICINE

## 2023-08-21 RX ORDER — CICLOPIROX 80 MG/ML
SOLUTION TOPICAL
Qty: 6.6 ML | Refills: 11 | Status: SHIPPED | OUTPATIENT
Start: 2023-08-21

## 2023-08-21 RX ORDER — FUROSEMIDE 40 MG
TABLET ORAL
Qty: 180 TABLET | Refills: 3 | Status: SHIPPED | OUTPATIENT
Start: 2023-08-21 | End: 2024-09-25

## 2023-08-21 RX ORDER — WARFARIN SODIUM 2.5 MG/1
3.75 TABLET ORAL DAILY
Qty: 135 TABLET | Refills: 1 | Status: SHIPPED | OUTPATIENT
Start: 2023-08-21 | End: 2024-03-04

## 2023-08-21 ASSESSMENT — PAIN SCALES - GENERAL: PAINLEVEL: MILD PAIN (2)

## 2023-08-21 NOTE — PROGRESS NOTES
[facility] - Primary [facility] Specialists    Comprehensive and complex medical care - Chronic disease management - Shared decision making - Care coordination - Compassionate care    Patient advocacy - Rational deprescribing - Minimally disruptive medicine - Ethical focus - Customized care         Date of Service: 8/21/2023  Primary Provider: Devon Ball    Patient Care Team:  Devon Ball MD as PCP - General (Internal Medicine)  Louis Jacobsen MD as MD (Ophthalmology)  DANGELO Pagan MD as MD  Devon Ball MD as Assigned PCP  Yancy Vinson MD as MD (Orthopaedic Surgery)          Patient's Pharmacy:    [facility]/pharmacy #[id number] - [facility], [place] - [address]  [address] [place] [address]  Phone: [phone] Fax: [phone]     Patient's Contacts:  Name Home Phone Work Phone Mobile Phone Relationship Lgl XIMENA Beckford [phone]   Spouse    JYOTHI SHEIKH   [phone] Daughter      Patient's Insurance:    Payor: MEDICARE / Plan: MEDICARE / Product Type: Medicare /            Active Problem List:  Problem List as of 8/21/2023 Reviewed: 4/28/2023  7:10 AM by Devon Ball MD         High    Nonsmoker    Atrial fibrillation, permanent (H)    CLL (chronic lymphocytic leukemia) (H)       Medium    Cardiac pacemaker in situ    HTN (hypertension)    Diabetes mellitus, type 2 (H)    Chronic diastolic heart failure (H)    Actinomycosis    Chronic kidney disease, stage 3 (H)    H/O atrioventricular jerzy ablation    CHB (complete heart block) (H)    Anticoagulation monitoring, INR range 2-3    Choledocholithiasis, RECURRENT       Low    Peripheral sensory neuropathy    MATILDE (obstructive sleep apnea)    Perirectal abscess    Coagulopathy (H) - due to warfarin    Senile purpura (H)        Current Outpatient Medications   Medication Instructions    atorvastatin (LIPITOR) 10 MG tablet TAKE 1 TABLET BY MOUTH EVERY DAY IN THE EVENING    benzonatate (TESSALON) 100 mg,  Oral, 3 TIMES DAILY PRN    biotin 2,500 mcg, Oral    Cholecalciferol (VITAMIN D3 PO) 2,000 Units, Oral, DAILY    ciclopirox (PENLAC) 8 % external solution Apply to adjacent skin and affected nails daily.  Remove with alcohol every 7 days, then repeat.    CONTOUR TEST test strip USE 1 STRIP TO TEST BLOOD SUGAR DAILY    FOLIC ACID PO 1 mg, DAILY    furosemide (LASIX) 40 MG tablet TAKE 2 TABLETS BY MOUTH EVERY MORNING    glucosamine-chondroitinoitin 500-400 MG tablet 1 tablet, Oral    guaiFENesin-codeine (ROBITUSSIN AC) 100-10 MG/5ML solution 5-10 mLs, Oral, EVERY 6 HOURS PRN    magnesium chloride 64 mg, Oral    meclizine (ANTIVERT) 25 mg, Oral, 3 TIMES DAILY PRN    metFORMIN (GLUCOPHAGE) 500 mg, Oral, 2 TIMES DAILY WITH MEALS    nitroGLYcerin (NITROSTAT) 0.4 mg, Sublingual    pantoprazole (PROTONIX) 40 MG EC tablet TAKE 1 TABLET BY MOUTH EVERY DAY    polyethylene glycol (MIRALAX) 17 GM/Dose powder MIX 1 CAPFUL (17 GRAMS) IN LIQUID AND DRINK BY MOUTH DAILY. (OTC NC)    potassium chloride ER (K-TAB/KLOR-CON) 10 MEQ CR tablet TAKE 1 TABLET BY MOUTH EVERY DAY    UNABLE TO FIND MEDICATION NAME: Hair skin and nails   2500    vitamin B-12 (CYANOCOBALAMIN) 1,000 mcg, Oral    warfarin ANTICOAGULANT (COUMADIN) 3.75 mg, Oral, DAILY      Social History     Social History Narrative           Subjective:     Reba Calderon is a 87 year old female who comes in today for:    Chief Complaint   Patient presents with    ER F/U     Patient states still having hip pain, pulled ligament          8/21/2023     7:53 AM   Additional Questions   Roomed by Shy JACKSON CMA   Accompanied by      Patient comes in today with her  for follow-up.  She was hospitalized at [facility] for severe left lateral hip pain.  She had difficulty bearing weight.    This occurred after bending over to clean her oven.  She felt a snap or pop in her hip and thought that hip went out of place.  There is no previous surgery of this  "hip.    X-rays were negative CT scan was negative for fracture.  She is unable to have MRI easily due to pacemaker.  She has an MRI safe pacemaker but would have to go to [facility] for this.    Her pain was initially 10 out of 10 but now is usually 2 out of 10.  It still affects her sleep at night.  Is still lateral.  She did have significant bruising over the lateral hip but this is improving but is still present.  She uses over-the-counter pain meds and Aspercreme for this.    We reviewed her history of CLL.  She would like this rechecked.  Her oncologist is moving to [facility] and so she would like us to follow her blood counts at this time.    We will follow-up on her diabetes as well.    She needs refills of her warfarin in relationship to her A-fib.    We reviewed her toenail issue.  She has thickened toenails and fungal infection.  Her previous podiatrist has not recommended any additional treatments.  They have not used Penlac yet at this time and they would like to try this.      We reviewed her follow-up with orthopedics related to her right ankle.    She has physical therapy scheduled for August 29 through [facility].    We reviewed her other issues noted in the assessment but not specifically addressed in the HPI above.     Objective:     Wt Readings from Last 3 Encounters:   08/21/23 90.5 kg (199 lb 8 oz)   05/22/23 86.9 kg (191 lb 9.6 oz)   05/11/23 88 kg (194 lb)     BP Readings from Last 3 Encounters:   08/21/23 (!) 152/70   05/22/23 130/76   05/11/23 115/74     BP (!) 152/70 (BP Location: Right arm, Patient Position: Sitting, Cuff Size: Adult Regular)   Pulse 77   Temp 97.8  F (36.6  C) (Oral)   Resp 18   Ht 1.695 m (5' 6.73\")   Wt 90.5 kg (199 lb 8 oz)   LMP  (LMP Unknown)   SpO2 96%   BMI 31.50 kg/m     The patient is comfortable, no acute distress.  Mood good.  Insight good.  Eyes are nonicteric.  Neck is supple without mass.  No cervical adenopathy.  No thyromegaly. " Heart irregular rate and rhythm.  Lungs clear to auscultation bilaterally.  Respiratory effort is good.  Her range of motion in the hip is slightly limited into internal rotation.  She has bruising and some significant tenderness over the lateral hip at the insertion of the buttock tendons onto the greater trochanter.  Her ischial bursa is normal.  Her gait is somewhat limping with protection of the hip.      Diagnostics:     Orders Only on 08/14/2023   Component Date Value Ref Range Status    INR HOME MONITORING 08/14/2023 2.2  2.000 - 3.000 Final       Results for orders placed or performed in visit on 08/21/23   Hemoglobin A1c     Status: Abnormal   Result Value Ref Range    Hemoglobin A1C 7.8 (H) 0.0 - 5.6 %   CBC with platelets and differential     Status: Abnormal   Result Value Ref Range    WBC Count 6.6 4.0 - 11.0 10e3/uL    RBC Count 4.16 3.80 - 5.20 10e6/uL    Hemoglobin 12.8 11.7 - 15.7 g/dL    Hematocrit 38.8 35.0 - 47.0 %    MCV 93 78 - 100 fL    MCH 30.8 26.5 - 33.0 pg    MCHC 33.0 31.5 - 36.5 g/dL    RDW 17.3 (H) 10.0 - 15.0 %    Platelet Count 223 150 - 450 10e3/uL    % Neutrophils 72 %    % Lymphocytes 19 %    % Monocytes 7 %    % Eosinophils 2 %    % Basophils 1 %    % Immature Granulocytes 0 %    Absolute Neutrophils 4.7 1.6 - 8.3 10e3/uL    Absolute Lymphocytes 1.2 0.8 - 5.3 10e3/uL    Absolute Monocytes 0.4 0.0 - 1.3 10e3/uL    Absolute Eosinophils 0.1 0.0 - 0.7 10e3/uL    Absolute Basophils 0.0 0.0 - 0.2 10e3/uL    Absolute Immature Granulocytes 0.0 <=0.4 10e3/uL   CBC with Platelets & Differential     Status: Abnormal    Narrative    The following orders were created for panel order CBC with Platelets & Differential.  Procedure                               Abnormality         Status                     ---------                               -----------         ------                     CBC with platelets and d...[[id number]]  Abnormal            Final result                 Please view  results for these tests on the individual orders.        Assessment:     1. Hip pain, left    2. Difficulty walking    3. Chronic diastolic (congestive) heart failure (H)    4. CLL (chronic lymphocytic leukemia) (H)    5. Type 2 diabetes mellitus with other skin complication, without long-term current use of insulin (H)    6. Fungal nail infection    7. Long term (current) use of anticoagulants    8. Permanent atrial fibrillation (H)    9. Primary osteoarthritis of right ankle        Plan:     Blood work done today.  Penlac for toenails.  X-rays reviewed.  Consider MRI if worsening.  Follow-up again in 3 weeks.    Refilled other medications.  Continue current medications otherwise.  Follow up sooner if issues.    Orders Placed This Encounter   Procedures    Iron & Iron Binding Capacity    Ferritin    Hemoglobin A1c    Basic metabolic panel    CBC with platelets and differential    CBC with Platelets & Differential        40 minutes or greater was spent today on the patient's care on the day of service.      This includes time for chart preparation, reviewing medical tests done before or during the visit, talking with the patient, review of quality indicators, required documentation, and other elements of care.        Devon Ball MD  General Internal Medicine  [facility]    Return in about 22 days (around 9/12/2023) for visit and blood work.     Future Appointments   Date Time Provider Department Center   9/12/2023  9:00 AM [facility]

## 2023-08-21 NOTE — PATIENT INSTRUCTIONS
Future Appointments   Date Time Provider Department Center   9/12/2023  9:00 AM [facility]

## 2023-08-28 ENCOUNTER — ANTICOAGULATION THERAPY VISIT (OUTPATIENT)
Dept: ANTICOAGULATION | Facility: CLINIC | Age: 88
End: 2023-08-28
Payer: MEDICARE

## 2023-08-28 LAB — INR HOME MONITORING: 1.7 (ref 2–3)

## 2023-08-28 NOTE — PROGRESS NOTES
ANTICOAGULATION MANAGEMENT     Reba Calderon 87 year old female is on warfarin with subtherapeutic INR result. (Goal INR 2.0-3.0)    Recent labs: (last 7 days)     08/28/23  0000   INR 1.7*       ASSESSMENT     Source(s): Chart Review and Patient/Caregiver Call     Warfarin doses taken: Warfarin taken as instructed  Diet: No new diet changes identified  Medication/supplement changes:  penlac for toenails-no anticipated interaction  New illness, injury, or hospitalization: Yes: Pt injured her left hip in July. Pt reports improvement. Pt states that her pain level has decreased. Pt plans to start physical therapy tomorrow 8/29/23.  Signs or symptoms of bleeding or clotting: Pt reports bruising on her arm and leg but reports that this is getting better.  Previous result: Therapeutic last 2(+) visits  Additional findings: Pt's activity level has increased. Hip swelling and pain have decreased.       PLAN     Recommended plan for ongoing change(s) affecting INR     Dosing Instructions: booster dose then Increase your warfarin dose (5% change) with next INR in 10-14 days       Summary  As of 8/28/2023      Full warfarin instructions:  8/28: 5 mg; Otherwise 3.75 mg every day   Next INR check:  9/11/2023               Telephone call with Reba who agrees to plan and repeated back plan correctly    Patient to recheck with home meter    Education provided:   Please call back if any changes to your diet, medications or how you've been taking warfarin  Contact [phone]  with any changes, questions or concerns.     Plan made per [facility] anticoagulation protocol    Maegan Norton RN  Anticoagulation Clinic  8/28/2023    _______________________________________________________________________     Anticoagulation Episode Summary       Current INR goal:  2.0-3.0   TTR:  79.8 % (1 y)   Target end date:  7/15/2036   [facility]    Indications    Atrial fibrillation  permanent (H) [I48.21]              Comments:  [employer] home monitor. Managed by exception.             Anticoagulation Care Providers       Provider Role Specialty Phone number    Devon Ball MD Referring Internal Medicine [phone]    Leti Brower NP Referring  [phone]

## 2023-08-31 NOTE — PROGRESS NOTES
ANTICOAGULATION  MANAGEMENT-Home Monitor Managed by Exception    Reba AKI Calderon 85 year old female is on warfarin with therapeutic INR result. (Goal INR 2.0-3.0)    Recent labs: (last 7 days)     10/11/21  0700   INR 2.3*         Previous INR was Therapeutic    Medication, diet, health changes since last INR:chart reviewed; none idientified    Contacted within the last 12 weeks by phone on 8/23/21      BELGICA     Reba was NOT contacted regarding therapeutic result today per home monitoring policy manage by exception agreement.   Current warfarin dose is to be continued:     Summary  As of 10/11/2021    Full warfarin instructions:  2.5 mg every Mon, Wed, Fri; 3.75 mg all other days   Next INR check:             ?   Dimple Sam RN  Anticoagulation Clinic  10/11/2021    _______________________________________________________________________     Anticoagulation Episode Summary     Current INR goal:  2.0-3.0   TTR:  92.3 % (2.6 mo)   Target end date:  7/15/2036   Send INR reminders to:  [facility]    Indications    Atrial fibrillation  permanent (H) [I48.21]           Comments:           Anticoagulation Care Providers     Provider Role Specialty Phone number    Devon Ball MD Referring Internal Medicine [phone]           Patient called at 12:30 TO Cancel his appt. His foot is too sore

## 2023-09-11 ENCOUNTER — ANTICOAGULATION THERAPY VISIT (OUTPATIENT)
Dept: ANTICOAGULATION | Facility: CLINIC | Age: 88
End: 2023-09-11
Payer: MEDICARE

## 2023-09-11 DIAGNOSIS — I48.21 ATRIAL FIBRILLATION, PERMANENT (H): Primary | ICD-10-CM

## 2023-09-11 LAB — INR HOME MONITORING: 2.8 (ref 2–3)

## 2023-09-11 NOTE — PROGRESS NOTES
ANTICOAGULATION  MANAGEMENT-Home Monitor Managed by Exception    Reba AKI Calderon 87 year old female is on warfarin with therapeutic INR result. (Goal INR 2.0-3.0)    Recent labs: (last 7 days)     02/20/23  0000   INR 2.7         Previous INR was Therapeutic    Medication, diet, health changes since last INR:chart reviewed; none identified    Contacted within the last 12 weeks by phone on 12/20/22      PLAN     Reba was NOT contacted regarding therapeutic result today per home monitoring policy manage by exception agreement.   Current warfarin dose is to be continued:     Summary  As of 2/20/2023    Full warfarin instructions:  2.5 mg every Sun, Tue, Thu; 3.75 mg all other days   Next INR check:  2/27/2023           ?   Crys Simpson RN  Anticoagulation Clinic  2/20/2023    _______________________________________________________________________     Anticoagulation Episode Summary     Current INR goal:  2.0-3.0   TTR:  84.6 % (1 y)   Target end date:  7/15/2036   Send INR reminders to:  [facility]    Indications    Atrial fibrillation  permanent (H) [I48.21]           Comments:  [employer] home monitor. Managed by exception.         Anticoagulation Care Providers     Provider Role Specialty Phone number    Devon Ball MD Referring Internal Medicine [phone]    Leti Brower NP Referring  [phone]             Patient smiles, follow some commands.  Limited verbalizations.     FUNCTIONAL PROGRESS  9/6 SLP  Speech Language Pathology Recommendations: 1. Advance to soft & bite-sized, mildly thick liquids2. Aspiration precautions3. 100% supervision for all meals4. Check oral cavity for stasis prior to subsequent bite/sip5. Upright for all PO, small bites/sips, slow rate6. Alternate solids & liquids7. Oral care8. SLP to follow for re-assess of swallow as schedule allows     9/6 PT  Bed Mobility: Sit to Supine:     · Level of Cochran	maximum assist (25% patients effort)  · Physical Assist/Nonphysical Assist	1 person assist    Bed Mobility: Supine to Sit:     · Level of Cochran	maximum assist (25% patients effort)  · Physical Assist/Nonphysical Assist	1 person assist    Bed Mobility Analysis:     · Bed Mobility Limitations	decreased ability to use arms for pushing/pulling; impaired ability to control trunk for mobility; decreased ability to use legs for bridging/pushing  · Impairments Contributing to Impaired Bed Mobility	impaired balance; decreased strength    Transfer: Sit to Stand:     · Level of Cochran	maximum assist (25% patients effort)  · Physical Assist/Nonphysical Assist	1 person assist  · Assistive Device	PT assist, donning helmet. Pt with right knee blocked    Transfer: Stand to Sit:     · Level of Cochran	maximum assist (25% patients effort)  · Assistive Device	PT assist, donning helmet. Pt with right knee blocked    Sit/Stand Transfer Safety Analysis:     · Transfer Safety Concerns Noted	decreased weight-shifting ability  · Impairments Contributing to Impaired Transfers	impaired balance; decreased strength; cognition; impaired coordination; impaired motor control; impaired postural control    Gait Skills:     · Level of Cochran	dependent (less than 25% patients effort)  · Assistive Device	PT assist, donning helmet. Pt with right knee blocked  · Gait Distance	to slides left foot backward toward bed during standing      VITALS  T(C): 37.1 (09-11-23 @ 05:06), Max: 37.9 (09-10-23 @ 20:05)  HR: 95 (09-11-23 @ 05:06) (95 - 102)  BP: 103/71 (09-11-23 @ 05:06) (103/71 - 134/82)  RR: 18 (09-11-23 @ 05:06) (18 - 18)  SpO2: 95% (09-11-23 @ 05:06) (93% - 98%)  Wt(kg): --    MEDICATIONS   acetaminophen     Tablet .. 975 milliGRAM(s) every 6 hours PRN  albuterol/ipratropium for Nebulization 3 milliLiter(s) every 6 hours PRN  aspirin  chewable 81 milliGRAM(s) daily  atorvastatin 80 milliGRAM(s) at bedtime  chlorhexidine 2% Cloths 1 Application(s) <User Schedule>  dextrose 5%. 1000 milliLiter(s) <Continuous>  dextrose 5%. 1000 milliLiter(s) <Continuous>  dextrose 50% Injectable 25 Gram(s) once  dextrose 50% Injectable 12.5 Gram(s) once  dextrose 50% Injectable 25 Gram(s) once  dextrose Oral Gel 15 Gram(s) once PRN  glucagon  Injectable 1 milliGRAM(s) once  hydrALAZINE Injectable 10 milliGRAM(s) every 2 hours PRN  insulin lispro (ADMELOG) corrective regimen sliding scale   three times a day before meals  labetalol Injectable 10 milliGRAM(s) every 2 hours PRN  levETIRAcetam  IVPB 500 milliGRAM(s) every 12 hours  piperacillin/tazobactam IVPB.. 3.375 Gram(s) every 8 hours  polyethylene glycol 3350 17 Gram(s) two times a day  senna 2 Tablet(s) at bedtime  tamsulosin 0.8 milliGRAM(s) at bedtime  vancomycin  IVPB 1000 milliGRAM(s) every 12 hours      RECENT LABS/IMAGING  - Reviewed Today                        10.1   26.52 )-----------( 373      ( 10 Sep 2023 05:51 )             31.5     09-10    135  |  100  |  19.3  ----------------------------<  142<H>  4.0   |  24.0  |  0.78    Ca    9.0      10 Sep 2023 05:51        Urinalysis Basic - ( 10 Sep 2023 05:51 )    Color: x / Appearance: x / SG: x / pH: x  Gluc: 142 mg/dL / Ketone: x  / Bili: x / Urobili: x   Blood: x / Protein: x / Nitrite: x   Leuk Esterase: x / RBC: x / WBC x   Sq Epi: x / Non Sq Epi: x / Bacteria: x    HEAD CT 9/2 - Redemonstration of large left MCA territory infarct. Left-to-right midline shift measures 3 mm, unchanged.    HEAD CT 9/3 - No significant change from the previous exam performed yesterday. Surgical site is unchanged as is the brain parenchyma changes from left middle cerebral artery stroke. Mass effect is unchanged.    CXR 9/3 - No acute pulmonary disease  ----------------------------------------------------------------------------------------  PHYSICAL EXAM  Constitutional - NAD, Comfortable  HEENT - Left craniectomy - CDI  Extremities - Mild BLE swelling  Neurologic Exam -                    Cognitive - Awake, Alert     Communication - Expressive/Receptive deficits, States "No" x1, able to follow limited commands - improves with visual cues     Motor - Left UE plegia, Left LE paresis  Psychiatric - Smiles  ----------------------------------------------------------------------------------------  ASSESSMENT/PLAN  62yMale with functional deficits after an acute CVA  Acute left CVA s/p thrombectomy/hemicraniectomy - ASA, Keppra, Lipitor, Helmet    PNA - Vanco, Zosyn  HTN - Hydralazine, Labetalol  Oropharyngeal Dysphagia - Puree/MOD Thick   Pain - Tylenol  DVT PPX - SCDs,   Rehab/Impaired mobility and function - Rehab/Impaired mobility and function - Patient continues to require hospitalization for the above diagnoses and ongoing active management of comorbid complications (IV HTN meds) that are substantially impairing functional ability and impairing quality of life.    When medically optimized, based on the patient's diagnosis, current functional status, and potential for progress, recommend ALDEN, patient DOES NOT meet acute inpatient rehabilitation criteria. Patient needs a more prolonged stay to achieve transition to community living and would not be able to tolerate a comprehensive/intense rehab program of 3hours/day.     Will sign off at this time. Thank you for allowing me to be part of your patient's care. Please reconsult PMR for additional rehab recommendations or dispo needs if functional status changes. Discussed the specific management and recommendations above with rehab clinical care team/rehab liaison.      Total Time Spent on Encounter (reviewing clinical notes, labs, radiology, medications, patient history/exam, assessment and plan) - 50 minutes

## 2023-09-11 NOTE — PROGRESS NOTES
"ANTICOAGULATION MANAGEMENT     Reba Calderon 87 year old female is on warfarin with therapeutic INR result. (Goal INR 2.0-3.0)    Recent labs: (last 7 days)     09/11/23  0000   INR 2.8       ASSESSMENT     Source(s): Chart Review and Patient/Caregiver Call     Warfarin doses taken: Warfarin taken as instructed  Diet: No new diet changes identified< patient reports she plans to add broccoli into diet  Medication/supplement changes: Patient is taking tylenol for the hip/ankle pain  New illness, injury, or hospitalization: Yes: Patient reports hip injury but reports it is improving  Signs or symptoms of bleeding or clotting: No, bruising on arms/leg reported at last check \"are almost gone completely\"  Previous result: Subtherapeutic  Additional findings: None       PLAN     Recommended plan for no diet, medication or health factor changes affecting INR     Dosing Instructions: Continue your current warfarin dose with next INR in 2 weeks       Summary  As of 9/11/2023      Full warfarin instructions:  3.75 mg every day   Next INR check:  9/25/2023               Telephone call with Reba who verbalizes understanding and agrees to plan    Patient to recheck with home meter    Education provided:   Please call back if any changes to your diet, medications or how you've been taking warfarin    Plan made per [facility] anticoagulation protocol    Carly Amador RN  Anticoagulation Clinic  9/11/2023    _______________________________________________________________________     Anticoagulation Episode Summary       Current INR goal:  2.0-3.0   TTR:  78.7 % (1 y)   Target end date:  7/15/2036   Send INR reminders to:  [facility] MONITOR[facility]    Indications    Atrial fibrillation  permanent (H) [I48.21]             Comments:  [employer] home monitor. Managed by exception.             Anticoagulation Care Providers       Provider Role Specialty Phone number    Devon Ball MD Referring Internal Medicine [phone]    Leti Brower, " NP Referring  [phone]

## 2023-09-12 ENCOUNTER — OFFICE VISIT (OUTPATIENT)
Dept: INTERNAL MEDICINE | Facility: CLINIC | Age: 88
End: 2023-09-12
Payer: MEDICARE

## 2023-09-12 VITALS
TEMPERATURE: 97.6 F | HEART RATE: 89 BPM | SYSTOLIC BLOOD PRESSURE: 138 MMHG | OXYGEN SATURATION: 94 % | HEIGHT: 67 IN | DIASTOLIC BLOOD PRESSURE: 72 MMHG | WEIGHT: 193.9 LBS | RESPIRATION RATE: 20 BRPM | BODY MASS INDEX: 30.43 KG/M2

## 2023-09-12 DIAGNOSIS — C91.10 CLL (CHRONIC LYMPHOCYTIC LEUKEMIA) (H): ICD-10-CM

## 2023-09-12 DIAGNOSIS — R26.2 DIFFICULTY WALKING: ICD-10-CM

## 2023-09-12 DIAGNOSIS — M25.552 HIP PAIN, LEFT: Primary | ICD-10-CM

## 2023-09-12 DIAGNOSIS — M19.071 PRIMARY OSTEOARTHRITIS OF RIGHT ANKLE: ICD-10-CM

## 2023-09-12 DIAGNOSIS — I10 PRIMARY HYPERTENSION: Chronic | ICD-10-CM

## 2023-09-12 PROBLEM — E11.9 DIABETES MELLITUS, TYPE 2 (H): Chronic | Status: ACTIVE | Noted: 2018-03-08

## 2023-09-12 PROBLEM — E11.42 TYPE 2 DIABETES MELLITUS WITH DIABETIC POLYNEUROPATHY, WITHOUT LONG-TERM CURRENT USE OF INSULIN (H): Chronic | Status: ACTIVE | Noted: 2018-03-08

## 2023-09-12 PROCEDURE — G0008 ADMIN INFLUENZA VIRUS VAC: HCPCS | Performed by: INTERNAL MEDICINE

## 2023-09-12 PROCEDURE — 99213 OFFICE O/P EST LOW 20 MIN: CPT | Mod: 25 | Performed by: INTERNAL MEDICINE

## 2023-09-12 PROCEDURE — 90662 IIV NO PRSV INCREASED AG IM: CPT | Performed by: INTERNAL MEDICINE

## 2023-09-12 ASSESSMENT — PAIN SCALES - GENERAL: PAINLEVEL: MILD PAIN (2)

## 2023-09-12 NOTE — PROGRESS NOTES
"  {PROVIDER CHARTING PREFERENCE:353559}    Estephania Britton is a 87 year old, presenting for the following health issues:  Follow Up and Musculoskeletal Problem (Pt states that her right ankle is bothering her )        9/12/2023     8:47 AM   Additional Questions   Roomed by Hans CASTELLANO   Accompanied by N/A       History of Present Illness       Reason for visit:  Follow up    She eats 2-3 servings of fruits and vegetables daily.She consumes 1 sweetened beverage(s) daily.She exercises with enough effort to increase her heart rate 9 or less minutes per day.  She exercises with enough effort to increase her heart rate 3 or less days per week.   She is taking medications regularly.       {ACUTE SUPERLIST - extended history:501762}  {additonal problems for provider to add (Optional):604788}      Review of Systems   {ROS COMP (Optional):298193}      Objective    BP (!) 163/79 (BP Location: Right arm, Patient Position: Sitting, Cuff Size: Adult Regular)   Pulse 89   Temp 97.6  F (36.4  C) (Oral)   Resp 20   Ht 1.695 m (5' 6.75\")   Wt 88 kg (193 lb 14.4 oz)   LMP  (LMP Unknown)   SpO2 94%   BMI 30.60 kg/m    Body mass index is 30.6 kg/m .  Physical Exam   {Exam List (Optional):976926}    {Diagnostic Test Results (Optional):802346}    {AMBULATORY ATTESTATION (Optional):988526}              "

## 2023-09-12 NOTE — PATIENT INSTRUCTIONS
Future Appointments   Date Time Provider Department Center   2/13/2024  8:30 AM [facility]

## 2023-09-14 NOTE — PROGRESS NOTES
[facility] - Primary Care Specialists    Comprehensive and complex medical care - Chronic disease management - Shared decision making - Care coordination - Compassionate care    Patient advocacy - Rational deprescribing - Minimally disruptive medicine - Ethical focus - Customized care         Date of Service: 9/12/2023  Primary Provider: Devon Ball    Patient Care Team:  Devon Ball MD as PCP - General (Internal Medicine)  Louis Jacobsen MD as MD (Ophthalmology)  DANGELO Pagan MD as MD  Devon Ball MD as Assigned PCP  Yancy Vinson MD as MD (Orthopaedic Surgery)          Patient's Pharmacy:    [facility]/pharmacy #[id number] - [facility], [place] - [address]  [address] [place] [address]  Phone: [phone] Fax: [phone]     Patient's Contacts:  Name Home Phone Work Phone Mobile Phone Relationship Lgl XIMENA Beckford [phone]   Spouse    JYOTHI SHEIKH   [phone] Daughter      Patient's Insurance:    Payor: MEDICARE / Plan: MEDICARE / Product Type: Medicare /           Active Problem List:  Problem List as of 9/12/2023 Reviewed: 4/28/2023  7:10 AM by Devon Ball MD         High    Nonsmoker    Atrial fibrillation, permanent (H)    CLL (chronic lymphocytic leukemia) (H)       Medium    Type 2 diabetes mellitus with diabetic polyneuropathy, without long-term current use of insulin (H)    Cardiac pacemaker in situ    Primary hypertension    Chronic diastolic heart failure (H)    Actinomycosis    Chronic kidney disease, stage 3 (H)    H/O atrioventricular jerzy ablation    CHB (complete heart block) (H)    Anticoagulation monitoring, INR range 2-3    Choledocholithiasis, RECURRENT    Primary osteoarthritis of right ankle       Low    Peripheral sensory neuropathy    MATILDE (obstructive sleep apnea)    Perirectal abscess    Coagulopathy (H) - due to warfarin    Senile purpura (H)        Current Outpatient Medications   Medication Instructions     atorvastatin (LIPITOR) 10 MG tablet TAKE 1 TABLET BY MOUTH EVERY DAY IN THE EVENING    biotin 2,500 mcg, Oral    Cholecalciferol (VITAMIN D3 PO) 2,000 Units, Oral, DAILY    ciclopirox (PENLAC) 8 % external solution Apply to adjacent skin and affected nails daily.  Remove with alcohol every 7 days, then repeat.    CONTOUR TEST test strip USE 1 STRIP TO TEST BLOOD SUGAR DAILY    FOLIC ACID PO 1 mg, DAILY    furosemide (LASIX) 40 MG tablet TAKE 2 TABLETS BY MOUTH EVERY MORNING    glucosamine-chondroitinoitin 500-400 MG tablet 1 tablet, Oral    magnesium chloride 64 mg, Oral    metFORMIN (GLUCOPHAGE) 500 mg, Oral, 2 TIMES DAILY WITH MEALS    nitroGLYcerin (NITROSTAT) 0.4 mg, Sublingual    pantoprazole (PROTONIX) 40 MG EC tablet TAKE 1 TABLET BY MOUTH EVERY DAY    polyethylene glycol (MIRALAX) 17 GM/Dose powder MIX 1 CAPFUL (17 GRAMS) IN LIQUID AND DRINK BY MOUTH DAILY. (OTC NC)    potassium chloride ER (K-TAB/KLOR-CON) 10 MEQ CR tablet TAKE 1 TABLET BY MOUTH EVERY DAY    UNABLE TO FIND MEDICATION NAME: Hair skin and nails   2500    vitamin B-12 (CYANOCOBALAMIN) 1,000 mcg, Oral    warfarin ANTICOAGULANT (COUMADIN) 3.75 mg, Oral, DAILY     Social History     Social History Narrative           Subjective:     Reba Calderon is a 87 year old female who comes in today for:    Chief Complaint   Patient presents with    Follow Up    Musculoskeletal Problem     Pt states that her right ankle is bothering her           9/12/2023     8:47 AM   Additional Questions   Roomed by Hans CASTELLANO   Accompanied by N/A     In for follow up multiple issues.    Mainly in for follow up of the hip issue.    Doing better but still bothers her.  No worse and she would like to continue to follow at this time.    Right ankle also bothering.  Can use a boot or brace for this if needed.    Blood pressure doing well.    Reviewed her chronic lymphocytic leukemia (CLL) and recent counts good.    We reviewed her other issues noted in the assessment  "but not specifically addressed in the HPI above.     Objective:     Wt Readings from Last 3 Encounters:   09/12/23 88 kg (193 lb 14.4 oz)   08/21/23 90.5 kg (199 lb 8 oz)   05/22/23 86.9 kg (191 lb 9.6 oz)     BP Readings from Last 3 Encounters:   09/12/23 138/72   08/21/23 (!) 152/70   05/22/23 130/76     /72 (BP Location: Left arm, Patient Position: Sitting, Cuff Size: Adult Regular)   Pulse 89   Temp 97.6  F (36.4  C) (Oral)   Resp 20   Ht 1.695 m (5' 6.75\")   Wt 88 kg (193 lb 14.4 oz)   LMP  (LMP Unknown)   SpO2 94%   BMI 30.60 kg/m     In general, the patient is in no acute distress.  Mood is good and insight is good.  Heart regular rate and rhythm and lungs clear to ausculation.         Diagnostics:     Orders Only on 09/11/2023   Component Date Value Ref Range Status    INR HOME MONITORING 09/11/2023 2.8  2.000 - 3.000 Final       No results found for any visits on 09/12/23.    Assessment:     1. Hip pain, left    2. Difficulty walking    3. Primary osteoarthritis of right ankle    4. CLL (chronic lymphocytic leukemia) (H)    5. Primary hypertension        Plan:     Flu shot done today.  No change in treatment.  Continue current medications otherwise.  Follow up sooner if issues.    Orders Placed This Encounter   Procedures    CT FOOT EXAM NO CHARGE    INFLUENZA VACCINE 65+ (FLUZONE HD)           Devon Ball MD  General Internal Medicine  [facility]    Return in about 22 weeks (around 2/13/2024) for annual wellness visit, visit and blood work.     Future Appointments   Date Time Provider Department Center   2/13/2024  8:30 AM Devon Ball MD [facility]       "

## 2023-09-25 ENCOUNTER — ANTICOAGULATION THERAPY VISIT (OUTPATIENT)
Dept: ANTICOAGULATION | Facility: CLINIC | Age: 88
End: 2023-09-25
Payer: MEDICARE

## 2023-09-25 DIAGNOSIS — I48.21 ATRIAL FIBRILLATION, PERMANENT (H): Primary | ICD-10-CM

## 2023-09-25 LAB — INR HOME MONITORING: 2.5 (ref 2–3)

## 2023-09-25 NOTE — PROGRESS NOTES
ANTICOAGULATION MANAGEMENT     Reba Calderon 87 year old female is on warfarin with therapeutic INR result. (Goal INR 2.0-3.0)    Recent labs: (last 7 days)     09/25/23  0000   INR 2.5       ASSESSMENT     Source(s): Chart Review and Patient/Caregiver Call     Warfarin doses taken: Warfarin taken as instructed  Diet: Increased greens/vitamin K in diet; ongoing change increased greens as planned and will continue with this  Medication/supplement changes: None noted  New illness, injury, or hospitalization: No  Signs or symptoms of bleeding or clotting: No  Previous result: Therapeutic last 2(+) visits  Additional findings: None       PLAN     Recommended plan for ongoing change(s) affecting INR     Dosing Instructions: Continue your current warfarin dose with next INR in 2 weeks       Summary  As of 9/25/2023      Full warfarin instructions:  3.75 mg every day   Next INR check:  10/9/2023               Telephone call with Reba who verbalizes understanding and agrees to plan    Patient to recheck with home meter    Education provided:   Please call back if any changes to your diet, medications or how you've been taking warfarin  Goal range and lab monitoring: goal range and significance of current result  Dietary considerations: importance of consistent vitamin K intake    Plan made per [facility] anticoagulation protocol    Danielle Bay RN  Anticoagulation Clinic  9/25/2023    _______________________________________________________________________     Anticoagulation Episode Summary       Current INR goal:  2.0-3.0   TTR:  79.2 % (1 y)   Target end date:  7/15/2036   Send INR reminders to:  ANTICOAG HOME MONITORING    Indications    Atrial fibrillation  permanent (H) [I48.21]             Comments:  [employer] home monitor. Managed by exception.             Anticoagulation Care Providers       Provider Role Specialty Phone number    Devon Ball MD Referring Internal Medicine [phone]    Leti Brower NP Referring   [phone]

## 2023-10-06 ENCOUNTER — TRANSFERRED RECORDS (OUTPATIENT)
Dept: MULTI SPECIALTY CLINIC | Facility: CLINIC | Age: 88
End: 2023-10-06
Payer: MEDICARE

## 2023-10-06 LAB — RETINOPATHY: NORMAL

## 2023-10-09 ENCOUNTER — ANTICOAGULATION THERAPY VISIT (OUTPATIENT)
Dept: ANTICOAGULATION | Facility: CLINIC | Age: 88
End: 2023-10-09
Payer: MEDICARE

## 2023-10-09 DIAGNOSIS — I48.21 ATRIAL FIBRILLATION, PERMANENT (H): Primary | ICD-10-CM

## 2023-10-09 LAB — INR HOME MONITORING: 2 (ref 2–3)

## 2023-10-09 NOTE — PROGRESS NOTES
ANTICOAGULATION MANAGEMENT     Reba Calderon 87 year old female is on warfarin with therapeutic INR result. (Goal INR 2.0-3.0)    Recent labs: (last 7 days)     10/09/23  0000   INR 2.0       ASSESSMENT     Source(s): Chart Review and Patient/Caregiver Call     Warfarin doses taken: Warfarin taken as instructed  Diet: Increased greens/vitamin K in diet; plans to resume previous intake. Patient had reported an increase in greens at the end of August, and maintenance dose was increased accordingly. Patient reports she went out to eat a few times within the last two weeks and had some salads. Patient reports it is hard to predict if this will continue.   Medication/supplement changes: None noted  New illness, injury, or hospitalization: No  Signs or symptoms of bleeding or clotting: No  Previous result: Therapeutic last 2(+) visits  Additional findings: Will not change maintenance dose for now. Patient will recheck INR in 1-2 weeks, will consider increasing maintenance dose again if INR continues to remain low.       PLAN     Recommended plan for temporary change(s) affecting INR     Dosing Instructions: Continue your current warfarin dose with next INR in 1-2 weeks       Summary  As of 10/9/2023      Full warfarin instructions:  3.75 mg every day   Next INR check:  10/23/2023               Telephone call with Reba who verbalizes understanding and agrees to plan    Patient to recheck with home meter    Education provided:   Please call back if any changes to your diet, medications or how you've been taking warfarin  Symptom monitoring: monitoring for bleeding signs and symptoms and monitoring for clotting signs and symptoms  Contact [phone]  with any changes, questions or concerns.     Plan made per [facility] anticoagulation protocol    Crys Simpson RN  Anticoagulation Clinic  10/9/2023    _______________________________________________________________________     Anticoagulation Episode Summary       Current INR  goal:  2.0-3.0   TTR:  79.2 % (1 y)   Target end date:  7/15/2036   Send INR reminders to:  ANTICOAG HOME MONITORING    Indications    Atrial fibrillation  permanent (H) [I48.21]             Comments:  [employer] home monitor. Managed by exception.             Anticoagulation Care Providers       Provider Role Specialty Phone number    Devon Ball MD Referring Internal Medicine [phone]    Leti Brower NP Referring  [phone]

## 2023-10-16 DIAGNOSIS — E11.628 TYPE 2 DIABETES MELLITUS WITH OTHER SKIN COMPLICATION, WITHOUT LONG-TERM CURRENT USE OF INSULIN (H): ICD-10-CM

## 2023-10-16 RX ORDER — CARVEDILOL 25 MG/1
TABLET, FILM COATED ORAL
Qty: 100 STRIP | Refills: 0 | Status: SHIPPED | OUTPATIENT
Start: 2023-10-16 | End: 2024-01-12

## 2023-10-19 ENCOUNTER — NURSE TRIAGE (OUTPATIENT)
Dept: NURSING | Facility: CLINIC | Age: 88
End: 2023-10-19
Payer: MEDICARE

## 2023-10-19 ENCOUNTER — HOSPITAL ENCOUNTER (EMERGENCY)
Facility: HOSPITAL | Age: 88
Discharge: HOME | End: 2023-10-19
Attending: EMERGENCY MEDICINE | Admitting: EMERGENCY MEDICINE
Payer: MEDICARE

## 2023-10-19 VITALS
RESPIRATION RATE: 18 BRPM | BODY MASS INDEX: 29.98 KG/M2 | WEIGHT: 190 LBS | HEART RATE: 68 BPM | OXYGEN SATURATION: 94 % | SYSTOLIC BLOOD PRESSURE: 160 MMHG | DIASTOLIC BLOOD PRESSURE: 77 MMHG | TEMPERATURE: 98 F

## 2023-10-19 DIAGNOSIS — M54.31 SCIATICA OF RIGHT SIDE: ICD-10-CM

## 2023-10-19 LAB
ALBUMIN UR-MCNC: NEGATIVE MG/DL
APPEARANCE UR: CLEAR
BILIRUB UR QL STRIP: NEGATIVE
COLOR UR AUTO: NORMAL
GLUCOSE UR STRIP-MCNC: NEGATIVE MG/DL
HGB UR QL STRIP: NEGATIVE
KETONES UR STRIP-MCNC: NEGATIVE MG/DL
LEUKOCYTE ESTERASE UR QL STRIP: NEGATIVE
NITRATE UR QL: NEGATIVE
PH UR STRIP: 5.5 [PH] (ref 5–7)
RBC URINE: <1 /HPF
SP GR UR STRIP: 1.02 (ref 1–1.03)
SQUAMOUS EPITHELIAL: <1 /HPF
UROBILINOGEN UR STRIP-MCNC: <2 MG/DL
WBC URINE: 1 /HPF

## 2023-10-19 PROCEDURE — 99284 EMERGENCY DEPT VISIT MOD MDM: CPT

## 2023-10-19 PROCEDURE — 81001 URINALYSIS AUTO W/SCOPE: CPT | Performed by: EMERGENCY MEDICINE

## 2023-10-19 PROCEDURE — 250N000013 HC RX MED GY IP 250 OP 250 PS 637: Performed by: STUDENT IN AN ORGANIZED HEALTH CARE EDUCATION/TRAINING PROGRAM

## 2023-10-19 RX ORDER — ACETAMINOPHEN 325 MG/1
975 TABLET ORAL ONCE
Status: COMPLETED | OUTPATIENT
Start: 2023-10-19 | End: 2023-10-19

## 2023-10-19 RX ORDER — METHYLPREDNISOLONE 4 MG
TABLET, DOSE PACK ORAL
Qty: 21 TABLET | Refills: 0 | Status: SHIPPED | OUTPATIENT
Start: 2023-10-19

## 2023-10-19 RX ORDER — ACETAMINOPHEN AND CODEINE PHOSPHATE 300; 30 MG/1; MG/1
1 TABLET ORAL EVERY 6 HOURS PRN
Qty: 10 TABLET | Refills: 0 | Status: SHIPPED | OUTPATIENT
Start: 2023-10-19 | End: 2023-10-22

## 2023-10-19 RX ADMIN — ACETAMINOPHEN 975 MG: 325 TABLET ORAL at 17:48

## 2023-10-19 NOTE — ED PROVIDER NOTES
EMERGENCY DEPARTMENT ENCOUNTER      NAME: Reba Calderon  AGE: 87 year old female  YOB: 1935  MRN: [id number]  EVALUATION DATE & TIME: No admission date for patient encounter.    PCP: Devon Ball    ED PROVIDER: Bertin Morales D.O.    Chief Complaint   Patient presents with    Back Pain       FINAL IMPRESSION:  1. Sciatica of right side        ED COURSE & MEDICAL DECISION MAKIN:40 PM I met with the patient to gather history and to perform my initial exam. I discussed the plan for care while in the Emergency Department.  7:15 PM Spoke to pharmacy, who determined that she previously was able to tolerate codeine.         Pertinent Labs & Imaging studies reviewed. (See chart for details)  87 year old female presents to the Emergency Department for evaluation of right lower back pain.  Differential does include sciatica, ureterolithiasis, fracture, other acute process.  On exam she had no midline pain, and pain was primarily over the buttocks on the right and over the lateral portion of the sacrum.  There was no definitive weakness in her leg.  She had no radiating symptoms.  UA did not show any evidence of blood, thus doubt ureterolithiasis.  She had no abdominal pain to suggest intra-abdominal pathology.  No trauma to suggest need for imaging as likelihood of fracture is low.  At this time her symptoms do appear to be most consistent with sciatica, and will attempt symptomatic management with steroids and pain control (due to allergy to oxycodone, discussed the patient with pharmacy, and we found that she has tolerated codeine in the recent past, therefore discharged on Tylenol 3) and have follow-up with primary care provider.  Return precautions were discussed.    Medical Decision Making    History:  Supplemental history from: N/A  External Record(s) reviewed: Documented in chart, if applicable.    Work Up:  Chart documentation includes differential considered and any EKGs or imaging  independently interpreted by provider, where specified.  In additional to work up documented, I considered the following work up: Documented in chart, if applicable.    External consultation:  Discussion of management with another provider: Pharmacist    Complicating factors:  Care impacted by chronic illness: Anticoagulated State, Chronic Kidney Disease, Diabetes, Hypertension, and Other: CLL  Care affected by social determinants of health: Access to Medical Care    Disposition considerations: Discharge. I prescribed additional prescription strength medication(s) as charted. See documentation for any additional details.    At the conclusion of the encounter I discussed the results of all of the tests and the disposition. The questions were answered. The patient or family acknowledged understanding and was agreeable with the care plan.        HPI    Patient information was obtained from: Patient    Use of : N/A     eRba Calderon is a 87 year old female who presents to this ED by private car for evaluation of back pain.    Per Chart Review: Patient was seen and had [facility] ED on 7/31 for evaluation of left hip pain.  X-ray and CT of the left hip both unremarkable. Ortho recommended WBAT with PT/OT in an outpatient setting.    Patient reports acute onset of right low back pain beginning yesterday evening. Pain is exacerbated with movement and bearing weight on the right side. Fortunately, she denies any pain or numbness down her posterior thigh. States that she cannot sleep secondary to the pain. Applied Voltaren without relief.  Regarding her ongoing left hip pain, patient has been compliant with her physical therapy.    Otherwise denies any chest pain, shortness of breath, fever, chills, abdominal pain, nausea, vomiting, diarrhea, dysuria, or hematuria.    REVIEW OF SYSTEMS  Constitutional:  Denies fever, chills, weight loss or weakness  Eyes:  No pain, discharge, redness  HENT:  Denies sore throat,  ear pain, congestion  Respiratory: No SOB, wheeze or cough  Cardiovascular:  No CP, palpitations  GI:  Denies abdominal pain, nausea, vomiting, diarrhea  : Denies dysuria, hematuria  Musculoskeletal:  Denies any new muscle/joint pain, swelling or loss of function.  Skin:  Denies rash, pallor  Neurologic:  Denies headache, focal weakness or sensory changes    All other systems negative unless noted in HPI.    PAST MEDICAL HISTORY:  Past Medical History:   Diagnosis Date    Carotid stenosis, asymptomatic     Choledocholithiasis, RECURRENT     CLL (chronic lymphocytic leukemia) (H)     DMII (diabetes mellitus, type 2) (H)     GERD (gastroesophageal reflux disease)     Hyperlipidemia     Hypertension     Nonsmoker     Pacemaker     Permanent atrial fibrillation (H)        PAST SURGICAL HISTORY:  Past Surgical History:   Procedure Laterality Date    ARTHROSCOPY KNEE      BIOPSY BREAST      CATARACT EXTRACTION      CHOLECYSTECTOMY  08/24/2013    D & C      ERCP W/ SPHINCTEROTOMY AND BALLOON DILATION  11/2017, 10/27/2014    H ABLATION AV NODE      H ABLATION FOCAL AFIB      also for Atrial Flutter    HEMANGIOMA EXCISION  03/29/2019    Nasal    IMPLANT PACEMAKER      INCISIONAL BREAST BIOPSY      IR LYMPH NODE BIOPSY  12/15/2014    LASER YAG CAPSULOTOMY Right 07/23/2015    Procedure: LASER YAG CAPSULOTOMY;  Surgeon: Louis Jacobsen MD;  Location: [facility]    NASAL ENDOSCOPY  03/29/2019    NOSE SURGERY  03/29/2019    nasal mucosal flap reconstruction    PHACOEMULSIFICATION CLEAR CORNEA WITH TORIC INTRAOCULAR LENS IMPLANT  04/03/2014    Procedure: PHACOEMULSIFICATION CLEAR CORNEA WITH TORIC INTRAOCULAR LENS IMPLANT;  RIGHT PHACOEMULSIFICATION CLEAR CORNEA WITH TORIC INTRAOCULAR LENS IMPLANT  ;  Surgeon: Louis Jacobsen MD;  Location: [facility]    PHACOEMULSIFICATION CLEAR CORNEA WITH TORIC INTRAOCULAR LENS IMPLANT  05/01/2014    Procedure: PHACOEMULSIFICATION CLEAR CORNEA WITH TORIC INTRAOCULAR LENS IMPLANT;  Surgeon: Ann-Marie  Louis RAE MD;  Location: [facility]    RECTOCELE REPAIR      CYSTOCELE AS WELL    TONSILLECTOMY         CURRENT MEDICATIONS:    No current facility-administered medications for this encounter.     Current Outpatient Medications   Medication    acetaminophen-codeine (TYLENOL #3) 300-30 MG per tablet    methylPREDNISolone (MEDROL DOSEPAK) 4 MG tablet therapy pack    atorvastatin (LIPITOR) 10 MG tablet    biotin 2.5 MG CAPS    blood glucose (CONTOUR TEST) test strip    Cholecalciferol (VITAMIN D3 PO)    ciclopirox (PENLAC) 8 % external solution    FOLIC ACID PO    furosemide (LASIX) 40 MG tablet    glucosamine-chondroitinoitin 500-400 MG tablet    magnesium chloride 535 (64 Mg) MG TBCR CR tablet    metFORMIN (GLUCOPHAGE) 500 MG tablet    nitroGLYcerin (NITROSTAT) 0.4 MG sublingual tablet    pantoprazole (PROTONIX) 40 MG EC tablet    polyethylene glycol (MIRALAX) 17 GM/Dose powder    potassium chloride ER (K-TAB/KLOR-CON) 10 MEQ CR tablet    UNABLE TO FIND    vitamin B-12 (CYANOCOBALAMIN) 1000 MCG tablet    warfarin ANTICOAGULANT (COUMADIN) 2.5 MG tablet         ALLERGIES:  Allergies   Allergen Reactions    Amiodarone Unknown     Dyspnea, O2 dependency    Oxycodone Anaphylaxis     Tolerates Morphine.    Atenolol     Beta Adrenergic Blockers     Demerol [Meperidine]     Percocet [Oxycodone-Acetaminophen]     Pioglitazone Other (See Comments) and Unknown     Other reaction(s): Unknown  Legs swell, Comment: intolerance, Description: Actos, Comment: intolerance, Description: Actos  Legs swell, Comment: intolerance, Description: Actos, Comment: intolerance, Description: Actos  Legs swell, Comment: intolerance, Description: Actos, Comment: intolerance, Description: Actos      Toprol Xl [Metoprolol]        FAMILY HISTORY:  No family history on file.    SOCIAL HISTORY:  Social History     Socioeconomic History    Marital status:    Tobacco Use    Smoking status: Never    Smokeless tobacco: Never   Substance and Sexual Activity     Alcohol use: Yes     Comment: Alcoholic Drinks/day: occasional    Drug use: No   Social History Narrative           VITALS:  Patient Vitals for the past 24 hrs:   BP Temp Temp src Pulse Resp SpO2 Weight   10/19/23 1926 (!) 160/77 -- -- 68 -- 94 % --   10/19/23 1624 (!) 178/79 98  F (36.7  C) Oral 67 18 96 % 86.2 kg (190 lb)       PHYSICAL EXAM    VITAL SIGNS: BP (!) 160/77   Pulse 68   Temp 98  F (36.7  C) (Oral)   Resp 18   Wt 86.2 kg (190 lb)   LMP  (LMP Unknown)   SpO2 94%   BMI 29.98 kg/m    General: Appears in no acute distress, awake, alert, interactive.  Eyes: Conjunctivae non-injected. Sclera anicteric.  HENT: Atraumatic, normocephalic  Neck: Supple, normal ROM  Respiratory/Chest: Respiration unlabored, no tachypnea  Abdomen: non distended  Musculoskeletal: Normal extremities. No edema or erythema.  Back: No spinal or sacral midline tenderness.  Tenderness to the right lateral sacrum and buttock.  Skin: Normal color. No rash or diaphoresis.  Neurologic: Alert and oriented, face symmetric, moves all extremities spontaneously. Speech clear.  Psychiatric:  Affect normal, Judgment normal, Mood normal.         LABS  Results for orders placed or performed during the hospital encounter of 10/19/23 (from the past 24 hour(s))   UA with Microscopic reflex to Culture    Specimen: Urine, Clean Catch   Result Value Ref Range    Color Urine Light Yellow Colorless, Straw, Light Yellow, Yellow    Appearance Urine Clear Clear    Glucose Urine Negative Negative mg/dL    Bilirubin Urine Negative Negative    Ketones Urine Negative Negative mg/dL    Specific Gravity Urine 1.017 1.001 - 1.030    Blood Urine Negative Negative    pH Urine 5.5 5.0 - 7.0    Protein Albumin Urine Negative Negative mg/dL    Urobilinogen Urine <2.0 <2.0 mg/dL    Nitrite Urine Negative Negative    Leukocyte Esterase Urine Negative Negative    RBC Urine <1 <=2 /HPF    WBC Urine 1 <=5 /HPF    Squamous Epithelials Urine <1 <=1 /HPF     Narrative    Urine Culture not indicated         RADIOLOGY  No orders to display        MEDICATIONS GIVEN IN THE EMERGENCY:  Medications   acetaminophen (TYLENOL) tablet 975 mg (975 mg Oral $Given 10/19/23 5208)       NEW PRESCRIPTIONS STARTED AT TODAY'S ER VISIT  Discharge Medication List as of 10/19/2023  7:19 PM        START taking these medications    Details   acetaminophen-codeine (TYLENOL #3) 300-30 MG per tablet Take 1 tablet by mouth every 6 hours as needed for severe pain or moderate to severe pain, Disp-10 tablet, R-0, Local Print      methylPREDNISolone (MEDROL DOSEPAK) 4 MG tablet therapy pack Follow Package Directions, Disp-21 tablet, R-0, Local Print                I, Marco A Kohler, am serving as a scribe to document services personally performed by Bertin Morales D.O., based on my observations and the provider's statements to me.  I, Bertin Morales D.O., attest that Marco A Kohler is acting in a scribe capacity, has observed my performance of the services and has documented them in accordance with my direction.    Bertin Morales D.O.  Emergency Medicine  [facility] EMERGENCY DEPARTMENT  [address] [place] [id number] [phone]  Dept: [phone]       Bertin Morales DO  10/19/23 1955

## 2023-10-19 NOTE — TELEPHONE ENCOUNTER
Patient calling to report new severe lower back pain that started last night, and continues currently despite treatment efforts.    Disposition to the ED and patient verbalizes understanding.  Her  will take her to [place] ED.    Trinh Elliott RN  [facility]      Reason for Disposition   SEVERE back pain of sudden onset and age > 60 years    Additional Information   Negative: Passed out (i.e., fainted, collapsed and was not responding)   Negative: Shock suspected (e.g., cold/pale/clammy skin, too weak to stand, low BP, rapid pulse)   Negative: Sounds like a life-threatening emergency to the triager    Protocols used: Back Pain-A-[place]

## 2023-10-19 NOTE — ED TRIAGE NOTES
Acute onset of right low back pain yesterday without any known cause. Denies any UTI symptoms; has never had this problem before. Pt takes blood thinner post pacemaker insertion.     Triage Assessment (Adult)       Row Name 10/19/23 1962          Triage Assessment    Airway WDL WDL        Respiratory WDL    Respiratory WDL WDL        Skin Circulation/Temperature WDL    Skin Circulation/Temperature WDL WDL        Cardiac WDL    Cardiac WDL WDL        Peripheral/Neurovascular WDL    Peripheral Neurovascular WDL WDL        Cognitive/Neuro/Behavioral WDL    Cognitive/Neuro/Behavioral WDL WDL

## 2023-10-20 ENCOUNTER — PATIENT OUTREACH (OUTPATIENT)
Dept: CARE COORDINATION | Facility: CLINIC | Age: 88
End: 2023-10-20
Payer: MEDICARE

## 2023-10-20 ENCOUNTER — DOCUMENTATION ONLY (OUTPATIENT)
Dept: ANTICOAGULATION | Facility: CLINIC | Age: 88
End: 2023-10-20
Payer: MEDICARE

## 2023-10-20 ENCOUNTER — TELEPHONE (OUTPATIENT)
Dept: INTERNAL MEDICINE | Facility: CLINIC | Age: 88
End: 2023-10-20
Payer: MEDICARE

## 2023-10-20 NOTE — PROGRESS NOTES
"ANTICOAGULATION  MANAGEMENT: Discharge Review    Reba Calderon chart reviewed for anticoagulation continuity of care    Emergency room visit on 10/19/23 for sciatica of right side.    Discharge disposition: Home    Results:    No results for input(s): \"INR\", \"[id number]\", \"F2\", \"ALMWH\", \"AAUFH\" in the last 168 hours.  Anticoagulation inpatient management:     home regimen continued    Anticoagulation discharge instructions:     Warfarin dosing: home regimen continued   Bridging: No   INR goal change: No      Medication changes affecting anticoagulation: Yes: methylprednisolone for 21 tablets, tylenol #3    Additional factors affecting anticoagulation: Yes: pain     PLAN     Recommend to check INR on 10/23/23 with home meter as planned    Patient not contacted    No adjustment to Anticoagulation Calendar was required    Carly Amador RN  "

## 2023-10-20 NOTE — PROGRESS NOTES
Clinic Care Coordination Contact  Community Health Worker Initial Outreach    CHW Initial Information Gathering:  Referral Source: ED Follow-Up  CHW Additional Questions  If ED/Hospital discharge, follow-up appointment scheduled as recommended?: Yes (Scheduled prior to outreach)  Lori active?: Yes    Patient accepts CC: No, patient declined at this time. Patient will be sent Care Coordination introduction letter for future reference.     Korin Lopez  Community Health Worker  [facility] Care Resource [facility]

## 2023-10-20 NOTE — TELEPHONE ENCOUNTER
Can offer 4 pm on Tuesday October 24th.    Devon Ball MD  General Internal Medicine  [facility]  10/20/2023, 11:34 AM

## 2023-10-20 NOTE — LETTER
Reba Calderon  [address] [address]    Dear Reba Calderon,      I am a team member within the [facility] with [facility]. I recently contacted you to ensure you were doing well following a recent visit within our health system. I also wanted to take this chance to introduce Clinic Care Coordination should you have any interest in this program in the future.    Below is a description of Clinic Care Coordination and how this team can further assist you:       The Clinic Care Coordination team is made up of a Registered Nurse, , Financial Resource Worker, and a Community Health Worker who understand and can help navigate the health care system. The goal of clinic care coordination is to help you manage your health, improve access to care, and achieve optimal health outcomes. They work alongside your provider to assist you in determining your health and social needs, obtain health care and community resources, and provide you with necessary information and education. Clinic Care Coordination can work with you through any barriers and develop a care plan that helps coordinate and strengthen the relationship between you and your care team.    If you wish to connect with the Clinic Care Coordination Team, please let your [facility] Primary Care Provider or Clinic Care Team know and they can place a referral. The Clinic Care Coordination team will then reach out by phone to further support you.    We are focused on providing you with the highest-quality healthcare experience possible.    Sincerely,   Your care team with [facility]

## 2023-10-20 NOTE — TELEPHONE ENCOUNTER
Patient calling to get a ED follow up     PCP is out longer then needed     Writer seeing if provider wants to squeeze patient in or have her see a colleague due to schedule     Call Back to schedule if provider will fit in otherwise call back to get scheduled with another provider     Call Back

## 2023-10-23 ENCOUNTER — ANTICOAGULATION THERAPY VISIT (OUTPATIENT)
Dept: ANTICOAGULATION | Facility: CLINIC | Age: 88
End: 2023-10-23
Payer: MEDICARE

## 2023-10-23 DIAGNOSIS — I48.21 ATRIAL FIBRILLATION, PERMANENT (H): Primary | ICD-10-CM

## 2023-10-23 LAB — INR HOME MONITORING: 4.4 (ref 2–3)

## 2023-10-23 NOTE — PROGRESS NOTES
ANTICOAGULATION MANAGEMENT     Reba Calderon 87 year old female is on warfarin with supratherapeutic INR result. (Goal INR 2.0-3.0)    Recent labs: (last 7 days)     10/23/23  0000   INR 4.4*       ASSESSMENT     Source(s): Chart Review and Home Care/Facility Nurse     Warfarin doses taken: Warfarin taken as instructed  Diet: No new diet changes identified  Medication/supplement changes: Tylenol #3, and methylprednisolone through 10/26/23  New illness, injury, or hospitalization: Yes: Patient was seen in the emergency room on 10/19/23 for sciatica pain  Signs or symptoms of bleeding or clotting: Yes: patient reports she noticed a dime-sized lump surrounded by bruising on the front of her right thigh yesterday. Patient denies injury. Patient also denies that the lump has gotten larger since she first noticed it. Writer has concerns for possible hematoma, will hold warfarin today. Patient will follow up with provider tomorrow and have this assessed. Patient will continue and report further concerns.   Previous result: Therapeutic last 2(+) visits  Additional findings: Will tentatively plan to recheck INR Friday, but will reassess after patient sees primary care provider tomorrow.        PLAN     Recommended plan for temporary change(s) affecting INR     Dosing Instructions: hold dose then continue your current warfarin dose with next INR in 4 days       Summary  As of 10/23/2023      Full warfarin instructions:  10/23: Hold; Otherwise 3.75 mg every day   Next INR check:  10/27/2023               Telephone call with Reba who agrees to plan and repeated back plan correctly    Patient to recheck with home meter    Education provided:   Please call back if any changes to your diet, medications or how you've been taking warfarin  Symptom monitoring: monitoring for bleeding signs and symptoms, monitoring for clotting signs and symptoms, if you hit your head or have a bad fall seek emergency care, and travel related  clotting risk and prevention  Contact [phone]  with any changes, questions or concerns.     Plan made per [facility] anticoagulation protocol    Crys Simpson RN  Anticoagulation Clinic  10/23/2023    _______________________________________________________________________     Anticoagulation Episode Summary       Current INR goal:  2.0-3.0   TTR:  77.0% (1 y)   Target end date:  7/15/2036   Send INR reminders to:  ANTICOAG HOME MONITORING    Indications    Atrial fibrillation  permanent (H) [I48.21]             Comments:  [employer] home monitor. Managed by exception.             Anticoagulation Care Providers       Provider Role Specialty Phone number    Devon Ball MD Referring Internal Medicine [phone]    Leti Brower NP Referring  [phone]

## 2023-10-24 ENCOUNTER — OFFICE VISIT (OUTPATIENT)
Dept: INTERNAL MEDICINE | Facility: CLINIC | Age: 88
End: 2023-10-24
Payer: MEDICARE

## 2023-10-24 VITALS
OXYGEN SATURATION: 96 % | RESPIRATION RATE: 22 BRPM | BODY MASS INDEX: 31.66 KG/M2 | HEART RATE: 92 BPM | DIASTOLIC BLOOD PRESSURE: 78 MMHG | TEMPERATURE: 97.8 F | SYSTOLIC BLOOD PRESSURE: 136 MMHG | HEIGHT: 67 IN | WEIGHT: 201.7 LBS

## 2023-10-24 DIAGNOSIS — M53.3 SACROILIAC JOINT PAIN: ICD-10-CM

## 2023-10-24 DIAGNOSIS — M54.50 LUMBAR BACK PAIN: Primary | ICD-10-CM

## 2023-10-24 DIAGNOSIS — R26.9 IMPAIRED GAIT: ICD-10-CM

## 2023-10-24 DIAGNOSIS — S80.11XA HEMATOMA OF RIGHT LOWER LEG: ICD-10-CM

## 2023-10-24 DIAGNOSIS — M70.62 TROCHANTERIC BURSITIS OF LEFT HIP: ICD-10-CM

## 2023-10-24 PROCEDURE — 99214 OFFICE O/P EST MOD 30 MIN: CPT | Mod: 25 | Performed by: INTERNAL MEDICINE

## 2023-10-24 PROCEDURE — 90480 ADMN SARSCOV2 VAC 1/ONLY CMP: CPT | Performed by: INTERNAL MEDICINE

## 2023-10-24 PROCEDURE — 91320 SARSCV2 VAC 30MCG TRS-SUC IM: CPT | Performed by: INTERNAL MEDICINE

## 2023-10-24 NOTE — PROGRESS NOTES
"  {PROVIDER CHARTING PREFERENCE:666831}    Estephania Britton is a 87 year old, presenting for the following health issues:  Hospital F/U (10/19/23 [facility] back pain )        10/24/2023     3:38 PM   Additional Questions   Roomed by Kristie TRIVEDI     {MA/LPN/RN Pre-Provider Visit Orders- hCG/UA/Strep (Optional):537606}  ED/UC Followup:    Facility:  [facility] ER  Date of visit: 10/19/23  Reason for visit: back pain   Current Status: getting better   {additonal problems for provider to add (Optional):943712}      Review of Systems   {ROS COMP (Optional):086466}      Objective    /78 (BP Location: Right arm, Patient Position: Sitting, Cuff Size: Adult Regular)   Pulse 92   Temp 97.8  F (36.6  C) (Oral)   Resp 22   Ht 1.689 m (5' 6.5\")   Wt 91.5 kg (201 lb 11.2 oz)   LMP  (LMP Unknown)   SpO2 96%   BMI 32.07 kg/m    Body mass index is 32.07 kg/m .  Physical Exam   {Exam List (Optional):591712}    {Diagnostic Test Results (Optional):559809}    {AMBULATORY ATTESTATION (Optional):281711}              "

## 2023-10-25 NOTE — PROGRESS NOTES
[facility]    Comprehensive and complex medical care - Chronic disease management - Shared decision making - Care coordination - Compassionate care    Patient advocacy - Rational deprescribing - Minimally disruptive medicine - Ethical focus - Customized care         Date of Service: 10/24/2023  Primary Provider: Devon Ball    Patient Care Team:  Devon Ball MD as PCP - General (Internal Medicine)  Louis Jacobsen MD as MD (Ophthalmology)  DANGELO Pagan MD as Devon Oro MD as Assigned PCP  Yancy Vinson MD as MD (Orthopaedic Surgery)          Patient's Pharmacy:    [facility]/pharmacy #[id number] - [facility], [place] - [address]  [address] [place] [address]  Phone: [phone] Fax: [phone]     Patient's Contacts:  Name Home Phone Work Phone Mobile Phone Relationship Lgl XIMENA Beckford [phone]   Spouse    JYOTHI SHEIKH   [phone] Daughter      Patient's Insurance:    Payor: MEDICARE / Plan: MEDICARE / Product Type: Medicare /           Active Problem List:  Problem List as of 10/24/2023 Reviewed: 10/19/2023  6:55 PM by Marco A Kohler    Nonsmoker    Atrial fibrillation, permanent (H)    CLL (chronic lymphocytic leukemia) (H)       Medium    Type 2 diabetes mellitus with diabetic polyneuropathy, without long-term current use of insulin (H)    Cardiac pacemaker in situ    Primary hypertension    Chronic diastolic heart failure (H)    Actinomycosis    Chronic kidney disease, stage 3 (H)    H/O atrioventricular jerzy ablation    CHB (complete heart block) (H)    Anticoagulation monitoring, INR range 2-3    Choledocholithiasis, RECURRENT    Primary osteoarthritis of right ankle       Low    Peripheral sensory neuropathy    MATILDE (obstructive sleep apnea)    Perirectal abscess    Coagulopathy (H) - due to warfarin    Senile purpura (H24)        Current Outpatient Medications   Medication Instructions     atorvastatin (LIPITOR) 10 MG tablet TAKE 1 TABLET BY MOUTH EVERY DAY IN THE EVENING    biotin 2,500 mcg, Oral    blood glucose (CONTOUR TEST) test strip USE 1 STRIP TO TEST BLOOD SUGAR DAILY.    Cholecalciferol (VITAMIN D3 PO) 2,000 Units, Oral, DAILY    ciclopirox (PENLAC) 8 % external solution Apply to adjacent skin and affected nails daily.  Remove with alcohol every 7 days, then repeat.    FOLIC ACID PO 1 mg, DAILY    furosemide (LASIX) 40 MG tablet TAKE 2 TABLETS BY MOUTH EVERY MORNING    glucosamine-chondroitinoitin 500-400 MG tablet 1 tablet, Oral    magnesium chloride 64 mg, Oral    metFORMIN (GLUCOPHAGE) 500 mg, Oral, 2 TIMES DAILY WITH MEALS    methylPREDNISolone (MEDROL DOSEPAK) 4 MG tablet therapy pack Follow Package Directions    nitroGLYcerin (NITROSTAT) 0.4 mg, Sublingual    pantoprazole (PROTONIX) 40 MG EC tablet TAKE 1 TABLET BY MOUTH EVERY DAY    polyethylene glycol (MIRALAX) 17 GM/Dose powder MIX 1 CAPFUL (17 GRAMS) IN LIQUID AND DRINK BY MOUTH DAILY. (OTC NC)    potassium chloride ER (K-TAB/KLOR-CON) 10 MEQ CR tablet TAKE 1 TABLET BY MOUTH EVERY DAY    UNABLE TO FIND MEDICATION NAME: Hair skin and nails   2500    vitamin B-12 (CYANOCOBALAMIN) 1,000 mcg, Oral    warfarin ANTICOAGULANT (COUMADIN) 3.75 mg, Oral, DAILY     Social History     Social History Narrative           Subjective:     Reba Calderon is a 87 year old female who comes in today for:    Chief Complaint   Patient presents with    Hospital F/U     10/19/23 [facility] back pain           10/24/2023     3:38 PM   Additional Questions   Roomed by Kristie     Follow up emergency room (ER) visit.    Doing therapy at this time for left hip issues at [facility].  This is generally doing okay.  Hurts to lay on the left side.    Emergency room (ER) visit was more for low back pain and sacroiliac (SI) pain on the right.  No radiculopathy down the right side according to the patient.  No numbness down the legs.    Given steroid boost  "in the emergency room (ER) and other medications.  Doing better at this time.    Discussed imaging and does want to hold on this still at this time.  Her walking is still stiff.      Has a small tender blood collection on the right lateral leg without trauma.  Wanted this checked out.  INR recently elevated.    We reviewed her other issues noted in the assessment but not specifically addressed in the HPI above.     Objective:     Wt Readings from Last 3 Encounters:   10/24/23 91.5 kg (201 lb 11.2 oz)   10/19/23 86.2 kg (190 lb)   09/12/23 88 kg (193 lb 14.4 oz)     BP Readings from Last 3 Encounters:   10/24/23 136/78   10/19/23 (!) 160/77   09/12/23 138/72     /78 (BP Location: Right arm, Patient Position: Sitting, Cuff Size: Adult Regular)   Pulse 92   Temp 97.8  F (36.6  C) (Oral)   Resp 22   Ht 1.689 m (5' 6.5\")   Wt 91.5 kg (201 lb 11.2 oz)   LMP  (LMP Unknown)   SpO2 96%   BMI 32.07 kg/m     The patient is comfortable, no acute distress.  Mood good.  Insight good.  Heart regular rate and rhythm.  Lungs clear to auscultation bilaterally.  Respiratory effort is good.  Abdomen soft and nontender.  No hepatosplenomegaly.  Extremities no edema.  Left trochanteric bursitis noted on exam.  Gair is stiff.  Right sacroiliac (SI) joint also noted today.      Diagnostics:     Orders Only on 10/23/2023   Component Date Value Ref Range Status    INR HOME MONITORING 10/23/2023 4.4 (H)  2.000 - 3.000 Final       No results found for any visits on 10/24/23.    Assessment:     1. Lumbar back pain    2. Sacroiliac joint pain    3. Trochanteric bursitis of left hip    4. Hematoma of right lower leg    5. Impaired gait        Plan:     Covid booster done today   Continue physical therapy.  Consider MRI scan of the back and hips if needed.  Consider corticosteroid injection of the trochanteric bursitis if needed in the future.  Consider blood work if needed, but likely not needed.  Continue current medications " otherwise.  Follow up sooner if issues.    Orders Placed This Encounter   Procedures    COVID-19 12+ (2023-24) (PFIZER)           Devon Ball MD  General Internal Medicine  [facility]    Return in about 20 days (around 11/13/2023) for follow up visit.     Future Appointments   Date Time Provider Department Center   11/13/2023  8:30 AM [facility]   2/13/2024  8:30 AM [facility]        Minocycline Counseling: Patient advised regarding possible photosensitivity and discoloration of the teeth, skin, lips, tongue and gums.  Patient instructed to avoid sunlight, if possible.  When exposed to sunlight, patients should wear protective clothing, sunglasses, and sunscreen.  The patient was instructed to call the office immediately if the following severe adverse effects occur:  hearing changes, easy bruising/bleeding, severe headache, or vision changes.  The patient verbalized understanding of the proper use and possible adverse effects of minocycline.  All of the patient's questions and concerns were addressed.

## 2023-10-25 NOTE — PATIENT INSTRUCTIONS
Future Appointments   Date Time Provider Department Center   11/13/2023  8:30 AM Devon Ball MD [facility]   2/13/2024  8:30 AM [facility]

## 2023-10-27 ENCOUNTER — ANTICOAGULATION THERAPY VISIT (OUTPATIENT)
Dept: ANTICOAGULATION | Facility: CLINIC | Age: 88
End: 2023-10-27
Payer: MEDICARE

## 2023-10-27 DIAGNOSIS — I48.21 ATRIAL FIBRILLATION, PERMANENT (H): Primary | ICD-10-CM

## 2023-10-27 LAB — INR HOME MONITORING: 2.6 (ref 2–3)

## 2023-10-27 NOTE — PROGRESS NOTES
"ANTICOAGULATION MANAGEMENT     Reba Calderon 87 year old female is on warfarin with therapeutic INR result. (Goal INR 2.0-3.0)    Recent labs: (last 7 days)     10/27/23  0000   INR 2.6       ASSESSMENT     Source(s): Chart Review and Patient/Caregiver Call     Warfarin doses taken: Warfarin taken as instructed  Diet: No new diet changes identified  Medication/supplement changes: None noted  New illness, injury, or hospitalization: No  Signs or symptoms of bleeding or clotting: No  Previous result: Supratherapeutic  Additional findings:  Patient had follow up appt with PCP. Thumb noted on previous encounter is a \"broken Blood Vessel\". She will continue to monitor but now in healing stage.        PLAN     Recommended plan for no diet, medication or health factor changes affecting INR     Dosing Instructions: Continue your current warfarin dose with next INR in 10 days       Summary  As of 10/27/2023      Full warfarin instructions:  3.75 mg every day   Next INR check:  11/6/2023               Telephone call with Reba who agrees to plan and repeated back plan correctly    Patient to recheck with home meter    Education provided:   Please call back if any changes to your diet, medications or how you've been taking warfarin    Plan made per [facility] anticoagulation protocol    Palak Velázquez RN  Anticoagulation Clinic  10/27/2023    _______________________________________________________________________     Anticoagulation Episode Summary       Current INR goal:  2.0-3.0   TTR:  76.2% (1 y)   Target end date:  7/15/2036   Send INR reminders to:  ANTICOAG HOME MONITORING    Indications    Atrial fibrillation  permanent (H) [I48.21]             Comments:  [employer] home monitor. Managed by exception.             Anticoagulation Care Providers       Provider Role Specialty Phone number    Devon Ball MD Referring Internal Medicine [phone]    Leti Brower NP Referring  [phone]              "

## 2023-11-06 ENCOUNTER — DOCUMENTATION ONLY (OUTPATIENT)
Dept: ANTICOAGULATION | Facility: CLINIC | Age: 88
End: 2023-11-06
Payer: MEDICARE

## 2023-11-06 DIAGNOSIS — I48.21 ATRIAL FIBRILLATION, PERMANENT (H): Primary | ICD-10-CM

## 2023-11-06 LAB — INR HOME MONITORING: 3 (ref 2–3)

## 2023-11-06 NOTE — PROGRESS NOTES
ANTICOAGULATION  MANAGEMENT-Home Monitor Managed by Exception    Reba AKI Calderon 87 year old female is on warfarin with therapeutic INR result. (Goal INR 2.0-3.0)    Recent labs: (last 7 days)     11/06/23  0000   INR 3.0       Previous INR was Therapeutic  Medication, diet, health changes since last INR:chart reviewed; none identified  Contacted within the last 12 weeks by phone on 10/27/23      BELGICA     Reba was NOT contacted regarding therapeutic result today per home monitoring policy manage by exception agreement.   Current warfarin dose is to be continued:     Summary  As of 11/6/2023      Full warfarin instructions:  3.75 mg every day   Next INR check:  11/20/2023             ?   Carly Amador RN  Anticoagulation Clinic  11/6/2023    _______________________________________________________________________     Anticoagulation Episode Summary       Current INR goal:  2.0-3.0   TTR:  76.2% (1 y)   Target end date:  7/15/2036   Send INR reminders to:  [facility] HOME MONITORING    Indications    Atrial fibrillation  permanent (H) [I48.21]             Comments:  [employer] home monitor. Managed by exception.             Anticoagulation Care Providers       Provider Role Specialty Phone number    Devon Ball MD Referring Internal Medicine [phone]    Leti Brower NP Referring  [phone]

## 2023-11-13 ENCOUNTER — OFFICE VISIT (OUTPATIENT)
Dept: INTERNAL MEDICINE | Facility: CLINIC | Age: 88
End: 2023-11-13
Payer: MEDICARE

## 2023-11-13 VITALS
WEIGHT: 203.9 LBS | TEMPERATURE: 98.2 F | DIASTOLIC BLOOD PRESSURE: 64 MMHG | OXYGEN SATURATION: 91 % | RESPIRATION RATE: 18 BRPM | HEART RATE: 84 BPM | SYSTOLIC BLOOD PRESSURE: 130 MMHG | BODY MASS INDEX: 32 KG/M2 | HEIGHT: 67 IN

## 2023-11-13 DIAGNOSIS — I10 PRIMARY HYPERTENSION: Chronic | ICD-10-CM

## 2023-11-13 DIAGNOSIS — M70.62 TROCHANTERIC BURSITIS OF LEFT HIP: Primary | ICD-10-CM

## 2023-11-13 DIAGNOSIS — C91.10 CLL (CHRONIC LYMPHOCYTIC LEUKEMIA) (H): ICD-10-CM

## 2023-11-13 DIAGNOSIS — M54.50 LUMBAR BACK PAIN: ICD-10-CM

## 2023-11-13 PROCEDURE — 99214 OFFICE O/P EST MOD 30 MIN: CPT | Mod: 25 | Performed by: INTERNAL MEDICINE

## 2023-11-13 PROCEDURE — 20610 DRAIN/INJ JOINT/BURSA W/O US: CPT | Mod: LT | Performed by: INTERNAL MEDICINE

## 2023-11-13 RX ORDER — TRIAMCINOLONE ACETONIDE 40 MG/ML
80 INJECTION, SUSPENSION INTRA-ARTICULAR; INTRAMUSCULAR ONCE
Status: COMPLETED | OUTPATIENT
Start: 2023-11-13 | End: 2023-11-13

## 2023-11-13 RX ADMIN — TRIAMCINOLONE ACETONIDE 80 MG: 40 INJECTION, SUSPENSION INTRA-ARTICULAR; INTRAMUSCULAR at 15:12

## 2023-11-13 NOTE — PROGRESS NOTES
Corticosteroid injection to the left trochanteric bursa    Date/Time: 11/13/2023 3:11 PM    Performed by: Devon Ball MD  Authorized by: Devon Ball MD         Procedure:  Left trochanteric bursa injection.    Diagnosis:  Trochanteric bursitis.    Description of procedure:  Patient was given informed consent prior to proceeding with injection.  Patient agreed to proceed knowing the risks and benefits.  Patient was placed in a lateral decubitus position with affected hip exposed.  The superior, anterior and posterior aspects of the upper femur were marked.  About 1.5 inches below the superior aspect and midway between the anterior and posterior aspects, an injection spot was found.  Palpation reproduced the tenderness.  Using a 25G needle, a combination of 80 mg of Kenalog and 1 cc of lidocaine with epinephrine was injected slightly away from the bone.  Aftercare instructions were given and there were no immediate complications.

## 2023-11-13 NOTE — PATIENT INSTRUCTIONS
Future Appointments   Date Time Provider Department Center   2/13/2024  8:30 AM [facility]

## 2023-11-13 NOTE — PROGRESS NOTES
[facility]    Comprehensive and complex medical care - Chronic disease management - Shared decision making - Care coordination - Compassionate care    Patient advocacy - Rational deprescribing - Minimally disruptive medicine - Ethical focus - Customized care         Date of Service: 11/13/2023  Primary Provider: Devon Ball    Patient Care Team:  Devon Ball MD as PCP - General (Internal Medicine)  Louis Jacobsen MD as MD (Ophthalmology)  DANGELO Pagan MD as Devon Oro MD as Assigned PCP  Yancy Vinson MD as MD (Orthopaedic Surgery)          Patient's Pharmacy:    [facility]/pharmacy #[id number] - [facility], [place] - [address]  [address] [place] [address]  Phone: [phone] Fax: [phone]     Patient's Contacts:  Name Home Phone Work Phone Mobile Phone Relationship Lgl XIMENA Beckford [phone]   Spouse    JYOTHI SHEIKH   [phone] Daughter      Patient's Insurance:    Payor: MEDICARE / Plan: MEDICARE / Product Type: Medicare /           Active Problem List:  Problem List as of 11/13/2023 Reviewed: 10/19/2023  6:55 PM by Marco A Kohler    Nonsmoker    Atrial fibrillation, permanent (H)    CLL (chronic lymphocytic leukemia) (H)       Medium    Type 2 diabetes mellitus with diabetic polyneuropathy, without long-term current use of insulin (H)    Cardiac pacemaker in situ    Primary hypertension    Chronic diastolic heart failure (H)    Actinomycosis    Chronic kidney disease, stage 3 (H)    H/O atrioventricular jerzy ablation    CHB (complete heart block) (H)    Anticoagulation monitoring, INR range 2-3    Choledocholithiasis, RECURRENT    Primary osteoarthritis of right ankle       Low    Peripheral sensory neuropathy    MATILDE (obstructive sleep apnea)    Perirectal abscess    Coagulopathy (H) - due to warfarin    Senile purpura (H24)        Current Outpatient Medications   Medication Instructions     atorvastatin (LIPITOR) 10 MG tablet TAKE 1 TABLET BY MOUTH EVERY DAY IN THE EVENING    biotin 2,500 mcg, Oral    blood glucose (CONTOUR TEST) test strip USE 1 STRIP TO TEST BLOOD SUGAR DAILY.    Cholecalciferol (VITAMIN D3 PO) 2,000 Units, Oral, DAILY    ciclopirox (PENLAC) 8 % external solution Apply to adjacent skin and affected nails daily.  Remove with alcohol every 7 days, then repeat.    FOLIC ACID PO 1 mg, DAILY    furosemide (LASIX) 40 MG tablet TAKE 2 TABLETS BY MOUTH EVERY MORNING    glucosamine-chondroitinoitin 500-400 MG tablet 1 tablet, Oral    magnesium chloride 64 mg, Oral    metFORMIN (GLUCOPHAGE) 500 mg, Oral, 2 TIMES DAILY WITH MEALS    methylPREDNISolone (MEDROL DOSEPAK) 4 MG tablet therapy pack Follow Package Directions    nitroGLYcerin (NITROSTAT) 0.4 mg, Sublingual    pantoprazole (PROTONIX) 40 MG EC tablet TAKE 1 TABLET BY MOUTH EVERY DAY    polyethylene glycol (MIRALAX) 17 GM/Dose powder MIX 1 CAPFUL (17 GRAMS) IN LIQUID AND DRINK BY MOUTH DAILY. (OTC NC)    potassium chloride ER (K-TAB/KLOR-CON) 10 MEQ CR tablet TAKE 1 TABLET BY MOUTH EVERY DAY    UNABLE TO FIND MEDICATION NAME: Hair skin and nails   2500    vitamin B-12 (CYANOCOBALAMIN) 1,000 mcg, Oral    warfarin ANTICOAGULANT (COUMADIN) 3.75 mg, Oral, DAILY     Social History     Social History Narrative           Subjective:     Reba Calderon is a 87 year old female who comes in today for:    Chief Complaint   Patient presents with    Follow Up          11/13/2023     8:12 AM   Additional Questions   Roomed by Kristie   Accompanied by      Patient comes in today for follow-up of a number of issues.    We first reviewed her left hip pain.  This is continue to bother her.  She still has issues with sleeping at night.  It is worse with laying on that side.  It is quite tender.  She has difficulty getting up out of a chair.  It affects her sleep at night.  She would be okay with doing a corticosteroid injection.  She  "continues physical therapy for this.    We reviewed her lumbar back pain.  This also bothers her.  Its not as bad as the hip but is present.  We have not done any further imaging on this.  She denies any other radicular type symptoms at this time.    She does get pain shooting in her back but not down the leg.    Its been 3 or 4 months of this going on at this point.    We reviewed her CLL and she had some questions about her blood counts.  Her blood pressure has otherwise been doing okay.    We reviewed her other issues noted in the assessment but not specifically addressed in the HPI above.     Objective:     Wt Readings from Last 3 Encounters:   11/13/23 92.5 kg (203 lb 14.4 oz)   10/24/23 91.5 kg (201 lb 11.2 oz)   10/19/23 86.2 kg (190 lb)     BP Readings from Last 3 Encounters:   11/13/23 130/64   10/24/23 136/78   10/19/23 (!) 160/77     /64 (BP Location: Right arm, Patient Position: Sitting, Cuff Size: Adult Regular)   Pulse 84   Temp 98.2  F (36.8  C) (Oral)   Resp 18   Ht 1.689 m (5' 6.5\")   Wt 92.5 kg (203 lb 14.4 oz)   LMP  (LMP Unknown)   SpO2 91%   BMI 32.42 kg/m     The patient is comfortable, no acute distress.  Mood good.  Insight good.  Eyes are nonicteric.  Neck is supple without mass.  No cervical adenopathy.  No thyromegaly. Heart regular rate and rhythm.  Lungs clear to auscultation bilaterally. Extremities no edema.  She has significant trochanteric bursitis on exam.  Some tenderness of the back in the midline low back.      Diagnostics:     Orders Only on 11/06/2023   Component Date Value Ref Range Status    INR HOME MONITORING 11/06/2023 3.0  2.000 - 3.000 Final       No results found for any visits on 11/13/23.    Assessment:     1. Trochanteric bursitis of left hip    2. Lumbar back pain    3. Primary hypertension    4. CLL (chronic lymphocytic leukemia) (H)        Plan:     Corticosteroid injection done to the left hip today.  Consider imaging of the hip and/or the back at " [facility] if needed in the future.  Continue current medications as is.  Blood work at her annual wellness visit to look at her blood counts as well.  Continue current medications otherwise.  Follow up sooner if issues.    Orders Placed This Encounter   Procedures    INJECTION SINGLE TENDON SHEATH/LIGAMENT           Devon Ball MD  General Internal Medicine  [facility]    Return in about 3 months (around 2/13/2024) for annual wellness visit.     Future Appointments   Date Time Provider Department Center   2/13/2024  8:30 AM [facility]

## 2023-11-20 ENCOUNTER — DOCUMENTATION ONLY (OUTPATIENT)
Dept: ANTICOAGULATION | Facility: CLINIC | Age: 88
End: 2023-11-20
Payer: MEDICARE

## 2023-11-20 DIAGNOSIS — I48.21 ATRIAL FIBRILLATION, PERMANENT (H): Primary | ICD-10-CM

## 2023-11-20 LAB — INR HOME MONITORING: 2.4 (ref 2–3)

## 2023-11-20 NOTE — PROGRESS NOTES
ANTICOAGULATION  MANAGEMENT-Home Monitor Managed by Exception    Reba Calderon 87 year old female is on warfarin with therapeutic INR result. (Goal INR 2.0-3.0)    Recent labs: (last 7 days)     11/20/23  0000   INR 2.4       Previous INR was Therapeutic  Medication, diet, health changes since last INR:chart reviewed; none identified  Contacted within the last 12 weeks by phone on 10/27/23      BELGICA     Reba was NOT contacted regarding therapeutic result today per home monitoring policy manage by exception agreement.   Current warfarin dose is to be continued:     Summary  As of 11/20/2023      Full warfarin instructions:  3.75 mg every day   Next INR check:  12/4/2023             ?   Palak Velázquez, RN  Anticoagulation Clinic  11/20/2023    _______________________________________________________________________     Anticoagulation Episode Summary       Current INR goal:  2.0-3.0   TTR:  76.2% (1 y)   Target end date:  7/15/2036   Send INR reminders to:  [facility] HOME MONITORING    Indications    Atrial fibrillation  permanent (H) [I48.21]             Comments:  [employer] home monitor. Managed by exception.             Anticoagulation Care Providers       Provider Role Specialty Phone number    Devon Ball MD Referring Internal Medicine [phone]    Leti Brower NP Referring  [phone]

## 2023-11-27 ENCOUNTER — ANTICOAGULATION THERAPY VISIT (OUTPATIENT)
Dept: ANTICOAGULATION | Facility: CLINIC | Age: 88
End: 2023-11-27
Payer: MEDICARE

## 2023-11-27 DIAGNOSIS — I48.21 ATRIAL FIBRILLATION, PERMANENT (H): Primary | ICD-10-CM

## 2023-11-27 LAB — INR HOME MONITORING: 3.4 (ref 2–3)

## 2023-11-27 NOTE — PROGRESS NOTES
ANTICOAGULATION MANAGEMENT     Reba Calderon 87 year old female is on warfarin with supratherapeutic INR result. (Goal INR 2.0-3.0)    Recent labs: (last 7 days)     11/27/23  0000   INR 3.4*       ASSESSMENT     Source(s): Chart Review and Patient/Caregiver Call     Warfarin doses taken: Warfarin taken as instructed  Diet: No new diet changes identified  Medication/supplement changes: Patient received a corticosteroid injection in her left hip on 11/13/23  New illness, injury, or hospitalization: No  Signs or symptoms of bleeding or clotting: Yes: patient noticed bruising on her left hip this morning about two inches under where she had the injection two weeks ago. Patient denies new injury to the hip. Will route to primary care provider for review. Patient also agrees to contact primary care provider.  Previous result: Therapeutic last 2(+) visits  Additional findings: Patient is requesting to decrease maintenance dose to 2.5 mg three times per week, but is agreeing to start with twice per week       PLAN     Recommended plan for no diet, medication or health factor changes affecting INR     Dosing Instructions: decrease your warfarin dose (10% change) with next INR in 1 week       Summary  As of 11/27/2023      Full warfarin instructions:  2.5 mg every Mon, Thu; 3.75 mg all other days   Next INR check:  12/4/2023               Telephone call with Reba who verbalizes understanding and agrees to plan    Patient to recheck with home meter    Education provided:   Please call back if any changes to your diet, medications or how you've been taking warfarin  Symptom monitoring: monitoring for bleeding signs and symptoms and monitoring for clotting signs and symptoms  Contact [phone]  with any changes, questions or concerns.     Plan made with [facility] Pharmacist Yusra Simpson, RN  Anticoagulation Clinic  11/27/2023    _______________________________________________________________________      Anticoagulation Episode Summary       Current INR goal:  2.0-3.0   TTR:  75.4% (1 y)   Target end date:  7/15/2036   Send INR reminders to:  ANTICOAG HOME MONITORING    Indications    Atrial fibrillation  permanent (H) [I48.21]             Comments:  [employer] home monitor. Managed by exception.             Anticoagulation Care Providers       Provider Role Specialty Phone number    Devon Ball MD Referring Internal Medicine [phone]    Leti Brower NP Referring  [phone]

## 2023-12-04 ENCOUNTER — ANTICOAGULATION THERAPY VISIT (OUTPATIENT)
Dept: ANTICOAGULATION | Facility: CLINIC | Age: 88
End: 2023-12-04
Payer: MEDICARE

## 2023-12-04 DIAGNOSIS — I48.21 ATRIAL FIBRILLATION, PERMANENT (H): Primary | ICD-10-CM

## 2023-12-04 LAB — INR HOME MONITORING: 2 (ref 2–3)

## 2023-12-04 NOTE — PROGRESS NOTES
ANTICOAGULATION MANAGEMENT     Reba Calderon 88 year old female is on warfarin with therapeutic INR result. (Goal INR 2.0-3.0)    Recent labs: (last 7 days)     12/04/23  0000   INR 2.0       ASSESSMENT     Source(s): Chart Review and Patient/Caregiver Call     Warfarin doses taken: Warfarin taken as instructed  Diet: No new diet changes identified  Medication/supplement changes: None noted  New illness, injury, or hospitalization: No  Signs or symptoms of bleeding or clotting: No. Patient reports the bruising on her left hip is improving.   Previous result: Supratherapeutic  Additional findings: patient is expecting a call on 12/11/23, then resume manage by exception.       PLAN     Recommended plan for no diet, medication or health factor changes affecting INR     Dosing Instructions: Continue your current warfarin dose with next INR in 1 week       Summary  As of 12/4/2023      Full warfarin instructions:  2.5 mg every Mon, Thu; 3.75 mg all other days   Next INR check:  12/11/2023               Telephone call with Reba who verbalizes understanding and agrees to plan    Patient to recheck with home meter    Education provided:   Please call back if any changes to your diet, medications or how you've been taking warfarin  Symptom monitoring: monitoring for bleeding signs and symptoms and monitoring for clotting signs and symptoms  Contact [phone]  with any changes, questions or concerns.     Plan made per [facility] anticoagulation protocol    Crys Simpson RN  Anticoagulation Clinic  12/4/2023    _______________________________________________________________________     Anticoagulation Episode Summary       Current INR goal:  2.0-3.0   TTR:  74.8% (1 y)   Target end date:  7/15/2036   Send INR reminders to:  [facility] HOME MONITORING    Indications    Atrial fibrillation  permanent (H) [I48.21]             Comments:  [employer] home monitor. Managed by exception.             Anticoagulation Care Providers        Provider Role Specialty Phone number    Devon Ball MD Referring Internal Medicine [phone]    Leti Brower NP Referring  [phone]

## 2023-12-11 ENCOUNTER — ANTICOAGULATION THERAPY VISIT (OUTPATIENT)
Dept: ANTICOAGULATION | Facility: CLINIC | Age: 88
End: 2023-12-11
Payer: MEDICARE

## 2023-12-11 DIAGNOSIS — I48.21 ATRIAL FIBRILLATION, PERMANENT (H): Primary | ICD-10-CM

## 2023-12-11 LAB — INR HOME MONITORING: 1.7 (ref 2–3)

## 2023-12-11 NOTE — PROGRESS NOTES
ANTICOAGULATION MANAGEMENT     Reba Calderon 88 year old female is on warfarin with supratherapeutic INR result. (Goal INR 2.0-3.0)    Recent labs: (last 7 days)     12/11/23  0000   INR 1.7*       ASSESSMENT     Source(s): Chart Review and Patient/Caregiver Call     Warfarin doses taken: Warfarin taken as instructed  Diet: Increased greens/vitamin K in diet; plans to resume previous intake  Medication/supplement changes: None noted  New illness, injury, or hospitalization: No  Signs or symptoms of bleeding or clotting: No  Previous result: Therapeutic last visit; previously outside of goal range  Additional findings: None       PLAN     Recommended plan for temporary change(s) affecting INR     Dosing Instructions: booster dose then continue your current warfarin dose with next INR in 1 week       Summary  As of 12/11/2023      Full warfarin instructions:  12/11: 3.75 mg; Otherwise 2.5 mg every Mon, Thu; 3.75 mg all other days   Next INR check:  12/18/2023               Telephone call with Reba who verbalizes understanding and agrees to plan    Patient to recheck with home meter    Education provided:   Please call back if any changes to your diet, medications or how you've been taking warfarin  Symptom monitoring: monitoring for bleeding signs and symptoms and monitoring for clotting signs and symptoms  Contact [phone]  with any changes, questions or concerns.     Plan made per [facility] anticoagulation protocol    Crys Simpson RN  Anticoagulation Clinic  12/11/2023    _______________________________________________________________________     Anticoagulation Episode Summary       Current INR goal:  2.0-3.0   TTR:  72.9% (1 y)   Target end date:  7/15/2036   Send INR reminders to:  ANTICOAG HOME MONITORING    Indications    Atrial fibrillation  permanent (H) [I48.21]             Comments:  [employer] home monitor. Managed by exception.             Anticoagulation Care Providers       Provider Role Specialty Phone  number    Devon Ball MD Referring Internal Medicine [phone]    Leti Brower NP Referring  [phone]

## 2024-01-02 ENCOUNTER — ANTICOAGULATION THERAPY VISIT (OUTPATIENT)
Dept: ANTICOAGULATION | Facility: CLINIC | Age: 89
End: 2024-01-02
Payer: MEDICARE

## 2024-01-02 DIAGNOSIS — I48.21 ATRIAL FIBRILLATION, PERMANENT (H): Primary | ICD-10-CM

## 2024-01-02 LAB — INR HOME MONITORING: 2.3 (ref 2–3)

## 2024-01-02 NOTE — PROGRESS NOTES
ANTICOAGULATION MANAGEMENT     Reba Calderon 88 year old female is on warfarin with therapeutic INR result. (Goal INR 2.0-3.0)    Recent labs: (last 7 days)     01/02/24  0000   INR 2.3       ASSESSMENT     Source(s): Chart Review and Patient/Caregiver Call     Warfarin doses taken: Warfarin taken as instructed  Diet: No new diet changes identified  Medication/supplement changes: None noted  New illness, injury, or hospitalization: No  Signs or symptoms of bleeding or clotting: No  Previous result: Subtherapeutic  Additional findings: None       PLAN     Recommended plan for no diet, medication or health factor changes affecting INR     Dosing Instructions: Continue your current warfarin dose with next INR in 1 week       Summary  As of 1/2/2024      Full warfarin instructions:  2.5 mg every Mon, Thu; 3.75 mg all other days   Next INR check:  1/9/2024               Sent [employer]Path message with dosing and follow up instructions    Patient to recheck with home meter    Education provided:   Please call back if any changes to your diet, medications or how you've been taking warfarin  Resume manage by exception with home monitor. Continue to submit INR results to home monitor company.You will only be called when your result is out of range. Please call and notify [facility] if new medication started, dose missed, signs or symptoms of bleeding or clotting, or a surgery/procedure is scheduled.    Plan made per [facility] anticoagulation protocol    Palak Velázquez RN  Anticoagulation Clinic  1/2/2024    _______________________________________________________________________     Anticoagulation Episode Summary       Current INR goal:  2.0-3.0   TTR:  69.9% (1 y)   Target end date:  7/15/2036   Send INR reminders to:  ANTICOAG HOME MONITORING    Indications    Atrial fibrillation  permanent (H) [I48.21]             Comments:  [employer] home monitor. Managed by exception.             Anticoagulation Care Providers       Provider Role  Specialty Phone number    Devon Ball MD Referring Internal Medicine [phone]    Leti Brower NP Referring  [phone]

## 2024-01-08 ENCOUNTER — DOCUMENTATION ONLY (OUTPATIENT)
Dept: ANTICOAGULATION | Facility: CLINIC | Age: 89
End: 2024-01-08
Payer: MEDICARE

## 2024-01-08 DIAGNOSIS — I48.21 ATRIAL FIBRILLATION, PERMANENT (H): Primary | ICD-10-CM

## 2024-01-08 LAB — INR HOME MONITORING: 2.4 (ref 2–3)

## 2024-01-08 NOTE — PROGRESS NOTES
ANTICOAGULATION  MANAGEMENT-Home Monitor Managed by Exception    Reba Calderon 88 year old female is on warfarin with therapeutic INR result. (Goal INR 2.0-3.0)    Recent labs: (last 7 days)     01/08/24  0000   INR 2.4       Previous INR was Therapeutic  Medication, diet, health changes since last INR:chart reviewed; none identified  Contacted within the last 12 weeks by phone on 1/2/24  Last [facility] referral date: 05/03/2023      PLAN     Reba was NOT contacted regarding therapeutic result today per home monitoring policy manage by exception agreement.   Current warfarin dose is to be continued:     Summary  As of 1/8/2024      Full warfarin instructions:  2.5 mg every Mon, Thu; 3.75 mg all other days   Next INR check:  1/15/2024             ?   Palak Velázquez RN  Anticoagulation Clinic  1/8/2024    _______________________________________________________________________     Anticoagulation Episode Summary       Current INR goal:  2.0-3.0   TTR:  69.9% (1 y)   Target end date:  7/15/2036   Send INR reminders to:  [facility] HOME MONITORING    Indications    Atrial fibrillation  permanent (H) [I48.21]             Comments:  [employer] home monitor. Managed by exception.             Anticoagulation Care Providers       Provider Role Specialty Phone number    Devon Ball MD Referring Internal Medicine [phone]    Leti Brower NP Referring  [phone]

## 2024-01-12 DIAGNOSIS — E11.628 TYPE 2 DIABETES MELLITUS WITH OTHER SKIN COMPLICATION, WITHOUT LONG-TERM CURRENT USE OF INSULIN (H): ICD-10-CM

## 2024-01-12 RX ORDER — CARVEDILOL 25 MG/1
TABLET, FILM COATED ORAL
Qty: 100 STRIP | Refills: 0 | Status: SHIPPED | OUTPATIENT
Start: 2024-01-12 | End: 2024-04-08

## 2024-01-22 ENCOUNTER — DOCUMENTATION ONLY (OUTPATIENT)
Dept: ANTICOAGULATION | Facility: CLINIC | Age: 89
End: 2024-01-22
Payer: MEDICARE

## 2024-01-22 DIAGNOSIS — I48.21 ATRIAL FIBRILLATION, PERMANENT (H): Primary | ICD-10-CM

## 2024-01-22 LAB — INR HOME MONITORING: 2 (ref 2–3)

## 2024-01-22 NOTE — PROGRESS NOTES
ANTICOAGULATION  MANAGEMENT-Home Monitor Managed by Exception    Reba Calderon 88 year old female is on warfarin with therapeutic INR result. (Goal INR 2.0-3.0)    Recent labs: (last 7 days)     01/22/24  0000   INR 2.0       Previous INR was Therapeutic  Medication, diet, health changes since last INR:chart reviewed; none identified  Contacted within the last 12 weeks by phone on 1/2/24  Last [facility] referral date: 05/03/2023      BELGICA     Reba was NOT contacted regarding therapeutic result today per home monitoring policy manage by exception agreement.   Current warfarin dose is to be continued:     Summary  As of 1/22/2024      Full warfarin instructions:  2.5 mg every Mon, Thu; 3.75 mg all other days   Next INR check:  2/5/2024             ?   Mignon Randall RN  Anticoagulation Clinic  1/22/2024    _______________________________________________________________________     Anticoagulation Episode Summary       Current INR goal:  2.0-3.0   TTR:  69.9% (1 y)   Target end date:  7/15/2036   Send INR reminders to:  [facility] MONITOR[facility]    Indications    Atrial fibrillation  permanent (H) [I48.21]             Comments:  [employer] home monitor. Managed by exception.             Anticoagulation Care Providers       Provider Role Specialty Phone number    Devon Ball MD Referring Internal Medicine [phone]    Leti Brower NP Referring  [phone]

## 2024-01-23 ENCOUNTER — OFFICE VISIT (OUTPATIENT)
Dept: INTERNAL MEDICINE | Facility: CLINIC | Age: 89
End: 2024-01-23
Payer: MEDICARE

## 2024-01-23 VITALS
RESPIRATION RATE: 16 BRPM | OXYGEN SATURATION: 94 % | BODY MASS INDEX: 31.48 KG/M2 | HEIGHT: 67 IN | HEART RATE: 81 BPM | SYSTOLIC BLOOD PRESSURE: 157 MMHG | TEMPERATURE: 97.7 F | DIASTOLIC BLOOD PRESSURE: 81 MMHG | WEIGHT: 200.6 LBS

## 2024-01-23 DIAGNOSIS — E78.2 MIXED HYPERLIPIDEMIA: ICD-10-CM

## 2024-01-23 DIAGNOSIS — I87.2 VENOUS INSUFFICIENCY OF RIGHT LOWER EXTREMITY: Primary | ICD-10-CM

## 2024-01-23 DIAGNOSIS — I48.21 ATRIAL FIBRILLATION, PERMANENT (H): ICD-10-CM

## 2024-01-23 DIAGNOSIS — M75.41 IMPINGEMENT SYNDROME OF RIGHT SHOULDER: ICD-10-CM

## 2024-01-23 DIAGNOSIS — M25.50 MULTIPLE JOINT PAIN: ICD-10-CM

## 2024-01-23 DIAGNOSIS — E11.42 TYPE 2 DIABETES MELLITUS WITH DIABETIC POLYNEUROPATHY, WITHOUT LONG-TERM CURRENT USE OF INSULIN (H): ICD-10-CM

## 2024-01-23 DIAGNOSIS — M79.89 LEG SWELLING: ICD-10-CM

## 2024-01-23 DIAGNOSIS — K21.9 GASTRO-ESOPHAGEAL REFLUX DISEASE WITHOUT ESOPHAGITIS: ICD-10-CM

## 2024-01-23 DIAGNOSIS — I50.32 CHRONIC DIASTOLIC HEART FAILURE (H): ICD-10-CM

## 2024-01-23 DIAGNOSIS — C91.10 CLL (CHRONIC LYMPHOCYTIC LEUKEMIA) (H): ICD-10-CM

## 2024-01-23 DIAGNOSIS — D69.2 SENILE PURPURA (H): ICD-10-CM

## 2024-01-23 LAB
ALBUMIN SERPL BCG-MCNC: 4.4 G/DL (ref 3.5–5.2)
ALP SERPL-CCNC: 64 U/L (ref 40–150)
ALT SERPL W P-5'-P-CCNC: 18 U/L (ref 0–50)
ANION GAP SERPL CALCULATED.3IONS-SCNC: 10 MMOL/L (ref 7–15)
AST SERPL W P-5'-P-CCNC: 26 U/L (ref 0–45)
BASOPHILS # BLD AUTO: 0 10E3/UL (ref 0–0.2)
BASOPHILS NFR BLD AUTO: 0 %
BILIRUB SERPL-MCNC: 0.6 MG/DL
BUN SERPL-MCNC: 21.2 MG/DL (ref 8–23)
CALCIUM SERPL-MCNC: 10.3 MG/DL (ref 8.8–10.2)
CHLORIDE SERPL-SCNC: 99 MMOL/L (ref 98–107)
CREAT SERPL-MCNC: 0.78 MG/DL (ref 0.51–0.95)
CRP SERPL-MCNC: <3 MG/L
D DIMER PPP FEU-MCNC: <0.27 UG/ML FEU (ref 0–0.5)
DEPRECATED HCO3 PLAS-SCNC: 34 MMOL/L (ref 22–29)
EGFRCR SERPLBLD CKD-EPI 2021: 73 ML/MIN/1.73M2
EOSINOPHIL # BLD AUTO: 0.1 10E3/UL (ref 0–0.7)
EOSINOPHIL NFR BLD AUTO: 2 %
ERYTHROCYTE [DISTWIDTH] IN BLOOD BY AUTOMATED COUNT: 17.1 % (ref 10–15)
ERYTHROCYTE [SEDIMENTATION RATE] IN BLOOD BY WESTERGREN METHOD: 17 MM/HR (ref 0–30)
GLUCOSE SERPL-MCNC: 138 MG/DL (ref 70–99)
HBA1C MFR BLD: 7.5 % (ref 0–5.6)
HCT VFR BLD AUTO: 40.5 % (ref 35–47)
HGB BLD-MCNC: 13.3 G/DL (ref 11.7–15.7)
IMM GRANULOCYTES # BLD: 0 10E3/UL
IMM GRANULOCYTES NFR BLD: 0 %
LYMPHOCYTES # BLD AUTO: 1.4 10E3/UL (ref 0.8–5.3)
LYMPHOCYTES NFR BLD AUTO: 17 %
MCH RBC QN AUTO: 30.6 PG (ref 26.5–33)
MCHC RBC AUTO-ENTMCNC: 32.8 G/DL (ref 31.5–36.5)
MCV RBC AUTO: 93 FL (ref 78–100)
MONOCYTES # BLD AUTO: 0.6 10E3/UL (ref 0–1.3)
MONOCYTES NFR BLD AUTO: 7 %
NEUTROPHILS # BLD AUTO: 6.2 10E3/UL (ref 1.6–8.3)
NEUTROPHILS NFR BLD AUTO: 74 %
NT-PROBNP SERPL-MCNC: 854 PG/ML (ref 0–1800)
PLATELET # BLD AUTO: 193 10E3/UL (ref 150–450)
POTASSIUM SERPL-SCNC: 4.1 MMOL/L (ref 3.4–5.3)
PROT SERPL-MCNC: 6.9 G/DL (ref 6.4–8.3)
RBC # BLD AUTO: 4.35 10E6/UL (ref 3.8–5.2)
SODIUM SERPL-SCNC: 143 MMOL/L (ref 135–145)
WBC # BLD AUTO: 8.3 10E3/UL (ref 4–11)

## 2024-01-23 PROCEDURE — 99214 OFFICE O/P EST MOD 30 MIN: CPT | Mod: 25 | Performed by: INTERNAL MEDICINE

## 2024-01-23 PROCEDURE — 83880 ASSAY OF NATRIURETIC PEPTIDE: CPT | Performed by: INTERNAL MEDICINE

## 2024-01-23 PROCEDURE — 83036 HEMOGLOBIN GLYCOSYLATED A1C: CPT | Performed by: INTERNAL MEDICINE

## 2024-01-23 PROCEDURE — 80053 COMPREHEN METABOLIC PANEL: CPT | Performed by: INTERNAL MEDICINE

## 2024-01-23 PROCEDURE — 20610 DRAIN/INJ JOINT/BURSA W/O US: CPT | Mod: RT | Performed by: INTERNAL MEDICINE

## 2024-01-23 PROCEDURE — 36415 COLL VENOUS BLD VENIPUNCTURE: CPT | Performed by: INTERNAL MEDICINE

## 2024-01-23 PROCEDURE — 86140 C-REACTIVE PROTEIN: CPT | Performed by: INTERNAL MEDICINE

## 2024-01-23 PROCEDURE — 85652 RBC SED RATE AUTOMATED: CPT | Performed by: INTERNAL MEDICINE

## 2024-01-23 PROCEDURE — 85025 COMPLETE CBC W/AUTO DIFF WBC: CPT | Performed by: INTERNAL MEDICINE

## 2024-01-23 PROCEDURE — 85379 FIBRIN DEGRADATION QUANT: CPT | Performed by: INTERNAL MEDICINE

## 2024-01-23 RX ORDER — TRIAMCINOLONE ACETONIDE 40 MG/ML
80 INJECTION, SUSPENSION INTRA-ARTICULAR; INTRAMUSCULAR ONCE
Status: COMPLETED | OUTPATIENT
Start: 2024-01-23 | End: 2024-01-23

## 2024-01-23 RX ORDER — PANTOPRAZOLE SODIUM 40 MG/1
40 TABLET, DELAYED RELEASE ORAL DAILY
Qty: 90 TABLET | Refills: 3 | Status: SHIPPED | OUTPATIENT
Start: 2024-01-23

## 2024-01-23 RX ORDER — ATORVASTATIN CALCIUM 10 MG/1
10 TABLET, FILM COATED ORAL EVERY EVENING
Qty: 90 TABLET | Refills: 3 | Status: SHIPPED | OUTPATIENT
Start: 2024-01-23 | End: 2024-03-26

## 2024-01-23 RX ADMIN — TRIAMCINOLONE ACETONIDE 80 MG: 40 INJECTION, SUSPENSION INTRA-ARTICULAR; INTRAMUSCULAR at 11:35

## 2024-01-23 ASSESSMENT — PAIN SCALES - GENERAL: PAINLEVEL: MILD PAIN (3)

## 2024-01-23 NOTE — PROGRESS NOTES
RIGHT SHOULDER CORTICOSTEROID INJECTION    Date/Time: 1/23/2024 11:34 AM    Performed by: Devon Ball MD  Authorized by: Devon Ball MD             Procedure:  Right subacromial corticosteroid injection.    Diagnosis:  Shoulder impingement syndrome.    Description of procedure:    Patient was given informed consent prior to proceeding with corticosteroid injection.  Patient agreed to proceed knowing risks and benefits.  A posterior approach was marked and prepped with alcohol.  Using a combination of 80 mg of Kenalog and 1 ml of lidocaine without epinephrine, the subacromial space was found using a 25G 1.5 inch needle.  The space was then injected and the fluid flowed easily.  Bandage applied and aftercare instructions given.  No immediate complications.

## 2024-01-23 NOTE — PROGRESS NOTES
Recent Labs   Lab Test 08/21/23  0908 05/22/23  1051    142   POTASSIUM 4.2 4.2   CHLORIDE 102 100   CO2 29 31*   ANIONGAP 12 11   * 185*   BUN 17.5 15.9   CR 0.79 0.78   GIORGI 9.4 10.3*      Wt Readings from Last 20 Encounters:   01/23/24 91 kg (200 lb 9.6 oz)   11/13/23 92.5 kg (203 lb 14.4 oz)   10/24/23 91.5 kg (201 lb 11.2 oz)   10/19/23 86.2 kg (190 lb)   09/12/23 88 kg (193 lb 14.4 oz)   08/21/23 90.5 kg (199 lb 8 oz)   05/22/23 86.9 kg (191 lb 9.6 oz)   05/11/23 88 kg (194 lb)   05/05/23 88 kg (194 lb)   04/27/23 88.6 kg (195 lb 4.8 oz)   02/02/23 88.9 kg (196 lb)   07/26/22 91.6 kg (202 lb)   07/19/22 92.1 kg (203 lb)   01/25/22 93.9 kg (207 lb)   07/15/21 93.9 kg (207 lb)   11/18/20 95.7 kg (211 lb)   01/03/20 96.6 kg (213 lb)   12/27/19 96.2 kg (212 lb 1.3 oz)   06/26/19 97.1 kg (214 lb 1.3 oz)   03/20/19 97.6 kg (215 lb 1.9 oz)     BP Readings from Last 20 Encounters:   01/23/24 (!) 157/81   11/13/23 130/64   10/24/23 136/78   10/19/23 (!) 160/77   09/12/23 138/72   08/21/23 (!) 152/70   05/22/23 130/76   05/11/23 115/74   05/05/23 122/60   04/27/23 (!) 153/81   02/02/23 125/71   07/26/22 124/78   07/19/22 122/74   01/25/22 134/70   07/15/21 112/66 11/18/20 138/88   01/03/20 122/62   12/27/19 (!) 152/74   07/23/15 147/86   01/02/15 131/66      Pulse Readings from Last 20 Encounters:   01/23/24 81   11/13/23 84   10/24/23 92   10/19/23 68   09/12/23 89   08/21/23 77   05/22/23 77   05/11/23 71   05/05/23 87   04/27/23 81   02/02/23 64   07/26/22 81   07/19/22 86   01/25/22 80   07/15/21 61   11/18/20 96   01/03/20 63   12/27/19 87   07/23/15 67   01/02/15 65     SpO2 Readings from Last 20 Encounters:   01/23/24 94%   11/13/23 91%   10/24/23 96%   10/19/23 94%   09/12/23 94%   08/21/23 96%   05/22/23 95%   05/11/23 96%   05/05/23 95%   04/27/23 94%   02/02/23 95%   07/26/22 95%   07/19/22 96%   01/25/22 97%   07/15/21 97%   01/03/20 98%   12/27/19 97%   07/23/15 95%   05/01/14 98%   04/03/14  95%         Lab Results   Component Value Date    A1C 7.8 (H) 08/21/2023    A1C 7.6 (H) 02/02/2023    A1C 7.2 (H) 07/19/2022    A1C 7.4 (H) 01/25/2022    A1C 7.8 (H) 06/26/2019    A1C 6.6 (H) 03/20/2019    A1C 7.4 (H) 03/08/2018    A1C 6.9 (H) 05/24/2017

## 2024-01-23 NOTE — PROGRESS NOTES
[facility] - Primary Care Specialists    Comprehensive and complex medical care - Chronic disease management - Shared decision making - Care coordination - Compassionate care    Patient advocacy - Rational deprescribing - Minimally disruptive medicine - Ethical focus - Customized care         Date of Service: 1/23/2024  Primary Provider: Devon Ball    Patient Care Team:  Devon Ball MD as PCP - General (Internal Medicine)  Louis Jacobsen MD as MD (Ophthalmology)  DANGELO Pagan MD as MD  Devon Ball MD as Assigned PCP  Yancy Vinson MD as MD (Orthopaedic Surgery)          Patient's Pharmacy:    [facility]/pharmacy #[id number] - [facility], [place] - [address]  [address] [place] [address]  Phone: [phone] Fax: [phone]     Patient's Contacts:  Name Home Phone Work Phone Mobile Phone Relationship Lgl XIMENA Beckford [phone]   Spouse    JYOTHI SHEIKH   [phone] Daughter      Patient's Insurance:    Payor: MEDICARE / Plan: MEDICARE / Product Type: Medicare /            Active Problem List:  Problem List as of 1/23/2024 Reviewed: 1/23/2024 11:32 AM by Devon Ball MD         High    Atrial fibrillation, permanent (H)    CLL (chronic lymphocytic leukemia) (H)    Nonsmoker       Medium    Type 2 diabetes mellitus with diabetic polyneuropathy, without long-term current use of insulin (H)    Cardiac pacemaker in situ    Primary hypertension    Chronic diastolic heart failure (H)    Actinomycosis    H/O atrioventricular jerzy ablation    CHB (complete heart block) (H)    Anticoagulation monitoring, INR range 2-3    Choledocholithiasis, RECURRENT    Primary osteoarthritis of right ankle       Low    Peripheral sensory neuropathy    MATILDE (obstructive sleep apnea)    Perirectal abscess    Coagulopathy (H) - due to warfarin    Senile purpura (H24)        Current Outpatient Medications   Medication Instructions    atorvastatin (LIPITOR) 10 mg, Oral, EVERY  EVENING    biotin 2,500 mcg, Oral    Cholecalciferol (VITAMIN D3 PO) 2,000 Units, Oral, DAILY    ciclopirox (PENLAC) 8 % external solution Apply to adjacent skin and affected nails daily.  Remove with alcohol every 7 days, then repeat.    CONTOUR TEST test strip USE 1 STRIP TO TEST BLOOD SUGAR DAILY.    FOLIC ACID PO 1 mg, DAILY    furosemide (LASIX) 40 MG tablet TAKE 2 TABLETS BY MOUTH EVERY MORNING    glucosamine-chondroitinoitin 500-400 MG tablet 1 tablet, Oral    magnesium chloride 64 mg, Oral    metFORMIN (GLUCOPHAGE) 500 mg, Oral, 2 TIMES DAILY WITH MEALS    methylPREDNISolone (MEDROL DOSEPAK) 4 MG tablet therapy pack Follow Package Directions    nitroGLYcerin (NITROSTAT) 0.4 mg, Sublingual    pantoprazole (PROTONIX) 40 mg, Oral, DAILY    polyethylene glycol (MIRALAX) 17 GM/Dose powder MIX 1 CAPFUL (17 GRAMS) IN LIQUID AND DRINK BY MOUTH DAILY. (OTC NC)    potassium chloride ER (K-TAB/KLOR-CON) 10 MEQ CR tablet TAKE 1 TABLET BY MOUTH EVERY DAY    UNABLE TO FIND MEDICATION NAME: Hair skin and nails   2500    vitamin B-12 (CYANOCOBALAMIN) 1,000 mcg, Oral    warfarin ANTICOAGULANT (COUMADIN) 3.75 mg, Oral, DAILY        Social History     Social History Narrative           Subjective:     Reba Calderon is a 88 year old female who comes in today for:    Chief Complaint   Patient presents with    Shoulder     Possible cortisone shot    Leg Swelling    Hip Pain     Painful when sleeping           1/23/2024     8:07 AM   Additional Questions   Roomed by KEILY Stover   Accompanied by Daughter and      Patient comes in today with her daughter Nicole and her .    She has had some newer right lower extremity edema over the last month.  It is worse on the right than the left.  It seems to go above her stocking.  She denies any increased shortness of breath.  She denies any additional symptoms such as redness or superficial clot.  Her right knee is generally okay.  There is some issues here.    She  "has not had any fevers or chills.    Her left hip is doing better after corticosteroid injection.  She also has some back issues where this radiates down her legs and she still has some left hip pain.  We talked about further imaging but she is okay with following it at this time as she is getting around a lot better than in November.    She has had some increasing problems with her right shoulder.  It is harder to reach at times.  It is harder to abduct at times.  Sleeping on it can make it worse.  She would like to consider shot for this if needed.    She is getting around better than she was previously.    We reviewed her other issues noted in the assessment but not specifically addressed in the HPI above.     Objective:     Wt Readings from Last 3 Encounters:   01/23/24 91 kg (200 lb 9.6 oz)   11/13/23 92.5 kg (203 lb 14.4 oz)   10/24/23 91.5 kg (201 lb 11.2 oz)     BP Readings from Last 3 Encounters:   01/23/24 (!) 157/81   11/13/23 130/64   10/24/23 136/78     BP (!) 157/81 (BP Location: Right arm, Patient Position: Sitting, Cuff Size: Adult Regular)   Pulse 81   Temp 97.7  F (36.5  C) (Oral)   Resp 16   Ht 1.689 m (5' 6.5\")   Wt 91 kg (200 lb 9.6 oz)   LMP  (LMP Unknown)   SpO2 94%   BMI 31.89 kg/m     The patient is comfortable, no acute distress.  Mood good.  Insight fair to good.  Eyes are nonicteric.  Neck is supple without mass.  No cervical adenopathy.  No thyromegaly. Heart irregular rate and rhythm.  Lungs clear to auscultation bilaterally.  Respiratory effort is good.  Abdomen soft and nontender.  Right leg shows 1-2+ edema with varicose vein changes.  Left leg is good but might have trace edema.  Exam also shows right rotator cuff impingement on exam without rotator cuff weakness.    Diagnostics:     Orders Only on 01/22/2024   Component Date Value Ref Range Status    INR HOME MONITORING 01/22/2024 2.0  2.000 - 3.000 Final       Results for orders placed or performed in visit on 01/23/24 "   Erythrocyte sedimentation rate auto     Status: Normal   Result Value Ref Range    Erythrocyte Sedimentation Rate 17 0 - 30 mm/hr   Hemoglobin A1c     Status: Abnormal   Result Value Ref Range    Hemoglobin A1C 7.5 (H) 0.0 - 5.6 %   CBC with platelets and differential     Status: Abnormal   Result Value Ref Range    WBC Count 8.3 4.0 - 11.0 10e3/uL    RBC Count 4.35 3.80 - 5.20 10e6/uL    Hemoglobin 13.3 11.7 - 15.7 g/dL    Hematocrit 40.5 35.0 - 47.0 %    MCV 93 78 - 100 fL    MCH 30.6 26.5 - 33.0 pg    MCHC 32.8 31.5 - 36.5 g/dL    RDW 17.1 (H) 10.0 - 15.0 %    Platelet Count 193 150 - 450 10e3/uL    % Neutrophils 74 %    % Lymphocytes 17 %    % Monocytes 7 %    % Eosinophils 2 %    % Basophils 0 %    % Immature Granulocytes 0 %    Absolute Neutrophils 6.2 1.6 - 8.3 10e3/uL    Absolute Lymphocytes 1.4 0.8 - 5.3 10e3/uL    Absolute Monocytes 0.6 0.0 - 1.3 10e3/uL    Absolute Eosinophils 0.1 0.0 - 0.7 10e3/uL    Absolute Basophils 0.0 0.0 - 0.2 10e3/uL    Absolute Immature Granulocytes 0.0 <=0.4 10e3/uL   CBC with Platelets & Differential     Status: Abnormal    Narrative    The following orders were created for panel order CBC with Platelets & Differential.  Procedure                               Abnormality         Status                     ---------                               -----------         ------                     CBC with platelets and d...[[id number]]  Abnormal            Final result                 Please view results for these tests on the individual orders.        Assessment and Plan:     1. Venous insufficiency of right lower extremity  Consider wrapping this and elevating.  Might use an Ace bandage to do this.  Used and discuss other options.    2. Type 2 diabetes mellitus with diabetic polyneuropathy, without long-term current use of insulin (H)  Generally doing well without issue.  Continue current medications.  Continue to monitor.    - atorvastatin (LIPITOR) 10 MG tablet; Take 1 tablet  (10 mg) by mouth every evening  Dispense: 90 tablet; Refill: 3  - Comprehensive metabolic panel; Future  - Hemoglobin A1c; Future  - Comprehensive metabolic panel  - Hemoglobin A1c    3. Mixed hyperlipidemia    4. Gastro-esophageal reflux disease without esophagitis    - pantoprazole (PROTONIX) 40 MG EC tablet; Take 1 tablet (40 mg) by mouth daily  Dispense: 90 tablet; Refill: 3    5. CLL (chronic lymphocytic leukemia) (H)  Her blood counts have been stable.  Doubt that this has changed.  Continue to monitor.    - CBC with Platelets & Differential; Future  - CBC with Platelets & Differential    6. Chronic diastolic heart failure (H)  Continue current medications at this time.  Follow-up sooner if issues.  Continue to monitor.    - N terminal pro BNP outpatient; Future  - N terminal pro BNP outpatient    7. Atrial fibrillation, permanent (H)  No signs of worsening.  Continue to monitor.    8. Senile purpura (H24)  No signs of worsening.  Continue to monitor.    9. Leg swelling  Consistent with venous insufficiency.  Consider wraps's like Ace wraps.    10. Multiple joint pain  Consider further workup for the back and hip as necessary.    - Erythrocyte sedimentation rate auto; Future  - CRP inflammation; Future  - Erythrocyte sedimentation rate auto  - CRP inflammation    11. Impingement syndrome of right shoulder  Corticosteroid injection done to the right shoulder today with good tolerance.    - triamcinolone (KENALOG-40) injection 80 mg  - DRAIN/INJECT LARGE JOINT/BURSA  - RIGHT SHOULDER CORTICOSTEROID INJECTION       Continue current medications otherwise.  Follow up sooner if issues.          Devon Ball MD  General Internal Medicine  [facility]      Return in about 3 weeks (around 2/13/2024) for annual wellness visit.     Future Appointments   Date Time Provider Department Center   2/13/2024  8:30 AM Devon Ball MD [facility]         HCC issues resolved at this visit.

## 2024-01-23 NOTE — PATIENT INSTRUCTIONS
Future Appointments   Date Time Provider Department Center   2/13/2024  8:30 AM [facility]

## 2024-02-07 ENCOUNTER — MYC MEDICAL ADVICE (OUTPATIENT)
Dept: ANTICOAGULATION | Facility: CLINIC | Age: 89
End: 2024-02-07
Payer: MEDICARE

## 2024-02-07 LAB — INR HOME MONITORING: 2 (ref 2–3)

## 2024-02-08 ENCOUNTER — DOCUMENTATION ONLY (OUTPATIENT)
Dept: ANTICOAGULATION | Facility: CLINIC | Age: 89
End: 2024-02-08
Payer: MEDICARE

## 2024-02-08 DIAGNOSIS — I48.21 ATRIAL FIBRILLATION, PERMANENT (H): Primary | Chronic | ICD-10-CM

## 2024-02-08 NOTE — PROGRESS NOTES
ANTICOAGULATION  MANAGEMENT-Home Monitor Managed by Exception    Reba AKI Calderon 88 year old female is on warfarin with therapeutic INR result. (Goal INR 2.0-3.0)    Recent labs: (last 7 days)     02/07/24  0000   INR 2.0       Previous INR was Therapeutic  Medication, diet, health changes since last INR:Yes: 1/23/24 office visit received a shoulder injection (steroid), but not anticipated to affect INR  Contacted within the last 12 weeks by phone on 1/2/24   Last [facility] referral date: 05/03/2023      BELGICA     Reba was NOT contacted regarding therapeutic result today per home monitoring policy manage by exception agreement.   Current warfarin dose is to be continued:     Summary  As of 2/8/2024      Full warfarin instructions:  2.5 mg every Mon, Thu; 3.75 mg all other days   Next INR check:  2/22/2024             ?   Carly Amador RN  Anticoagulation Clinic  2/8/2024    _______________________________________________________________________     Anticoagulation Episode Summary       Current INR goal:  2.0-3.0   TTR:  69.8% (1 y)   Target end date:  7/15/2036   Send INR reminders to:  [facility]IT[facility]    Indications    Atrial fibrillation  permanent (H) [I48.21]             Comments:  [employer] home monitor. Managed by exception.             Anticoagulation Care Providers       Provider Role Specialty Phone number    Devon Ball MD Referring Internal Medicine [phone]    Leti Brower NP Referring  [phone]

## 2024-02-13 ENCOUNTER — OFFICE VISIT (OUTPATIENT)
Dept: INTERNAL MEDICINE | Facility: CLINIC | Age: 89
End: 2024-02-13
Payer: MEDICARE

## 2024-02-13 VITALS
WEIGHT: 197 LBS | BODY MASS INDEX: 30.92 KG/M2 | OXYGEN SATURATION: 95 % | HEART RATE: 68 BPM | HEIGHT: 67 IN | DIASTOLIC BLOOD PRESSURE: 68 MMHG | SYSTOLIC BLOOD PRESSURE: 138 MMHG | RESPIRATION RATE: 18 BRPM

## 2024-02-13 DIAGNOSIS — E11.42 TYPE 2 DIABETES MELLITUS WITH DIABETIC POLYNEUROPATHY, WITHOUT LONG-TERM CURRENT USE OF INSULIN (H): ICD-10-CM

## 2024-02-13 DIAGNOSIS — C91.10 CLL (CHRONIC LYMPHOCYTIC LEUKEMIA) (H): Chronic | ICD-10-CM

## 2024-02-13 DIAGNOSIS — R26.2 DIFFICULTY WALKING: ICD-10-CM

## 2024-02-13 DIAGNOSIS — I87.2 VENOUS INSUFFICIENCY OF RIGHT LOWER EXTREMITY: ICD-10-CM

## 2024-02-13 DIAGNOSIS — Z95.0 CARDIAC PACEMAKER IN SITU: Chronic | ICD-10-CM

## 2024-02-13 DIAGNOSIS — I48.21 ATRIAL FIBRILLATION, PERMANENT (H): Chronic | ICD-10-CM

## 2024-02-13 DIAGNOSIS — Z00.00 MEDICARE ANNUAL WELLNESS VISIT, SUBSEQUENT: Primary | ICD-10-CM

## 2024-02-13 DIAGNOSIS — I10 PRIMARY HYPERTENSION: Chronic | ICD-10-CM

## 2024-02-13 DIAGNOSIS — M79.605 LOW BACK PAIN RADIATING TO LEFT LEG: ICD-10-CM

## 2024-02-13 DIAGNOSIS — M25.552 HIP PAIN, LEFT: ICD-10-CM

## 2024-02-13 DIAGNOSIS — M54.50 LOW BACK PAIN RADIATING TO LEFT LEG: ICD-10-CM

## 2024-02-13 PROCEDURE — G0439 PPPS, SUBSEQ VISIT: HCPCS | Performed by: INTERNAL MEDICINE

## 2024-02-13 PROCEDURE — 99214 OFFICE O/P EST MOD 30 MIN: CPT | Mod: 25 | Performed by: INTERNAL MEDICINE

## 2024-02-13 RX ORDER — METHYLPREDNISOLONE 4 MG
TABLET, DOSE PACK ORAL
Qty: 21 TABLET | Refills: 0 | Status: SHIPPED | OUTPATIENT
Start: 2024-02-13

## 2024-02-13 RX ORDER — ACETAMINOPHEN AND CODEINE PHOSPHATE 300; 30 MG/1; MG/1
1 TABLET ORAL EVERY 6 HOURS PRN
Qty: 30 TABLET | Refills: 0 | Status: SHIPPED | OUTPATIENT
Start: 2024-02-13 | End: 2024-06-10

## 2024-02-13 SDOH — HEALTH STABILITY: PHYSICAL HEALTH: ON AVERAGE, HOW MANY MINUTES DO YOU ENGAGE IN EXERCISE AT THIS LEVEL?: 0 MIN

## 2024-02-13 SDOH — HEALTH STABILITY: PHYSICAL HEALTH: ON AVERAGE, HOW MANY DAYS PER WEEK DO YOU ENGAGE IN MODERATE TO STRENUOUS EXERCISE (LIKE A BRISK WALK)?: 0 DAYS

## 2024-02-13 ASSESSMENT — SOCIAL DETERMINANTS OF HEALTH (SDOH): HOW OFTEN DO YOU GET TOGETHER WITH FRIENDS OR RELATIVES?: TWICE A WEEK

## 2024-02-13 NOTE — PROGRESS NOTES
[facility] - Primary [facility] Specialists    Comprehensive and complex medical care - Chronic disease management - Shared decision making - Care coordination - Compassionate care    Patient advocacy - Rational deprescribing - Minimally disruptive medicine - Ethical focus - Customized care         Date of Service: 2/13/2024  Primary Provider: Devon Ball    Patient Care Team:  Devon Ball MD as PCP - General (Internal Medicine)  Louis Jacobsen MD as MD (Ophthalmology)  DANGELO Pagan MD as MD  Devon Ball MD as Assigned PCP  Yancy Vinson MD as MD (Orthopaedic Surgery)          Patient's Pharmacy:    [facility]/pharmacy #[id number] - [facility], [place] - [address]  [address] [facility] [place] [address]  Phone: [phone] Fax: [phone]     Patient's Contacts:  Name Home Phone Work Phone Mobile Phone Relationship Lgl XIMENA Beckford [phone]   Spouse    JYOTHI SHEIKH   [phone] Daughter      Patient's Insurance:    Payor: MEDICARE / Plan: MEDICARE / Product Type: Medicare /      Subjective:     History of present illness:    Reba Calderon is an 88 year old here for an annual wellness visit.    The issues she would like to address at today's visit include the following:    Chief Complaint   Patient presents with    Physical     AWV    Musculoskeletal Problem     My hip pain keeping me up at night.            2/13/2024     8:30 AM   Additional Questions   Roomed by Cr Sky   Accompanied by  and Daughter     In for annual wellness visit and other issues.    Reviewed her back and hip pain mainly at this time.    Has been dealing with these issues since July 2023 and getting worse again in the last month.  Outside x-rays already done at [facility].    Left back pain radiates with walking from back and down the side of the left leg to the calf.  No numbness at this time.  Has significant limitation in walking due to the pain.    Left hip also an issue in  the joint and at the trochanteric bursa.  Difficult to sleep 2-4 hours at most.  Cannot put on shoes easily.  Difficult to walk with this as well.  Has had corticosteroid injection of the hip in the past with short term help.    Right shoulder still bothers at this time.    Recent blood work reviewed.    When in the emergency room (ER) in October the medrol and acetaminophen (Tylenol) #3 seemed to give her some relief.    We reviewed her other issues noted in the assessment but not specifically addressed in the HPI above.           Active Problem List:  Problem List as of 2/13/2024 Reviewed: 1/23/2024 11:32 AM by Devon Ball MD         High    Atrial fibrillation, permanent (H)    CLL (chronic lymphocytic leukemia) (H)    Nonsmoker       Medium    Type 2 diabetes mellitus with diabetic polyneuropathy, without long-term current use of insulin (H)    Cardiac pacemaker in situ    Primary hypertension    Chronic diastolic heart failure (H)    Actinomycosis    H/O atrioventricular jerzy ablation    CHB (complete heart block) (H)    Anticoagulation monitoring, INR range 2-3    Choledocholithiasis, RECURRENT    Primary osteoarthritis of right ankle       Low    Peripheral sensory neuropathy    MATILDE (obstructive sleep apnea)    Perirectal abscess    Coagulopathy (H) - due to warfarin    Senile purpura (H24)        Past Medical History:   Diagnosis Date    Carotid stenosis, asymptomatic     Choledocholithiasis, RECURRENT     CLL (chronic lymphocytic leukemia) (H)     DMII (diabetes mellitus, type 2) (H)     GERD (gastroesophageal reflux disease)     Hyperlipidemia     Hypertension     Nonsmoker     Pacemaker     Permanent atrial fibrillation (H)       Past Surgical History:   Procedure Laterality Date    ARTHROSCOPY KNEE      BIOPSY BREAST      CATARACT EXTRACTION      CHOLECYSTECTOMY  08/24/2013    D & C      ERCP W/ SPHINCTEROTOMY AND BALLOON DILATION  11/2017, 10/27/2014    H ABLATION AV NODE      H ABLATION FOCAL AFIB       also for Atrial Flutter    HEMANGIOMA EXCISION  03/29/2019    Nasal    IMPLANT PACEMAKER      INCISIONAL BREAST BIOPSY      IR LYMPH NODE BIOPSY  12/15/2014    LASER YAG CAPSULOTOMY Right 07/23/2015    Procedure: LASER YAG CAPSULOTOMY;  Surgeon: Louis Jacobsen MD;  Location: [facility]    NASAL ENDOSCOPY  03/29/2019    NOSE SURGERY  03/29/2019    nasal mucosal flap reconstruction    PHACOEMULSIFICATION CLEAR CORNEA WITH TORIC INTRAOCULAR LENS IMPLANT  04/03/2014    Procedure: PHACOEMULSIFICATION CLEAR CORNEA WITH TORIC INTRAOCULAR LENS IMPLANT;  RIGHT PHACOEMULSIFICATION CLEAR CORNEA WITH TORIC INTRAOCULAR LENS IMPLANT  ;  Surgeon: Louis Jacobsen MD;  Location: [facility]    PHACOEMULSIFICATION CLEAR CORNEA WITH TORIC INTRAOCULAR LENS IMPLANT  05/01/2014    Procedure: PHACOEMULSIFICATION CLEAR CORNEA WITH TORIC INTRAOCULAR LENS IMPLANT;  Surgeon: Louis Jacobsen MD;  Location: [facility]    RECTOCELE REPAIR      CYSTOCELE AS WELL    TONSILLECTOMY        No family history on file.   Family history is otherwise noncontributory.    Social History     Occupational History    Not on file   Tobacco Use    Smoking status: Never    Smokeless tobacco: Never   Vaping Use    Vaping Use: Never used   Substance and Sexual Activity    Alcohol use: Yes     Comment: Alcoholic Drinks/day: occasional    Drug use: No    Sexual activity: Not on file      Social History     Social History Narrative          Current Outpatient Medications   Medication Instructions    acetaminophen-codeine (TYLENOL #3) 300-30 MG per tablet 1 tablet, Oral, EVERY 6 HOURS PRN    atorvastatin (LIPITOR) 10 mg, Oral, EVERY EVENING    biotin 2,500 mcg, Oral    Cholecalciferol (VITAMIN D3 PO) 2,000 Units, Oral, DAILY    ciclopirox (PENLAC) 8 % external solution Apply to adjacent skin and affected nails daily.  Remove with alcohol every 7 days, then repeat.    CONTOUR TEST test strip USE 1 STRIP TO TEST BLOOD SUGAR DAILY.    FOLIC ACID PO 1 mg, DAILY     furosemide (LASIX) 40 MG tablet TAKE 2 TABLETS BY MOUTH EVERY MORNING    glucosamine-chondroitinoitin 500-400 MG tablet 1 tablet, Oral    magnesium chloride 64 mg, Oral    metFORMIN (GLUCOPHAGE) 500 mg, Oral, 2 TIMES DAILY WITH MEALS    methylPREDNISolone (MEDROL DOSEPAK) 4 MG tablet therapy pack Follow Package Directions    methylPREDNISolone (MEDROL DOSEPAK) 4 MG tablet therapy pack Follow Package Directions    nitroGLYcerin (NITROSTAT) 0.4 mg, Sublingual    pantoprazole (PROTONIX) 40 mg, Oral, DAILY    polyethylene glycol (MIRALAX) 17 GM/Dose powder MIX 1 CAPFUL (17 GRAMS) IN LIQUID AND DRINK BY MOUTH DAILY. (OTC NC)    potassium chloride ER (K-TAB/KLOR-CON) 10 MEQ CR tablet TAKE 1 TABLET BY MOUTH EVERY DAY    UNABLE TO FIND MEDICATION NAME: Hair skin and nails   2500    vitamin B-12 (CYANOCOBALAMIN) 1,000 mcg, Oral    warfarin ANTICOAGULANT (COUMADIN) 3.75 mg, Oral, DAILY      Allergies: Amiodarone, Oxycodone, Atenolol, Beta adrenergic blockers, Demerol [meperidine], Percocet [oxycodone-acetaminophen], Pioglitazone, and Toprol xl [metoprolol]     Immunization History   Administered Date(s) Administered    COVID-19 12+ (2023-24) (Pfizer) 10/24/2023    COVID-19 Bivalent 12+ (Pfizer) 09/23/2022    COVID-19 MONOVALENT 12+ (Pfizer) 02/24/2021, 03/17/2021, 09/29/2021    COVID-19 Monovalent 12+ (Pfizer 2022) 04/13/2022    Flu, Unspecified 10/11/2018, 11/26/2019, 10/01/2020    Influenza (High Dose) 3 valent vaccine 10/29/2014, 11/07/2015, 11/05/2016, 11/06/2017, 10/11/2018, 11/26/2019, 10/07/2020    Influenza (IIV3) PF 09/30/2009, 11/23/2010, 10/15/2012    Influenza Vaccine 65+ (FLUAD) 09/23/2022    Influenza Vaccine 65+ (Fluzone HD) 10/07/2020, 10/13/2021, 09/12/2023    Pneumococcal 23 valent 01/01/1998, 08/20/2008, 11/14/2008, 08/26/2013    Zoster recombinant adjuvanted (SHINGRIX) 11/30/2019, 04/21/2021    Zoster vaccine, live 09/01/2013      Objective:     Wt Readings from Last 3 Encounters:   02/13/24 89.4 kg (197  "lb)   01/23/24 91 kg (200 lb 9.6 oz)   11/13/23 92.5 kg (203 lb 14.4 oz)     BP Readings from Last 3 Encounters:   02/13/24 138/68   01/23/24 (!) 157/81   11/13/23 130/64       PHYSICAL EXAM  /68 (BP Location: Right arm, Patient Position: Sitting, Cuff Size: Adult Regular)   Pulse 68   Resp 18   Ht 1.689 m (5' 6.5\")   Wt 89.4 kg (197 lb)   LMP  (LMP Unknown)   SpO2 95%   BMI 31.32 kg/m     The patient is mildly uncomfortable, no acute distress.  Difficulty ambulating.  Mood good.  Insight is good.  No skin lesions or nodules of concern.  Ears clear.  Eyes are nonicteric.  Pupils equal and reactive.  Throat is clear.  Neck is supple without mass, no thyromegaly. No cervical or epitrochlear adenopathy.  No axillary lymph nodes. Heart regular rate and rhythm.  Lungs clear to auscultation bilaterally.  Respiratory effort good.  Abdomen soft and nontender.  No hepatosplenomegaly.  Extremities show 1-2+ left lower extremity and trace-1+ right lower extremity edema. There is trochanteric bursitis on exam.  Justin's maneuver is positive on the left referable to the trochanteric bursa.  Decreased passive range of motion (PROM) in the hip.       Diagnostics:     Orders Only on 02/07/2024   Component Date Value Ref Range Status    INR HOME MONITORING 02/07/2024 2.0  2.000 - 3.000 Final       No results found for any visits on 02/13/24.    Assessment:     1. Medicare annual wellness visit, subsequent    2. Type 2 diabetes mellitus with diabetic polyneuropathy, without long-term current use of insulin (H)    3. Hip pain, left    4. Low back pain radiating to left leg    5. Difficulty walking    6. CLL (chronic lymphocytic leukemia) (H)    7. Atrial fibrillation, permanent (H)    8. Cardiac pacemaker in situ    9. Primary hypertension    10. Venous insufficiency of right lower extremity         Plan:     MRI scans of the hip and the back.  Failure of conservative management, walking difficulty and worsening " symptoms.  Recent blood work reviewed.  Determine follow up based on results.  Can see orthopedics if issues need to be pursued more.  Acetaminophen (Tylenol) #3 and Medrol given.  Continue current medications  Follow up sooner if issues.    Orders Placed This Encounter   Procedures    MR Lumbar Spine w/o Contrast    MR Hip Left w/o Contrast        A personalized health plan based on the identified health risks was provided to the patient on the AVS.       Devon Ball MD  General Internal Medicine  [facility]      Return in about 1 month (around 3/13/2024) for visit and blood work.     No future appointments.      Health Maintenance   Topic Date Due    ANNUAL REVIEW OF  ORDERS  Never done    RSV VACCINE (Pregnancy & 60+) (1 - 1-dose 60+ series) 11/13/2024 (Originally 12/1/1995)    A1C  07/23/2024    BMP  07/23/2024    DIABETIC FOOT EXAM  09/12/2024    EYE EXAM  10/06/2024    ALT  01/23/2025    CBC  01/23/2025    MEDICARE ANNUAL WELLNESS VISIT  02/13/2025    FALL RISK ASSESSMENT  02/13/2025    HF ACTION PLAN  02/14/2027    LIPID  02/02/2028    ADVANCE CARE PLANNING  02/06/2028    TSH W/FREE T4 REFLEX  Completed    PHQ-2 (once per calendar year)  Completed    INFLUENZA VACCINE  Completed    ZOSTER IMMUNIZATION  Completed    COVID-19 Vaccine  Completed    IPV IMMUNIZATION  Aged Out    HPV IMMUNIZATION  Aged Out    MENINGITIS IMMUNIZATION  Aged Out    RSV MONOCLONAL ANTIBODY  Aged Out    MICROALBUMIN  Discontinued    Pneumococcal Vaccine: 65+ Years  Discontinued    DTAP/TDAP/TD IMMUNIZATION  Discontinued        ______________________________________________________________________     Additional required elements:        2/13/2024     8:34 AM   MINI COG   Clock Draw Score 0 Abnormal   3 Item Recall 1 object recalled   Mini Cog Total Score 1        PHQ-2 Score:         2/13/2024     8:30 AM 1/23/2024     8:04 AM   PHQ-2 ( 1999 Pfizer)   Q1: Little interest or pleasure in doing things 0  0   Q2: Feeling down, depressed or hopeless 0 0   PHQ-2 Score 0 0   Q1: Little interest or pleasure in doing things Not at all Not at all   Q2: Feeling down, depressed or hopeless Not at all Not at all   PHQ-2 Score 0 0        Advanced care planning reviewed.        2/13/2024   Fall Risk   Fallen 2 or more times in the past year? No   Trouble with walking or balance? Yes         Last vision screening (if done):         No data to display                   I have reviewed Opioid Use Disorder and Substance Use Disorder risk factors and made any needed referrals.  This may not apply to this individual patient, but this documentation is required by Medicare.

## 2024-02-13 NOTE — COMMUNITY RESOURCES LIST (ENGLISH)
02/13/2024   [facility] [employer]  N/A  For questions about this resource list or additional care needs, please contact your primary care clinic or care manager.  Phone: [phone]   Email: N/A   Address: [address] [place] [id number]   Hours: N/A        Exercise and Recreation       Gym or workout facility  1  [employer] Distance: 2.85 miles      In-Person   [address] [place] [address]  Language: English  Hours: Mon - Sun Open 24 Hours  Fees: Insurance, Self Pay, Sliding Fee   Phone: [phone] Email: [email or web address] Website: [email or web address]     2  [facility] Distance: 2.88 miles      In-Person   [address] [place] [address]  Language: English  Hours: Mon - Thu 9:00 AM - 9:00 PM , Fri 9:00 AM - 6:00 PM  Fees: Free, Self Pay   Phone: [phone]) [phone] Email: [email or web address]. Website: [email or web address]/facilities/[email or web address]          Important Numbers & Websites       Emergency Services   911  [facility] Services   311  Poison Control   (290) 738-7777  Suicide Prevention Lifeline   (709) 398-3408 (TALK)  Child Abuse Hotline   (818) 891-6189 (4-A-Child)  Sexual Assault Hotline   (858) 357-2996 (HOPE)  National Runaway Safeline   (774) 499-4190 (RUNAWAY)  All-Options Talkline   (281) 930-1123  Substance Abuse Referral   (686) 803-1390 (HELP)

## 2024-02-15 NOTE — PATIENT INSTRUCTIONS
Preventing Falls: Care Instructions  Injuries and health problems such as trouble walking or poor eyesight can increase your risk of falling. So can some medicines. But there are things you can do to help prevent falls. You can exercise to get stronger. You can also arrange your home to make it safer.    Talk to your doctor about the medicines you take. Ask if any of them increase the risk of falls and whether they can be changed or stopped.   Try to exercise regularly. It can help improve your strength and balance. This can help lower your risk of falling.     Practice fall safety and prevention.    Wear low-heeled shoes that fit well and give your feet good support. Talk to your doctor if you have foot problems that make this hard.  Carry a cellphone or wear a medical alert device that you can use to call for help.  Use stepladders instead of chairs to reach high objects. Don't climb if you're at risk for falls. Ask for help, if needed.  Wear the correct eyeglasses, if you need them.    Make your home safer.    Remove rugs, cords, clutter, and furniture from walkways.  Keep your house well lit. Use night-lights in hallways and bathrooms.  Install and use sturdy handrails on stairways.  Wear nonskid footwear, even inside. Don't walk barefoot or in socks without shoes.    Be safe outside.    Use handrails, curb cuts, and ramps whenever possible.  Keep your hands free by using a shoulder bag or backpack.  Try to walk in well-lit areas. Watch out for uneven ground, changes in pavement, and debris.  Be careful in the winter. Walk on the grass or gravel when sidewalks are slippery. Use de-icer on steps and walkways. Add non-slip devices to shoes.    Put grab bars and nonskid mats in your shower or tub and near the toilet. Try to use a shower chair or bath bench when bathing.   Get into a tub or shower by putting in your weaker leg first. Get out with your strong side first. Have a phone or medical alert device in the  "bathroom with you.   Where can you learn more?  Go to https://www.[employer].net/patiented  Enter G117 in the search box to learn more about \"Preventing Falls: Care Instructions.\"  Current as of: July 18, 2023               Content Version: 13.8    5572-3241 [employer].   Care instructions adapted under license by your healthcare professional. If you have questions about a medical condition or this instruction, always ask your healthcare professional. [employer] disclaims any warranty or liability for your use of this information.      Learning About Sleeping Well  What does sleeping well mean?     Sleeping well means getting enough sleep to feel good and stay healthy. How much sleep is enough varies among people.  The number of hours you sleep and how you feel when you wake up are both important. If you do not feel refreshed, you probably need more sleep. Another sign of not getting enough sleep is feeling tired during the day.  Experts recommend that adults get at least 7 or more hours of sleep per day. Children and older adults need more sleep.  Why is getting enough sleep important?  Getting enough quality sleep is a basic part of good health. When your sleep suffers, your physical health, mood, and your thoughts can suffer too. You may find yourself feeling more grumpy or stressed. Not getting enough sleep also can lead to serious problems, including injury, accidents, anxiety, and depression.  What might cause poor sleeping?  Many things can cause sleep problems, including:  Changes to your sleep schedule.  Stress. Stress can be caused by fear about a single event, such as giving a speech. Or you may have ongoing stress, such as worry about work or school.  Depression, anxiety, and other mental or emotional conditions.  Changes in your sleep habits or surroundings. This includes changes that happen where you sleep, such as noise, light, or sleeping in a different bed. It also " "includes changes in your sleep pattern, such as having jet lag or working a late shift.  Health problems, such as pain, breathing problems, and restless legs syndrome.  Lack of regular exercise.  Using alcohol, nicotine, or caffeine before bed.  How can you help yourself?  Here are some tips that may help you sleep more soundly and wake up feeling more refreshed.  Your sleeping area   Use your bedroom only for sleeping and sex. A bit of light reading may help you fall asleep. But if it doesn't, do your reading elsewhere in the house. Try not to use your TV, computer, smartphone, or tablet while you are in bed.  Be sure your bed is big enough to stretch out comfortably, especially if you have a sleep partner.  Keep your bedroom quiet, dark, and cool. Use curtains, blinds, or a sleep mask to block out light. To block out noise, use earplugs, soothing music, or a \"white noise\" machine.  Your evening and bedtime routine   Create a relaxing bedtime routine. You might want to take a warm shower or bath, or listen to soothing music.  Go to bed at the same time every night. And get up at the same time every morning, even if you feel tired.  What to avoid   Limit caffeine (coffee, tea, caffeinated sodas) during the day, and don't have any for at least 6 hours before bedtime.  Avoid drinking alcohol before bedtime. Alcohol can cause you to wake up more often during the night.  Try not to smoke or use tobacco, especially in the evening. Nicotine can keep you awake.  Limit naps during the day, especially close to bedtime.  Avoid lying in bed awake for too long. If you can't fall asleep or if you wake up in the middle of the night and can't get back to sleep within about 20 minutes, get out of bed and go to another room until you feel sleepy.  Avoid taking medicine right before bed that may keep you awake or make you feel hyper or energized. Your doctor can tell you if your medicine may do this and if you can take it earlier in " "the day.  If you can't sleep   Imagine yourself in a peaceful, pleasant scene. Focus on the details and feelings of being in a place that is relaxing.  Get up and do a quiet or boring activity until you feel sleepy.  Avoid drinking any liquids before going to bed to help prevent waking up often to use the bathroom.  Where can you learn more?  Go to https://www.[employer].net/patiented  Enter J942 in the search box to learn more about \"Learning About Sleeping Well.\"  Current as of: July 11, 2023               Content Version: 13.8    6352-6522 [employer].   Care instructions adapted under license by your healthcare professional. If you have questions about a medical condition or this instruction, always ask your healthcare professional. [employer] disclaims any warranty or liability for your use of this information.      "

## 2024-02-19 ENCOUNTER — DOCUMENTATION ONLY (OUTPATIENT)
Dept: CARDIOLOGY | Facility: CLINIC | Age: 89
End: 2024-02-19
Payer: MEDICARE

## 2024-02-19 ENCOUNTER — DOCUMENTATION ONLY (OUTPATIENT)
Dept: ANTICOAGULATION | Facility: CLINIC | Age: 89
End: 2024-02-19
Payer: MEDICARE

## 2024-02-19 DIAGNOSIS — I48.21 ATRIAL FIBRILLATION, PERMANENT (H): Primary | Chronic | ICD-10-CM

## 2024-02-19 LAB — INR HOME MONITORING: 2.8 (ref 2–3)

## 2024-02-19 NOTE — PROGRESS NOTES
The implanted device is NOT considered safe. Please DO NOT schedule this patient for their MRI.    The order will be canceled by the scheduling department. A telephone call was made to the ordering provider of the MRI exam.     REASON: PPM is not MR safe.        Jake Calvillo RN on 2/19/2024 at 12:59 PM

## 2024-02-19 NOTE — PROGRESS NOTES
ANTICOAGULATION MANAGEMENT     Reba Calderon 88 year old female is on warfarin with therapeutic INR result. (Goal INR 2.0-3.0)    Recent labs: (last 7 days)     02/19/24  0000   INR 2.8       ASSESSMENT     Source(s): Chart Review and Patient/Caregiver Call     Warfarin doses taken: Warfarin taken as instructed  Diet: No new diet changes identified  Medication/supplement changes: Completed medrol dose pack today for lower back pain. Also switched Tylenol #3 to reg tylenol. Back pain doing a little better.   New illness, injury, or hospitalization: Yes: Lower back pain . May have MRI in future  Signs or symptoms of bleeding or clotting: No  Previous result: Therapeutic last 2(+) visits  Additional findings: None       PLAN     Recommended plan for temporary change(s) affecting INR     Dosing Instructions: Continue your current warfarin dose with next INR in 2 weeks       Summary  As of 2/19/2024      Full warfarin instructions:  2.5 mg every Mon, Thu; 3.75 mg all other days   Next INR check:  3/4/2024               Telephone call with Reba who agrees to plan and repeated back plan correctly    Patient to recheck with home meter    Education provided:   Please call back if any changes to your diet, medications or how you've been taking warfarin  Resume manage by exception with home monitor. Continue to submit INR results to home monitor company.You will only be called when your result is out of range. Please call and notify [facility] if new medication started, dose missed, signs or symptoms of bleeding or clotting, or a surgery/procedure is scheduled.    Plan made per [facility] anticoagulation protocol    Palak Velázquez RN  Anticoagulation Clinic  2/19/2024    _______________________________________________________________________     Anticoagulation Episode Summary       Current INR goal:  2.0-3.0   TTR:  69.9% (1 y)   Target end date:  7/15/2036   Send INR reminders to:  [facility]    Indications    Atrial  fibrillation  permanent (H) [I48.21]             Comments:  [employer] home monitor. Managed by exception.             Anticoagulation Care Providers       Provider Role Specialty Phone number    Devon Ball MD Referring Internal Medicine [phone]    Leti Brower NP Referring  [phone]

## 2024-02-21 ENCOUNTER — TELEPHONE (OUTPATIENT)
Dept: INTERNAL MEDICINE | Facility: CLINIC | Age: 89
End: 2024-02-21
Payer: MEDICARE

## 2024-02-21 NOTE — TELEPHONE ENCOUNTER
"  FYI - Status Update    Who is Calling: patient    Update: pt is calling would like for provider to add in for R arm pain, per pt leaned in on her arm due to sciatic flare up at the time and \"did something\" to her arm. Pt is able to lift up arm, but not past elbow height. Pt is not able to do the img done at [place] due to pacemaker, pt will have img done at [facility]. Can care team call her back?    Does caller want a call/response back: Yes     Could we send this information to you in [employer] or would you prefer to receive a phone call?:   Patient would prefer a phone call   Okay to leave a detailed message?: Yes at Cell number on file:    Telephone Information:   Mobile [phone]     "

## 2024-02-21 NOTE — TELEPHONE ENCOUNTER
"Called pt in regards to call placed below to get more information.     Pt reports RT arm issue is an ongoing issue. Pt states she heard a \"pop\" about two nights ago. Pt states since then, the pain has worsen and is experiencing issues with certain movement or lifting objects.     Pt wanting to ask PCP if able to order MRI of her shoulder also. MRI Hip and Lumbar spine ordered for the pt previously. Pt states [facility] is unable to do the MRI's for her due to pacemaker and was told by her cardiologist that she should go back to [facility] to get the MRI's completed.     Writer informed would route this to PCP for review and advise further.     Pt thanked for the call.   "

## 2024-02-22 NOTE — TELEPHONE ENCOUNTER
Please call and see if the patient has scheduled MRI scans at [facility] yet at this time.    If the back and the hip have not been scheduled, please contact [facility] radiology and try to send the orders there for them to perform.    The shoulder cannot likely be ordered without a more formal evaluation first.    I can see her Monday at 2:30 pm for this February 26th if this works out.  Otherwise, she can be seen at [facility] for this as well.    Devon Ball MD  General Internal Medicine  [facility]  2/22/2024, 1:51 PM

## 2024-02-22 NOTE — TELEPHONE ENCOUNTER
Relayed Dr. Ball's message to pt. She is ok to make the appt for shoulder evaluation.     She has not scheduled at [facility] for her current MRI's yet. She states that we need to send order and she is unsure what number to call.     Writer then called [facility] and received fax number [phone] and faxed MRIs to that number.     [facility] then provided writer with scheduling phone number: [phone] option 1. They state, pacemakers are booked out until April currently.     Writer updated patient with number to schedule.     Writer then updated patient with scheduling number at [facility].     No further questions at this time

## 2024-02-26 ENCOUNTER — HOSPITAL ENCOUNTER (OUTPATIENT)
Dept: CT IMAGING | Facility: HOSPITAL | Age: 89
Discharge: HOME | End: 2024-02-26
Attending: INTERNAL MEDICINE | Admitting: INTERNAL MEDICINE
Payer: MEDICARE

## 2024-02-26 ENCOUNTER — OFFICE VISIT (OUTPATIENT)
Dept: INTERNAL MEDICINE | Facility: CLINIC | Age: 89
End: 2024-02-26
Payer: MEDICARE

## 2024-02-26 VITALS
OXYGEN SATURATION: 95 % | RESPIRATION RATE: 18 BRPM | SYSTOLIC BLOOD PRESSURE: 138 MMHG | DIASTOLIC BLOOD PRESSURE: 74 MMHG | HEART RATE: 62 BPM | TEMPERATURE: 97.7 F

## 2024-02-26 DIAGNOSIS — R26.2 DIFFICULTY WALKING: ICD-10-CM

## 2024-02-26 DIAGNOSIS — M54.50 LUMBAR BACK PAIN: ICD-10-CM

## 2024-02-26 DIAGNOSIS — R54 AGE-RELATED PHYSICAL DEBILITY: ICD-10-CM

## 2024-02-26 DIAGNOSIS — M79.605 LOW BACK PAIN RADIATING TO LEFT LEG: ICD-10-CM

## 2024-02-26 DIAGNOSIS — M54.50 LUMBAR BACK PAIN: Primary | ICD-10-CM

## 2024-02-26 DIAGNOSIS — M54.50 LOW BACK PAIN RADIATING TO LEFT LEG: ICD-10-CM

## 2024-02-26 DIAGNOSIS — M70.62 TROCHANTERIC BURSITIS OF LEFT HIP: ICD-10-CM

## 2024-02-26 DIAGNOSIS — M75.121 NONTRAUMATIC COMPLETE TEAR OF RIGHT ROTATOR CUFF: ICD-10-CM

## 2024-02-26 PROCEDURE — 99215 OFFICE O/P EST HI 40 MIN: CPT | Performed by: INTERNAL MEDICINE

## 2024-02-26 PROCEDURE — 72131 CT LUMBAR SPINE W/O DYE: CPT | Mod: MG

## 2024-02-26 PROCEDURE — 99417 PROLNG OP E/M EACH 15 MIN: CPT | Performed by: INTERNAL MEDICINE

## 2024-02-27 ENCOUNTER — TELEPHONE (OUTPATIENT)
Dept: INTERNAL MEDICINE | Facility: CLINIC | Age: 89
End: 2024-02-27
Payer: MEDICARE

## 2024-02-27 DIAGNOSIS — M25.511 CHRONIC RIGHT SHOULDER PAIN: ICD-10-CM

## 2024-02-27 DIAGNOSIS — M75.41 IMPINGEMENT SYNDROME OF RIGHT SHOULDER: ICD-10-CM

## 2024-02-27 DIAGNOSIS — R29.898 SHOULDER WEAKNESS: Primary | ICD-10-CM

## 2024-02-27 DIAGNOSIS — G89.29 CHRONIC RIGHT SHOULDER PAIN: ICD-10-CM

## 2024-02-27 NOTE — TELEPHONE ENCOUNTER
Referral faxed out to [phone]    Spoke with patient and relayed that this has been done. Patient thanked for the call.

## 2024-02-27 NOTE — TELEPHONE ENCOUNTER
Order/Referral Request    Who is requesting: PT    Orders being requested: SHOULDER MRI    Reason service is needed/diagnosis: PT NEEDS SHOULDER MRI DONE DURING THE HIP MRI THAT SHE HAS SCHEDULED WITH [facility] ON APRIL 17TH. PT SAID SPEAK WITH SANDHYA THERE SO THEY CAN DO BOTH PT'S HIP AND KNEE THAT SAME DAY.     When are orders needed by: ASAP    Has this been discussed with Provider: No    Does patient have a preference on a Group/Provider/Facility? [facility] (FAX: [phone])    Does patient have an appointment scheduled?: Yes: WITH [facility]     Where to send orders: Fax    Could we send this information to you in [facility] (Closed) or would you prefer to receive a phone call?:   Patient would prefer a phone call   Okay to leave a detailed message?: Yes at Cell number on file:    Telephone Information:   Mobile [phone]

## 2024-02-27 NOTE — TELEPHONE ENCOUNTER
Referral done.    Please fax this to [facility] and then notify the patient that this has been done.    Devon Ball MD  General Internal Medicine  [facility]  2/27/2024, 12:54 PM

## 2024-02-28 ENCOUNTER — TELEPHONE (OUTPATIENT)
Dept: INTERNAL MEDICINE | Facility: CLINIC | Age: 89
End: 2024-02-28
Payer: MEDICARE

## 2024-02-28 NOTE — TELEPHONE ENCOUNTER
FYI - Status Update    Who is Calling: nurseZohra Care     Update: Delay of care per pts request, they will be seeing pt on March 5th    Does caller want a call/response back: No

## 2024-02-28 NOTE — PROGRESS NOTES
[facility] - Primary Care Specialists    Comprehensive and complex medical care - Chronic disease management - Shared decision making - Care coordination - Compassionate care    Patient advocacy - Rational deprescribing - Minimally disruptive medicine - Ethical focus - Customized care         Date of Service: 2/26/2024  Primary Provider: Devon Ball    Patient Care Team:  Devon Ball MD as PCP - General (Internal Medicine)  Louis Jacobsen MD as MD (Ophthalmology)  DANGELO Pagan MD as MD  Devon Ball MD as Assigned PCP  Yancy Vinson MD as MD (Orthopaedic Surgery)          Patient's Pharmacy:    [facility]/pharmacy #[id number] - [facility], [place] - [address]  [address] [facility] [place] [address]  Phone: [phone] Fax: [phone]     Patient's Contacts:  Name Home Phone Work Phone Mobile Phone Relationship Lgl XIMENA Beckford [phone]   Spouse    JYOTHI SHEIKH   [phone] Daughter      Patient's Insurance:    Payor: MEDICARE / Plan: MEDICARE / Product Type: Medicare /           Active Problem List:  Problem List as of 2/26/2024 Reviewed: 2/14/2024 10:44 PM by Devon Ball MD         High    Atrial fibrillation, permanent (H)    CLL (chronic lymphocytic leukemia) (H)    Nonsmoker       Medium    Type 2 diabetes mellitus with diabetic polyneuropathy, without long-term current use of insulin (H)    Cardiac pacemaker in situ    Primary hypertension    Chronic diastolic heart failure (H)    Actinomycosis    H/O atrioventricular jerzy ablation    CHB (complete heart block) (H)    Anticoagulation monitoring, INR range 2-3    Choledocholithiasis, RECURRENT    Primary osteoarthritis of right ankle       Low    Peripheral sensory neuropathy    MATILDE (obstructive sleep apnea)    Perirectal abscess    Coagulopathy (H) - due to warfarin    Senile purpura (H24)        Current Outpatient Medications   Medication Instructions    acetaminophen-codeine (TYLENOL #3 300-30  MG per tablet 1 tablet, Oral, EVERY 6 HOURS PRN    atorvastatin (LIPITOR) 10 mg, Oral, EVERY EVENING    biotin 2,500 mcg, Oral    Cholecalciferol (VITAMIN D3 PO) 2,000 Units, Oral, DAILY    ciclopirox (PENLAC) 8 % external solution Apply to adjacent skin and affected nails daily.  Remove with alcohol every 7 days, then repeat.    CONTOUR TEST test strip USE 1 STRIP TO TEST BLOOD SUGAR DAILY.    FOLIC ACID PO 1 mg, DAILY    furosemide (LASIX) 40 MG tablet TAKE 2 TABLETS BY MOUTH EVERY MORNING    glucosamine-chondroitinoitin 500-400 MG tablet 1 tablet, Oral    magnesium chloride 64 mg, Oral    metFORMIN (GLUCOPHAGE) 500 mg, Oral, 2 TIMES DAILY WITH MEALS    methylPREDNISolone (MEDROL DOSEPAK) 4 MG tablet therapy pack Follow Package Directions    methylPREDNISolone (MEDROL DOSEPAK) 4 MG tablet therapy pack Follow Package Directions    nitroGLYcerin (NITROSTAT) 0.4 mg, Sublingual    pantoprazole (PROTONIX) 40 mg, Oral, DAILY    polyethylene glycol (MIRALAX) 17 GM/Dose powder MIX 1 CAPFUL (17 GRAMS) IN LIQUID AND DRINK BY MOUTH DAILY. (OTC NC)    potassium chloride ER (K-TAB/KLOR-CON) 10 MEQ CR tablet TAKE 1 TABLET BY MOUTH EVERY DAY    UNABLE TO FIND MEDICATION NAME: Hair skin and nails   2500    vitamin B-12 (CYANOCOBALAMIN) 1,000 mcg, Oral     Social History     Social History Narrative           Subjective:     Reba Calderon is a 88 year old female who comes in today for:    Chief Complaint   Patient presents with    Shoulder Pain     Patient states did something shoulder to right shoulder, cannot lift the arm up, constant pain, 2 or 3 out of 10    Hip Pain     Patient states MRI scheduled for April 17th, pain is at 6 out of 10          2/26/2024     2:12 PM   Additional Questions   Roomed by Shy JACKSON CMA   Accompanied by  and daughter     In for follow up multiple issues.    New issue of increased weakness in the right shoulder and debility.  Did recently have corticosteroid injection by myself.   This injury occurred on February 19th.  Was trying to get out of bed and heard a pop in the shoulder.  Difficult to lift the arm.  Has pain associated with this.  Discussed options related to this.  I think she will need to see orthopedics in relationship to this.  She has increased limitations at this time.    Back and left leg are worsening as well.  Does not have MRI scan appointment until mid April at [facility].  We discussed doing a CT scan in the meantime and we reviewed options related to this.      Both hips are bothering her more at this time.  The left hip laterally still bothers.    Difficult for her to get around with these limitations.    I think she will need to be seen by multiple orthopedists for these issues.    Difficult to walk and at risk for falls.  Discussed LTC options.  They are trying to continue at home at this time.    We reviewed her other issues noted in the assessment but not specifically addressed in the HPI above.     Objective:     Wt Readings from Last 3 Encounters:   02/13/24 89.4 kg (197 lb)   01/23/24 91 kg (200 lb 9.6 oz)   11/13/23 92.5 kg (203 lb 14.4 oz)     BP Readings from Last 3 Encounters:   02/26/24 138/74   02/13/24 138/68   01/23/24 (!) 157/81     /74 (BP Location: Left arm, Patient Position: Sitting, Cuff Size: Adult Regular)   Pulse 62   Temp 97.7  F (36.5  C) (Oral)   Resp 18   LMP  (LMP Unknown)   SpO2 95%    The patient is comfortable, no acute distress.  Mood good.  Insight good.  Eyes are nonicteric.  Neck is supple without mass.  No cervical adenopathy.  No thyromegaly. Heart regular rate and rhythm.  Lungs clear to auscultation bilaterally.  Respiratory effort is good.   Extremities no edema.  Difficult to lift shoulder above 10 degrees.  Trochanteric bursitis on exam as well.      Diagnostics:     Orders Only on 02/19/2024   Component Date Value Ref Range Status    INR HOME MONITORING 02/19/2024 2.8  2.000 - 3.000 Final        Results for orders placed or performed during the hospital encounter of 02/26/24   CT Lumbar Spine w/o Contrast     Status: None    Narrative    EXAM: CT LUMBAR SPINE W/O CONTRAST  LOCATION: [facility]  DATE: 2/26/2024    INDICATION:  Lumbar back pain, Low back pain radiating to left leg, Low back pain radiating to left leg, Difficulty walking  COMPARISON: None.  TECHNIQUE: Routine CT Lumbar Spine without IV contrast. Multiplanar reformats. Dose reduction techniques were used.     FINDINGS:  VERTEBRA: Grade 1 anterolisthesis L4 on L5 measuring 4 mm. No fracture or posttraumatic subluxation.     CANAL/FORAMINA: Moderate to potentially severe spinal canal stenosis L2-L3. Mild to moderate spinal canal stenosis L3-L4. Severe spinal canal stenosis L4-L5. Foraminal stenosis is severe on the right L4-L5.    PARASPINAL: No extraspinal abnormality. Vascular calcifications are present.      Impression    IMPRESSION:  1.  No fracture or posttraumatic subluxation.  2.  Severe L4-L5 spinal canal stenosis and right foraminal stenosis.  3.  Moderate to severe spinal canal stenosis L2-L3.       Assessment:     1. Lumbar back pain    2. Low back pain radiating to left leg    3. Difficulty walking    4. Nontraumatic complete tear of right rotator cuff    5. Trochanteric bursitis of left hip    6. Age-related physical debility        Plan:     Good amount of time spent in counseling and coordination of care.  CT scan lumbar spine due to worsening function.  Recommended seeing [facility] for the shoulder, the hip and the spine.  Referral to home care for physical therapy and occupational therapy.  MRI scan of the shoulder ordered for [facility].  Continue current medications otherwise.  Follow up sooner if issues.    Orders Placed This Encounter   Procedures    CT Lumbar Spine w/o Contrast    Home Care Referral      70 minutes or greater was spent today on the patient's care on  the day of service.    This includes time for chart preparation, reviewing medical tests done before or during the visit, talking with the patient, review of quality indicators, required documentation, and other elements of care.        Devon Ball MD  General Internal Medicine  [facility]    Return in about 3 months (around 5/26/2024) for follow up visit.     No future appointments.

## 2024-02-29 ENCOUNTER — TRANSFERRED RECORDS (OUTPATIENT)
Dept: HEALTH INFORMATION MANAGEMENT | Facility: CLINIC | Age: 89
End: 2024-02-29
Payer: MEDICARE

## 2024-03-03 DIAGNOSIS — Z79.01 LONG TERM (CURRENT) USE OF ANTICOAGULANTS: ICD-10-CM

## 2024-03-03 DIAGNOSIS — I48.21 PERMANENT ATRIAL FIBRILLATION (H): ICD-10-CM

## 2024-03-03 DIAGNOSIS — E11.42 TYPE 2 DIABETES MELLITUS WITH DIABETIC POLYNEUROPATHY, WITHOUT LONG-TERM CURRENT USE OF INSULIN (H): ICD-10-CM

## 2024-03-04 ENCOUNTER — TELEPHONE (OUTPATIENT)
Dept: ANTICOAGULATION | Facility: CLINIC | Age: 89
End: 2024-03-04
Payer: MEDICARE

## 2024-03-04 ENCOUNTER — ANTICOAGULATION THERAPY VISIT (OUTPATIENT)
Dept: ANTICOAGULATION | Facility: CLINIC | Age: 89
End: 2024-03-04
Payer: MEDICARE

## 2024-03-04 ENCOUNTER — TRANSFERRED RECORDS (OUTPATIENT)
Dept: HEALTH INFORMATION MANAGEMENT | Facility: CLINIC | Age: 89
End: 2024-03-04
Payer: MEDICARE

## 2024-03-04 DIAGNOSIS — I48.21 ATRIAL FIBRILLATION, PERMANENT (H): Primary | Chronic | ICD-10-CM

## 2024-03-04 LAB — INR HOME MONITORING: 3.1 (ref 2–3)

## 2024-03-04 RX ORDER — WARFARIN SODIUM 2.5 MG/1
TABLET ORAL
Qty: 120 TABLET | Refills: 1 | Status: SHIPPED | OUTPATIENT
Start: 2024-03-04 | End: 2024-09-05

## 2024-03-04 RX ORDER — ATORVASTATIN CALCIUM 10 MG/1
10 TABLET, FILM COATED ORAL EVERY EVENING
Qty: 90 TABLET | Refills: 3 | OUTPATIENT
Start: 2024-03-04

## 2024-03-04 NOTE — TELEPHONE ENCOUNTER
Patient reports she is having a spinal injection through [facility] on 3/18/24. Per chart, patient's cardiologist Dr. Pagan ([facility]) was already contacted about hold and bridge orders.    Left a detailed message for Dr. Pagan's nurse ([phone]) to confirm if they are going to make the procedure plan, or if [facility] should since we are ordering the warfarin.

## 2024-03-04 NOTE — TELEPHONE ENCOUNTER
Pt calling to ask why Atorvastatin refilled denied  Explained that Rx was sent on 1/23/24 for 90 day supply with 3 refills  Pt will call [facility] pharmacy

## 2024-03-04 NOTE — PROGRESS NOTES
ANTICOAGULATION MANAGEMENT     Reba Calderon 88 year old female is on warfarin with supratherapeutic INR result. (Goal INR 2.0-3.0)    Recent labs: (last 7 days)     03/04/24  0000   INR 3.1*       ASSESSMENT     Source(s): Chart Review and Patient/Caregiver Call     Warfarin doses taken: Warfarin taken as instructed  Diet: Decreased greens/vitamin K in diet; plans to resume previous intake  Medication/supplement changes: None noted  New illness, injury, or hospitalization: Patient reports increased sciatic pain.  Signs or symptoms of bleeding or clotting: Patient reports a bruise on her left leg. Patient does not recall an injury. Patient reports bruising is improving since onset.   Previous result: Therapeutic last 2(+) visits  Additional findings: Patient has an spinal injection on 3/18/24 through [facility]. See TE from 3/4/24       PLAN     Recommended plan for temporary change(s) affecting INR     Dosing Instructions: partial hold then continue your current warfarin dose with next INR in 1 week       Summary  As of 3/4/2024      Full warfarin instructions:  3/4: 1.25 mg; Otherwise 2.5 mg every Mon, Thu; 3.75 mg all other days   Next INR check:  3/11/2024               Telephone call with Reba who agrees to plan and repeated back plan correctly    Patient to recheck with home meter    Education provided:   Please call back if any changes to your diet, medications or how you've been taking warfarin  Symptom monitoring: monitoring for bleeding signs and symptoms, monitoring for clotting signs and symptoms, and monitoring for stroke signs and symptoms  Contact [phone]  with any changes, questions or concerns.     Plan made with [facility] Pharmacist Yusra Simpson RN  Anticoagulation Clinic  3/4/2024    _______________________________________________________________________     Anticoagulation Episode Summary       Current INR goal:  2.0-3.0   TTR:  68.6% (1 y)   Target end date:  7/15/2036    Send INR reminders to:  [facility] MONITOR[facility]    Indications    Atrial fibrillation  permanent (H) [I48.21]             Comments:  [employer] home monitor. Managed by exception.             Anticoagulation Care Providers       Provider Role Specialty Phone number    Devon Ball MD Referring Internal Medicine [phone]    Leti Brower NP Referring  [phone]

## 2024-03-04 NOTE — TELEPHONE ENCOUNTER
ANTICOAGULATION MANAGEMENT:  Medication Refill    Anticoagulation Summary  As of 2/19/2024      Warfarin maintenance plan:  2.5 mg (2.5 mg x 1) every Mon, Thu; 3.75 mg (2.5 mg x 1.5) all other days   Next INR check:  3/4/2024   Target end date:  7/15/2036    Indications    Atrial fibrillation  permanent (H) [I48.21]                 Anticoagulation Care Providers       Provider Role Specialty Phone number    Devon Ball MD Referring Internal Medicine [phone]    Leti Brower NP Referring  [phone]            Refill Criteria    Visit with referring provider/group: Meets criteria: office visit within referring provider group in the last 1 year on 2/26/24    [facility] referral last signed: 05/03/2023; within last year: Yes    Lab monitoring not exceeding 2 weeks overdue: Yes    Reba meets all criteria for refill. Rx instructions and quantity supplied updated to match patient's current dosing plan. Warfarin 90 day supply with 1 refill granted per [facility] protocol     Dannielle Lee RN  Anticoagulation Clinic

## 2024-03-05 ENCOUNTER — MEDICAL CORRESPONDENCE (OUTPATIENT)
Dept: HEALTH INFORMATION MANAGEMENT | Facility: CLINIC | Age: 89
End: 2024-03-05

## 2024-03-05 NOTE — TELEPHONE ENCOUNTER
"No response from Dr. Pagan's nurse. Writer called back again and spoke with Rut who advised Dr. Pagan advised:  \"Higher risk. Reasonable to bridge with lovenox for the couple days before as INR falls. Probably not after, just let INR drift back up.  Mandeep Pagan MD\"    Rut advised that these orders were signed and faxed to [facility]. Writer clarified if [facility] should manage hold orders since Dr. Pagan does not sign orders for patient's warfarin. Rut advised that would probably be best since Dr. Pagan is now out of the office for the next two weeks.   "

## 2024-03-05 NOTE — TELEPHONE ENCOUNTER
[facility]-PROCEDURAL ANTICOAGULATION  MANAGEMENT    [facility] requesting pre-procedure hold orders for warfarin and review for bridging      Procedure date: 3/18/24       Procedure:  spinal injection      Procedure location and phone number (if external):  [phone]     Number of warfarin hold days requested and/or target INR: 5 days    Pre-op date: Not applicable      Routing to Anticoagulation Pharmacist for review.      Crys Simpson RN

## 2024-03-05 NOTE — TELEPHONE ENCOUNTER
Reviewed and noted.    Devon Ball MD  General Internal Medicine  [facility]  3/5/2024, 4:49 PM

## 2024-03-05 NOTE — TELEPHONE ENCOUNTER
"[facility]-PROCEDURAL ANTICOAGULATION  MANAGEMENT    ASSESSMENT     Warfarin interruption plan for ELIO on 2024.    Indication for Anticoagulation: Atrial Fibrillation    [id number] = 6 (CHF, Hypertension, Age >= 75, Diabetes, and Female)    Cristy-Procedure Risk stratification for thromboembolism: moderate ( Chest guidelines and  ACC periprocedure pathway for NVAF Expert Consensus)    AFIB:  CHEST Perioperative Management guidelines recommends against bridging for patients with atrial fibrillation except in high risk stratification patients.  NVAF: 2017 ACC periprocedure pathway for NVAF advises likely NO bridge for moderate risk stratification ([id number] score 5-6)  without a hx of stroke, TIA or systemic embolism    RECOMMENDATION     Pre-Procedure:  Hold warfarin for 5 days, until after procedure startin2024   No Bridge    Post-Procedure:  Resume warfarin dose if okay with provider doing procedure on night of procedure, 2024 PM: 5mg  Recheck INR ~ 7 days after resuming warfarin     Plan routed to referring provider for approval  ?   Yusra Naranjo [facility]    SUBJECTIVE/OBJECTIVE     Reba AKI Calderon, a 88 year old female    Goal INR Range: 2.0-3.0     Patient bridged in past: No    Wt Readings from Last 3 Encounters:   24 89.4 kg (197 lb)   24 91 kg (200 lb 9.6 oz)   23 92.5 kg (203 lb 14.4 oz)      Ideal body weight: 60.5 kg (133 lb 4.3 oz)  Adjusted ideal body weight: 72 kg (158 lb 12.2 oz)     Estimated body mass index is 31.32 kg/m  as calculated from the following:    Height as of 24: 1.689 m (5' 6.5\").    Weight as of 24: 89.4 kg (197 lb).    Lab Results   Component Value Date    INR 3.1 (H) 2024    INR 2.8 2024    INR 2.0 2024     Lab Results   Component Value Date    HGB 13.3 2024    HCT 40.5 2024     2024     Lab Results   Component Value Date    CR 0.78 2024    CR 0.79 2023    CR 0.78 " 05/22/2023     Estimated Creatinine Clearance: 56.7 mL/min (based on SCr of 0.78 mg/dL).

## 2024-03-06 ENCOUNTER — TELEPHONE (OUTPATIENT)
Dept: INTERNAL MEDICINE | Facility: CLINIC | Age: 89
End: 2024-03-06
Payer: MEDICARE

## 2024-03-06 NOTE — TELEPHONE ENCOUNTER
Home Health Care    Reason for call:  Verbal Orders    Orders are needed for this patient.  PT: 2 visits per week for 2 weeks, then 1 visit per week for 6 weeks also requesting   OT Eval and Treat    Pt Provider: Dr. Devon Ball    Phone Number Homecare Nurse can be reached at: [phone]

## 2024-03-11 ENCOUNTER — ANTICOAGULATION THERAPY VISIT (OUTPATIENT)
Dept: ANTICOAGULATION | Facility: CLINIC | Age: 89
End: 2024-03-11
Payer: MEDICARE

## 2024-03-11 DIAGNOSIS — I48.21 ATRIAL FIBRILLATION, PERMANENT (H): Primary | Chronic | ICD-10-CM

## 2024-03-11 LAB — INR HOME MONITORING: 2.7 (ref 2–3)

## 2024-03-11 NOTE — PROGRESS NOTES
ANTICOAGULATION MANAGEMENT     Reba Calderon 88 year old female is on warfarin with therapeutic INR result. (Goal INR 2.0-3.0)    Recent labs: (last 7 days)     03/11/24  0000   INR 2.7       ASSESSMENT     Source(s): Chart Review and Patient/Caregiver Call     Warfarin doses taken: Warfarin taken as instructed  Diet: No new diet changes identified  Medication/supplement changes: None noted  New illness, injury, or hospitalization: No  Signs or symptoms of bleeding or clotting: yes, bruise on left leg is almost healed  Previous result: Supratherapeutic  Additional findings: None and  Patient has an spinal injection on 3/18/24 through [facility], went over hold plan with patient today, she wrote down her instructions.  [facility] calendar updated.       PLAN     Recommended plan for no diet, medication or health factor changes affecting INR     Dosing Instructions: Continue your current warfarin dose with next INR in 2 weeks   (hold plan discussed)    Summary  As of 3/11/2024      Full warfarin instructions:  3/13: Hold; 3/14: Hold; 3/15: Hold; 3/16: Hold; 3/17: Hold; 3/18: 5 mg; Otherwise 2.5 mg every Mon, Thu; 3.75 mg all other days   Next INR check:  3/25/2024               Telephone call with Reba who verbalizes understanding and agrees to plan    Patient to recheck with home meter    Education provided:   Please call back if any changes to your diet, medications or how you've been taking warfarin    Plan made per [facility] anticoagulation protocol    Carly Amador, RN  Anticoagulation Clinic  3/11/2024    _______________________________________________________________________     Anticoagulation Episode Summary       Current INR goal:  2.0-3.0   TTR:  68.8% (1 y)   Target end date:  7/15/2036   Send INR reminders to:  [facility] MONITOR[facility]    Indications    Atrial fibrillation  permanent (H) [I48.21]             Comments:  [employer] home monitor. Managed by exception.             Anticoagulation Care Providers        Provider Role Specialty Phone number    Devon Ball MD Referring Internal Medicine [phone]    Leti Brower NP Referring  [phone]

## 2024-03-14 ENCOUNTER — MYC MEDICAL ADVICE (OUTPATIENT)
Dept: INTERNAL MEDICINE | Facility: CLINIC | Age: 89
End: 2024-03-14
Payer: MEDICARE

## 2024-03-14 ENCOUNTER — TELEPHONE (OUTPATIENT)
Dept: INTERNAL MEDICINE | Facility: CLINIC | Age: 89
End: 2024-03-14
Payer: MEDICARE

## 2024-03-14 NOTE — TELEPHONE ENCOUNTER
Susie for orders.    Devon Ball MD  General Internal Medicine  [facility]  3/14/2024, 3:02 PM

## 2024-03-14 NOTE — TELEPHONE ENCOUNTER
Home Care is calling regarding an established patient with [facility].       Requesting orders from: Devon Ball  Provider is following patient: Yes  Is this a 60-day recertification request?  No    Orders Requested    Occupational Therapy  Request for initial certification (first set of orders)   Frequency:  1x/wk for 3 wks      Information was gathered and will be sent to provider for review.  RN will contact Home Care with information after provider review.

## 2024-03-14 NOTE — TELEPHONE ENCOUNTER
Verbal orders    Occupational Therapy   1 x a week for 3 weeks   target weakness    [facility]   [phone]

## 2024-03-18 ENCOUNTER — MEDICAL CORRESPONDENCE (OUTPATIENT)
Dept: HEALTH INFORMATION MANAGEMENT | Facility: CLINIC | Age: 89
End: 2024-03-18
Payer: MEDICARE

## 2024-03-20 ENCOUNTER — TELEPHONE (OUTPATIENT)
Dept: INTERNAL MEDICINE | Facility: CLINIC | Age: 89
End: 2024-03-20
Payer: MEDICARE

## 2024-03-20 NOTE — TELEPHONE ENCOUNTER
Home Health Care    Reason for call:  [facility] is Still Awaiting signed plan of care     Orders are needed for this patient. PT, OT       Pt Provider: pcp    Phone Number Homecare Nurse can be reached at: [phone] [facility]      Could we send this information to you in "[employer] or would you prefer to receive a phone call?:   No preference   Okay to leave a detailed message?: Yes at Other phone number:  [phone] [facility]   Cibinqo Counseling: I discussed with the patient the risks of Cibinqo therapy including but not limited to common cold, nausea, headache, cold sores, increased blood CPK levels, dizziness, UTIs, fatigue, acne, and vomitting. Live vaccines should be avoided.  This medication has been linked to serious infections; higher rate of mortality; malignancy and lymphoproliferative disorders; major adverse cardiovascular events; thrombosis; thrombocytopenia and lymphopenia; lipid elevations; and retinal detachment.

## 2024-03-25 ENCOUNTER — ANTICOAGULATION THERAPY VISIT (OUTPATIENT)
Dept: ANTICOAGULATION | Facility: CLINIC | Age: 89
End: 2024-03-25
Payer: MEDICARE

## 2024-03-25 DIAGNOSIS — I48.21 ATRIAL FIBRILLATION, PERMANENT (H): Primary | Chronic | ICD-10-CM

## 2024-03-25 DIAGNOSIS — E11.42 TYPE 2 DIABETES MELLITUS WITH DIABETIC POLYNEUROPATHY, WITHOUT LONG-TERM CURRENT USE OF INSULIN (H): ICD-10-CM

## 2024-03-25 LAB — INR HOME MONITORING: 2.1 (ref 2–3)

## 2024-03-25 NOTE — PROGRESS NOTES
ANTICOAGULATION MANAGEMENT     Reba Calderon 88 year old female is on warfarin with therapeutic INR result. (Goal INR 2.0-3.0)    Recent labs: (last 7 days)     03/25/24  0000   INR 2.1       ASSESSMENT     Source(s): Chart Review and Patient/Caregiver Call     Warfarin doses taken: More warfarin taken than planned which may be affecting INR. Patient accidentally took 3.75 mg of warfarin on 3/21/24 instead of directed 2.5 mg.  Diet: No new diet changes identified  Medication/supplement changes: None noted  New illness, injury, or hospitalization: Yes: ELIO injection on 3/18/24  Signs or symptoms of bleeding or clotting: No  Previous result: Therapeutic last visit; previously outside of goal range  Additional findings: Patient may have rotator cuff surgery in the future, patient will keep [facility] updated on date.  Patient is expecting a call on 4/1/24, then can resume MBE if in range        PLAN     Recommended plan for no diet, medication or health factor changes affecting INR     Dosing Instructions: Continue your current warfarin dose with next INR in 1 week       Summary  As of 3/25/2024      Full warfarin instructions:  2.5 mg every Mon, Thu; 3.75 mg all other days   Next INR check:  4/1/2024               Telephone call with Reba who agrees to plan and repeated back plan correctly    Patient to recheck with home meter    Education provided:   Please call back if any changes to your diet, medications or how you've been taking warfarin  Symptom monitoring: monitoring for bleeding signs and symptoms, monitoring for clotting signs and symptoms, and monitoring for stroke signs and symptoms  Contact [phone]  with any changes, questions or concerns.     Plan made per [facility] anticoagulation protocol    Crys Simpson RN  Anticoagulation Clinic  3/25/2024    _______________________________________________________________________     Anticoagulation Episode Summary       Current INR goal:  2.0-3.0   TTR:  70.1% (1 y)    Target end date:  7/15/2036   Send INR reminders to:  ANTICOAG HOME MONITORING    Indications    Atrial fibrillation  permanent (H) [I48.21]             Comments:  [employer] home monitor. Managed by exception.             Anticoagulation Care Providers       Provider Role Specialty Phone number    Devon Ball MD Referring Internal Medicine [phone]    Leti Brower NP Referring  [phone]

## 2024-03-26 ENCOUNTER — TELEPHONE (OUTPATIENT)
Dept: INTERNAL MEDICINE | Facility: CLINIC | Age: 89
End: 2024-03-26
Payer: MEDICARE

## 2024-03-26 DIAGNOSIS — E11.42 TYPE 2 DIABETES MELLITUS WITH DIABETIC POLYNEUROPATHY, WITHOUT LONG-TERM CURRENT USE OF INSULIN (H): ICD-10-CM

## 2024-03-26 RX ORDER — ATORVASTATIN CALCIUM 10 MG/1
10 TABLET, FILM COATED ORAL EVERY EVENING
Qty: 90 TABLET | Refills: 3 | Status: SHIPPED | OUTPATIENT
Start: 2024-03-26

## 2024-03-26 RX ORDER — ATORVASTATIN CALCIUM 10 MG/1
10 TABLET, FILM COATED ORAL EVERY EVENING
Qty: 90 TABLET | Refills: 3 | OUTPATIENT
Start: 2024-03-26

## 2024-03-26 NOTE — TELEPHONE ENCOUNTER
Writer called pharmacy. Pharmacy states they have no refills on file even though 3 were sent in.       Medication is sharon'd up for verification and approval, if appropriate.     Louis Soto Jr., CMA on 3/26/2024 at 12:05 PM

## 2024-03-26 NOTE — TELEPHONE ENCOUNTER
Pt calling and stated that [facility] is telling her that she dose not have any refills available, but per Epic it says she has 3 refills available.     atorvastatin (LIPITOR) 10 MG tablet

## 2024-03-26 NOTE — TELEPHONE ENCOUNTER
Rx resent for pt as pharmacy states below per the call that they do not have any refills on file for the pt.

## 2024-04-01 ENCOUNTER — ANTICOAGULATION THERAPY VISIT (OUTPATIENT)
Dept: ANTICOAGULATION | Facility: CLINIC | Age: 89
End: 2024-04-01
Payer: MEDICARE

## 2024-04-01 DIAGNOSIS — I48.21 ATRIAL FIBRILLATION, PERMANENT (H): Primary | Chronic | ICD-10-CM

## 2024-04-01 LAB — INR HOME MONITORING: 2.9 (ref 2–3)

## 2024-04-01 NOTE — PROGRESS NOTES
ANTICOAGULATION MANAGEMENT     Reba Calderon 88 year old female is on warfarin with therapeutic INR result. (Goal INR 2.0-3.0)    Recent labs: (last 7 days)     04/01/24  0000   INR 2.9       ASSESSMENT     Source(s): Chart Review and Patient/Caregiver Call     Warfarin doses taken: Warfarin taken as instructed  Diet: No new diet changes identified  Medication/supplement changes: None noted  New illness, injury, or hospitalization: No  Signs or symptoms of bleeding or clotting: No  Previous result: Therapeutic last 2(+) visits  Additional findings: None       PLAN     Recommended plan for no diet, medication or health factor changes affecting INR     Dosing Instructions: Continue your current warfarin dose with next INR in 2 weeks       Summary  As of 4/1/2024      Full warfarin instructions:  2.5 mg every Mon, Thu; 3.75 mg all other days   Next INR check:  4/15/2024               Telephone call with Reba who verbalizes understanding and agrees to plan    Patient to recheck with home meter    Education provided:   Please call back if any changes to your diet, medications or how you've been taking warfarin  Resume manage by exception with home monitor. Continue to submit INR results to home monitor company.You will only be called when your result is out of range. Please call and notify [facility] if new medication started, dose missed, signs or symptoms of bleeding or clotting, or a surgery/procedure is scheduled. Due for next call no later than: 6/30/24.    Plan made per [facility] anticoagulation protocol    Aby Sandoval RN  Anticoagulation Clinic  4/1/2024    _______________________________________________________________________     Anticoagulation Episode Summary       Current INR goal:  2.0-3.0   TTR:  70.1% (1 y)   Target end date:  7/15/2036   Send INR reminders to:  [facility]    Indications    Atrial fibrillation  permanent (H) [I48.21]             Comments:  [employer] home monitor. Managed by exception.              Anticoagulation Care Providers       Provider Role Specialty Phone number    Devon Ball MD Referring Internal Medicine [phone]    Leti Brower NP Referring  [phone]

## 2024-04-08 ENCOUNTER — TRANSFERRED RECORDS (OUTPATIENT)
Dept: HEALTH INFORMATION MANAGEMENT | Facility: CLINIC | Age: 89
End: 2024-04-08
Payer: MEDICARE

## 2024-04-08 DIAGNOSIS — E11.628 TYPE 2 DIABETES MELLITUS WITH OTHER SKIN COMPLICATION, WITHOUT LONG-TERM CURRENT USE OF INSULIN (H): ICD-10-CM

## 2024-04-08 RX ORDER — CARVEDILOL 25 MG/1
TABLET, FILM COATED ORAL
Qty: 100 STRIP | Refills: 0 | Status: SHIPPED | OUTPATIENT
Start: 2024-04-08 | End: 2024-09-30

## 2024-04-15 ENCOUNTER — DOCUMENTATION ONLY (OUTPATIENT)
Dept: ANTICOAGULATION | Facility: CLINIC | Age: 89
End: 2024-04-15
Payer: MEDICARE

## 2024-04-15 DIAGNOSIS — I48.21 ATRIAL FIBRILLATION, PERMANENT (H): Primary | Chronic | ICD-10-CM

## 2024-04-15 LAB — INR HOME MONITORING: 2.8 (ref 2–3)

## 2024-04-15 NOTE — PROGRESS NOTES
ANTICOAGULATION  MANAGEMENT-Home Monitor Managed by Exception    Reba Calderon 88 year old female is on warfarin with therapeutic INR result. (Goal INR 2.0-3.0)    Recent labs: (last 7 days)     04/15/24  0000   INR 2.8       Previous INR was Therapeutic  Medication, diet, health changes since last INR:Yes: gabapentin, but not anticipated to affect INR  Contacted within the last 12 weeks by phone on 4/1/24  Last [facility] referral date: 05/03/2023      PLAN     Reba was NOT contacted regarding therapeutic result today per home monitoring policy manage by exception agreement.   Current warfarin dose is to be continued:     Summary  As of 4/15/2024      Full warfarin instructions:  2.5 mg every Mon, Thu; 3.75 mg all other days   Next INR check:  4/29/2024             ?   Crys Simpson RN  Anticoagulation Clinic  4/15/2024    _______________________________________________________________________     Anticoagulation Episode Summary       Current INR goal:  2.0-3.0   TTR:  70.1% (1 y)   Target end date:  7/15/2036   Send INR reminders to:  [facility]    Indications    Atrial fibrillation  permanent (H) [I48.21]             Comments:  [employer] home monitor. Managed by exception.             Anticoagulation Care Providers       Provider Role Specialty Phone number    Devon Ball MD Referring Internal Medicine [phone]    Leti Brower NP Referring  [phone]

## 2024-04-18 ENCOUNTER — TELEPHONE (OUTPATIENT)
Dept: INTERNAL MEDICINE | Facility: CLINIC | Age: 89
End: 2024-04-18
Payer: MEDICARE

## 2024-04-18 NOTE — TELEPHONE ENCOUNTER
Please advise on results below of MRI    For Patients:  As a result of the 21st Century Cures Act, medical imaging exams and procedure reports are released immediately into your electronic medical record.  You may view this report before your referring provider.  If you have questions, please contact your health care provider.      Indication:  Low back pain.    Technique:  Multisequence multiplanar MRI of the lumbar spine without the use of intravenous contrast.    Comparison:  None available.    Findings:  Grade 1 4 mm anterolisthesis of L4 on L5. Normal vertebral stature and intrinsic marrow signal intensity. Multilevel disc desiccation height loss most pronounced at L4-L5. Prominent Schmorl`s node at the L5 superior endplate. The conus medullaris terminates normally at the L1-L2 level. Multiple left renal cysts are incidentally noted.    T12-L1: No significant spinal canal or neural foraminal stenosis.  L1-L2: Shallow symmetric disc bulge. Mild facet joint hypertrophy. No significant spinal canal or neural foraminal stenosis.  L2-L3: Moderate-severe spinal canal stenosis, mild-moderate right neural foraminal narrowing, and moderate left neural foraminal narrowing resulting from prominent facet joint hypertrophy and symmetric disc bulging.  L3-L4: Mild-moderate spinal canal stenosis, moderate right neural foraminal narrowing, and mild-moderate left neural foraminal narrowing as sequela of prominent facet joint hypertrophy and asymmetric disc bulging.  L4-L5: Severe spinal canal stenosis, moderate-severe right neural foraminal narrowing, and mild left neural foraminal narrowing resulting from prominent facet joint hypertrophy with anterolisthesis and disc bulge/uncovering.  L5-S1: Shallow symmetric disc bulge. Prominent facet joint hypertrophy. No significant spinal canal or neural foraminal stenosis.    Impression:  1. Grade 1 4 mm anterolisthesis of L4 on L5.  2. Multilevel disc desiccation and height loss most  pronounced at L4-L5.  3. At L2-L3, moderate-severe spinal canal stenosis, mild-moderate right neural foraminal narrowing, and moderate left neural foraminal narrowing.  4. At L3-L4, mild-moderate spinal canal stenosis, moderate right neural foraminal narrowing, and mild-moderate left neural foraminal narrowing.  5. At L4-L5, severe spinal canal stenosis and moderate-severe right neural foraminal narrowing.        Dictated by Jordi Martinez MD @ 4/17/2024 2:01:18 PM    (Electronically Signed)

## 2024-04-18 NOTE — TELEPHONE ENCOUNTER
Reason for Call:  Request for results:    Name of test or procedure: MRI [facility]    Date of test of procedure: 04/17/24    Location of the test or procedure: [facility]    Phone number Patient can be reached at:  Cell number on file:    Telephone Information:   Mobile [phone]       Additional comments: Patient is requesting MRI results are sent to [facility].  Per Patient, Dr. Ball reviews first and then Dr. Ball sends to [facility] providers.     Call taken on 4/18/2024 at 1:15 PM by Radha Perez

## 2024-04-18 NOTE — TELEPHONE ENCOUNTER
I went over the results of the patient's scans with the patient directly.    She wanted a copy sent to her providers at [facility].  These were faxed through [employer].    Devon Ball MD  General Internal Medicine  [facility]  4/18/2024, 5:26 PM

## 2024-04-23 ENCOUNTER — TRANSFERRED RECORDS (OUTPATIENT)
Dept: HEALTH INFORMATION MANAGEMENT | Facility: CLINIC | Age: 89
End: 2024-04-23
Payer: MEDICARE

## 2024-04-25 ENCOUNTER — OFFICE VISIT (OUTPATIENT)
Dept: INTERNAL MEDICINE | Facility: CLINIC | Age: 89
End: 2024-04-25
Payer: MEDICARE

## 2024-04-25 VITALS
HEIGHT: 67 IN | OXYGEN SATURATION: 97 % | RESPIRATION RATE: 24 BRPM | TEMPERATURE: 97.4 F | HEART RATE: 69 BPM | BODY MASS INDEX: 31.17 KG/M2 | WEIGHT: 198.6 LBS | SYSTOLIC BLOOD PRESSURE: 164 MMHG | DIASTOLIC BLOOD PRESSURE: 80 MMHG

## 2024-04-25 DIAGNOSIS — D48.5 NEOPLASM OF UNCERTAIN BEHAVIOR OF SKIN: ICD-10-CM

## 2024-04-25 DIAGNOSIS — R60.0 BILATERAL LOWER EXTREMITY EDEMA: ICD-10-CM

## 2024-04-25 DIAGNOSIS — G89.29 CHRONIC RIGHT SHOULDER PAIN: ICD-10-CM

## 2024-04-25 DIAGNOSIS — M25.511 CHRONIC RIGHT SHOULDER PAIN: ICD-10-CM

## 2024-04-25 DIAGNOSIS — M25.552 HIP PAIN, LEFT: ICD-10-CM

## 2024-04-25 DIAGNOSIS — M75.121 NONTRAUMATIC COMPLETE TEAR OF RIGHT ROTATOR CUFF: ICD-10-CM

## 2024-04-25 DIAGNOSIS — M48.062 SPINAL STENOSIS OF LUMBAR REGION WITH NEUROGENIC CLAUDICATION: Primary | ICD-10-CM

## 2024-04-25 PROCEDURE — G2211 COMPLEX E/M VISIT ADD ON: HCPCS | Performed by: INTERNAL MEDICINE

## 2024-04-25 PROCEDURE — 99214 OFFICE O/P EST MOD 30 MIN: CPT | Performed by: INTERNAL MEDICINE

## 2024-04-25 RX ORDER — GABAPENTIN 100 MG/1
200 CAPSULE ORAL DAILY
COMMUNITY
Start: 2024-04-08

## 2024-04-25 ASSESSMENT — PAIN SCALES - GENERAL: PAINLEVEL: NO PAIN (1)

## 2024-04-25 NOTE — PROGRESS NOTES
[facility] - [facility] Specialists    Comprehensive and complex medical care - Chronic disease management - Shared decision making - Care coordination - Compassionate care    Patient advocacy - Rational deprescribing - Minimally disruptive medicine - Ethical focus - Customized care         Date of Service: 4/25/2024  Primary Provider: Devon Ball    Patient Care Team:  Devon Ball MD as PCP - General (Internal Medicine)  Louis Jacobsen MD as MD (Ophthalmology)  DANGELO Pagan MD as MD  Devon Ball MD as Assigned PCP  Yancy Vinson MD as MD (Orthopaedic Surgery)          Patient's Pharmacy:    [facility]/pharmacy #[id number] - [facility], [place] - [address]  [address] [place] [address]  Phone: [phone] Fax: [phone]     Patient's Contacts:  Name Home Phone Work Phone Mobile Phone Relationship Lgl XIMENA Beckford [phone]   Spouse    JYOTHI SHEIKH   [phone] Daughter      Patient's Insurance:    Payor: MEDICARE / Plan: MEDICARE / Product Type: Medicare /           Active Problem List:  Problem List as of 4/25/2024 Reviewed: 4/25/2024  5:46 PM by Devon Ball MD         High    Atrial fibrillation, permanent (H)    CLL (chronic lymphocytic leukemia) (H)    Nonsmoker       Medium    Type 2 diabetes mellitus with diabetic polyneuropathy, without long-term current use of insulin (H)    Cardiac pacemaker in situ    Primary hypertension    Chronic diastolic heart failure (H)    Actinomycosis    H/O atrioventricular jerzy ablation    CHB (complete heart block) (H)    Anticoagulation monitoring, INR range 2-3    Choledocholithiasis, RECURRENT    Primary osteoarthritis of right ankle    Spinal stenosis of lumbar region with neurogenic claudication       Low    Peripheral sensory neuropathy    MATILDE (obstructive sleep apnea)    Perirectal abscess    Coagulopathy (H) - due to warfarin    Senile purpura (H24)        Current Outpatient Medications    Medication Instructions    acetaminophen-codeine (TYLENOL #3) 300-30 MG per tablet 1 tablet, Oral, EVERY 6 HOURS PRN    atorvastatin (LIPITOR) 10 mg, Oral, EVERY EVENING    biotin 2,500 mcg, Oral    Cholecalciferol (VITAMIN D3 PO) 2,000 Units, Oral, DAILY    ciclopirox (PENLAC) 8 % external solution Apply to adjacent skin and affected nails daily.  Remove with alcohol every 7 days, then repeat.    CONTOUR TEST test strip USE 1 STRIP TO TEST BLOOD SUGAR DAILY.    FOLIC ACID PO 1 mg, DAILY    furosemide (LASIX) 40 MG tablet TAKE 2 TABLETS BY MOUTH EVERY MORNING    gabapentin (NEURONTIN) 200 mg, Oral, DAILY, TAKE 1 CAPSULE BY MOUTH AT BEDTIME FOR 1 WEEK THEN 2 CAPS THEREAFTER    glucosamine-chondroitinoitin 500-400 MG tablet 1 tablet, Oral    magnesium chloride 64 mg, Oral    metFORMIN (GLUCOPHAGE) 500 mg, Oral, 2 TIMES DAILY WITH MEALS    methylPREDNISolone (MEDROL DOSEPAK) 4 MG tablet therapy pack Follow Package Directions    methylPREDNISolone (MEDROL DOSEPAK) 4 MG tablet therapy pack Follow Package Directions    nitroGLYcerin (NITROSTAT) 0.4 mg, Sublingual    pantoprazole (PROTONIX) 40 mg, Oral, DAILY    polyethylene glycol (MIRALAX) 17 GM/Dose powder MIX 1 CAPFUL (17 GRAMS) IN LIQUID AND DRINK BY MOUTH DAILY. (OTC NC)    potassium chloride ER (K-TAB/KLOR-CON) 10 MEQ CR tablet TAKE 1 TABLET BY MOUTH EVERY DAY    UNABLE TO FIND MEDICATION NAME: Hair skin and nails   2500    vitamin B-12 (CYANOCOBALAMIN) 1,000 mcg, Oral    warfarin ANTICOAGULANT (COUMADIN) 2.5 MG tablet Take 2.5 mg on Mon, Thurs and 3.75 mg all other days, or as directed by the [facility].     Social History     Social History Narrative           Subjective:     Reba Calderon is a 88 year old female who comes in today for:    Chief Complaint   Patient presents with    Results     MRI f/u    Back Pain          4/25/2024     3:31 PM   Additional Questions   Roomed by KEILY Stover   Accompanied by ELEONORA     Patient comes in today with her   to review a number of issues.    She has a lesion on the bridge of her nose which has come up in the last month or so.  Is thickened and somewhat irritated.  She is putting on make-up to cover it.  It is somewhat irritated.    We reviewed her follow-up with [facility].  They have been able to help her some.    We reviewed her right shoulder.  She has 2 muscles torn in her rotator cuff.  Her supraspinatus is out and she does have difficulty with abducting.  She has had an injection in the shoulder.  They have been doing physical therapy as well.  She is unable to drive because her steering is affected and her ability to shift the car because the shifter is up on the steering wheel itself.    We reviewed her spinal stenosis with left-sided lateral radiculopathy and she continues to have problems with this.  She got a lift chair and recliner on her own.  This is very adjustable and helpful for her.  She is able to get some sleep with gabapentin 200 mg at night from [facility].  This has helped a bit.  She has been on this dose for about 3 weeks.  She did have spinal injection in the past which may have lasted about 1 month for her.  She does have follow-up with [facility] related to this as well.  She does tend to wake up at 3 AM with fairly severe pain and then it takes a while to settle down.    She has swelling in her legs and this was reviewed as well.    We tried to answer her questions about magnesium supplement.  Her current magnesium supplement costs about $20 for 60 pills.    We reviewed her other issues noted in the assessment but not specifically addressed in the HPI above.     Objective:     Wt Readings from Last 3 Encounters:   04/25/24 90.1 kg (198 lb 9.6 oz)   02/13/24 89.4 kg (197 lb)   01/23/24 91 kg (200 lb 9.6 oz)     BP Readings from Last 3 Encounters:   04/25/24 (!) 164/80   02/26/24 138/74   02/13/24 138/68     BP (!) 164/80 (BP Location: Right arm, Patient  "Position: Sitting, Cuff Size: Adult Large)   Pulse 69   Temp 97.4  F (36.3  C) (Oral)   Resp 24   Ht 1.689 m (5' 6.5\")   Wt 90.1 kg (198 lb 9.6 oz)   LMP  (LMP Unknown)   SpO2 97%   BMI 31.57 kg/m     The patient is comfortable, no acute distress.  Mood good.  Insight good.  Eyes are nonicteric.  Neck is supple without mass.  No cervical adenopathy.  No thyromegaly. Heart regular rate and rhythm.  Lungs clear to auscultation bilaterally.  Respiratory effort is good.   Extremities no edema.  Skin of the nose shows either a complicated actinic keratosis or early squamous cell carcinoma.      Diagnostics:     Orders Only on 04/15/2024   Component Date Value Ref Range Status    INR HOME MONITORING 04/15/2024 2.8  2.000 - 3.000 Final       No results found for any visits on 04/25/24.    Assessment:     1. Spinal stenosis of lumbar region with neurogenic claudication    2. 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 Chronic right shoulder pain    4. Hip pain, left    5. Neoplasm of uncertain behavior of skin    6. Bilateral lower extremity edema        Plan:     Follow-up with [facility] right now related to her orthopedic issues.  May continue gabapentin and we reviewed side effects with this.  Referral to [facility] for evaluation of the nasal lesion.  Most of our time was spent in counseling and coordination of care today.  Continue current medications otherwise.  Follow up sooner if issues.    No orders of the defined types were placed in this encounter.          Devon Ball MD  General Internal Medicine  [facility]    The longitudinal plan of care for the diagnoses and conditions as documented were addressed during this visit. Due to the added complexity in care, I will continue to support Reba in the subsequent management and with ongoing continuity of care.     Return in about 7 weeks (around 6/10/2024) for follow up visit.     Future " Appointments   Date Time Provider Department Center   6/10/2024  9:30 AM Devon Ball MD [facility]

## 2024-04-25 NOTE — PATIENT INSTRUCTIONS
To schedule an appointment with a dermatologist, I recommend calling [facility] at [phone].    They have multiple offices in the following locations:    [facility]  [address], [address]  [place] [address]    [facility]  [address], [address]  [place] [address]    [place]  [address], [address]  [place]  [address]    [facility]  [address], [address]  [place] [address]    Please have them send me copies of your reports from your visit.

## 2024-04-29 ENCOUNTER — TRANSFERRED RECORDS (OUTPATIENT)
Dept: HEALTH INFORMATION MANAGEMENT | Facility: CLINIC | Age: 89
End: 2024-04-29
Payer: MEDICARE

## 2024-04-29 ENCOUNTER — DOCUMENTATION ONLY (OUTPATIENT)
Dept: ANTICOAGULATION | Facility: CLINIC | Age: 89
End: 2024-04-29
Payer: MEDICARE

## 2024-04-29 DIAGNOSIS — I48.21 ATRIAL FIBRILLATION, PERMANENT (H): Primary | Chronic | ICD-10-CM

## 2024-04-29 LAB — INR HOME MONITORING: 3 (ref 2–3)

## 2024-04-29 NOTE — PROGRESS NOTES
ANTICOAGULATION  MANAGEMENT-Home Monitor Managed by Exception    Reba Calderon 88 year old female is on warfarin with therapeutic INR result. (Goal INR 2.0-3.0)    Recent labs: (last 7 days)     04/29/24  0000   INR 3.0       Previous INR was Therapeutic  Medication, diet, health changes since last INR:chart reviewed; none identified  Contacted within the last 12 weeks by phone on 4/1/24  Last [facility] referral date: 05/03/2023      [facility]     Reba was NOT contacted regarding therapeutic result today per home monitoring policy manage by exception agreement.   Current warfarin dose is to be continued:     Summary  As of 4/29/2024      Full warfarin instructions:  2.5 mg every Mon, Thu; 3.75 mg all other days   Next INR check:  5/13/2024             ?   Crys Simpson RN  Anticoagulation Clinic  4/29/2024    _______________________________________________________________________     Anticoagulation Episode Summary       Current INR goal:  2.0-3.0   TTR:  70.1% (1 y)   Target end date:  7/15/2036   Send INR reminders to:  [facility]    Indications    Atrial fibrillation  permanent (H) [I48.21]             Comments:  [employer] home monitor. Managed by exception.             Anticoagulation Care Providers       Provider Role Specialty Phone number    Devon Ball MD Referring Internal Medicine [phone]    Leti Brower NP Referring  [phone]

## 2024-04-30 ENCOUNTER — TELEPHONE (OUTPATIENT)
Dept: INTERNAL MEDICINE | Facility: CLINIC | Age: 89
End: 2024-04-30

## 2024-04-30 NOTE — TELEPHONE ENCOUNTER
Order/Referral Request    Who is requesting: [facility]     Orders being requested: OT 1 visit times 1 wk   1 visit every other wk for 6 wks     Reason service is needed/diagnosis: work on Upper extremity     When are orders needed by: ASAP     Has this been discussed with Provider: No    Does patient have a preference on a Group/Provider/Facility? [facility]     Does patient have an appointment scheduled?: No    Where to send orders:  [facility]    Could we send this information to you in [employer] or would you prefer to receive a phone call?:   No preference   Okay to leave a detailed message?: Yes at Other phone number:  [phone]

## 2024-04-30 NOTE — TELEPHONE ENCOUNTER
Susie for orders.    Devon Ball MD  General Internal Medicine  [facility]  4/30/2024, 3:46 PM

## 2024-05-06 ENCOUNTER — TRANSFERRED RECORDS (OUTPATIENT)
Dept: HEALTH INFORMATION MANAGEMENT | Facility: CLINIC | Age: 89
End: 2024-05-06
Payer: MEDICARE

## 2024-05-09 ENCOUNTER — MEDICAL CORRESPONDENCE (OUTPATIENT)
Dept: HEALTH INFORMATION MANAGEMENT | Facility: CLINIC | Age: 89
End: 2024-05-09

## 2024-05-13 ENCOUNTER — DOCUMENTATION ONLY (OUTPATIENT)
Dept: ANTICOAGULATION | Facility: CLINIC | Age: 89
End: 2024-05-13
Payer: MEDICARE

## 2024-05-13 ENCOUNTER — TELEPHONE (OUTPATIENT)
Dept: ANTICOAGULATION | Facility: CLINIC | Age: 89
End: 2024-05-13
Payer: MEDICARE

## 2024-05-13 ENCOUNTER — ANTICOAGULATION THERAPY VISIT (OUTPATIENT)
Dept: ANTICOAGULATION | Facility: CLINIC | Age: 89
End: 2024-05-13
Payer: MEDICARE

## 2024-05-13 DIAGNOSIS — I48.21 ATRIAL FIBRILLATION, PERMANENT (H): Primary | Chronic | ICD-10-CM

## 2024-05-13 DIAGNOSIS — I48.21 ATRIAL FIBRILLATION, PERMANENT (H): Primary | ICD-10-CM

## 2024-05-13 LAB — INR HOME MONITORING: 2.9 (ref 2–3)

## 2024-05-13 NOTE — PROGRESS NOTES
ANTICOAGULATION MANAGEMENT     Reba Calderon 88 year old female is on warfarin with therapeutic INR result. (Goal INR 2.0-3.0)    Recent labs: (last 7 days)     05/13/24  0000   INR 2.9       ASSESSMENT     Source(s): Chart Review and Patient/Caregiver Call     Warfarin doses taken: Warfarin taken as instructed  Diet: No new diet changes identified  Medication/supplement changes: None noted  New illness, injury, or hospitalization: No  Signs or symptoms of bleeding or clotting: No  Previous result: Therapeutic last 2(+) visits  Additional findings: Patient is having an ELIO through [facility] on 5/20/24.       PLAN     Recommended plan for no diet, medication or health factor changes affecting INR     Dosing Instructions: Continue your current warfarin dose, then hold 5/15/24-5/19/24, take a booster dose on 5/20/24, then resume maintenance dose with next INR in 10 days       Summary  As of 5/13/2024      Full warfarin instructions:  5/15: Hold; 5/16: Hold; 5/17: Hold; 5/18: Hold; 5/19: Hold; 5/20: 5 mg; Otherwise 2.5 mg every Mon, Thu; 3.75 mg all other days   Next INR check:  5/24/2024               Telephone call with Reba who verbalizes understanding and agrees to plan    Patient to recheck with home meter    Education provided:   Please call back if any changes to your diet, medications or how you've been taking warfarin  Symptom monitoring: monitoring for bleeding signs and symptoms, monitoring for clotting signs and symptoms, and monitoring for stroke signs and symptoms  Contact [phone]  with any changes, questions or concerns.     Plan made with [facility] Pharmacist Yusra Simpson RN  Anticoagulation Clinic  5/13/2024    _______________________________________________________________________     Anticoagulation Episode Summary       Current INR goal:  2.0-3.0   TTR:  71.9% (1 y)   Target end date:  7/15/2036   Send INR reminders to:  [facility] MONITOR[facility]    Indications    Atrial  fibrillation  permanent (H) [I48.21]             Comments:  [employer] home monitor. Managed by exception.             Anticoagulation Care Providers       Provider Role Specialty Phone number    Devon Ball MD Referring Internal Medicine [phone]    Leti Brower NP Referring  [phone]

## 2024-05-13 NOTE — PROGRESS NOTES
ANTICOAGULATION CLINIC REFERRAL RENEWAL REQUEST       An annual renewal order is required for all patients referred to [facility].?  Please review and sign the pended referral order for Reba Calderon.       ANTICOAGULATION SUMMARY      Warfarin indication(s)   Atrial Fibrillation    Mechanical heart valve present?  NO       Current goal range   INR: 2.0-3.0     Goal appropriate for indication? Goal INR 2-3, standard for indication(s) above     Time in Therapeutic Range (TTR)  (Goal > 60%) 72%       Office visit with referring provider's group within last year yes on 4/25/24       Crys Simpson RN  [facility]

## 2024-05-13 NOTE — TELEPHONE ENCOUNTER
"CRISTY-PROCEDURAL ANTICOAGULATION  MANAGEMENT    ASSESSMENT     Warfarin interruption plan for ELIO on 2024.    Indication for Anticoagulation: Atrial Fibrillation    [id number] = 6 (CHF, Hypertension, Age >= 75, Diabetes, and Female)    Cristy-Procedure Risk stratification for thromboembolism: moderate ( Chest guidelines and  ACC periprocedure pathway for NVAF Expert Consensus)    AFIB:  CHEST Perioperative Management guidelines recommends against bridging for patients with atrial fibrillation except in high risk stratification patients.  NVAF: 2017 ACC periprocedure pathway for NVAF advises likely NO bridge for moderate risk stratification ([id number] score 5-6)  without a hx of stroke, TIA or systemic embolism    RECOMMENDATION     Pre-Procedure:  Hold warfarin for 5 days, until after procedure startin/15/2024   No Bridge    Post-Procedure:  Resume warfarin dose if okay with provider doing procedure on night of procedure, 2024 PM: 5mg  Recheck INR ~ 7 days after resuming warfarin     Plan routed to referring provider for approval  ?   Yusra Naranjo [facility]    SUBJECTIVE/OBJECTIVE     Reba AKI Calderon, a 88 year old female    Goal INR Range: 2.0-3.0     Patient bridged in past: No    Pertinent History: tolerated hold, no bridge for ELIO 2024    Wt Readings from Last 3 Encounters:   24 90.1 kg (198 lb 9.6 oz)   24 89.4 kg (197 lb)   24 91 kg (200 lb 9.6 oz)      Ideal body weight: 60.5 kg (133 lb 4.3 oz)  Adjusted ideal body weight: 72.3 kg (159 lb 6.4 oz)     Estimated body mass index is 31.57 kg/m  as calculated from the following:    Height as of 24: 1.689 m (5' 6.5\").    Weight as of 24: 90.1 kg (198 lb 9.6 oz).    Lab Results   Component Value Date    INR 2.9 2024    INR 3.0 2024    INR 2.8 04/15/2024     Lab Results   Component Value Date    HGB 13.3 2024    HCT 40.5 2024     2024     Lab Results   Component " Value Date    CR 0.78 01/23/2024    CR 0.79 08/21/2023    CR 0.78 05/22/2023     Estimated Creatinine Clearance: 56.9 mL/min (based on SCr of 0.78 mg/dL).

## 2024-05-13 NOTE — TELEPHONE ENCOUNTER
"[facility]-PROCEDURAL ANTICOAGULATION  MANAGEMENT    [facility] requesting pre-procedure hold orders for warfarin and review for bridging      Procedure date: 24       Procedure: Epidural Steroid Injection      Procedure location and phone number (if external):  [facility]     Number of warfarin hold days requested and/or target INR: 5 days    Pre-op date: Not applicable      Routing to Anticoagulation Pharmacist for review.      Crys Simpson RN       Patient had same procedure on 3/13/24, procedure plan at that time stated:  \"Pre-Procedure:  Hold warfarin for 5 days, until after procedure startin2024   No Bridge     Post-Procedure:  Resume warfarin dose if okay with provider doing procedure on night of procedure, 2024 PM: 5mg  Recheck INR ~ 7 days after resuming warfarin\"  "

## 2024-05-14 NOTE — TELEPHONE ENCOUNTER
Okay with plan as specified.    Devon Ball MD  General Internal Medicine  [facility]  5/14/2024, 7:27 AM

## 2024-05-14 NOTE — TELEPHONE ENCOUNTER
"CRISTY-PROCEDURAL ANTICOAGULATION  MANAGEMENT    ASSESSMENT     Warfarin interruption plan for ELIO on 2024.    Indication for Anticoagulation: Atrial Fibrillation    [id number] = 6 (CHF, Hypertension, Age >= 75, Diabetes, and Female)    Cristy-Procedure Risk stratification for thromboembolism: moderate ( Chest guidelines and  ACC periprocedure pathway for NVAF Expert Consensus)    AFIB:  CHEST Perioperative Management guidelines recommends against bridging for patients with atrial fibrillation except in high risk stratification patients.  NVAF: 2017 ACC periprocedure pathway for NVAF advises likely NO bridge for moderate risk stratification ([id number] score 5-6)  without a hx of stroke, TIA or systemic embolism    RECOMMENDATION     Pre-Procedure:  Hold warfarin for 5 days, until after procedure startin/15/2024   No Bridge    Post-Procedure:  Resume warfarin dose if okay with provider doing procedure on night of procedure, 2024 PM: 10mg  Recheck INR ~ 7 days after resuming warfarin     Plan routed to referring provider for approval  ?   Yusra Naranjo [facility]    SUBJECTIVE/OBJECTIVE     Reba AKI Calderon, a 88 year old female    Goal INR Range: 2.0-3.0     Patient bridged in past: No    Pertinent History: tolerated hold, no bridge for ELIO 2024    Wt Readings from Last 3 Encounters:   24 90.1 kg (198 lb 9.6 oz)   24 89.4 kg (197 lb)   24 91 kg (200 lb 9.6 oz)      Ideal body weight: 60.5 kg (133 lb 4.3 oz)  Adjusted ideal body weight: 72.3 kg (159 lb 6.4 oz)     Estimated body mass index is 31.57 kg/m  as calculated from the following:    Height as of 24: 1.689 m (5' 6.5\").    Weight as of 24: 90.1 kg (198 lb 9.6 oz).    Lab Results   Component Value Date    INR 2.9 2024    INR 3.0 2024    INR 2.8 04/15/2024     Lab Results   Component Value Date    HGB 13.3 2024    HCT 40.5 2024     2024     Lab Results   Component " Value Date    CR 0.78 01/23/2024    CR 0.79 08/21/2023    CR 0.78 05/22/2023     Estimated Creatinine Clearance: 56.9 mL/min (based on SCr of 0.78 mg/dL).

## 2024-05-20 NOTE — PROGRESS NOTES
OFFICE VISIT      Patient: Randi Salmon Date of Service: 2024   : 2000 MRN: [id number]     SUBJECTIVE:   HISTORY OF PRESENT ILLNESS:  Randi Salmon is a 24 year old female who presents today for   Chief Complaint   Patient presents with   • Office Visit     Pt agrees to MARGARET   • Lactose Intolerance       The patient is a 24-year-old female presenting for an acute visit.    The patient experienced persistent diarrhea for a duration of 1.5 weeks approximately 2 to 3 months ago. Despite discontinuing her dairy intake, the diarrhea recurred after consuming yogurt, leading her to suspect lactose intolerance. Concurrently, she experienced abdominal bloating and burning, characterized by liquid stools. She has abstained from lactose. She does tolerate cheese and ice cream. She denies any correlation between spicy or acidic foods. The patient has been diagnosed with mild GERD and has sought consultation with a gastroenterologist. She avoids spicy foods. She denies any associated nausea, vomiting, fevers, or chills. The patient also reports experiencing headaches which she attributes to seasonal allergies. She has not undergone a Pap smear. Since eliminating dairy from her diet, her bowel movements have returned to normal. She denies any presence of blood or mucus in her stools.            PAST MEDICAL HISTORY:  Past Medical History:   Diagnosis Date   • Dental cavities        MEDICATIONS:  Current Outpatient Medications   Medication Sig   • ondansetron (ZOFRAN ODT) 4 MG disintegrating tablet Place 1 tablet onto the tongue every 6 hours.     No current facility-administered medications for this visit.       ALLERGIES:  ALLERGIES:  No Known Allergies    PAST SURGICAL HISTORY:  Past Surgical History:   Procedure Laterality Date   • No past surgeries         FAMILY HISTORY:  Family History   Problem Relation Age of Onset   • GERD Mother    • Diabetes Maternal Grandmother    • Cancer Maternal Grandmother         uterine?  Pt informed per Dr Chaparro's request.         SOCIAL HISTORY:  Social History     Tobacco Use   • Smoking status: Never   • Smokeless tobacco: Never   Vaping Use   • Vaping status: never used   Substance Use Topics   • Alcohol use: Yes     Comment: social   • Drug use: Never       SCREENINGS:   No results found.    Recent PHQ 2/9 Score    PHQ 2:  PHQ 2 Score Adult PHQ 2 Score Adult PHQ 2 Interpretation Little interest or pleasure in activity?   5/20/2024   9:43 AM 0 No further screening needed 0       PHQ 9:     Most Recent RUDDY 7 Score            Review Flowsheet  More data exists       5/20/2024   PHQ 2/9 Score   Adult PHQ 2 Score 0   Adult PHQ 2 Interpretation No further screening needed   Little interest or pleasure in activity? Not at all   Feeling down, depressed or hopeless? Not at all   DEPRESSION ASSESSMENT/PLAN:  Depression screening is negative no further plan needed.        Review of Systems   Constitutional: Negative.    Respiratory: Negative.     Cardiovascular: Negative.    Gastrointestinal: Negative.         Resolved symptoms of diarrhea and abdominal bloating   Neurological: Negative.    Psychiatric/Behavioral: Negative.           OBJECTIVE:     Visit Vitals  /78 (BP Location: LUE - Left upper extremity, Cuff Size: Regular)   Pulse 72   Resp 16   Ht 5' 5\" (1.651 m)   Wt 53.3 kg (117 lb 8.1 oz)   LMP 05/13/2024 (Approximate)   SpO2 99%   BMI 19.55 kg/m²       Physical Exam  Vitals and nursing note reviewed.   Constitutional:       Appearance: She is well-developed.   HENT:      Head: Normocephalic and atraumatic.   Eyes:      Pupils: Pupils are equal, round, and reactive to light.   Cardiovascular:      Rate and Rhythm: Normal rate and regular rhythm.      Heart sounds: Normal heart sounds.   Pulmonary:      Effort: Pulmonary effort is normal. No respiratory distress.      Breath sounds: Normal breath sounds.   Abdominal:      General: Bowel sounds are normal. There is no distension.      Palpations: Abdomen is soft.      Tenderness:  There is no abdominal tenderness.   Musculoskeletal:      Cervical back: Normal range of motion.   Skin:     General: Skin is warm and dry.   Neurological:      Mental Status: She is alert and oriented to person, place, and time.   Psychiatric:         Behavior: Behavior normal.         DIAGNOSTIC STUDIES:   LAB RESULTS:    Lab Results Reviewed    No visits with results within 1 Month(s) from this visit.   Latest known visit with results is:   Admission on 08/09/2023, Discharged on 08/09/2023   Component Date Value Ref Range Status   • Sodium 08/09/2023 133 (L)  135 - 145 mmol/L Final   • Potassium 08/09/2023 3.1 (L)  3.4 - 5.1 mmol/L Final   • Chloride 08/09/2023 101  97 - 110 mmol/L Final   • Carbon Dioxide 08/09/2023 24  21 - 32 mmol/L Final   • Anion Gap 08/09/2023 11  7 - 19 mmol/L Final   • Glucose 08/09/2023 114 (H)  70 - 99 mg/dL Final   • BUN 08/09/2023 3 (L)  6 - 20 mg/dL Final   • Creatinine 08/09/2023 0.63  0.51 - 0.95 mg/dL Final   • Glomerular Filtration Rate 08/09/2023 >90  >=60 Final   • BUN/Cr 08/09/2023 5 (L)  7 - 25 Final   • Calcium 08/09/2023 9.0  8.4 - 10.2 mg/dL Final   • Bilirubin, Total 08/09/2023 0.7  0.2 - 1.0 mg/dL Final   • GOT/AST 08/09/2023 19  <=37 Units/L Final   • GPT/ALT 08/09/2023 17  <64 Units/L Final   • Alkaline Phosphatase 08/09/2023 67  45 - 117 Units/L Final   • Albumin 08/09/2023 3.9  3.6 - 5.1 g/dL Final   • Protein, Total 08/09/2023 8.3 (H)  6.4 - 8.2 g/dL Final   • Globulin 08/09/2023 4.4 (H)  2.0 - 4.0 g/dL Final   • A/G Ratio 08/09/2023 0.9 (L)  1.0 - 2.4 Final   • Lipase 08/09/2023 66  15 - 77 Units/L Final   • URINE PREGNANCY,QUAL 08/09/2023 Negative   Final   • Internal Procedural Controls Accep* 08/09/2023 Yes   Final   • TEST LOT NUMBER 08/09/2023 [id number]   Final   • TEST LOT EXPIRATION DATE 08/09/2023 4/30/24   Final   • WBC 08/09/2023 7.6  4.2 - 11.0 K/mcL Final   • RBC 08/09/2023 4.65  4.00 - 5.20 mil/mcL Final   • HGB 08/09/2023 12.7  12.0 - 15.5 g/dL Final    • HCT 08/09/2023 39.7  36.0 - 46.5 % Final   • MCV 08/09/2023 85.4  78.0 - 100.0 fl Final   • MCH 08/09/2023 27.3  26.0 - 34.0 pg Final   • MCHC 08/09/2023 32.0  32.0 - 36.5 g/dL Final   • RDW-CV 08/09/2023 13.5  11.0 - 15.0 % Final   • RDW-SD 08/09/2023 41.9  39.0 - 50.0 fL Final   • PLT 08/09/2023 298  140 - 450 K/mcL Final   • NRBC 08/09/2023 0  <=0 /100 WBC Final   • Neutrophil, Percent 08/09/2023 75  % Final   • Lymphocytes, Percent 08/09/2023 14  % Final   • Mono, Percent 08/09/2023 9  % Final   • Eosinophils, Percent 08/09/2023 1  % Final   • Basophils, Percent 08/09/2023 0  % Final   • Immature Granulocytes 08/09/2023 1  % Final   • Absolute Neutrophils 08/09/2023 5.7  1.8 - 7.7 K/mcL Final   • Absolute Lymphocytes 08/09/2023 1.1  1.0 - 4.8 K/mcL Final   • Absolute Monocytes 08/09/2023 0.7  0.3 - 0.9 K/mcL Final   • Absolute Eosinophils  08/09/2023 0.1  0.0 - 0.5 K/mcL Final   • Absolute Basophils 08/09/2023 0.0  0.0 - 0.3 K/mcL Final   • Absolute Immature Granulocytes 08/09/2023 0.1  0.0 - 0.2 K/mcL Final   • RBC Morphology 08/09/2023 Normal  Normal Final   • WBC Morphology 08/09/2023 Normal  Normal Final   • Platelet Morphology 08/09/2023 Normal  Normal Final       ASSESSMENT AND PLAN:   This is a 24 year old year-old female who presents with   Chief Complaint   Patient presents with   • Office Visit     Pt agrees to MARGARET   • Lactose Intolerance   .      Problem List Items Addressed This Visit    None  Visit Diagnoses     Irregular bowel habits    -  Primary    Relevant Orders    Allergen, Food, Alpha-Gal (galactose-alpha-1,3-galatose) IgE    Allergen: Food Food Panel 20 IgE ImmunoCAP    CBC with Automated Differential    Comprehensive Metabolic Panel        1. Diarrhea.  A comprehensive blood work will be conducted to evaluate for lactose intolerance. Additionally, the patient is due for a Pap smear and a tetanus booster.    Return if symptoms worsen or fail to improve.      Instructions provided as  documented in the AVS.      The patient indicated understanding of the diagnosis and agreed with the plan of care.      Jenni Santillan, CNP

## 2024-05-24 ENCOUNTER — ANTICOAGULATION THERAPY VISIT (OUTPATIENT)
Dept: ANTICOAGULATION | Facility: CLINIC | Age: 89
End: 2024-05-24
Payer: MEDICARE

## 2024-05-24 DIAGNOSIS — I48.21 ATRIAL FIBRILLATION, PERMANENT (H): Primary | Chronic | ICD-10-CM

## 2024-05-24 LAB — INR HOME MONITORING: 1.3 (ref 2–3)

## 2024-05-24 NOTE — PROGRESS NOTES
ANTICOAGULATION MANAGEMENT     Reba Calderon 88 year old female is on warfarin with subtherapeutic INR result. (Goal INR 2.0-3.0)    Recent labs: (last 7 days)     05/24/24  0000   INR 1.3*       ASSESSMENT     Source(s): Chart Review and Patient/Caregiver Call     Warfarin doses taken: Warfarin taken as instructed. Recently holding warfarin for 5 days. Patient took booster dose as instructed. Opted to have patient recheck INR earlier after procedure due to holiday weekend.  Diet: No new diet changes identified  Medication/supplement changes: None noted  New illness, injury, or hospitalization: Yes: Patient had ELIO injection on 5/20/24. Patient reports she was feeling really good on 5/21/24, and overdid it with activity. Patient reports she is feeling very sore today.  Signs or symptoms of bleeding or clotting: No  Previous result: Therapeutic last 2(+) visits  Additional findings: None       PLAN     Recommended plan for temporary change(s) affecting INR     Dosing Instructions: booster dose then continue your current warfarin dose with next INR in 1 week       Summary  As of 5/24/2024      Full warfarin instructions:  5/24: 5 mg; Otherwise 2.5 mg every Mon, Thu; 3.75 mg all other days   Next INR check:  5/31/2024               Telephone call with Reba who verbalizes understanding and agrees to plan    Patient to recheck with home meter    Education provided:   Please call back if any changes to your diet, medications or how you've been taking warfarin    Plan made with [facility] Pharmacist Yusra Simpson, RN  Anticoagulation Clinic  5/24/2024    _______________________________________________________________________     Anticoagulation Episode Summary       Current INR goal:  2.0-3.0   TTR:  72.7% (1 y)   Target end date:  7/15/2036   Send INR reminders to:  [facility]    Indications    Atrial fibrillation  permanent (H) [I48.21]             Comments:  [employer] home monitor. Managed by  exception.             Anticoagulation Care Providers       Provider Role Specialty Phone number    Devon Ball MD Referring Internal Medicine [phone]    Leti Brower NP Referring  [phone]

## 2024-05-30 ENCOUNTER — TELEPHONE (OUTPATIENT)
Dept: INTERNAL MEDICINE | Facility: CLINIC | Age: 89
End: 2024-05-30
Payer: MEDICARE

## 2024-05-30 NOTE — TELEPHONE ENCOUNTER
General Call    Contacts         Type Contact Phone/Fax    05/30/2024 11:52 AM CDT Phone (Incoming) Dora w/[facility] (Other)           Reason for Call:  Outstanding Plan of Care Order [id number]    What are your questions or concerns:  Plan of Care Order [id number] has been faxed several times to  and has not been returned.  Last Faxed on 05/166/24.  Will fax again today.  Needed completed and returned as soon as feasible for billing.   Dora with [facility] can be reached at   ext [phone]

## 2024-06-03 ENCOUNTER — ANTICOAGULATION THERAPY VISIT (OUTPATIENT)
Dept: ANTICOAGULATION | Facility: CLINIC | Age: 89
End: 2024-06-03
Payer: MEDICARE

## 2024-06-03 ENCOUNTER — TRANSFERRED RECORDS (OUTPATIENT)
Dept: HEALTH INFORMATION MANAGEMENT | Facility: CLINIC | Age: 89
End: 2024-06-03
Payer: MEDICARE

## 2024-06-03 DIAGNOSIS — I48.21 ATRIAL FIBRILLATION, PERMANENT (H): Primary | Chronic | ICD-10-CM

## 2024-06-03 LAB — INR HOME MONITORING: 2 (ref 2–3)

## 2024-06-03 NOTE — TELEPHONE ENCOUNTER
Received.    Form completed and faxed out.    Copies made for monthly hold bin and sent to scan.

## 2024-06-03 NOTE — PROGRESS NOTES
ANTICOAGULATION MANAGEMENT     Reba Calderon 88 year old female is on warfarin with therapeutic INR result. (Goal INR 2.0-3.0)    Recent labs: (last 7 days)     06/03/24  0000   INR 2.0       ASSESSMENT     Source(s): Chart Review and Patient/Caregiver Call     Warfarin doses taken: Warfarin taken as instructed  Diet: No new diet changes identified  Medication/supplement changes: None noted  New illness, injury, or hospitalization: Per patient she has been dealing with sciatic nerve plus arthritis issues and stated Tylenol does not help with pain control and she is asking if she can take ibuprofen - made aware that ibuprofen increases risk of bleeding and she needs to discuss pain issues with PCP - she has appointment to see pcp on Monday  Signs or symptoms of bleeding or clotting: No  Previous result: Subtherapeutic to be called regarding next INR result  Additional findings: Patient is requesting       PLAN     Recommended plan for ongoing change(s) affecting INR     Dosing Instructions: Continue your current warfarin dose with next INR in 1 week       Summary  As of 6/3/2024      Full warfarin instructions:  2.5 mg every Mon, Thu; 3.75 mg all other days   Next INR check:  6/10/2024               Telephone call with Reba who verbalizes understanding and agrees to plan    Patient to recheck with home meter    Education provided:   Interaction IS anticipated between warfarin and ibuprofen  Contact [phone]  with any changes, questions or concerns.     Plan made per [facility] anticoagulation protocol    Crys Fontanez RN  Anticoagulation Clinic  6/3/2024    _______________________________________________________________________     Anticoagulation Episode Summary       Current INR goal:  2.0-3.0   TTR:  70.1% (1 y)   Target end date:  7/15/2036   Send INR reminders to:  [facility]    Indications    Atrial fibrillation  permanent (H) [I48.21]             Comments:  [employer] home monitor. Managed by  exception.             Anticoagulation Care Providers       Provider Role Specialty Phone number    Devon Ball MD Referring Internal Medicine [phone]    Leti Brower NP Referring  [phone]

## 2024-06-04 ENCOUNTER — TRANSFERRED RECORDS (OUTPATIENT)
Dept: HEALTH INFORMATION MANAGEMENT | Facility: CLINIC | Age: 89
End: 2024-06-04
Payer: MEDICARE

## 2024-06-10 ENCOUNTER — OFFICE VISIT (OUTPATIENT)
Dept: INTERNAL MEDICINE | Facility: CLINIC | Age: 89
End: 2024-06-10
Payer: MEDICARE

## 2024-06-10 ENCOUNTER — ANTICOAGULATION THERAPY VISIT (OUTPATIENT)
Dept: ANTICOAGULATION | Facility: CLINIC | Age: 89
End: 2024-06-10

## 2024-06-10 VITALS
DIASTOLIC BLOOD PRESSURE: 70 MMHG | SYSTOLIC BLOOD PRESSURE: 136 MMHG | RESPIRATION RATE: 18 BRPM | TEMPERATURE: 97.6 F | WEIGHT: 187 LBS | BODY MASS INDEX: 29.35 KG/M2 | HEIGHT: 67 IN | OXYGEN SATURATION: 94 % | HEART RATE: 77 BPM

## 2024-06-10 DIAGNOSIS — I48.21 ATRIAL FIBRILLATION, PERMANENT (H): Primary | Chronic | ICD-10-CM

## 2024-06-10 DIAGNOSIS — I48.21 PERMANENT ATRIAL FIBRILLATION (H): ICD-10-CM

## 2024-06-10 DIAGNOSIS — R60.0 BILATERAL LOWER EXTREMITY EDEMA: ICD-10-CM

## 2024-06-10 DIAGNOSIS — G89.29 CHRONIC RIGHT SHOULDER PAIN: ICD-10-CM

## 2024-06-10 DIAGNOSIS — M48.062 SPINAL STENOSIS OF LUMBAR REGION WITH NEUROGENIC CLAUDICATION: Primary | ICD-10-CM

## 2024-06-10 DIAGNOSIS — E11.628 TYPE 2 DIABETES MELLITUS WITH OTHER SKIN COMPLICATION, WITHOUT LONG-TERM CURRENT USE OF INSULIN (H): ICD-10-CM

## 2024-06-10 DIAGNOSIS — M25.511 CHRONIC RIGHT SHOULDER PAIN: ICD-10-CM

## 2024-06-10 DIAGNOSIS — M25.552 HIP PAIN, LEFT: ICD-10-CM

## 2024-06-10 LAB — INR HOME MONITORING: 2.3 (ref 2–3)

## 2024-06-10 PROCEDURE — G2211 COMPLEX E/M VISIT ADD ON: HCPCS | Performed by: INTERNAL MEDICINE

## 2024-06-10 PROCEDURE — 99214 OFFICE O/P EST MOD 30 MIN: CPT | Performed by: INTERNAL MEDICINE

## 2024-06-10 NOTE — PROGRESS NOTES
[facility]    Comprehensive and complex medical care - Chronic disease management - Shared decision making - Care coordination - Compassionate care    Patient advocacy - Rational deprescribing - Minimally disruptive medicine - Ethical focus - Customized care         Date of Service: 6/10/2024  Primary Provider: Devon Ball    Patient Care Team:  Devon Ball MD as PCP - General (Internal Medicine)  Louis Jacobsen MD as MD (Ophthalmology)  DANGELO Pagan MD as MD  Devon Ball MD as Assigned PCP  Yancy Vinson MD as MD (Orthopaedic Surgery)          Patient's Pharmacy:    [id number] - [facility], [place] - [address]  [address] [place] [address]  Phone: [phone] Fax: [phone]     Patient's Contacts:  Name Home Phone Work Phone Mobile Phone Relationship Lgl XIMENA Beckford [phone]   Spouse    JYOTHI SHEIKH   [phone] Daughter      Patient's Insurance:    Payor: MEDICARE / Plan: MEDICARE / Product Type: Medicare /           Active Problem List:  Problem List as of 6/10/2024 Reviewed: 4/25/2024  5:46 PM by Devon Ball MD         High    Atrial fibrillation, permanent (H)    CLL (chronic lymphocytic leukemia) (H)    Nonsmoker       Medium    Type 2 diabetes mellitus with diabetic polyneuropathy, without long-term current use of insulin (H)    Cardiac pacemaker in situ    Primary hypertension    Chronic diastolic heart failure (H)    Actinomycosis    H/O atrioventricular jerzy ablation    CHB (complete heart block) (H)    Anticoagulation monitoring, INR range 2-3    Choledocholithiasis, RECURRENT    Primary osteoarthritis of right ankle    Spinal stenosis of lumbar region with neurogenic claudication       Low    Peripheral sensory neuropathy    MATILDE (obstructive sleep apnea)    Perirectal abscess    Coagulopathy (H) - due to warfarin    Senile purpura (H24)        Current Outpatient Medications    Medication Instructions    acetaminophen-codeine (TYLENOL #3) 300-30 MG per tablet 1 tablet, Oral, EVERY 6 HOURS PRN    atorvastatin (LIPITOR) 10 mg, Oral, EVERY EVENING    biotin 2,500 mcg, Oral    Cholecalciferol (VITAMIN D3 PO) 2,000 Units, Oral, DAILY    ciclopirox (PENLAC) 8 % external solution Apply to adjacent skin and affected nails daily.  Remove with alcohol every 7 days, then repeat.    CONTOUR TEST test strip USE 1 STRIP TO TEST BLOOD SUGAR DAILY.    FOLIC ACID PO 1 mg, DAILY    furosemide (LASIX) 40 MG tablet TAKE 2 TABLETS BY MOUTH EVERY MORNING    gabapentin (NEURONTIN) 200 mg, Oral, DAILY, TAKE 1 CAPSULE BY MOUTH AT BEDTIME FOR 1 WEEK THEN 2 CAPS THEREAFTER    glucosamine-chondroitinoitin 500-400 MG tablet 1 tablet, Oral    magnesium chloride 64 mg, Oral    metFORMIN (GLUCOPHAGE) 500 mg, Oral, 2 TIMES DAILY WITH MEALS    methylPREDNISolone (MEDROL DOSEPAK) 4 MG tablet therapy pack Follow Package Directions    methylPREDNISolone (MEDROL DOSEPAK) 4 MG tablet therapy pack Follow Package Directions    nitroGLYcerin (NITROSTAT) 0.4 mg, Sublingual    pantoprazole (PROTONIX) 40 mg, Oral, DAILY    polyethylene glycol (MIRALAX) 17 GM/Dose powder MIX 1 CAPFUL (17 GRAMS) IN LIQUID AND DRINK BY MOUTH DAILY. (OTC NC)    potassium chloride ER (K-TAB/KLOR-CON) 10 MEQ CR tablet TAKE 1 TABLET BY MOUTH EVERY DAY    UNABLE TO FIND MEDICATION NAME: Hair skin and nails   2500    vitamin B-12 (CYANOCOBALAMIN) 1,000 mcg, Oral    warfarin ANTICOAGULANT (COUMADIN) 2.5 MG tablet Take 2.5 mg on Mon, Thurs and 3.75 mg all other days, or as directed by the Coumadin Clinic.     Social History     Social History Narrative           Subjective:     Reba Calderon is a 88 year old female who comes in today for:    Chief Complaint   Patient presents with    Follow Up          6/10/2024     9:09 AM   Additional Questions   Roomed by Cr Sky     In for follow up multiple issues.    Reviewed her follow up with [facility].    She is doing physical therapy for her right shoulder and this is helping.    She is going to have a procedure for her back and this is pending at this time.  The right hip is stable at this time.    Using acetaminophen (Tylenol) arthritis 2 three times a day for the pain.    Anticoagulation reviewed.    Having cryoablation of precancerous lesion of the nose at dermatologist.    Has difficulty finding a work at times.    We reviewed her other issues noted in the assessment but not specifically addressed in the HPI above.     Objective:     Wt Readings from Last 3 Encounters:   06/10/24 84.8 kg (187 lb)   04/25/24 90.1 kg (198 lb 9.6 oz)   02/13/24 89.4 kg (197 lb)     BP Readings from Last 3 Encounters:   06/10/24 136/70   04/25/24 (!) 164/80   02/26/24 138/74     /70 (BP Location: Right arm, Patient Position: Sitting, Cuff Size: Adult Regular)   Pulse 77   Temp 97.6  F (36.4  C) (Oral)   Resp 18   Ht 1.689 m (5' 6.5\")   Wt 84.8 kg (187 lb)   LMP  (LMP Unknown)   SpO2 94%   BMI 29.73 kg/m     The patient is comfortable, no acute distress.  Mood good.  Insight fair to good.  Eyes are nonicteric.  Neck is supple without mass.  No cervical adenopathy.  No thyromegaly. Heart regular rate and rhythm.  Lungs clear to auscultation bilaterally.  Respiratory effort is good.   Extremities trace edema - - has stockings on.      Diagnostics:     Orders Only on 06/10/2024   Component Date Value Ref Range Status    INR HOME MONITORING 06/10/2024 2.3  2.000 - 3.000 Final       Results for orders placed or performed in visit on 06/10/24   INR (External Result)     Status: None   Result Value Ref Range    INR HOME MONITORING 2.3 2.000 - 3.000       Assessment:     1. Spinal stenosis of lumbar region with neurogenic claudication    2. Chronic right shoulder pain    3. Hip pain, left    4. Bilateral lower extremity edema    5. Type 2 diabetes mellitus with other skin complication, without long-term current " use of insulin (H)    6. Permanent atrial fibrillation (H)        Plan:     Follow up [facility] as scheduled.  Follow up 4 months with influenza shot and covid shot.  Full blood work next visit.  Continue current medications otherwise.  Follow up sooner if issues.    No orders of the defined types were placed in this encounter.          Devon Ball MD  General Internal Medicine  [facility]    The longitudinal plan of care for the diagnoses and conditions as documented were addressed during this visit. Due to the added complexity in care, I will continue to support Reba in the subsequent management and with ongoing continuity of care.     Return in about 4 months (around 10/15/2024) for visit and blood work.     Future Appointments   Date Time Provider Department Center   10/15/2024  9:30 AM Devon Ball MD [facility]

## 2024-06-10 NOTE — PROGRESS NOTES
ANTICOAGULATION MANAGEMENT     Reba Calderon 88 year old female is on warfarin with therapeutic INR result. (Goal INR 2.0-3.0)    Recent labs: (last 7 days)     06/10/24  0000   INR 2.3       ASSESSMENT     Source(s): Chart Review  Previous INR was Therapeutic last visit; previously outside of goal range  Medication, diet, health changes since last INR chart reviewed; none identified         PLAN     Recommended plan for no diet, medication or health factor changes affecting INR     Dosing Instructions: Continue your current warfarin dose with next INR in 2 weeks       Summary  As of 6/10/2024      Full warfarin instructions:  2.5 mg every Mon, Thu; 3.75 mg all other days   Next INR check:  6/24/2024               Telephone call with Rbea who agrees to plan and repeated back plan correctly    Patient to recheck with home meter    Education provided:   Please call back if any changes to your diet, medications or how you've been taking warfarin  Resume manage by exception with home monitor. Continue to submit INR results to home monitor company.You will only be called when your result is out of range. Please call and notify [facility] if new medication started, dose missed, signs or symptoms of bleeding or clotting, or a surgery/procedure is scheduled. Due for next call no later than: 9/8/24.    Plan made per [facility] anticoagulation protocol    Palak Velázquez, RN  Anticoagulation Clinic  6/10/2024    _______________________________________________________________________     Anticoagulation Episode Summary       Current INR goal:  2.0-3.0   TTR:  71.2% (1 y)   Target end date:  7/15/2036   Send INR reminders to:  [facility]IT[facility]    Indications    Atrial fibrillation  permanent (H) [I48.21]             Comments:  [employer] home monitor. Managed by exception.             Anticoagulation Care Providers       Provider Role Specialty Phone number    Devon Ball MD Referring Internal Medicine [phone]    Leti Brower,  NP Referring  [phone]

## 2024-06-10 NOTE — PATIENT INSTRUCTIONS
Future Appointments   Date Time Provider Department Center   10/15/2024  9:30 AM [facility]

## 2024-06-24 ENCOUNTER — DOCUMENTATION ONLY (OUTPATIENT)
Dept: ANTICOAGULATION | Facility: CLINIC | Age: 89
End: 2024-06-24
Payer: MEDICARE

## 2024-06-24 DIAGNOSIS — I48.21 ATRIAL FIBRILLATION, PERMANENT (H): Primary | Chronic | ICD-10-CM

## 2024-06-24 LAB — INR HOME MONITORING: 2.3 (ref 2–3)

## 2024-06-24 NOTE — PROGRESS NOTES
ANTICOAGULATION  MANAGEMENT-Home Monitor Managed by Exception    Reba Calderon 88 year old female is on warfarin with therapeutic INR result. (Goal INR 2.0-3.0)    Recent labs: (last 7 days)     06/24/24  0000   INR 2.3       Previous INR was Therapeutic  Medication, diet, health changes since last INR:chart reviewed; none identified  Contacted within the last 12 weeks by phone on 6/10/24  Last [facility] referral date: 05/13/2024      [facility]     Reba was NOT contacted regarding therapeutic result today per home monitoring policy manage by exception agreement.   Current warfarin dose is to be continued:     Summary  As of 6/24/2024      Full warfarin instructions:  2.5 mg every Mon, Thu; 3.75 mg all other days   Next INR check:  7/8/2024             ?   Crys Fontanez RN  Anticoagulation Clinic  6/24/2024    _______________________________________________________________________     Anticoagulation Episode Summary       Current INR goal:  2.0-3.0   TTR:  75.1% (1 y)   Target end date:  7/15/2036   Send INR reminders to:  [facility]    Indications    Atrial fibrillation  permanent (H) [I48.21]             Comments:  [employer] home monitor. Managed by exception.             Anticoagulation Care Providers       Provider Role Specialty Phone number    Devon Ball MD Referring Internal Medicine [phone]    Leti Brower NP Referring  [phone]

## 2024-07-08 ENCOUNTER — DOCUMENTATION ONLY (OUTPATIENT)
Dept: ANTICOAGULATION | Facility: CLINIC | Age: 89
End: 2024-07-08
Payer: MEDICARE

## 2024-07-08 DIAGNOSIS — I48.21 ATRIAL FIBRILLATION, PERMANENT (H): Primary | Chronic | ICD-10-CM

## 2024-07-08 LAB — INR HOME MONITORING: 2.9 (ref 2–3)

## 2024-07-08 NOTE — PROGRESS NOTES
ANTICOAGULATION  MANAGEMENT-Home Monitor Managed by Exception    Reba Calderon 88 year old female is on warfarin with therapeutic INR result. (Goal INR 2.0-3.0)    Recent labs: (last 7 days)     07/08/24  0000   INR 2.9       Previous INR was Therapeutic  Medication, diet, health changes since last INR:chart reviewed; none identified  Contacted within the last 12 weeks by phone on 6/10/24  Last [facility] referral date: 05/13/2024      PLAN     Reba was NOT contacted regarding therapeutic result today per home monitoring policy manage by exception agreement.   Current warfarin dose is to be continued:     Summary  As of 7/8/2024      Full warfarin instructions:  2.5 mg every Mon, Thu; 3.75 mg all other days   Next INR check:  7/22/2024             ?   Palak Velázquez RN  Anticoagulation Clinic  7/8/2024    _______________________________________________________________________     Anticoagulation Episode Summary       Current INR goal:  2.0-3.0   TTR:  77.9% (1 y)   Target end date:  7/15/2036   Send INR reminders to:  [facility]    Indications    Atrial fibrillation  permanent (H) [I48.21]             Comments:  [employer] home monitor. Managed by exception.             Anticoagulation Care Providers       Provider Role Specialty Phone number    Devon Ball MD Referring Internal Medicine [phone]    Leti Brower NP Referring  [phone]

## 2024-07-22 ENCOUNTER — DOCUMENTATION ONLY (OUTPATIENT)
Dept: ANTICOAGULATION | Facility: CLINIC | Age: 89
End: 2024-07-22
Payer: MEDICARE

## 2024-07-22 DIAGNOSIS — I48.21 ATRIAL FIBRILLATION, PERMANENT (H): Primary | Chronic | ICD-10-CM

## 2024-07-22 LAB — INR HOME MONITORING: 2.2 (ref 2–3)

## 2024-07-22 NOTE — PROGRESS NOTES
ANTICOAGULATION  MANAGEMENT-Home Monitor Managed by Exception    Reba Calderon 88 year old female is on warfarin with therapeutic INR result. (Goal INR 2.0-3.0)    Recent labs: (last 7 days)     07/22/24  0000   INR 2.2       Previous INR was Therapeutic  Medication, diet, health changes since last INR:chart reviewed; none identified  Contacted within the last 12 weeks by phone on 6/10/24  Last [facility] referral date: 05/13/2024      BELGICA     Reba was NOT contacted regarding therapeutic result today per home monitoring policy manage by exception agreement.   Current warfarin dose is to be continued:     Summary  As of 7/22/2024      Full warfarin instructions:  2.5 mg every Mon, Thu; 3.75 mg all other days   Next INR check:  8/5/2024             ?   Crys Fontanez RN  Anticoagulation Clinic  7/22/2024    _______________________________________________________________________     Anticoagulation Episode Summary       Current INR goal:  2.0-3.0   TTR:  81.5% (1 y)   Target end date:  7/15/2036   Send INR reminders to:  [facility]    Indications    Atrial fibrillation  permanent (H) [I48.21]             Comments:  [employer] home monitor. Managed by exception.             Anticoagulation Care Providers       Provider Role Specialty Phone number    Devon Ball MD Referring Internal Medicine [phone]    Leti Brower NP Referring  [phone]

## 2024-08-05 ENCOUNTER — DOCUMENTATION ONLY (OUTPATIENT)
Dept: ANTICOAGULATION | Facility: CLINIC | Age: 89
End: 2024-08-05
Payer: MEDICARE

## 2024-08-05 DIAGNOSIS — I48.21 ATRIAL FIBRILLATION, PERMANENT (H): Primary | Chronic | ICD-10-CM

## 2024-08-05 LAB — INR HOME MONITORING: 2.5 (ref 2–3)

## 2024-08-05 NOTE — PROGRESS NOTES
ANTICOAGULATION  MANAGEMENT-Home Monitor Managed by Exception    Reba Calderon 88 year old female is on warfarin with therapeutic INR result. (Goal INR 2.0-3.0)    Recent labs: (last 7 days)     08/05/24  0000   INR 2.5       Previous INR was Therapeutic  Medication, diet, health changes since last INR:chart reviewed; none identified  Contacted within the last 12 weeks by phone on 6/10/24  Due for next call no later than: 9/8/24.   Last [facility] referral date: 05/13/2024      BELGICA Britton was NOT contacted regarding therapeutic result today per home monitoring policy manage by exception agreement.   Current warfarin dose is to be continued:     Summary  As of 8/5/2024      Full warfarin instructions:  2.5 mg every Mon, Thu; 3.75 mg all other days   Next INR check:  8/19/2024             ?   Palak Velázquez, RN  Anticoagulation Clinic  8/5/2024    _______________________________________________________________________     Anticoagulation Episode Summary       Current INR goal:  2.0-3.0   TTR:  81.5% (1 y)   Target end date:  7/15/2036   Send INR reminders to:  [facility]    Indications    Atrial fibrillation  permanent (H) [I48.21]             Comments:  [employer] home monitor. Managed by exception.             Anticoagulation Care Providers       Provider Role Specialty Phone number    Devon Ball MD Referring Internal Medicine [phone]    Leti Brower NP Referring  [phone]

## 2024-08-17 ENCOUNTER — HEALTH MAINTENANCE LETTER (OUTPATIENT)
Age: 89
End: 2024-08-17

## 2024-08-20 ENCOUNTER — DOCUMENTATION ONLY (OUTPATIENT)
Dept: ANTICOAGULATION | Facility: CLINIC | Age: 89
End: 2024-08-20
Payer: MEDICARE

## 2024-08-20 DIAGNOSIS — I48.21 ATRIAL FIBRILLATION, PERMANENT (H): Primary | Chronic | ICD-10-CM

## 2024-08-20 LAB — INR HOME MONITORING: 2.3 (ref 2–3)

## 2024-08-20 NOTE — PROGRESS NOTES
ANTICOAGULATION  MANAGEMENT-Home Monitor Managed by Exception    Reba Calderon 88 year old female is on warfarin with therapeutic INR result. (Goal INR 2.0-3.0)    Recent labs: (last 7 days)     08/20/24  0000   INR 2.3       Previous INR was Therapeutic  Medication, diet, health changes since last INR:chart reviewed; none identified  Contacted within the last 12 weeks by phone on 6/10/24  Last [facility] referral date: 05/13/2024      BELGICA     Reba was NOT contacted regarding therapeutic result today per home monitoring policy manage by exception agreement.   Current warfarin dose is to be continued:     Summary  As of 8/20/2024      Full warfarin instructions:  2.5 mg every Mon, Thu; 3.75 mg all other days   Next INR check:  9/3/2024             ?   Crys Simpson RN  Anticoagulation Clinic  8/20/2024    _______________________________________________________________________     Anticoagulation Episode Summary       Current INR goal:  2.0-3.0   TTR:  81.9% (1 y)   Target end date:  7/15/2036   Send INR reminders to:  [facility]    Indications    Atrial fibrillation  permanent (H) [I48.21]             Comments:  [employer] home monitor. Managed by exception.             Anticoagulation Care Providers       Provider Role Specialty Phone number    Devon Ball MD Referring Internal Medicine [phone]    Leti Brower NP Referring  [phone]

## 2024-09-03 ENCOUNTER — ANTICOAGULATION THERAPY VISIT (OUTPATIENT)
Dept: ANTICOAGULATION | Facility: CLINIC | Age: 89
End: 2024-09-03
Payer: MEDICARE

## 2024-09-03 DIAGNOSIS — I48.21 ATRIAL FIBRILLATION, PERMANENT (H): Primary | Chronic | ICD-10-CM

## 2024-09-03 LAB — INR HOME MONITORING: 2.1 (ref 2–3)

## 2024-09-03 NOTE — PROGRESS NOTES
ANTICOAGULATION MANAGEMENT     Reba Calderon 88 year old female is on warfarin with therapeutic INR result. (Goal INR 2.0-3.0)    Recent labs: (last 7 days)     09/03/24  0000   INR 2.1       ASSESSMENT     Source(s): Chart Review and Patient/Caregiver Call     Warfarin doses taken: Warfarin taken as instructed  Diet: No new diet changes identified  Medication/supplement changes: None noted  New illness, injury, or hospitalization: No  Signs or symptoms of bleeding or clotting: No  Previous result: Therapeutic last 2(+) visits  Additional findings: None       PLAN     Recommended plan for no diet, medication or health factor changes affecting INR     Dosing Instructions: Continue your current warfarin dose with next INR in 2 weeks       Summary  As of 9/3/2024      Full warfarin instructions:  2.5 mg every Mon, Thu; 3.75 mg all other days   Next INR check:  9/17/2024               Telephone call with Reba who verbalizes understanding and agrees to plan    Patient to recheck with home meter    Education provided: Please call back if any changes to your diet, medications or how you've been taking warfarin  Symptom monitoring: monitoring for bleeding signs and symptoms and monitoring for clotting signs and symptoms  Resume manage by exception with home monitor. Continue to submit INR results to home monitor company.You will only be called when your result is out of range. Please call and notify [facility] if new medication started, dose missed, signs or symptoms of bleeding or clotting, or a surgery/procedure is scheduled. Due for next call no later than: 12/2/24.    Plan made per [facility] anticoagulation protocol    Crys Simpson RN  Anticoagulation Clinic  9/3/2024    _______________________________________________________________________     Anticoagulation Episode Summary       Current INR goal:  2.0-3.0   TTR:  84.8% (1 y)   Target end date:  7/15/2036   Send INR reminders to:  [facility]    Indications     Atrial fibrillation  permanent (H) [I48.21]             Comments:  [employer] home monitor. Managed by exception.             Anticoagulation Care Providers       Provider Role Specialty Phone number    Devon Ball MD Referring Internal Medicine [phone]    Leti Brower NP Referring  [phone]

## 2024-09-05 DIAGNOSIS — Z79.01 LONG TERM (CURRENT) USE OF ANTICOAGULANTS: ICD-10-CM

## 2024-09-05 DIAGNOSIS — I48.21 PERMANENT ATRIAL FIBRILLATION (H): ICD-10-CM

## 2024-09-05 RX ORDER — WARFARIN SODIUM 2.5 MG/1
TABLET ORAL
Qty: 130 TABLET | Refills: 1 | Status: SHIPPED | OUTPATIENT
Start: 2024-09-05

## 2024-09-05 NOTE — TELEPHONE ENCOUNTER
ANTICOAGULATION MANAGEMENT:  Medication Refill    Anticoagulation Summary  As of 9/3/2024      Warfarin maintenance plan:  2.5 mg (2.5 mg x 1) every Mon, Thu; 3.75 mg (2.5 mg x 1.5) all other days   Next INR check:  9/17/2024   Target end date:  7/15/2036    Indications    Atrial fibrillation  permanent (H) [I48.21]                 Anticoagulation Care Providers       Provider Role Specialty Phone number    Devon Ball MD Referring Internal Medicine [phone]    Leti Brower NP Referring  [phone]            Refill Criteria    Visit with referring provider/group: Meets criteria: visit within referring provider group in the last 15 months on 6/10/24    [facility] referral last signed: 05/13/2024; within last year: Yes    Lab monitoring not exceeding 2 weeks overdue: Yes    Reba meets all criteria for refill. Rx instructions and quantity supplied updated to match patient's current dosing plan. Warfarin 90 day supply with 1 refill granted per [facility] protocol     Crys Simpson RN  Anticoagulation Clinic

## 2024-09-13 DIAGNOSIS — E11.628 TYPE 2 DIABETES MELLITUS WITH OTHER SKIN COMPLICATION, WITHOUT LONG-TERM CURRENT USE OF INSULIN (H): ICD-10-CM

## 2024-09-13 DIAGNOSIS — I10 ESSENTIAL HYPERTENSION: ICD-10-CM

## 2024-09-13 RX ORDER — POTASSIUM CHLORIDE 750 MG/1
TABLET, EXTENDED RELEASE ORAL
Qty: 90 TABLET | Refills: 0 | Status: SHIPPED | OUTPATIENT
Start: 2024-09-13

## 2024-09-17 ENCOUNTER — ANTICOAGULATION THERAPY VISIT (OUTPATIENT)
Dept: ANTICOAGULATION | Facility: CLINIC | Age: 89
End: 2024-09-17
Payer: MEDICARE

## 2024-09-17 DIAGNOSIS — I48.21 ATRIAL FIBRILLATION, PERMANENT (H): Primary | Chronic | ICD-10-CM

## 2024-09-17 LAB — INR HOME MONITORING: 1.7 (ref 2–3)

## 2024-09-17 NOTE — PROGRESS NOTES
ANTICOAGULATION MANAGEMENT     Reba Calderon 88 year old female is on warfarin with subtherapeutic INR result. (Goal INR 2.0-3.0)    Recent labs: (last 7 days)     09/17/24  0000   INR 1.7*       ASSESSMENT     Source(s): Chart Review and Patient/Caregiver Call     Warfarin doses taken: Warfarin taken as instructed  Diet: Increased greens/vitamin K in diet; plans to resume previous intake  Medication/supplement changes: None noted  New illness, injury, or hospitalization: No  Signs or symptoms of bleeding or clotting: No  Previous result: Therapeutic last 2(+) visits  Additional findings: None       PLAN     Recommended plan for temporary change(s) affecting INR     Dosing Instructions: booster dose then continue your current warfarin dose with next INR in 2 weeks       Summary  As of 9/17/2024      Full warfarin instructions:  9/17: 5 mg; Otherwise 2.5 mg every Mon, Thu; 3.75 mg all other days   Next INR check:  10/1/2024               Telephone call with Reba who verbalizes understanding and agrees to plan    Patient to recheck with home meter    Education provided: Please call back if any changes to your diet, medications or how you've been taking warfarin  Dietary considerations: importance of consistent vitamin K intake  Symptom monitoring: monitoring for bleeding signs and symptoms, monitoring for clotting signs and symptoms, and monitoring for stroke signs and symptoms    Plan made per [facility] anticoagulation protocol    Crys Simpson RN  9/17/2024  Anticoagulation Clinic  [facility] for routing messages: p ANTICOAG HOME MONITORING  [facility] patient phone line: [phone]        _______________________________________________________________________     Anticoagulation Episode Summary       Current INR goal:  2.0-3.0   TTR:  82.0% (1 y)   Target end date:  7/15/2036   Send INR reminders to:  [facility]    Indications    Atrial fibrillation  permanent (H) [I48.21]             Comments:  [employer] home  monitor. Managed by leeanna.             Anticoagulation Care Providers       Provider Role Specialty Phone number    Devon Ball MD Referring Internal Medicine [phone]    Leti Brower NP Referring  [phone]

## 2024-09-25 DIAGNOSIS — I50.32 CHRONIC DIASTOLIC (CONGESTIVE) HEART FAILURE (H): ICD-10-CM

## 2024-09-25 RX ORDER — FUROSEMIDE 40 MG
TABLET ORAL
Qty: 180 TABLET | Refills: 3 | Status: SHIPPED | OUTPATIENT
Start: 2024-09-25

## 2024-09-30 ENCOUNTER — ANTICOAGULATION THERAPY VISIT (OUTPATIENT)
Dept: ANTICOAGULATION | Facility: CLINIC | Age: 89
End: 2024-09-30
Payer: MEDICARE

## 2024-09-30 DIAGNOSIS — E11.628 TYPE 2 DIABETES MELLITUS WITH OTHER SKIN COMPLICATION, WITHOUT LONG-TERM CURRENT USE OF INSULIN (H): ICD-10-CM

## 2024-09-30 DIAGNOSIS — I48.21 ATRIAL FIBRILLATION, PERMANENT (H): Primary | Chronic | ICD-10-CM

## 2024-09-30 LAB — INR HOME MONITORING: 2.4 (ref 2–3)

## 2024-09-30 RX ORDER — CARVEDILOL 25 MG/1
TABLET, FILM COATED ORAL
Qty: 100 STRIP | Refills: 1 | Status: SHIPPED | OUTPATIENT
Start: 2024-09-30
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
## 2024-10-07 NOTE — TELEPHONE ENCOUNTER
I refilled that too    Please see patient update and sign prescription for Mounjaro 5 mg if ok to titrate to next dose

## 2024-10-15 ENCOUNTER — ANTICOAGULATION THERAPY VISIT (OUTPATIENT)
Dept: ANTICOAGULATION | Facility: CLINIC | Age: 89
End: 2024-10-15

## 2024-10-15 ENCOUNTER — OFFICE VISIT (OUTPATIENT)
Dept: INTERNAL MEDICINE | Facility: CLINIC | Age: 89
End: 2024-10-15
Payer: MEDICARE

## 2024-10-15 VITALS
HEART RATE: 72 BPM | OXYGEN SATURATION: 93 % | TEMPERATURE: 97.6 F | HEIGHT: 67 IN | RESPIRATION RATE: 18 BRPM | SYSTOLIC BLOOD PRESSURE: 140 MMHG | WEIGHT: 194.4 LBS | DIASTOLIC BLOOD PRESSURE: 76 MMHG | BODY MASS INDEX: 30.51 KG/M2

## 2024-10-15 DIAGNOSIS — G89.29 CHRONIC RIGHT SHOULDER PAIN: ICD-10-CM

## 2024-10-15 DIAGNOSIS — M19.071 PRIMARY OSTEOARTHRITIS OF RIGHT ANKLE: ICD-10-CM

## 2024-10-15 DIAGNOSIS — M48.062 SPINAL STENOSIS OF LUMBAR REGION WITH NEUROGENIC CLAUDICATION: ICD-10-CM

## 2024-10-15 DIAGNOSIS — I48.21 ATRIAL FIBRILLATION, PERMANENT (H): Chronic | ICD-10-CM

## 2024-10-15 DIAGNOSIS — E11.42 TYPE 2 DIABETES MELLITUS WITH DIABETIC POLYNEUROPATHY, WITHOUT LONG-TERM CURRENT USE OF INSULIN (H): Primary | ICD-10-CM

## 2024-10-15 DIAGNOSIS — I10 PRIMARY HYPERTENSION: ICD-10-CM

## 2024-10-15 DIAGNOSIS — I48.21 ATRIAL FIBRILLATION, PERMANENT (H): Primary | Chronic | ICD-10-CM

## 2024-10-15 DIAGNOSIS — C91.10 CLL (CHRONIC LYMPHOCYTIC LEUKEMIA) (H): ICD-10-CM

## 2024-10-15 DIAGNOSIS — M25.511 CHRONIC RIGHT SHOULDER PAIN: ICD-10-CM

## 2024-10-15 LAB
ANION GAP SERPL CALCULATED.3IONS-SCNC: 14 MMOL/L (ref 7–15)
BASOPHILS # BLD AUTO: 0.1 10E3/UL (ref 0–0.2)
BASOPHILS NFR BLD AUTO: 1 %
BUN SERPL-MCNC: 19.3 MG/DL (ref 8–23)
CALCIUM SERPL-MCNC: 10.3 MG/DL (ref 8.8–10.4)
CHLORIDE SERPL-SCNC: 99 MMOL/L (ref 98–107)
CREAT SERPL-MCNC: 0.82 MG/DL (ref 0.51–0.95)
EGFRCR SERPLBLD CKD-EPI 2021: 68 ML/MIN/1.73M2
EOSINOPHIL # BLD AUTO: 0.1 10E3/UL (ref 0–0.7)
EOSINOPHIL NFR BLD AUTO: 1 %
ERYTHROCYTE [DISTWIDTH] IN BLOOD BY AUTOMATED COUNT: 15.8 % (ref 10–15)
EST. AVERAGE GLUCOSE BLD GHB EST-MCNC: 166 MG/DL
GLUCOSE SERPL-MCNC: 151 MG/DL (ref 70–99)
HBA1C MFR BLD: 7.4 % (ref 0–5.6)
HCO3 SERPL-SCNC: 29 MMOL/L (ref 22–29)
HCT VFR BLD AUTO: 40.6 % (ref 35–47)
HGB BLD-MCNC: 12.9 G/DL (ref 11.7–15.7)
IMM GRANULOCYTES # BLD: 0 10E3/UL
IMM GRANULOCYTES NFR BLD: 0 %
INR HOME MONITORING: 1.9 (ref 2–3)
LYMPHOCYTES # BLD AUTO: 1.3 10E3/UL (ref 0.8–5.3)
LYMPHOCYTES NFR BLD AUTO: 18 %
MCH RBC QN AUTO: 30.6 PG (ref 26.5–33)
MCHC RBC AUTO-ENTMCNC: 31.8 G/DL (ref 31.5–36.5)
MCV RBC AUTO: 96 FL (ref 78–100)
MONOCYTES # BLD AUTO: 0.6 10E3/UL (ref 0–1.3)
MONOCYTES NFR BLD AUTO: 8 %
NEUTROPHILS # BLD AUTO: 5.5 10E3/UL (ref 1.6–8.3)
NEUTROPHILS NFR BLD AUTO: 72 %
PLATELET # BLD AUTO: 217 10E3/UL (ref 150–450)
POTASSIUM SERPL-SCNC: 4.2 MMOL/L (ref 3.4–5.3)
RBC # BLD AUTO: 4.21 10E6/UL (ref 3.8–5.2)
SODIUM SERPL-SCNC: 142 MMOL/L (ref 135–145)
WBC # BLD AUTO: 7.6 10E3/UL (ref 4–11)

## 2024-10-15 PROCEDURE — 91320 SARSCV2 VAC 30MCG TRS-SUC IM: CPT | Performed by: INTERNAL MEDICINE

## 2024-10-15 PROCEDURE — 90662 IIV NO PRSV INCREASED AG IM: CPT | Performed by: INTERNAL MEDICINE

## 2024-10-15 PROCEDURE — 83036 HEMOGLOBIN GLYCOSYLATED A1C: CPT | Performed by: INTERNAL MEDICINE

## 2024-10-15 PROCEDURE — 36415 COLL VENOUS BLD VENIPUNCTURE: CPT | Performed by: INTERNAL MEDICINE

## 2024-10-15 PROCEDURE — G0008 ADMIN INFLUENZA VIRUS VAC: HCPCS | Performed by: INTERNAL MEDICINE

## 2024-10-15 PROCEDURE — 80048 BASIC METABOLIC PNL TOTAL CA: CPT | Performed by: INTERNAL MEDICINE

## 2024-10-15 PROCEDURE — 99214 OFFICE O/P EST MOD 30 MIN: CPT | Mod: 25 | Performed by: INTERNAL MEDICINE

## 2024-10-15 PROCEDURE — 90480 ADMN SARSCOV2 VAC 1/ONLY CMP: CPT | Performed by: INTERNAL MEDICINE

## 2024-10-15 PROCEDURE — 85025 COMPLETE CBC W/AUTO DIFF WBC: CPT | Performed by: INTERNAL MEDICINE

## 2024-10-15 NOTE — PROGRESS NOTES
ANTICOAGULATION MANAGEMENT     Reba Caledron 88 year old female is on warfarin with subtherapeutic INR result. (Goal INR 2.0-3.0)    Recent labs: (last 7 days)     10/15/24  0000   INR 1.9*       ASSESSMENT     Source(s): Chart Review and Patient/Caregiver Call     Warfarin doses taken: Warfarin taken as instructed  Diet :  1 randolph salad yesterday, this was a temporary change, generally if has a salad has ice romero  Medication/supplement changes: None noted  New illness, injury, or hospitalization: No  Signs or symptoms of bleeding or clotting: No  Previous result: Therapeutic last visit; previously outside of goal range  Additional findings:  Patient made aware if ut of goal range next result a dose change may be advised.       PLAN     Recommended plan for temporary change(s) affecting INR     Dosing Instructions: booster dose then continue your current warfarin dose with next INR in 2 weeks       Summary  As of 10/15/2024      Full warfarin instructions:  10/15: 5 mg; Otherwise 2.5 mg every Mon, Thu; 3.75 mg all other days   Next INR check:  10/29/2024               Telephone call with Reba who agrees to plan and repeated back plan correctly    Patient to recheck with home meter    Education provided: Please call back if any changes to your diet, medications or how you've been taking warfarin  Goal range and lab monitoring: goal range and significance of current result    Plan made per [facility] anticoagulation protocol    Mignon Randall RN  10/15/2024  Anticoagulation Clinic  [employer] for routing messages: p ANTICOAG HOME MONITORING  [facility] patient phone line: [phone]        _______________________________________________________________________     Anticoagulation Episode Summary       Current INR goal:  2.0-3.0   TTR:  79.9% (1 y)   Target end date:  7/15/2036   Send INR reminders to:  [facility]AG [facility]IT[facility]    Indications    Atrial fibrillation  permanent (H) [I48.21]             Comments:  [employer] home  monitor. Managed by leeanna.             Anticoagulation Care Providers       Provider Role Specialty Phone number    Devon Ball MD Referring Internal Medicine [phone]    Leti Brower NP Referring  [phone]

## 2024-10-16 NOTE — PROGRESS NOTES
[facility]    Comprehensive and complex medical care - Chronic disease management - Shared decision making - Care coordination - Compassionate care    Patient advocacy - Rational deprescribing - Minimally disruptive medicine - Ethical focus - Customized care         Date of Service: 10/15/2024  Primary Provider: Devon Ball    Patient Care Team:  Devon Ball MD as PCP - General (Internal Medicine)  Louis Jacobsen MD as MD (Ophthalmology)  DANGELO Pagan MD as MD  Devon Ball MD as Assigned PCP  Yancy Vinson MD as MD (Orthopaedic Surgery)          Patient's Pharmacy:    [facility]/pharmacy #[id number] - [facility], [place] - [address]  [address] [place] [address]  Phone: [phone] Fax: [phone]     Patient's Contacts:  Name Home Phone Work Phone Mobile Phone Relationship Lgl XIMENA Beckford [phone]   Spouse    JYOTHI SHEIKH   [phone] Daughter      Patient's Insurance:    Payor: MEDICARE / Plan: MEDICARE / Product Type: Medicare /           Active Problem List:  Problem List as of 10/15/2024 Reviewed: 4/25/2024  5:46 PM by Devon Ball MD         High    Atrial fibrillation, permanent (H)    CLL (chronic lymphocytic leukemia) (H)    Nonsmoker       Medium    Type 2 diabetes mellitus with diabetic polyneuropathy, without long-term current use of insulin (H)    Cardiac pacemaker in situ    Primary hypertension    Chronic diastolic heart failure (H)    Actinomycosis    H/O atrioventricular jerzy ablation    CHB (complete heart block) (H)    Anticoagulation monitoring, INR range 2-3    Choledocholithiasis, RECURRENT    Primary osteoarthritis of right ankle    Spinal stenosis of lumbar region with neurogenic claudication       Low    Peripheral sensory neuropathy    MATILDE (obstructive sleep apnea)    Perirectal abscess    Coagulopathy (H) - due to warfarin    Senile purpura (H)        Current Outpatient Medications    Medication Instructions    atorvastatin (LIPITOR) 10 mg, Oral, EVERY EVENING    biotin 2,500 mcg, Oral    blood glucose (CONTOUR TEST) test strip USE 1 STRIP TO TEST BLOOD SUGAR DAILY.    Cholecalciferol (VITAMIN D3 PO) 2,000 Units, Oral, DAILY    ciclopirox (PENLAC) 8 % external solution Apply to adjacent skin and affected nails daily.  Remove with alcohol every 7 days, then repeat.    FOLIC ACID PO 1 mg, DAILY    furosemide (LASIX) 40 MG tablet TAKE 2 TABLETS BY MOUTH EVERY MORNING    gabapentin (NEURONTIN) 200 mg, Oral, DAILY, TAKE 1 CAPSULE BY MOUTH AT BEDTIME FOR 1 WEEK THEN 2 CAPS THEREAFTER    glucosamine-chondroitinoitin 500-400 MG tablet 1 tablet, Oral    magnesium chloride 64 mg, Oral    metFORMIN (GLUCOPHAGE) 500 mg, Oral, 2 TIMES DAILY WITH MEALS    methylPREDNISolone (MEDROL DOSEPAK) 4 MG tablet therapy pack Follow Package Directions    methylPREDNISolone (MEDROL DOSEPAK) 4 MG tablet therapy pack Follow Package Directions    nitroGLYcerin (NITROSTAT) 0.4 mg, Sublingual    pantoprazole (PROTONIX) 40 mg, Oral, DAILY    polyethylene glycol (MIRALAX) 17 GM/Dose powder MIX 1 CAPFUL (17 GRAMS) IN LIQUID AND DRINK BY MOUTH DAILY. (OTC NC)    potassium chloride ER (K-TAB/KLOR-CON) 10 MEQ CR tablet TAKE 1 TABLET BY MOUTH EVERY DAY    UNABLE TO FIND MEDICATION NAME: Hair skin and nails   2500    vitamin B-12 (CYANOCOBALAMIN) 1,000 mcg, Oral    warfarin ANTICOAGULANT (COUMADIN) 2.5 MG tablet Take 2.5 mg by mouth every Mon, Thu; 3.75 mg all other days, or as directed by INR clinic     Social History     Social History Narrative           Subjective:     Reba Calderon is a 88 year old female who comes in today for:    Chief Complaint   Patient presents with    Follow Up    Imm/Inj     Flu and Covid           10/15/2024     9:32 AM   Additional Questions   Roomed by Cr Sky     In for follow up multiple issues.    In with daughter and .    Continues follow up with [facility].  Continues to  "have treatment mainly for the back.  Did have RFA on the 22nd of July and this has helped somewhat.  In the last few days, has had some pain in the am on the left side.    Shoulder and ankle continue to bother her as well.    Atrial fibrillation about the same and other health issues generally doing well.    Would like to update vaccination and would like to recheck CLL.    We reviewed her other issues noted in the assessment but not specifically addressed in the HPI above.     Objective:     Wt Readings from Last 3 Encounters:   10/15/24 88.2 kg (194 lb 6.4 oz)   06/10/24 84.8 kg (187 lb)   04/25/24 90.1 kg (198 lb 9.6 oz)     BP Readings from Last 3 Encounters:   10/15/24 (!) 140/76   06/10/24 136/70   04/25/24 (!) 164/80     BP (!) 140/76   Pulse 72   Temp 97.6  F (36.4  C) (Oral)   Resp 18   Ht 1.689 m (5' 6.5\")   Wt 88.2 kg (194 lb 6.4 oz)   LMP  (LMP Unknown)   SpO2 93%   BMI 30.91 kg/m     The patient is comfortable, no acute distress.  Mood good.  Insight good.  Eyes are nonicteric.  Neck is supple without mass.  No cervical adenopathy.  No thyromegaly. Heart regular rate and rhythm.  Lungs clear to auscultation bilaterally.  Respiratory effort is good      Diagnostics:     Orders Only on 09/30/2024   Component Date Value Ref Range Status    INR HOME MONITORING 09/30/2024 2.4  2.000 - 3.000 Final       Results for orders placed or performed in visit on 10/15/24   INR (External Result)     Status: Abnormal   Result Value Ref Range    INR HOME MONITORING 1.9 (L) 2.000 - 3.000   Results for orders placed or performed in visit on 10/15/24   HEMOGLOBIN A1C     Status: Abnormal   Result Value Ref Range    Estimated Average Glucose 166 (H) <117 mg/dL    Hemoglobin A1C 7.4 (H) 0.0 - 5.6 %   BASIC METABOLIC PANEL     Status: Abnormal   Result Value Ref Range    Sodium 142 135 - 145 mmol/L    Potassium 4.2 3.4 - 5.3 mmol/L    Chloride 99 98 - 107 mmol/L    Carbon Dioxide (CO2) 29 22 - 29 mmol/L    Anion Gap 14 " 7 - 15 mmol/L    Urea Nitrogen 19.3 8.0 - 23.0 mg/dL    Creatinine 0.82 0.51 - 0.95 mg/dL    GFR Estimate 68 >60 mL/min/1.73m2    Calcium 10.3 8.8 - 10.4 mg/dL    Glucose 151 (H) 70 - 99 mg/dL   CBC with platelets and differential     Status: Abnormal   Result Value Ref Range    WBC Count 7.6 4.0 - 11.0 10e3/uL    RBC Count 4.21 3.80 - 5.20 10e6/uL    Hemoglobin 12.9 11.7 - 15.7 g/dL    Hematocrit 40.6 35.0 - 47.0 %    MCV 96 78 - 100 fL    MCH 30.6 26.5 - 33.0 pg    MCHC 31.8 31.5 - 36.5 g/dL    RDW 15.8 (H) 10.0 - 15.0 %    Platelet Count 217 150 - 450 10e3/uL    % Neutrophils 72 %    % Lymphocytes 18 %    % Monocytes 8 %    % Eosinophils 1 %    % Basophils 1 %    % Immature Granulocytes 0 %    Absolute Neutrophils 5.5 1.6 - 8.3 10e3/uL    Absolute Lymphocytes 1.3 0.8 - 5.3 10e3/uL    Absolute Monocytes 0.6 0.0 - 1.3 10e3/uL    Absolute Eosinophils 0.1 0.0 - 0.7 10e3/uL    Absolute Basophils 0.1 0.0 - 0.2 10e3/uL    Absolute Immature Granulocytes 0.0 <=0.4 10e3/uL   CBC with Platelets & Differential     Status: Abnormal    Narrative    The following orders were created for panel order CBC with Platelets & Differential.  Procedure                               Abnormality         Status                     ---------                               -----------         ------                     CBC with platelets and d...[[id number]]  Abnormal            Final result                 Please view results for these tests on the individual orders.       Assessment:     1. Type 2 diabetes mellitus with diabetic polyneuropathy, without long-term current use of insulin (H)    2. Primary hypertension    3. CLL (chronic lymphocytic leukemia) (H)    4. Atrial fibrillation, permanent (H)    5. Spinal stenosis of lumbar region with neurogenic claudication    6. Primary osteoarthritis of right ankle    7. Chronic right shoulder pain        Plan:     Covid and flu shots done today.  Check lab work today.     Continue follow up with  [facility].  Continue current medications otherwise.  Follow up sooner if issues.    Orders Placed This Encounter   Procedures    INFLUENZA HIGH DOSE, TRIVALENT, PF (FLUZONE)    COVID-19 12+ (PFIZER)    HEMOGLOBIN A1C    BASIC METABOLIC PANEL    CBC with platelets and differential    CBC with Platelets & Differential           Devon Ball MD  General Internal Medicine  [facility]    The longitudinal plan of care for the diagnoses and conditions as documented were addressed during this visit. Due to the added complexity in care, I will continue to support Reba in the subsequent management and with ongoing continuity of care.     Return in about 4 months (around 2/14/2025) for annual wellness visit.     Future Appointments   Date Time Provider Department Center   2/14/2025 10:00 AM [facility]

## 2024-10-16 NOTE — PATIENT INSTRUCTIONS
Future Appointments   Date Time Provider Department Center   2/14/2025 10:00 AM [facility]

## 2024-10-28 ENCOUNTER — TRANSFERRED RECORDS (OUTPATIENT)
Dept: MULTI SPECIALTY CLINIC | Facility: CLINIC | Age: 89
End: 2024-10-28
Payer: MEDICARE

## 2024-10-28 ENCOUNTER — ANTICOAGULATION THERAPY VISIT (OUTPATIENT)
Dept: ANTICOAGULATION | Facility: CLINIC | Age: 89
End: 2024-10-28
Payer: MEDICARE

## 2024-10-28 DIAGNOSIS — I48.21 ATRIAL FIBRILLATION, PERMANENT (H): Primary | Chronic | ICD-10-CM

## 2024-10-28 LAB
INR HOME MONITORING: 1.8 (ref 2–3)
RETINOPATHY: NORMAL

## 2024-10-28 NOTE — PROGRESS NOTES
ANTICOAGULATION MANAGEMENT     Reba Calderon 88 year old female is on warfarin with subtherapeutic INR result. (Goal INR 2.0-3.0)    Recent labs: (last 7 days)     10/28/24  0000   INR 1.8*       ASSESSMENT     Source(s): Chart Review and Patient/Caregiver Call     Warfarin doses taken: Warfarin taken as instructed  Diet: No new diet changes identified  Medication/supplement changes: None noted  New illness, injury, or hospitalization: No  Signs or symptoms of bleeding or clotting: No  Previous result: Subtherapeutic  Additional findings: None       PLAN     Recommended plan for no diet, medication or health factor changes affecting INR     Dosing Instructions: Continue your current warfarin dose with next INR in 2 weeks       Summary  As of 10/28/2024      Full warfarin instructions:  2.5 mg every Thu; 3.75 mg all other days   Next INR check:  11/11/2024               Telephone call with Reba who verbalizes understanding and agrees to plan    Patient to recheck with home meter    Education provided: Please call back if any changes to your diet, medications or how you've been taking warfarin  Symptom monitoring: monitoring for bleeding signs and symptoms, monitoring for clotting signs and symptoms, and monitoring for stroke signs and symptoms    Plan made per [facility] anticoagulation protocol    Crys Simpson RN  10/28/2024  Anticoagulation Clinic  [employer] for routing messages: p ANTICOAG HOME MONITORING  [facility] patient phone line: [phone]        _______________________________________________________________________     Anticoagulation Episode Summary       Current INR goal:  2.0-3.0   TTR:  79.2% (1 y)   Target end date:  7/15/2036   Send INR reminders to:  ANTICOAG HOME MONITORING    Indications    Atrial fibrillation  permanent (H) [I48.21]             Comments:  [employer] home monitor. Managed by exception.             Anticoagulation Care Providers       Provider Role Specialty Phone number    Devon Ball MD  Referring Internal Medicine [phone]    Leti Brower NP Referring  [phone]

## 2024-11-11 ENCOUNTER — ANTICOAGULATION THERAPY VISIT (OUTPATIENT)
Dept: ANTICOAGULATION | Facility: CLINIC | Age: 89
End: 2024-11-11
Payer: MEDICARE

## 2024-11-11 DIAGNOSIS — I48.21 ATRIAL FIBRILLATION, PERMANENT (H): Primary | Chronic | ICD-10-CM

## 2024-11-11 LAB — INR HOME MONITORING: 2.1 (ref 2–3)

## 2024-11-11 NOTE — PROGRESS NOTES
"ANTICOAGULATION MANAGEMENT     Reba Calderon 88 year old female is on warfarin with therapeutic INR result. (Goal INR 2.0-3.0)    Recent labs: (last 7 days)     11/11/24  0000   INR 2.1       ASSESSMENT     Source(s): Chart Review and Patient/Caregiver Call     Warfarin doses taken: Less warfarin taken than planned which may be affecting INR - was not aware she should be taking 2.5 mg only on Thursdays. Took 2.5 mg last Monday.   Diet: No new diet changes identified - has had \"lettuce\" recently which she believes was influencing her results so just avoiding for the time being.  Medication/supplement changes: None noted  New illness, injury, or hospitalization: No  Signs or symptoms of bleeding or clotting: No  Previous result: Subtherapeutic  Additional findings: Note: Did not give this patient the prompt that she would return to [facility] with next result. Figured she should be called to ensure dosing is accurate.        PLAN     Recommended plan for temporary change(s) affecting INR     Dosing Instructions: Continue your current warfarin dose with next INR in 2 weeks       Summary  As of 11/11/2024      Full warfarin instructions:  2.5 mg every Thu; 3.75 mg all other days   Next INR check:  11/25/2024               Telephone call with Reba who verbalizes understanding and agrees to plan    Patient to recheck with home meter    Education provided: Please call back if any changes to your diet, medications or how you've been taking warfarin    Plan made per [facility] anticoagulation protocol    Hamida Naranjo RN  11/11/2024  Anticoagulation Clinic  [facility] for routing messages: p ANTICOAG HOME MONITORING  [facility] patient phone line: [phone]        _______________________________________________________________________     Anticoagulation Episode Summary       Current INR goal:  2.0-3.0   TTR:  76.6% (1 y)   Target end date:  7/15/2036   Send INR reminders to:  ANTICOAG HOME MONITORING    Indications    Atrial " fibrillation  permanent (H) [I48.21]             Comments:  [employer] home monitor. Managed by exception.             Anticoagulation Care Providers       Provider Role Specialty Phone number    Devon Ball MD Referring Internal Medicine [phone]    Leti Brower NP Referring  [phone]

## 2024-11-26 ENCOUNTER — ANTICOAGULATION THERAPY VISIT (OUTPATIENT)
Dept: ANTICOAGULATION | Facility: CLINIC | Age: 89
End: 2024-11-26
Payer: MEDICARE

## 2024-11-26 DIAGNOSIS — I48.21 ATRIAL FIBRILLATION, PERMANENT (H): Primary | Chronic | ICD-10-CM

## 2024-11-26 LAB — INR HOME MONITORING: 2.4 (ref 2–3)

## 2024-11-26 NOTE — PROGRESS NOTES
ANTICOAGULATION MANAGEMENT     Reba Calderon 88 year old female is on warfarin with therapeutic INR result. (Goal INR 2.0-3.0)    Recent labs: (last 7 days)     11/26/24  0000   INR 2.4       ASSESSMENT     Source(s): Chart Review and Patient/Caregiver Call     Warfarin doses taken: Warfarin taken as instructed  Diet: No new diet changes identified  Medication/supplement changes: None noted  New illness, injury, or hospitalization: No  Signs or symptoms of bleeding or clotting: No  Previous result: Therapeutic last visit; previously outside of goal range  Additional findings: None       PLAN     Recommended plan for no diet, medication or health factor changes affecting INR     Dosing Instructions: Continue your current warfarin dose with next INR in 1 week       Summary  As of 11/26/2024      Full warfarin instructions:  2.5 mg every Thu; 3.75 mg all other days   Next INR check:  12/10/2024               Telephone call with Reba who verbalizes understanding and agrees to plan    Patient to recheck with home meter    Education provided: Please call back if any changes to your diet, medications or how you've been taking warfarin  Resume manage by exception with home monitor. Continue to submit INR results to home monitor company.You will only be called when your result is out of range and at 90 da check in. Please call and notify [facility] if new medication started, dose missed, signs or symptoms of bleeding or clotting, or a surgery/procedure is scheduled. Due for next call no later than: 2/24/25.    Plan made per [facility] anticoagulation protocol    Crys Simpson RN  11/26/2024  Anticoagulation Clinic  [facility] for routing messages: chey [facility] HOME MONITORING  [facility] patient phone line: [phone]        _______________________________________________________________________     Anticoagulation Episode Summary       Current INR goal:  2.0-3.0   TTR:  77.4% (1 y)   Target end date:  7/15/2036   Send INR reminders to:   ANTICOAG HOME MONITORING    Indications    Atrial fibrillation  permanent (H) [I48.21]             Comments:  [employer] home monitor. Managed by exception.             Anticoagulation Care Providers       Provider Role Specialty Phone number    Devon Ball MD Referring Internal Medicine [phone]    Leti Brower NP Referring  [phone]

## 2024-12-02 ENCOUNTER — TRANSFERRED RECORDS (OUTPATIENT)
Dept: HEALTH INFORMATION MANAGEMENT | Facility: CLINIC | Age: 89
End: 2024-12-02
Payer: MEDICARE

## 2024-12-09 ENCOUNTER — DOCUMENTATION ONLY (OUTPATIENT)
Dept: ANTICOAGULATION | Facility: CLINIC | Age: 89
End: 2024-12-09
Payer: MEDICARE

## 2024-12-09 DIAGNOSIS — I48.21 ATRIAL FIBRILLATION, PERMANENT (H): Primary | Chronic | ICD-10-CM

## 2024-12-09 LAB — INR HOME MONITORING: 2.5 (ref 2–3)

## 2024-12-09 NOTE — PROGRESS NOTES
ANTICOAGULATION  MANAGEMENT-Home Monitor Managed by Exception    Reba Calderon 89 year old female is on warfarin with therapeutic INR result. (Goal INR 2.0-3.0)    Recent labs: (last 7 days)     12/09/24  0000   INR 2.5       Previous INR was Therapeutic  Medication, diet, health changes since last INR:chart reviewed; none identified  Contacted within the last 12 weeks by phone on 11/26/24  Last [facility] referral date: 05/13/2024      BELGICA Britton was NOT contacted regarding therapeutic result today per home monitoring policy manage by exception agreement.   Current warfarin dose is to be continued:     Summary  As of 12/9/2024      Full warfarin instructions:  2.5 mg every Thu; 3.75 mg all other days   Next INR check:  12/23/2024             ?   Mignon Randall RN  Anticoagulation Clinic  12/9/2024    _______________________________________________________________________     Anticoagulation Episode Summary       Current INR goal:  2.0-3.0   TTR:  79.6% (1 y)   Target end date:  7/15/2036   Send INR reminders to:  [facility] HOME MONITORING    Indications    Atrial fibrillation  permanent (H) [I48.21]             Comments:  [employer] home monitor. Managed by exception.             Anticoagulation Care Providers       Provider Role Specialty Phone number    Devon Ball MD Referring Internal Medicine [phone]    Leti Brower NP Referring  [phone]

## 2024-12-19 DIAGNOSIS — I10 ESSENTIAL HYPERTENSION: ICD-10-CM

## 2024-12-19 RX ORDER — POTASSIUM CHLORIDE 750 MG/1
TABLET, EXTENDED RELEASE ORAL
Qty: 90 TABLET | Refills: 2 | Status: SHIPPED | OUTPATIENT
Start: 2024-12-19

## 2024-12-23 ENCOUNTER — ANTICOAGULATION THERAPY VISIT (OUTPATIENT)
Dept: ANTICOAGULATION | Facility: CLINIC | Age: 89
End: 2024-12-23
Payer: MEDICARE

## 2024-12-23 DIAGNOSIS — I48.21 ATRIAL FIBRILLATION, PERMANENT (H): Primary | Chronic | ICD-10-CM

## 2024-12-23 LAB — INR HOME MONITORING: 3.7 (ref 2–3)

## 2024-12-23 NOTE — PROGRESS NOTES
ANTICOAGULATION MANAGEMENT     Reba Calderon 89 year old female is on warfarin with supratherapeutic INR result. (Goal INR 2.0-3.0)    Recent labs: (last 7 days)     12/23/24  0000   INR 3.7*       ASSESSMENT     Source(s): Chart Review and Patient/Caregiver Call     Warfarin doses taken:  Per patient she was sick started 12/13 th and missed taking all her medications including warfarin on 12/13 to 12/15  the restarted current dosing on 12/16th  Diet:  was sick and not eating per routine. may be affecting diet and INR  Medication/supplement changes: None noted  New illness, injury, or hospitalization: Yes: Started getting sick on 12/13 then lasted until 12/15 stated that 6 of them got the flu but started feeling better now.  Signs or symptoms of bleeding or clotting: No  Previous result: Therapeutic last 2(+) visits  Additional findings: None       PLAN     Recommended plan for temporary change(s) affecting INR     Dosing Instructions: hold dose then continue your current warfarin dose with next INR in 1 week       Summary  As of 12/23/2024      Full warfarin instructions:  12/23: Hold; Otherwise 2.5 mg every Thu; 3.75 mg all other days   Next INR check:  12/30/2024               Telephone call with Reba who verbalizes understanding and agrees to plan    Patient to recheck with home meter    Education provided: Symptom monitoring: monitoring for bleeding signs and symptoms  Contact [phone] with any changes, questions or concerns.     Plan made per [facility] anticoagulation protocol    Crys Fontanez RN  12/23/2024  Anticoagulation Clinic  [facility] for routing messages: p ANTICOAG HOME MONITORING  [facility] patient phone line: [phone]        _______________________________________________________________________     Anticoagulation Episode Summary       Current INR goal:  2.0-3.0   TTR:  80.6% (1 y)   Target end date:  7/15/2036   Send INR reminders to:  ANTICOAG HOME MONITORING    Indications    Atrial  fibrillation  permanent (H) [I48.21]             Comments:  [employer] home monitor. Managed by exception.             Anticoagulation Care Providers       Provider Role Specialty Phone number    Devon Ball MD Referring Internal Medicine [phone]    Leti Brower NP Referring  [phone]

## 2024-12-30 ENCOUNTER — ANTICOAGULATION THERAPY VISIT (OUTPATIENT)
Dept: ANTICOAGULATION | Facility: CLINIC | Age: 89
End: 2024-12-30
Payer: MEDICARE

## 2024-12-30 DIAGNOSIS — I48.21 ATRIAL FIBRILLATION, PERMANENT (H): Primary | Chronic | ICD-10-CM

## 2024-12-30 LAB — INR HOME MONITORING: 3.3 (ref 2–3)

## 2024-12-30 NOTE — PROGRESS NOTES
ANTICOAGULATION MANAGEMENT     Reba Calderon 89 year old female is on warfarin with supratherapeutic INR result. (Goal INR 2.0-3.0)    Recent labs: (last 7 days)     12/30/24  0000   INR 3.3*       ASSESSMENT     Source(s): Chart Review and Patient/Caregiver Call     Warfarin doses taken: Warfarin taken as instructed  Diet: Patient reports not eating her normal diet while not feeling well, but believes she is getting back to baseline.  Medication/supplement changes: None noted  New illness, injury, or hospitalization: Yes: patient reports she had the stomach bug earlier this month, and still did not feel back to her baseline this last week. Patient reports she thinks she is improving now, but still had some stomach upset last week.  Signs or symptoms of bleeding or clotting: No  Previous result: Supratherapeutic  Additional findings: Patient is requesting a temporary hold vs decreasing maintenance dose today       PLAN     Recommended plan for temporary change(s) affecting INR     Dosing Instructions: partial hold then continue your current warfarin dose with next INR in 1 week       Summary  As of 12/30/2024      Full warfarin instructions:  12/30: 1.25 mg; Otherwise 2.5 mg every Thu; 3.75 mg all other days   Next INR check:  1/6/2025               Telephone call with Reba who verbalizes understanding and agrees to plan    Patient to recheck with home meter    Education provided: Please call back if any changes to your diet, medications or how you've been taking warfarin  Symptom monitoring: monitoring for bleeding signs and symptoms, monitoring for clotting signs and symptoms, and monitoring for stroke signs and symptoms    Plan made per [facility] anticoagulation protocol    Crys Simpson RN  12/30/2024  Anticoagulation Clinic  [employer] for routing messages: chey [employer] HOME MONITORING  [facility] patient phone line: [phone]        _______________________________________________________________________      Anticoagulation Episode Summary       Current INR goal:  2.0-3.0   TTR:  78.7% (1 y)   Target end date:  7/15/2036   Send INR reminders to:  ANTICOAG HOME MONITORING    Indications    Atrial fibrillation  permanent (H) [I48.21]             Comments:  [employer] home monitor. Managed by exception.             Anticoagulation Care Providers       Provider Role Specialty Phone number    Devon Ball MD Referring Internal Medicine [phone]    Leti Brower NP Referring  [phone]

## 2025-01-06 ENCOUNTER — ANTICOAGULATION THERAPY VISIT (OUTPATIENT)
Dept: ANTICOAGULATION | Facility: CLINIC | Age: OVER 89
End: 2025-01-06
Payer: MEDICARE

## 2025-01-06 DIAGNOSIS — I48.21 ATRIAL FIBRILLATION, PERMANENT (H): Primary | Chronic | ICD-10-CM

## 2025-01-06 LAB — INR HOME MONITORING: 3.3 (ref 2–3)

## 2025-01-06 NOTE — PROGRESS NOTES
ANTICOAGULATION MANAGEMENT     eRba Calderon 89 year old female is on warfarin with supratherapeutic INR result. (Goal INR 2.0-3.0)    Recent labs: (last 7 days)     01/06/25  0000   INR 3.3*       ASSESSMENT     Source(s): Chart Review and Patient/Caregiver Call     Warfarin doses taken: Warfarin taken as instructed  Diet: No new diet changes identified  Medication/supplement changes: None noted  New illness, injury, or hospitalization: No  Signs or symptoms of bleeding or clotting: No  Previous result: Supratherapeutic  Additional findings: None       PLAN     Recommended plan for no diet, medication or health factor changes affecting INR     Dosing Instructions: decrease your warfarin dose (10% change) with next INR in 1 week       Summary  As of 1/6/2025      Full warfarin instructions:  2.5 mg every Mon, Wed, Fri; 3.75 mg all other days   Next INR check:  1/13/2025               Telephone call with Reba who agrees to plan and repeated back plan correctly    Patient to recheck with home meter    Education provided: None required    Plan made per [facility] anticoagulation protocol    Glenis Sparks RN  1/6/2025  Anticoagulation Clinic  [employer] for routing messages: p ANTICOAG HOME MONITORING  [facility] patient phone line: [phone]        _______________________________________________________________________     Anticoagulation Episode Summary       Current INR goal:  2.0-3.0   TTR:  76.8% (1 y)   Target end date:  7/15/2036   Send INR reminders to:  ANTICOAG HOME MONITORING    Indications    Atrial fibrillation  permanent (H) [I48.21]             Comments:  [employer] home monitor. Managed by exception.             Anticoagulation Care Providers       Provider Role Specialty Phone number    Devon Ball MD Referring Internal Medicine [phone]    Leti Brower NP Referring  [phone]

## 2025-01-13 ENCOUNTER — ANTICOAGULATION THERAPY VISIT (OUTPATIENT)
Dept: ANTICOAGULATION | Facility: CLINIC | Age: OVER 89
End: 2025-01-13
Payer: MEDICARE

## 2025-01-13 DIAGNOSIS — I48.21 ATRIAL FIBRILLATION, PERMANENT (H): Primary | Chronic | ICD-10-CM

## 2025-01-13 LAB — INR HOME MONITORING: 1.9 (ref 2–3)

## 2025-01-13 NOTE — PROGRESS NOTES
ANTICOAGULATION MANAGEMENT     Reba Calderon 89 year old female is on warfarin with subtherapeutic INR result. (Goal INR 2.0-3.0)    Recent labs: (last 7 days)     01/13/25  0000   INR 1.9*       ASSESSMENT     Source(s): Chart Review and Patient/Caregiver Call     Warfarin doses taken: Missed dose(s) may be affecting INR  Diet: No new diet changes identified  Medication/supplement changes: None noted  New illness, injury, or hospitalization: No  Signs or symptoms of bleeding or clotting: No  Previous result: Supratherapeutic  Additional findings:  Patient missed her dose last evening but took this morning. She will take her normal dosing this evening       PLAN     Recommended plan for temporary change(s) affecting INR     Dosing Instructions: Continue your current warfarin dose with next INR in 1 week       Summary  As of 1/13/2025      Full warfarin instructions:  2.5 mg every Mon, Wed, Fri; 3.75 mg all other days   Next INR check:  1/20/2025               Telephone call with Reba who verbalizes understanding and agrees to plan and who agrees to plan and repeated back plan correctly    Patient to recheck with home meter    Education provided: Please call back if any changes to your diet, medications or how you've been taking warfarin    Plan made per [facility] anticoagulation protocol    Palak Velázquez RN  1/13/2025  Anticoagulation Clinic  [employer] for routing messages: p ANTICOAG HOME MONITORING  [facility] patient phone line: [phone]        _______________________________________________________________________     Anticoagulation Episode Summary       Current INR goal:  2.0-3.0   TTR:  76.2% (1 y)   Target end date:  7/15/2036   Send INR reminders to:  ANTICOAG HOME MONITORING    Indications    Atrial fibrillation  permanent (H) [I48.21]             Comments:  [employer] home monitor. Managed by exception.             Anticoagulation Care Providers       Provider Role Specialty Phone number    Devon Ball MD  Referring Internal Medicine [phone]    Leti Brower NP Referring  [phone]

## 2025-01-20 ENCOUNTER — ANTICOAGULATION THERAPY VISIT (OUTPATIENT)
Dept: ANTICOAGULATION | Facility: CLINIC | Age: OVER 89
End: 2025-01-20
Payer: MEDICARE

## 2025-01-20 DIAGNOSIS — I48.21 ATRIAL FIBRILLATION, PERMANENT (H): Primary | Chronic | ICD-10-CM

## 2025-01-20 LAB — INR HOME MONITORING: 2.2 (ref 2–3)

## 2025-01-20 NOTE — PROGRESS NOTES
ANTICOAGULATION MANAGEMENT     Reba Calderon 89 year old female is on warfarin with therapeutic INR result. (Goal INR 2.0-3.0)    Recent labs: (last 7 days)     01/20/25  0000   INR 2.2       ASSESSMENT     Source(s): Chart Review and Patient/Caregiver Call     Warfarin doses taken: Warfarin taken as instructed  Diet: No new diet changes identified  Medication/supplement changes: None noted  New illness, injury, or hospitalization: No  Signs or symptoms of bleeding or clotting: No  Previous result: Subtherapeutic  Additional findings: Patient is okay to resume MBE per home monitor protocol if next INR is therapeutic         PLAN     Recommended plan for no diet, medication or health factor changes affecting INR     Dosing Instructions: Continue your current warfarin dose with next INR in 1 week   then can extend to 2 weeks if still within range.    Summary  As of 1/20/2025      Full warfarin instructions:  2.5 mg every Mon, Wed, Fri; 3.75 mg all other days   Next INR check:  1/27/2025               Telephone call with Reba who verbalizes understanding and agrees to plan    Patient to recheck with home meter    Education provided: Resume manage by exception with home monitor. Continue to submit INR results to home monitor company.You will only be called when your result is out of range and at 90 day check in. Please call and notify [facility] if new medication started, dose missed, signs or symptoms of bleeding or clotting, or a surgery/procedure is scheduled. Due for next call no later than: 4/20/25.  Contact [phone] with any changes, questions or concerns.     Plan made per [facility] anticoagulation protocol    Crys Fontanez RN  1/20/2025  Anticoagulation Clinic  [facility] for routing messages: [facility]y [facility] HOME MONITORING  [facility] patient phone line: [phone]        _______________________________________________________________________     Anticoagulation Episode Summary       Current INR goal:  2.0-3.0   TTR:  75.6% (1  y)   Target end date:  7/15/2036   Send INR reminders to:  ANTICOAG HOME MONITORING    Indications    Atrial fibrillation  permanent (H) [I48.21]             Comments:  [employer] home monitor. Managed by exception.             Anticoagulation Care Providers       Provider Role Specialty Phone number    Devon Ball MD Referring Internal Medicine [phone]    Leti Brower NP Referring  [phone]

## 2025-01-27 ENCOUNTER — DOCUMENTATION ONLY (OUTPATIENT)
Dept: ANTICOAGULATION | Facility: CLINIC | Age: OVER 89
End: 2025-01-27
Payer: MEDICARE

## 2025-01-27 DIAGNOSIS — I48.21 ATRIAL FIBRILLATION, PERMANENT (H): Primary | Chronic | ICD-10-CM

## 2025-01-27 LAB — INR HOME MONITORING: 2 (ref 2–3)

## 2025-01-27 NOTE — PROGRESS NOTES
ANTICOAGULATION  MANAGEMENT-Home Monitor Managed by Exception    Reba Calderon 89 year old female is on warfarin with therapeutic INR result. (Goal INR 2.0-3.0)    Recent labs: (last 7 days)     01/27/25  0000   INR 2.0       Previous INR was Therapeutic  Medication, diet, health changes since last INR:chart reviewed; none identified  Contacted within the last 12 weeks by phone on 1/20/25  Last [facility] referral date: 05/13/2024      BELGICA Britton was NOT contacted regarding therapeutic result today per home monitoring policy manage by exception agreement.   Current warfarin dose is to be continued:     Summary  As of 1/27/2025      Full warfarin instructions:  2.5 mg every Mon, Wed, Fri; 3.75 mg all other days   Next INR check:  2/3/2025             ?   Yesenia Montes RN  Anticoagulation Clinic  1/27/2025    _______________________________________________________________________     Anticoagulation Episode Summary       Current INR goal:  2.0-3.0   TTR:  75.6% (1 y)   Target end date:  7/15/2036   Send INR reminders to:  [facility]IT[facility]    Indications    Atrial fibrillation  permanent (H) [I48.21]             Comments:  [employer] home monitor. Managed by exception.             Anticoagulation Care Providers       Provider Role Specialty Phone number    Devon Ball MD Referring Internal Medicine [phone]    Leti Brower NP Referring  [phone]

## 2025-02-04 ENCOUNTER — DOCUMENTATION ONLY (OUTPATIENT)
Dept: ANTICOAGULATION | Facility: CLINIC | Age: OVER 89
End: 2025-02-04
Payer: MEDICARE

## 2025-02-04 NOTE — PROGRESS NOTES
ANTICOAGULATION     Reba Calderon is overdue for an INR check.     [employer] sent    Bre Alexander, RN  2/4/2025  Anticoagulation Clinic  [facility] for routing messages: chey [facility] HOME MONITOR[facility]  [facility] patient phone line: [phone]

## 2025-02-05 ENCOUNTER — TELEPHONE (OUTPATIENT)
Dept: INTERNAL MEDICINE | Facility: CLINIC | Age: OVER 89
End: 2025-02-05
Payer: MEDICARE

## 2025-02-05 DIAGNOSIS — I48.21 ATRIAL FIBRILLATION, PERMANENT (H): Primary | Chronic | ICD-10-CM

## 2025-02-05 NOTE — TELEPHONE ENCOUNTER
FYI - Status Update    Who is Calling: patient    Update: Patient was under the impression that she was to do the INR test on 2/10/2025 and doing it every other Monday.  Patient got a [employer] message saying that she missed the INR for Monday 2/3/2025.  Patient is wondering if she she do her INR now or wait until Monday 2/10/2025     Does caller want a call/response back: Yes     Could we send this information to you in [place] or would you prefer to receive a phone call?:   Patient would prefer a phone call   Okay to leave a detailed message?: Yes at Home number on file [phone] (home)

## 2025-02-05 NOTE — TELEPHONE ENCOUNTER
Patient is correct, she tests every 2 weeks normally.  When you INR was out of range, we had her check weekly but she has had 2 in range readings in a row now so she can test every 2 weeks. Next INR due 2/10/25. Patient informed.  Palak Velázquez, RN  Anticoagulation Nurse - [facility] [place]

## 2025-02-10 ENCOUNTER — ANTICOAGULATION THERAPY VISIT (OUTPATIENT)
Dept: ANTICOAGULATION | Facility: CLINIC | Age: OVER 89
End: 2025-02-10
Payer: MEDICARE

## 2025-02-10 DIAGNOSIS — I48.21 ATRIAL FIBRILLATION, PERMANENT (H): Primary | Chronic | ICD-10-CM

## 2025-02-10 LAB — INR HOME MONITORING: 1.9 (ref 2–3)

## 2025-02-10 NOTE — PROGRESS NOTES
ANTICOAGULATION MANAGEMENT     Reba Calderon 89 year old female is on warfarin with subtherapeutic INR result. (Goal INR 2.0-3.0)    Recent labs: (last 7 days)     02/10/25  0000   INR 1.9*       ASSESSMENT     Source(s): Chart Review and Patient/Caregiver Call     Warfarin doses taken: Warfarin taken as instructed  Diet: No new diet changes identified  Medication/supplement changes: None noted  New illness, injury, or hospitalization: No  Signs or symptoms of bleeding or clotting: No  Previous result: Therapeutic last 2(+) visits  Additional findings: INR has been dropping since 1/20/25. Patient is requesting to increase maintenance dose today.       PLAN     Recommended plan for no diet, medication or health factor changes affecting INR     Dosing Instructions: Increase your warfarin dose (6% change) with next INR in 2 weeks       Summary  As of 2/10/2025      Full warfarin instructions:  2.5 mg every Tue, Fri; 3.75 mg all other days   Next INR check:  2/24/2025               Telephone call with Reba who verbalizes understanding and agrees to plan    Patient to recheck with home meter    Education provided: Please call back if any changes to your diet, medications or how you've been taking warfarin  Symptom monitoring: monitoring for bleeding signs and symptoms and monitoring for clotting signs and symptoms    Plan made per [facility] anticoagulation protocol    Crys Simpson RN  2/10/2025  Anticoagulation Clinic  [employer] for routing messages: p ANTICOAG HOME MONITORING  [facility] patient phone line: [phone]        _______________________________________________________________________     Anticoagulation Episode Summary       Current INR goal:  2.0-3.0   TTR:  71.7% (1 y)   Target end date:  7/15/2036   Send INR reminders to:  ANTICOAG HOME MONITORING    Indications    Atrial fibrillation  permanent (H) [I48.21]             Comments:  [employer] home monitor. Managed by exception.             Anticoagulation Care  Providers       Provider Role Specialty Phone number    Devon Ball MD Referring Internal Medicine [phone]    Leti Brower NP Referring  [phone]

## 2025-02-18 ENCOUNTER — TELEPHONE (OUTPATIENT)
Dept: INTERNAL MEDICINE | Facility: CLINIC | Age: OVER 89
End: 2025-02-18
Payer: MEDICARE

## 2025-02-18 DIAGNOSIS — R59.1 LYMPHADENOPATHY: ICD-10-CM

## 2025-02-18 DIAGNOSIS — C91.10 CLL (CHRONIC LYMPHOCYTIC LEUKEMIA) (H): Primary | Chronic | ICD-10-CM

## 2025-02-18 DIAGNOSIS — R60.0 BILATERAL LOWER EXTREMITY EDEMA: ICD-10-CM

## 2025-02-18 DIAGNOSIS — I27.20 PULMONARY HYPERTENSION (H): ICD-10-CM

## 2025-02-18 NOTE — TELEPHONE ENCOUNTER
Please call patient -    ______________________________________________________________________     Home phone:  [phone] (home) [phone] (work)    Cell phone:   Telephone Information:   Mobile [phone]       Other contacts:  Name Home Phone Work Phone Mobile Phone Relationship Lgl Grd   XIMENA GUPTA [phone]   Spouse    JYOTHI SHEIKH   [phone] Daughter      ______________________________________________________________________     Your iron levels were normal.     Your kidney tests are normal.  Your electrolytes are also normal.  Your liver tests are normal.       Diabetes doing well.    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    No signs of heart failure at this time on the blood work.    A marker of inflammation lis slightly elevated.    Based on the findings, I would recommend a recheck of her CT scan to recheck on her lungs and lymphoma with the finding of pulmonary hypertension and lower oxygen levels.    Please see if she would be willing to do this as a next step.    Thank you,    Devon Ball MD  [facility]  2/18/2025, 4:25 PM     ______________________________________________________________________    Recent Results (from the past 240 hours)   INR (External Result)    Collection Time: 02/10/25 12:00 AM   Result Value Ref Range    INR HOME MONITORING 1.9 (L) 2.000 - 3.000   INR    Collection Time: 02/14/25 11:35 AM   Result Value Ref Range    INR 2.04 (H) 0.85 - 1.15   Ferritin    Collection Time: 02/14/25 11:35 AM   Result Value Ref Range    Ferritin 45 11 - 328 ng/mL   Iron & Iron Binding Capacity    Collection Time: 02/14/25 11:35 AM   Result Value Ref Range    Iron 85 37 - 145 ug/dL    Iron Binding Capacity 471 (H) 240 - 430 ug/dL    Iron Sat Index 18 15 - 46 %   Comprehensive metabolic panel    Collection Time: 02/14/25 11:35 AM   Result Value Ref Range    Sodium 143 135 - 145 mmol/L    Potassium 4.3 3.4 -  5.3 mmol/L    Carbon Dioxide (CO2) 28 22 - 29 mmol/L    Anion Gap 14 7 - 15 mmol/L    Urea Nitrogen 18.4 8.0 - 23.0 mg/dL    Creatinine 0.75 0.51 - 0.95 mg/dL    GFR Estimate 76 >60 mL/min/1.73m2    Calcium 10.5 (H) 8.8 - 10.4 mg/dL    Chloride 101 98 - 107 mmol/L    Glucose 150 (H) 70 - 99 mg/dL    Alkaline Phosphatase 68 40 - 150 U/L    AST 20 0 - 45 U/L    ALT 13 0 - 50 U/L    Protein Total 7.0 6.4 - 8.3 g/dL    Albumin 4.3 3.5 - 5.2 g/dL    Bilirubin Total 1.2 <=1.2 mg/dL   Hemoglobin A1c    Collection Time: 02/14/25 11:35 AM   Result Value Ref Range    Estimated Average Glucose 163 (H) <117 mg/dL    Hemoglobin A1C 7.3 (H) 0.0 - 5.6 %   CRP inflammation    Collection Time: 02/14/25 11:35 AM   Result Value Ref Range    CRP Inflammation 10.10 (H) <5.00 mg/L   N terminal pro BNP outpatient    Collection Time: 02/14/25 11:35 AM   Result Value Ref Range    N Terminal Pro BNP Outpatient 1,390 0 - 1,800 pg/mL   CBC with platelets and differential    Collection Time: 02/14/25 11:35 AM   Result Value Ref Range    WBC Count 7.8 4.0 - 11.0 10e3/uL    RBC Count 4.01 3.80 - 5.20 10e6/uL    Hemoglobin 12.1 11.7 - 15.7 g/dL    Hematocrit 38.8 35.0 - 47.0 %    MCV 97 78 - 100 fL    MCH 30.2 26.5 - 33.0 pg    MCHC 31.2 (L) 31.5 - 36.5 g/dL    RDW 18.5 (H) 10.0 - 15.0 %    Platelet Count 241 150 - 450 10e3/uL    % Neutrophils 75 %    % Lymphocytes 16 %    % Monocytes 6 %    % Eosinophils 2 %    % Basophils 1 %    % Immature Granulocytes 0 %    Absolute Neutrophils 5.9 1.6 - 8.3 10e3/uL    Absolute Lymphocytes 1.2 0.8 - 5.3 10e3/uL    Absolute Monocytes 0.5 0.0 - 1.3 10e3/uL    Absolute Eosinophils 0.2 0.0 - 0.7 10e3/uL    Absolute Basophils 0.0 0.0 - 0.2 10e3/uL    Absolute Immature Granulocytes 0.0 <=0.4 10e3/uL      ______________________________________________________________________

## 2025-02-19 NOTE — TELEPHONE ENCOUNTER
Called patient and relayed provider result message. Patient verbalized understanding.     She is willing to do the CT scan. Provided imaging scheduling phone number and told her to wait until PCP has placed order.     details… no joint swelling/no joint erythema/no calf tenderness/decreased ROM due to pain

## 2025-02-24 ENCOUNTER — DOCUMENTATION ONLY (OUTPATIENT)
Dept: ANTICOAGULATION | Facility: CLINIC | Age: OVER 89
End: 2025-02-24
Payer: MEDICARE

## 2025-02-24 DIAGNOSIS — I48.21 ATRIAL FIBRILLATION, PERMANENT (H): Primary | Chronic | ICD-10-CM

## 2025-02-24 LAB — INR HOME MONITORING: 2.2 (ref 2–3)

## 2025-02-24 NOTE — PROGRESS NOTES
ANTICOAGULATION  MANAGEMENT-Home Monitor Managed by Exception    Reba Calderon 89 year old female is on warfarin with therapeutic INR result. (Goal INR 2.0-3.0)    Recent labs: (last 7 days)     02/24/25  0000   INR 2.2       Previous INR was Therapeutic  Medication, diet, health changes since last INR:chart reviewed; none identified  Contacted within the last 12 weeks by phone on 2/14/25  Due for next call no later than: 5/15/25.   Last [facility] referral date: 05/13/2024      BELGICA Brittno was NOT contacted regarding therapeutic result today per home monitoring policy manage by exception agreement.   Current warfarin dose is to be continued:     Summary  As of 2/24/2025      Full warfarin instructions:  2.5 mg every Tue, Fri; 3.75 mg all other days   Next INR check:  3/10/2025             ?   Carly Amador RN  Anticoagulation Clinic  2/24/2025    _______________________________________________________________________     Anticoagulation Episode Summary       Current INR goal:  2.0-3.0   TTR:  70.9% (1 y)   Target end date:  7/15/2036   Send INR reminders to:  [facility]    Indications    Atrial fibrillation  permanent (H) [I48.21]             Comments:  [employer] home monitor. Managed by exception.             Anticoagulation Care Providers       Provider Role Specialty Phone number    Devon Ball MD Referring Internal Medicine [phone]    Leti Brower NP Referring Internal Medicine [phone]

## 2025-02-26 DIAGNOSIS — K21.9 GASTRO-ESOPHAGEAL REFLUX DISEASE WITHOUT ESOPHAGITIS: ICD-10-CM

## 2025-02-26 DIAGNOSIS — Z79.01 LONG TERM (CURRENT) USE OF ANTICOAGULANTS: ICD-10-CM

## 2025-02-26 DIAGNOSIS — I48.21 PERMANENT ATRIAL FIBRILLATION (H): ICD-10-CM

## 2025-02-26 DIAGNOSIS — E11.42 TYPE 2 DIABETES MELLITUS WITH DIABETIC POLYNEUROPATHY, WITHOUT LONG-TERM CURRENT USE OF INSULIN (H): ICD-10-CM

## 2025-02-26 RX ORDER — WARFARIN SODIUM 2.5 MG/1
TABLET ORAL
Qty: 120 TABLET | Refills: 1 | Status: SHIPPED | OUTPATIENT
Start: 2025-02-26

## 2025-02-26 RX ORDER — PANTOPRAZOLE SODIUM 40 MG/1
40 TABLET, DELAYED RELEASE ORAL DAILY
Qty: 90 TABLET | Refills: 2 | Status: SHIPPED | OUTPATIENT
Start: 2025-02-26

## 2025-02-26 NOTE — TELEPHONE ENCOUNTER
ANTICOAGULATION MANAGEMENT:  Medication Refill    Anticoagulation Summary  As of 2/24/2025      Warfarin maintenance plan:  2.5 mg (2.5 mg x 1) every Tue, Fri; 3.75 mg (2.5 mg x 1.5) all other days   Next INR check:  3/10/2025   Target end date:  7/15/2036    Indications    Atrial fibrillation  permanent (H) [I48.21]                 Anticoagulation Care Providers       Provider Role Specialty Phone number    Devon Ball MD Referring Internal Medicine [phone]    Leti Brower NP Referring Internal Medicine [phone]            Refill Criteria    Visit with referring provider/group: Meets criteria: visit within referring provider group in the last 15 months on 2/14/25    [facility] referral last signed: 05/13/2024; within last year:  Yes    Lab monitoring is up to date (not exceeding 2 weeks overdue): Yes    Reba meets all criteria for refill. Rx instructions and quantity supplied updated to match patient's current dosing plan. Warfarin 90 day supply with 1 refill granted per [facility] protocol     Dannielle Lee RN  Anticoagulation Clinic

## 2025-02-27 ENCOUNTER — HOSPITAL ENCOUNTER (OUTPATIENT)
Dept: CT IMAGING | Facility: HOSPITAL | Age: OVER 89
Discharge: HOME | End: 2025-02-27
Attending: INTERNAL MEDICINE
Payer: MEDICARE

## 2025-02-27 DIAGNOSIS — C91.10 CLL (CHRONIC LYMPHOCYTIC LEUKEMIA) (H): Chronic | ICD-10-CM

## 2025-02-27 DIAGNOSIS — R59.1 LYMPHADENOPATHY: ICD-10-CM

## 2025-02-27 DIAGNOSIS — R60.0 BILATERAL LOWER EXTREMITY EDEMA: ICD-10-CM

## 2025-02-27 DIAGNOSIS — I27.20 PULMONARY HYPERTENSION (H): ICD-10-CM

## 2025-02-27 PROCEDURE — 71250 CT THORAX DX C-: CPT

## 2025-02-27 RX ORDER — ATORVASTATIN CALCIUM 10 MG/1
10 TABLET, FILM COATED ORAL EVERY EVENING
Qty: 90 TABLET | Refills: 0 | Status: SHIPPED | OUTPATIENT
Start: 2025-02-27

## 2025-03-10 ENCOUNTER — ANTICOAGULATION THERAPY VISIT (OUTPATIENT)
Dept: ANTICOAGULATION | Facility: CLINIC | Age: OVER 89
End: 2025-03-10
Payer: MEDICARE

## 2025-03-10 ENCOUNTER — TRANSFERRED RECORDS (OUTPATIENT)
Dept: HEALTH INFORMATION MANAGEMENT | Facility: CLINIC | Age: OVER 89
End: 2025-03-10
Payer: MEDICARE

## 2025-03-10 DIAGNOSIS — I48.21 ATRIAL FIBRILLATION, PERMANENT (H): Primary | Chronic | ICD-10-CM

## 2025-03-10 LAB — INR HOME MONITORING: 1.9 (ref 2–3)

## 2025-03-10 NOTE — PROGRESS NOTES
ANTICOAGULATION MANAGEMENT     Reba Calderon 89 year old female is on warfarin with subtherapeutic INR result. (Goal INR 2.0-3.0)    Recent labs: (last 7 days)     03/10/25  0000   INR 1.9*       ASSESSMENT     Source(s): Chart Review and Patient/Caregiver Call     Warfarin doses taken: Warfarin taken as instructed  Diet: No new diet changes identified  Medication/supplement changes: None noted  New illness, injury, or hospitalization: No  Signs or symptoms of bleeding or clotting: No  Previous result: Therapeutic last visit; previously outside of goal range  Additional findings: None       PLAN     Recommended plan for no diet, medication or health factor changes affecting INR     Dosing Instructions: Increase your warfarin dose (5% change) with next INR in 2 weeks       Summary  As of 3/10/2025      Full warfarin instructions:  2.5 mg every Fri; 3.75 mg all other days   Next INR check:  3/24/2025               Telephone call with Reba who verbalizes understanding and agrees to plan    Patient to recheck with home meter    Education provided: Please call back if any changes to your diet, medications or how you've been taking warfarin  Symptom monitoring: monitoring for bleeding signs and symptoms and monitoring for clotting signs and symptoms    Plan made per [facility] anticoagulation protocol    Crys Simpson RN  3/10/2025  Anticoagulation Clinic  [employer] for routing messages: p ANTICOAG HOME MONITORING  [facility] patient phone line: [phone]        _______________________________________________________________________     Anticoagulation Episode Summary       Current INR goal:  2.0-3.0   TTR:  71.3% (1 y)   Target end date:  7/15/2036   Send INR reminders to:  ANTICOAG HOME MONITORING    Indications    Atrial fibrillation  permanent (H) [I48.21]             Comments:  [employer] home monitor. Managed by exception.             Anticoagulation Care Providers       Provider Role Specialty Phone number    Devon Ball MD  Referring Internal Medicine [phone]    Leti Brower NP Referring Internal Medicine [phone]

## 2025-03-24 ENCOUNTER — ANTICOAGULATION THERAPY VISIT (OUTPATIENT)
Dept: ANTICOAGULATION | Facility: CLINIC | Age: OVER 89
End: 2025-03-24
Payer: MEDICARE

## 2025-03-24 DIAGNOSIS — I48.21 ATRIAL FIBRILLATION, PERMANENT (H): Primary | Chronic | ICD-10-CM

## 2025-03-24 LAB — INR HOME MONITORING: 2.5 (ref 2–3)

## 2025-03-24 NOTE — PROGRESS NOTES
ANTICOAGULATION MANAGEMENT     Reba Calderon 89 year old female is on warfarin with therapeutic INR result. (Goal INR 2.0-3.0)    Recent labs: (last 7 days)     03/24/25  0000   INR 2.5       ASSESSMENT     Source(s): Chart Review and Patient/Caregiver Call     Warfarin doses taken: Warfarin taken as instructed  Diet: No new diet changes identified  Medication/supplement changes: None noted  New illness, injury, or hospitalization: No  Signs or symptoms of bleeding or clotting: No  Previous result: Subtherapeutic  Additional findings: None       PLAN     Recommended plan for no diet, medication or health factor changes affecting INR     Dosing Instructions: Continue your current warfarin dose with next INR in 2 weeks       Summary  As of 3/24/2025      Full warfarin instructions:  2.5 mg every Fri; 3.75 mg all other days   Next INR check:  4/7/2025               Telephone call with Reba who verbalizes understanding and agrees to plan    Patient to recheck with home meter    Education provided: Please call back if any changes to your diet, medications or how you've been taking warfarin  Resume manage by exception with home monitor. Continue to submit INR results to home monitor company.You will only be called when your result is out of range and at 90 day check in. Please call and notify [facility] if new medication started, dose missed, signs or symptoms of bleeding or clotting, or a surgery/procedure is scheduled. Due for next call no later than: 6/22/25.    Plan made per [facility] anticoagulation protocol    Crys Simpson RN  3/24/2025  Anticoagulation Clinic  [facility] for routing messages: p ANTICOAG HOME MONITORING  [facility] patient phone line: [phone]        _______________________________________________________________________     Anticoagulation Episode Summary       Current INR goal:  2.0-3.0   TTR:  70.7% (1 y)   Target end date:  7/15/2036   Send INR reminders to:  ANTICOAG HOME MONITORING    Indications    Atrial  fibrillation  permanent (H) [I48.21]             Comments:  [employer] home monitor. Managed by exception.             Anticoagulation Care Providers       Provider Role Specialty Phone number    Devon Ball MD Referring Internal Medicine [phone]    Leti Brower NP Referring Internal Medicine [phone]

## 2025-03-26 DIAGNOSIS — E11.628 TYPE 2 DIABETES MELLITUS WITH OTHER SKIN COMPLICATION, WITHOUT LONG-TERM CURRENT USE OF INSULIN (H): ICD-10-CM

## 2025-03-27 RX ORDER — CARVEDILOL 25 MG/1
TABLET, FILM COATED ORAL
Qty: 100 STRIP | Refills: 2 | Status: SHIPPED | OUTPATIENT
Start: 2025-03-27

## 2025-03-31 ENCOUNTER — DOCUMENTATION ONLY (OUTPATIENT)
Dept: ANTICOAGULATION | Facility: CLINIC | Age: OVER 89
End: 2025-03-31
Payer: MEDICARE

## 2025-03-31 ENCOUNTER — TELEPHONE (OUTPATIENT)
Dept: INTERNAL MEDICINE | Facility: CLINIC | Age: OVER 89
End: 2025-03-31
Payer: MEDICARE

## 2025-03-31 DIAGNOSIS — I48.21 ATRIAL FIBRILLATION, PERMANENT (H): Primary | Chronic | ICD-10-CM

## 2025-03-31 DIAGNOSIS — E11.628 TYPE 2 DIABETES MELLITUS WITH OTHER SKIN COMPLICATION, WITHOUT LONG-TERM CURRENT USE OF INSULIN (H): Primary | ICD-10-CM

## 2025-03-31 LAB — INR HOME MONITORING: 2.6 (ref 2–3)

## 2025-03-31 RX ORDER — LANCETS
1 EACH MISCELLANEOUS DAILY
Qty: 100 EACH | Refills: 6 | Status: SHIPPED | OUTPATIENT
Start: 2025-03-31

## 2025-03-31 NOTE — PROGRESS NOTES
ANTICOAGULATION  MANAGEMENT-Home Monitor Managed by Exception    Reba Calderon 89 year old female is on warfarin with therapeutic INR result. (Goal INR 2.0-3.0)    Recent labs: (last 7 days)     03/31/25  0000   INR 2.6       Previous INR was Therapeutic  Medication, diet, health changes since last INR:chart reviewed; none identified  Contacted within the last 12 weeks by phone on 3/24/25  Last [facility] referral date: 05/13/2024      BELGICA     Reba was NOT contacted regarding therapeutic result today per home monitoring policy manage by exception agreement.   Current warfarin dose is to be continued:     Summary  As of 3/31/2025      Full warfarin instructions:  2.5 mg every Fri; 3.75 mg all other days   Next INR check:  4/14/2025             ?   Palak Velázquez RN  Anticoagulation Clinic  3/31/2025    _______________________________________________________________________     Anticoagulation Episode Summary       Current INR goal:  2.0-3.0   TTR:  70.7% (1 y)   Target end date:  7/15/2036   Send INR reminders to:  [facility]IT[facility]    Indications    Atrial fibrillation  permanent (H) [I48.21]             Comments:  [employer] home monitor. Managed by exception.             Anticoagulation Care Providers       Provider Role Specialty Phone number    Devon Ball MD Referring Internal Medicine [phone]    Leti Brower NP Referring Internal Medicine [phone]

## 2025-03-31 NOTE — TELEPHONE ENCOUNTER
"FAX [facility] Product Unavailable/Back Order    Pharmacy Comments:  Contour Test Strips are discontinued.  Please send new RX for \"Contour Next\" Meter, test strips and lancets  "

## 2025-04-07 LAB — INR (EXTERNAL): 2.2 (ref 0.9–1.1)

## 2025-04-08 LAB — INR (EXTERNAL): 2.2 (ref 0.9–1.1)

## 2025-04-09 LAB — INR (EXTERNAL): 2.8 (ref 0.9–1.1)

## 2025-04-10 LAB — INR (EXTERNAL): 2.5 (ref 0.9–1.1)

## 2025-04-11 LAB — INR (EXTERNAL): 2.2 (ref 0.9–1.1)

## 2025-04-12 LAB — INR (EXTERNAL): 2.1 (ref 0.9–1.1)

## 2025-04-13 ENCOUNTER — HEALTH MAINTENANCE LETTER (OUTPATIENT)
Age: OVER 89
End: 2025-04-13

## 2025-04-14 ENCOUNTER — ANTICOAGULATION THERAPY VISIT (OUTPATIENT)
Dept: ANTICOAGULATION | Facility: CLINIC | Age: OVER 89
End: 2025-04-14
Payer: MEDICARE

## 2025-04-14 ENCOUNTER — DOCUMENTATION ONLY (OUTPATIENT)
Dept: ANTICOAGULATION | Facility: CLINIC | Age: OVER 89
End: 2025-04-14
Payer: MEDICARE

## 2025-04-14 DIAGNOSIS — I48.21 ATRIAL FIBRILLATION, PERMANENT (H): Primary | Chronic | ICD-10-CM

## 2025-04-14 DIAGNOSIS — Z79.01 ANTICOAGULATION MONITORING, INR RANGE 2-3: ICD-10-CM

## 2025-04-14 LAB — INR HOME MONITORING: 1.8 (ref 2–3)

## 2025-04-14 NOTE — PROGRESS NOTES
ANTICOAGULATION CLINIC REFERRAL RENEWAL REQUEST       An annual renewal order is required for all patients referred to [facility]tico[facility].?  Please review and sign the pended referral order for Reba Calderon.       ANTICOAGULATION SUMMARY      Warfarin indication(s)   Atrial Fibrillation    Mechanical heart valve present?  NO       Current goal range   INR: 2.0-3.0     Goal appropriate for indication? Goal INR 2-3, standard for indication(s) above     Time in Therapeutic Range (TTR)  (Goal > 60%) 70.3%       Office visit with referring provider's group within last year yes on 2/14/25       Cameron Tao RN  [facility]

## 2025-04-14 NOTE — PROGRESS NOTES
ANTICOAGULATION MANAGEMENT     Reba Calderon 89 year old female is on warfarin with subtherapeutic INR result. (Goal INR 2.0-3.0)    Recent labs: (last 7 days)     04/14/25  0000   INR 1.8*       ASSESSMENT     Source(s): Chart Review and Patient/Caregiver Call     Warfarin doses taken: While hospitalized on 4/7/25-4/13/25: anticoagulation calendar updated with inpatient dosing.  Discharged on: home regimen continued  Diet: No new diet changes identified  Medication/supplement changes:  Pt furosemide and potassium increased, no interaction with warfarin/INR. Pt was started on 3mg of melatonin nightly, per [employer]romed[employer] can increase INR and risk of bleeding  New illness, injury, or hospitalization: Yes: Hospitalized for acute on chronic diastolic heart failure.  Signs or symptoms of bleeding or clotting: Yes: Bruise on hip prior to hospitalization.   Previous result: Therapeutic last 2(+) visits (while inpatient)  Additional findings:  Pt is now on continuous oxygen        PLAN     Recommended plan for temporary change(s) affecting INR     Dosing Instructions: Continue your current warfarin dose with next INR in 4 days       Summary  As of 4/14/2025      Full warfarin instructions:  2.5 mg every Fri; 3.75 mg all other days   Next INR check:  4/18/2025               Telephone call with Reba and daughter Nicole who verbalizes understanding and agrees to plan    Patient to recheck with home meter    Education provided: Taking warfarin: prescribed tablet strength and color and importance of following [facility] instructions vs instructions on the prescription bottle  Goal range and lab monitoring: goal range and significance of current result, Importance of therapeutic range, and Importance of following up at instructed interval    Plan made with [facility] Pharmacist Yusra Tao RN  4/14/2025  Anticoagulation Clinic  [employer] for routing messages: chey [facility]  [facility] patient phone line:  [phone]        _______________________________________________________________________     Anticoagulation Episode Summary       Current INR goal:  2.0-3.0   TTR:  70.3% (1 y)   Target end date:  7/15/2036   Send INR reminders to:  [facility]AG HOME MONITOR[facility]    Indications    Atrial fibrillation  permanent (H) [I48.21]             Comments:  [employer] home monitor. Managed by exception.             Anticoagulation Care Providers       Provider Role Specialty Phone number    Devon Ball MD Referring Internal Medicine [phone]    Leti Brower NP Referring Internal Medicine [phone]

## 2025-04-15 ENCOUNTER — TELEPHONE (OUTPATIENT)
Dept: ANTICOAGULATION | Facility: CLINIC | Age: OVER 89
End: 2025-04-15
Payer: MEDICARE

## 2025-04-15 ENCOUNTER — MEDICAL CORRESPONDENCE (OUTPATIENT)
Dept: HEALTH INFORMATION MANAGEMENT | Facility: CLINIC | Age: OVER 89
End: 2025-04-15

## 2025-04-15 ENCOUNTER — TELEPHONE (OUTPATIENT)
Dept: INTERNAL MEDICINE | Facility: CLINIC | Age: OVER 89
End: 2025-04-15
Payer: MEDICARE

## 2025-04-15 NOTE — TELEPHONE ENCOUNTER
Home Care is calling regarding an established patient with [facility].  Requesting orders from: Devon Ball  PROVIDER AUTHORIZATION REQUIRED: RN unable to provide verbal approval for all orders.  See below for additional information.  RN will contact Home care with information after provider review.  Is this a request for a temporary pause in the home care episode?  No    Orders Requested    Skilled Nursing  Request for initial evaluation and treatment (one time)   RN gave verbal order: NO      Physical Therapy  Request for initial evaluation and treatment (one time)   RN gave verbal order: NO      Occupational Therapy  Request for initial evaluation and treatment (one time)   RN gave verbal order: NO        Phone number Home Care can be reached at: [phone]   Okay to leave a detailed message?: Yes    Contacts       Contact Date/Time Type Contact Phone/Fax    04/15/2025 01:22 PM CDT Phone (Incoming) Richard BOUCHER [phone]     [facility]          Jeannette Bachelor, RN

## 2025-04-15 NOTE — TELEPHONE ENCOUNTER
Patient is being admitted to [facility] home care today. Richard BOUCHER was calling for dosing and recheck date. He was given that information. [facility] will check INR on 4/18/25 and call it in for dosing and recheck date.    Glenis Sparks RN    [facility]ticoagulation [facility]

## 2025-04-17 ENCOUNTER — ANTICOAGULATION THERAPY VISIT (OUTPATIENT)
Dept: ANTICOAGULATION | Facility: CLINIC | Age: OVER 89
End: 2025-04-17
Payer: MEDICARE

## 2025-04-17 DIAGNOSIS — I48.21 ATRIAL FIBRILLATION, PERMANENT (H): Primary | Chronic | ICD-10-CM

## 2025-04-17 DIAGNOSIS — Z79.01 ANTICOAGULATION MONITORING, INR RANGE 2-3: ICD-10-CM

## 2025-04-17 LAB — INR (EXTERNAL): 2 (ref 0.9–1.1)

## 2025-04-17 NOTE — PROGRESS NOTES
ANTICOAGULATION MANAGEMENT     Reba Calderon 89 year old female is on warfarin with therapeutic INR result. (Goal INR 2.0-3.0)    Recent labs: (last 7 days)     04/17/25  1146   INR 2.0*       ASSESSMENT     Source(s): Chart Review and Home Care/Facility Nurse     Warfarin doses taken: Warfarin taken as instructed  Diet: Starting today, patient plans to have 2 salads per week going forward   Medication/supplement changes: Patient continues   New illness, injury, or hospitalization: Yes: recently hospitalized for acute CHF. Weight on 4/14/25 was 172 lbs, today is 170 lbs. Swelling is stable per home care.  Signs or symptoms of bleeding or clotting: No  Previous result: Subtherapeutic  Additional findings: None         PLAN     Recommended plan for ongoing change(s) affecting INR     Dosing Instructions: Continue your current warfarin dose with next INR in 6 days       Summary  As of 4/17/2025      Full warfarin instructions:  2.5 mg every Fri; 3.75 mg all other days   Next INR check:  4/23/2025               Telephone call with [place] home care nurse who verbalizes understanding and agrees to plan    Orders given to  Homecare nurse/facility to recheck    Education provided: Please call back if any changes to your diet, medications or how you've been taking warfarin  Dietary considerations: importance of consistent vitamin K intake and impact of vitamin K foods on INR    Plan made per [facility] anticoagulation protocol    Crys Simpson RN  4/17/2025  Anticoagulation Clinic  [facility] for routing messages: chey [facility] MONITOR[facility]  [facility] patient phone line: [phone]        _______________________________________________________________________     Anticoagulation Episode Summary       Current INR goal:  2.0-3.0   TTR:  69.4% (1 y)   Target end date:  7/15/2036   Send INR reminders to:  [facility]IT[facility]    Indications    Atrial fibrillation  permanent (H) [I48.21]  Anticoagulation monitoring  INR range 2-3  [Z79.01]             Comments:  [employer] home monitor. Managed by exception.             Anticoagulation Care Providers       Provider Role Specialty Phone number    Devon Ball MD Referring Internal Medicine [phone]    Leti Brower NP Referring Internal Medicine [phone]    Marion Rios MD Referring Family Medicine [phone]

## 2025-04-21 ENCOUNTER — TELEPHONE (OUTPATIENT)
Dept: INTERNAL MEDICINE | Facility: CLINIC | Age: OVER 89
End: 2025-04-21
Payer: MEDICARE

## 2025-04-21 NOTE — TELEPHONE ENCOUNTER
Home Care is calling regarding an established patient with [facility].  Requesting orders from: Devon Ball RN APPROVED: RN able to provide verbal orders.  Home Care will send orders for signature.  RN will close encounter.  Is this a request for a temporary pause in the home care episode?  No    Orders Requested    Occupational Therapy  Request for initial certification (first set of orders)   Frequency: 1x wk for 6 wks   RN gave verbal order: Yes        Okay to leave a detailed message?: Yes    Contacts       Contact Date/Time Type Contact Phone/Fax    04/21/2025 12:05 PM CDT Phone (Incoming) [facility] [phone]          Clotilde Curiel RN

## 2025-04-22 ENCOUNTER — TELEPHONE (OUTPATIENT)
Dept: INTERNAL MEDICINE | Facility: CLINIC | Age: OVER 89
End: 2025-04-22
Payer: MEDICARE

## 2025-04-22 ENCOUNTER — ANTICOAGULATION THERAPY VISIT (OUTPATIENT)
Dept: ANTICOAGULATION | Facility: CLINIC | Age: OVER 89
End: 2025-04-22
Payer: MEDICARE

## 2025-04-22 DIAGNOSIS — Z79.01 ANTICOAGULATION MONITORING, INR RANGE 2-3: ICD-10-CM

## 2025-04-22 DIAGNOSIS — I48.21 ATRIAL FIBRILLATION, PERMANENT (H): Primary | Chronic | ICD-10-CM

## 2025-04-22 LAB — INR (EXTERNAL): 2.2

## 2025-04-22 NOTE — TELEPHONE ENCOUNTER
See [facility] encounter dated today regarding INR result.   Palak Velázquez, RN  Anticoagulation Nurse - [facility], [place]

## 2025-04-22 NOTE — TELEPHONE ENCOUNTER
Triage/PCP:  Please see note below from home care regarding 2 lbs weight gain since yesterday. Home care nurse requesting call back for orders on weight gain.    INR result has been addressed.    Thank you.  Palak Velázquez RN  Anticoagulation Nurse - [facility], [place]

## 2025-04-22 NOTE — TELEPHONE ENCOUNTER
Home care RN calling to report INR and request for dosing instructions.   INR today was 2.2.     Also reports 2lbs weight gain since yesterday.     Call SANDER Jain back with dosing instructions.

## 2025-04-22 NOTE — PROGRESS NOTES
Home care RN calling to report INR and request for dosing instructions.   INR today was 2.2.      Also reports 2lbs weight gain since yesterday.      Call SANDER Jain back with dosing instructions.      SANDER Jain [phone]

## 2025-04-22 NOTE — PROGRESS NOTES
ANTICOAGULATION MANAGEMENT     Reba Calderon 89 year old female is on warfarin with therapeutic INR result. (Goal INR 2.0-3.0)    Recent labs: (last 7 days)     04/22/25  1418   INR 2.2       ASSESSMENT     Source(s): Chart Review and Home Care/Facility Nurse     Warfarin doses taken: Warfarin taken as instructed  Diet: No new diet changes identified  Medication/supplement changes: None noted  New illness, injury, or hospitalization: No  Signs or symptoms of bleeding or clotting: No  Previous result: Therapeutic last visit; previously outside of goal range  Additional findings:  2 lbs weight gain / edema / Hospital follow up appt on 4/24/25        PLAN     Recommended plan for temporary change(s) affecting INR     Dosing Instructions: Continue your current warfarin dose with next INR in 10 days (next scheduled home care visit).       Summary  As of 4/22/2025      Full warfarin instructions:  2.5 mg every Fri; 3.75 mg all other days   Next INR check:  5/2/2025               Telephone call with [place] home care nurse who verbalizes understanding and agrees to plan and who agrees to plan and repeated back plan correctly    Orders given to  Homecare nurse/facility to recheck    Education provided: Please call back if any changes to your diet, medications or how you've been taking warfarin    Plan made per [facility] anticoagulation protocol    Palak Velázquez RN  4/22/2025  Anticoagulation Clinic  [facility] for routing messages: p ANTICOAG HOME MONITORING  [facility] patient phone line: [phone]        _______________________________________________________________________     Anticoagulation Episode Summary       Current INR goal:  2.0-3.0   TTR:  69.4% (1 y)   Target end date:  7/15/2036   Send INR reminders to:  [facility] MONITOR[facility]    Indications    Atrial fibrillation  permanent (H) [I48.21]  Anticoagulation monitoring  INR range 2-3 [Z79.01]             Comments:  [employer] home monitor. Managed by exception.              Anticoagulation Care Providers       Provider Role Specialty Phone number    Devon Ball MD Referring Internal Medicine [phone]    Leti Brower NP Referring Internal Medicine [phone]    Marion Rios MD Referring Family Medicine [phone]

## 2025-04-24 ENCOUNTER — TRANSFERRED RECORDS (OUTPATIENT)
Dept: HEALTH INFORMATION MANAGEMENT | Facility: CLINIC | Age: OVER 89
End: 2025-04-24

## 2025-04-25 PROBLEM — K61.1 PERIRECTAL ABSCESS: Status: RESOLVED | Noted: 2020-04-24 | Resolved: 2025-04-25

## 2025-04-25 PROBLEM — I27.20 PULMONARY HYPERTENSION (H): Status: ACTIVE | Noted: 2025-04-25

## 2025-04-25 PROBLEM — I50.33 ACUTE ON CHRONIC DIASTOLIC HEART FAILURE (H): Status: ACTIVE | Noted: 2025-04-08

## 2025-04-25 PROBLEM — Z99.81 ON HOME OXYGEN THERAPY: Status: ACTIVE | Noted: 2025-04-25

## 2025-04-29 ENCOUNTER — TRANSFERRED RECORDS (OUTPATIENT)
Dept: HEALTH INFORMATION MANAGEMENT | Facility: CLINIC | Age: OVER 89
End: 2025-04-29
Payer: MEDICARE

## 2025-05-01 ENCOUNTER — TELEPHONE (OUTPATIENT)
Dept: PULMONOLOGY | Facility: CLINIC | Age: OVER 89
End: 2025-05-01
Payer: MEDICARE

## 2025-05-01 RX ORDER — ALBUTEROL SULFATE 0.83 MG/ML
2.5 SOLUTION RESPIRATORY (INHALATION) ONCE
Status: COMPLETED | OUTPATIENT
Start: 2025-05-02 | End: 2025-05-02

## 2025-05-01 NOTE — TELEPHONE ENCOUNTER
[facility] Call Center    Phone Message    May a detailed message be left on voicemail: yes     Reason for Call: Other: .     Patients daughter. Nicole is wanting to speak with a nurse. Nicole states she received notice about restrictions for the PFT. Nicole states patient is on a medication, Methylprednisolone that was used to help treat pinched nerve in back. Nicole states she is confused on why the appt would need to be booked out a month after patient has ended the treatment. Nicole is wanting to get a call back to further discuss. Please advise.     Action Taken: Message routed to:  [facility] Lung    Travel Screening: Not Applicable     Date of Service:

## 2025-05-02 ENCOUNTER — HOSPITAL ENCOUNTER (OUTPATIENT)
Dept: RESPIRATORY THERAPY | Facility: CLINIC | Age: OVER 89
End: 2025-05-02
Payer: MEDICARE

## 2025-05-02 DIAGNOSIS — R09.02 HYPOXIA: ICD-10-CM

## 2025-05-02 DIAGNOSIS — R06.02 SOB (SHORTNESS OF BREATH): Primary | ICD-10-CM

## 2025-05-02 DIAGNOSIS — I27.20 PULMONARY HYPERTENSION (H): ICD-10-CM

## 2025-05-02 LAB — HGB BLD-MCNC: 11.5 G/DL (ref 11.7–15.7)

## 2025-05-02 PROCEDURE — 250N000009 HC RX 250: Performed by: INTERNAL MEDICINE

## 2025-05-02 PROCEDURE — 94060 EVALUATION OF WHEEZING: CPT

## 2025-05-02 PROCEDURE — 85018 HEMOGLOBIN: CPT | Performed by: INTERNAL MEDICINE

## 2025-05-02 PROCEDURE — 36415 COLL VENOUS BLD VENIPUNCTURE: CPT | Performed by: INTERNAL MEDICINE

## 2025-05-02 PROCEDURE — 94729 DIFFUSING CAPACITY: CPT

## 2025-05-02 PROCEDURE — 999N000157 HC STATISTIC RCP TIME EA 10 MIN

## 2025-05-02 PROCEDURE — 94726 PLETHYSMOGRAPHY LUNG VOLUMES: CPT

## 2025-05-02 RX ADMIN — ALBUTEROL SULFATE 2.5 MG: 2.5 SOLUTION RESPIRATORY (INHALATION) at 08:24

## 2025-05-02 NOTE — PROGRESS NOTES
RESPIRATORY CARE NOTE    Complete Pulmonary Function Test completed by patient.  Good patient effort and cooperation. Albuterol 2.5 mg neb given for bronchodilation.  The results of this test meet the ATS standards for acceptability and repeatability. PT returned to baseline and left in no distress.    Maranda Campos, RT  5/2/2025

## 2025-05-03 LAB
DLCOCOR-%PRED-PRE: 74 %
DLCOCOR-PRE: 14.06 ML/MIN/MMHG
DLCOUNC-%PRED-PRE: 69 %
DLCOUNC-PRE: 13.17 ML/MIN/MMHG
DLCOUNC-PRED: 18.9 ML/MIN/MMHG
ERV-%PRED-PRE: 108 %
ERV-PRE: 0.89 L
ERV-PRED: 0.82 L
EXPTIME-PRE: 5.67 SEC
FEF2575-%PRED-POST: 239 %
FEF2575-%PRED-PRE: 153 %
FEF2575-POST: 3.35 L/SEC
FEF2575-PRE: 2.14 L/SEC
FEF2575-PRED: 1.4 L/SEC
FEFMAX-%PRED-PRE: 141 %
FEFMAX-PRE: 5.94 L/SEC
FEFMAX-PRED: 4.19 L/SEC
FEV1-%PRED-PRE: 87 %
FEV1-PRE: 1.62 L
FEV1FEV6-PRE: 85 %
FEV1FEV6-PRED: 76 %
FEV1FVC-PRE: 85 %
FEV1FVC-PRED: 76 %
FEV1SVC-PRE: 100 %
FEV1SVC-PRED: 60 %
FIFMAX-PRE: 2.63 L/SEC
FRCPLETH-%PRED-PRE: 98 %
FRCPLETH-PRE: 3.13 L
FRCPLETH-PRED: 3.18 L
FVC-%PRED-PRE: 75 %
FVC-PRE: 1.9 L
FVC-PRED: 2.51 L
IC-%PRED-PRE: 44 %
IC-PRE: 0.74 L
IC-PRED: 1.64 L
RVPLETH-%PRED-PRE: 90 %
RVPLETH-PRE: 2.24 L
RVPLETH-PRED: 2.49 L
TLCPLETH-%PRED-PRE: 72 %
TLCPLETH-PRE: 3.87 L
TLCPLETH-PRED: 5.36 L
VA-%PRED-PRE: 64 %
VA-PRE: 3.04 L
VC-%PRED-PRE: 52 %
VC-PRE: 1.63 L
VC-PRED: 3.1 L

## 2025-05-06 ENCOUNTER — TELEPHONE (OUTPATIENT)
Dept: INTERNAL MEDICINE | Facility: CLINIC | Age: OVER 89
End: 2025-05-06
Payer: MEDICARE

## 2025-05-06 NOTE — TELEPHONE ENCOUNTER
[facility]    Order number(s):  [id number]  [id number]  [id number]  [id number]    Fax received and placed on PCP's desk

## 2025-05-07 ENCOUNTER — TELEPHONE (OUTPATIENT)
Dept: INTERNAL MEDICINE | Facility: CLINIC | Age: OVER 89
End: 2025-05-07
Payer: MEDICARE

## 2025-05-08 ENCOUNTER — MEDICAL CORRESPONDENCE (OUTPATIENT)
Dept: HEALTH INFORMATION MANAGEMENT | Facility: CLINIC | Age: OVER 89
End: 2025-05-08
Payer: MEDICARE

## 2025-05-12 ENCOUNTER — TELEPHONE (OUTPATIENT)
Dept: INTERNAL MEDICINE | Facility: CLINIC | Age: OVER 89
End: 2025-05-12
Payer: MEDICARE

## 2025-05-15 ENCOUNTER — OFFICE VISIT (OUTPATIENT)
Dept: PULMONOLOGY | Facility: CLINIC | Age: OVER 89
End: 2025-05-15
Payer: MEDICARE

## 2025-05-15 ENCOUNTER — TELEPHONE (OUTPATIENT)
Dept: INTERNAL MEDICINE | Facility: CLINIC | Age: OVER 89
End: 2025-05-15

## 2025-05-15 VITALS
SYSTOLIC BLOOD PRESSURE: 126 MMHG | HEIGHT: 67 IN | HEART RATE: 64 BPM | DIASTOLIC BLOOD PRESSURE: 62 MMHG | WEIGHT: 179.5 LBS | BODY MASS INDEX: 28.17 KG/M2 | OXYGEN SATURATION: 98 %

## 2025-05-15 DIAGNOSIS — J96.11 CHRONIC RESPIRATORY FAILURE WITH HYPOXIA (H): ICD-10-CM

## 2025-05-15 DIAGNOSIS — I27.20 PULMONARY HYPERTENSION (H): ICD-10-CM

## 2025-05-15 DIAGNOSIS — R06.02 SOB (SHORTNESS OF BREATH): ICD-10-CM

## 2025-05-15 LAB
6 MIN WALK (FT): 920 FT
6 MIN WALK (M): 280 M
FIO2-PRE: 28 %

## 2025-05-15 RX ORDER — FUROSEMIDE 80 MG/1
TABLET ORAL
COMMUNITY
Start: 2025-05-10

## 2025-05-15 NOTE — PROGRESS NOTES
Pulmonary New Clinic Visit       Visit Date 05/15/2025  MRN [id number]  PCP Devon Ball    Chief Complaint   Patient presents with    Consult     R09.02 (ICD-10-CM) - Hypoxia  I27.20 (ICD-10-CM) - Pulmonary hypertension (H)  R06.02 (ICD-10-CM) - SOB (shortness of breath)  Referred by Devon Ball MD; records in Epic   PFT 5/2/25       Reba Calderon is a 89 year old  female referred for hypoxemia and pulmonary HTN     I have reviewed patient's records from primary care, cardiology, inpt notes and summarized as follows:      Pertinent PMH includes   CLL  Permanent AF/flutter s/p PVI 2006, s/p PPM 2010, AVNA 2013, on warfarin  Pulmonary HTN (by Echo, no RHC on file yet)  Chronic hypoxemic respiratory failure, started on O2 04/2025  HFpEF, HTN, HLD  Valvular heart disease, MR/TR  DM  MATILDE, off tx (was on CPAP in early 2010s)   ? 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      Subjective:   Struggling with sciatica/LLE pain this morning.   Regarding dyspnea, he reports limitation with activity, especially without O2, but her sciatica is also contributing to that  She has an occasional cough, sometimes dry or productive, clear mucus typically. No hemoptysis.   Has been managing volume well since discharge last month, no ankle edema. No chest pain.     Watching O2 at home, and has in home care which helps keep track of sats with PT sessions  She is not sure she would like a CPAP/BiPAP machine.     DME [employer]   [employer] POC 2lpm pulsed during the day  Home concentrator 2lpm, in home and at night      Social Hx:   Never smoker  No vaping or marijuana         ROS:  Reviewed pertinent systems, summarized above       Past Medical History:   Diagnosis Date     Carotid stenosis, asymptomatic     Choledocholithiasis, RECURRENT     CLL (chronic lymphocytic leukemia) (H)     DMII (diabetes mellitus, type 2) (H)     GERD (gastroesophageal reflux disease)     Hyperlipidemia     Hypertension     Nonsmoker     Pacemaker     Perirectal abscess 04/24/2020    Permanent atrial fibrillation (H)         Past Surgical History:   Procedure Laterality Date    ARTHROSCOPY KNEE      BIOPSY BREAST      CATARACT EXTRACTION      CHOLECYSTECTOMY  08/24/2013    D & C      ERCP W/ SPHINCTEROTOMY AND BALLOON DILATION  11/2017, 10/27/2014    H ABLATION AV NODE      H ABLATION FOCAL AFIB      also for Atrial Flutter    HEMANGIOMA EXCISION  03/29/2019    Nasal    IMPLANT PACEMAKER      INCISIONAL BREAST BIOPSY      IR LYMPH NODE BIOPSY  12/15/2014    LASER YAG CAPSULOTOMY Right 07/23/2015    Procedure: LASER YAG CAPSULOTOMY;  Surgeon: Louis Jacobsen MD;  Location: [facility]    NASAL ENDOSCOPY  03/29/2019    NOSE SURGERY  03/29/2019    nasal mucosal flap reconstruction    PHACOEMULSIFICATION CLEAR CORNEA WITH TORIC INTRAOCULAR LENS IMPLANT  04/03/2014    Procedure: PHACOEMULSIFICATION CLEAR CORNEA WITH TORIC INTRAOCULAR LENS IMPLANT;  RIGHT PHACOEMULSIFICATION CLEAR CORNEA WITH TORIC INTRAOCULAR LENS IMPLANT  ;  Surgeon: Louis Jacobsen MD;  Location: [facility]    PHACOEMULSIFICATION CLEAR CORNEA WITH TORIC INTRAOCULAR LENS IMPLANT  05/01/2014    Procedure: PHACOEMULSIFICATION CLEAR CORNEA WITH TORIC INTRAOCULAR LENS IMPLANT;  Surgeon: Louis Jacobsen MD;  Location: [facility]    RECTOCELE REPAIR      CYSTOCELE AS WELL    TONSILLECTOMY          No family history on file.        Allergies   Allergen Reactions    Amiodarone Unknown     Dyspnea, O2 dependency    Oxycodone Anaphylaxis     Tolerates Morphine.    Amlodipine Swelling    Atenolol     Beta Adrenergic Blockers     Demerol [Meperidine]     Dilaudid [Hydromorphone] Confusion     Delirium    Percocet [Oxycodone-Acetaminophen]     Pioglitazone Other (See  Comments) and Unknown     Other reaction(s): Unknown  Legs swell, Comment: intolerance, Description: Actos, Comment: intolerance, Description: Actos  Legs swell, Comment: intolerance, Description: Actos, Comment: intolerance, Description: Actos  Legs swell, Comment: intolerance, Description: Actos, Comment: intolerance, Description: Actos      Toprol Xl [Metoprolol]            Current Outpatient Medications   Medication Sig Dispense Refill    atorvastatin (LIPITOR) 10 MG tablet TAKE 1 TABLET BY MOUTH EVERY EVENING 90 tablet 0    biotin 2.5 MG CAPS Take 2,500 mcg by mouth      blood glucose (CONTOUR TEST) test strip USE 1 STRIP TO TEST BLOOD SUGAR DAILY. 100 strip 2    blood glucose (NO BRAND SPECIFIED) test strip 1 strip by [employer] route daily. 100 strip 3    blood glucose monitoring (NO BRAND SPECIFIED) meter device kit by [employer] route daily. 1 kit 0    Cholecalciferol (VITAMIN D3 PO) Take 2,000 Units by mouth daily       ciclopirox (PENLAC) 8 % external solution Apply to adjacent skin and affected nails daily.  Remove with alcohol every 7 days, then repeat. 6.6 mL 11    FOLIC ACID PO Take 1 mg by mouth daily      furosemide (LASIX) 40 MG tablet Take 2 tablets (80 mg) by mouth every morning AND 1 tablet (40 mg) daily at 2 pm. 180 tablet 3    furosemide (LASIX) 80 MG tablet TAKE 1 TAB (80 MG) IN THE AM AND 1/2 TABLET (40 MG) IN THE PM.      gabapentin (NEURONTIN) 100 MG capsule Take 200 mg by mouth daily TAKE 1 CAPSULE BY MOUTH AT BEDTIME FOR 1 WEEK THEN 2 CAPS THEREAFTER      glucosamine-chondroitinoitin 500-400 MG tablet Take 1 tablet by mouth      magnesium chloride 535 (64 Mg) MG TBCR CR tablet Take 64 mg by mouth      melatonin 3 MG tablet Take 3 mg by mouth nightly as needed for sleep.      metFORMIN (GLUCOPHAGE) 500 MG tablet Take 1 tablet (500 mg) by mouth 2 times daily (with meals). 180 tablet 3    mupirocin (BACTROBAN) 2 % external ointment Apply topically 3 times daily.      nitroGLYcerin (NITROSTAT)  "0.4 MG sublingual tablet Place 0.4 mg under the tongue      pantoprazole (PROTONIX) 40 MG EC tablet TAKE 1 TABLET BY MOUTH EVERY DAY 90 tablet 2    polyethylene glycol (MIRALAX) 17 GM/Dose powder MIX 1 CAPFUL (17 GRAMS) IN LIQUID AND DRINK BY MOUTH DAILY. (OTC NC)      potassium chloride ER (K-TAB/KLOR-CON) 10 MEQ CR tablet Take 1 tablet (10 mEq) by mouth 2 times daily.      thin (NO BRAND SPECIFIED) lancets 1 each by In Vitro route daily. 100 each 6    UNABLE TO FIND MEDICATION NAME: Hair skin and nails   2500      vitamin B-12 (CYANOCOBALAMIN) 1000 MCG tablet Take 1,000 mcg by mouth      warfarin ANTICOAGULANT (COUMADIN) 2.5 MG tablet Take 2.5 mg Tues, Fri and 3.75 mg all other days, or as directed by the Coumadin Clinic 120 tablet 1     No current facility-administered medications for this visit.           Objective:  Vitals:    05/15/25 0925   BP: 126/62   BP Location: Left arm   Patient Position: Sitting   Cuff Size: Adult Regular   Pulse: 64   SpO2: 98%   Weight: 81.4 kg (179 lb 8 oz)   Height: 1.689 m (5' 6.5\")              Physical Exam  Vitals reviewed.   Constitutional:       Appearance: Normal appearance.   HENT:      Head: Normocephalic and atraumatic.   Eyes:      General: No scleral icterus.     Conjunctiva/sclera: Conjunctivae normal.   Cardiovascular:      Rate and Rhythm: Normal rate and regular rhythm.      Heart sounds: No murmur heard.  Pulmonary:      Effort: Pulmonary effort is normal. No respiratory distress.      Breath sounds: Rales (bibasilar) present. No wheezing.   Musculoskeletal:         General: Deformity: no clubbing.   Neurological:      General: No focal deficit present.      Mental Status: She is alert and oriented to person, place, and time.   Psychiatric:         Mood and Affect: Mood normal.         Behavior: Behavior normal.         Thought Content: Thought content normal.         Judgment: Judgment normal.                PFTs:   Date 5/2/2025   Pre  Post  FVC 1.90 75% 2.01 " +4%  FEV1 1.62 87% 1.78 +8%  Ratio 85  RV 2.24 90%   TLC 3.87 72%  DLCOu 69%  DLCOc 74%  Interpretation: Moderate restriction.  No significant improvement postbronchodilator.  Diffusion capacity normalizes after correction for anemia, hg 11.5     Overnight oximetry 3/10/2025  On room air, total test time 7 hours 35 minutes  6 hours 31 minutes with SpO2 at or below 88%  Desaturation index 35.7      6MWT   pending      Imaging:   Personally and independently reviewed following imaging with my own impression:   CT chest 5/4/2023: Mild patchy GGO's and basilar peripheral mild reticular thickening  CT chest 4/7/2025 (acute hospitalization for CHF exacerbation), bilateral right greater than left pleural effusions, pulmonary edema findings with interlobular septal thickening, dependent GGO's.  Mediastinal lymphadenopathy in line with known CLL diagnosis.      Cardiac/Echo/Caths:     TTE 4/8/2025: ([facility], acute CHF exacerbation) currently, no evidence of  1. Normal left ventricular size and systolic function (estimated LVEF 55-60%). Abnormal   ventricular septal motion due to pacing.    2. Normal right ventricular size and function; estimated RVSP 76 mmHg ?? suggestive of severe   pulmonary hypertension.    3. Biatrial enlargement.    4. Mild-moderate tricuspid regurgitation.    5. Mild-moderate mitral regurgitation.    6. When compared to previous study, no significant change.    Estimated EF: 55-60%   TAPSE 1.75   Right Ventricular Systolic Press  54.7 mmHg ??  RA pressure 8      Pertinent Labs:   Hg 11.5      Assessment:   Pulmonary hypertension, group 2 (CHF, valvular heart disease) +3 (MATILDE) , active, appears well compensated other pulmonary disease at this time  Chronic hypoxemic respiratory failure, active  MATILDE, off therapy, has sleep consult scheduled  HFpEF, pending HF clinic consult  Permanent AF/flutter s/p PVI 2006, s/p PPM 2010, AVNA 2013, on warfarin, follows with EP  Valvular heart disease,  MR/TR  CLL, active  H/o amiodarone lung toxicity, remote  Anemia, contributes to dyspnea on exertion  Deconditioning    First visit with the patient, and her daughter.  Discussed pulmonary hypertension diagnosis, contributing pathology being cardiac disease and untreated MATILDE.  Discussed PFT findings, anemia contributing to her dyspnea on exertion, role for MATILDE evaluation and treatment, continuing PT, O2 treatment goals.  No current evidence of other pulmonary disease.  Already anticoagulated with warfarin.  Reviewed last echocardiogram, conflicting RVSP estimate of 54 or 76 mmHg however RV size/Fx and RA pressures remain normal which is reassuring  Not a candidate for PH directed therapy at this time.    Plan:  6-minute walk testing ordered, for baseline O2 requirements  Recommended sleep consultation and evaluation, this is already scheduled  Continue PT. Would also be a candidate for pulmonary rehab in the future  Continue O2, using and benefiting.  Goal SpO2 90% or higher. O2 needs expected to fluctuate based on volume status, conditioning level, activity level.      RTC 6 months     Brandon Cespedes MD  05/15/2025 10:36 AM  Pulmonary & Critical Care      Total time spent during this encounter date 05/15/2025: 75min including chart review, independent interpretation of results as above, face-to-face time with the patient, counseling and education about above diagnoses and treatment plan, answering all patient or caregiver questions, and clinical documentation and care coordination.     This note was completed using voice recognition software. Dictation errors may have been missed.         CC: Copy sent to Devon Ball through Epic and/or letter

## 2025-05-15 NOTE — PROGRESS NOTES
Six Minute Walk Test:   Probe: (finger.) Stops: (0)  Patient walked (920 Ft /280 m) in 6 minutes.  LLN = (852 Ft /260 m)   O2 system: (pulse dose setting 2.)  Pre-walk: SP02 (97%) Heart Rate (77) Shruthi Dyspnea = (0) Fatigue = (0)   On pulse dose 2: Lowest SPO2 (93%) Highest HR: (102)  Post-walk: SP02 (94%) Heart Rate (102 ) Shruthi Dyspnea = (0) Fatigue = (0)  Recovery time to baseline 02 SAT (0:20) minutes and HR (1:18) minutes.  Patient reports walk distance may have been affected by (none noted.)   **did not have time to walk patient on room air as well as patient is running behind for her next appt today.

## 2025-05-16 ENCOUNTER — RESULTS FOLLOW-UP (OUTPATIENT)
Dept: PULMONOLOGY | Facility: CLINIC | Age: OVER 89
End: 2025-05-16

## 2025-05-19 ENCOUNTER — MEDICAL CORRESPONDENCE (OUTPATIENT)
Dept: HEALTH INFORMATION MANAGEMENT | Facility: CLINIC | Age: OVER 89
End: 2025-05-19
Payer: MEDICARE

## 2025-05-19 LAB — FIO2-PRE: 28 %

## 2025-05-20 ENCOUNTER — TELEPHONE (OUTPATIENT)
Dept: INTERNAL MEDICINE | Facility: CLINIC | Age: OVER 89
End: 2025-05-20
Payer: MEDICARE

## 2025-05-20 ENCOUNTER — MEDICAL CORRESPONDENCE (OUTPATIENT)
Dept: HEALTH INFORMATION MANAGEMENT | Facility: CLINIC | Age: OVER 89
End: 2025-05-20
Payer: MEDICARE

## 2025-05-20 NOTE — TELEPHONE ENCOUNTER
[facility]    Order number(s):  [id number]    Fax received and placed on PCP's desk

## 2025-05-25 DIAGNOSIS — E11.42 TYPE 2 DIABETES MELLITUS WITH DIABETIC POLYNEUROPATHY, WITHOUT LONG-TERM CURRENT USE OF INSULIN (H): ICD-10-CM

## 2025-05-27 RX ORDER — ATORVASTATIN CALCIUM 10 MG/1
10 TABLET, FILM COATED ORAL EVERY EVENING
Qty: 90 TABLET | Refills: 3 | Status: SHIPPED | OUTPATIENT
Start: 2025-05-27

## 2025-06-02 ENCOUNTER — MEDICAL CORRESPONDENCE (OUTPATIENT)
Dept: HEALTH INFORMATION MANAGEMENT | Facility: CLINIC | Age: OVER 89
End: 2025-06-02
Payer: MEDICARE

## 2025-06-02 ENCOUNTER — TELEPHONE (OUTPATIENT)
Dept: INTERNAL MEDICINE | Facility: CLINIC | Age: OVER 89
End: 2025-06-02
Payer: MEDICARE

## 2025-06-11 ENCOUNTER — TELEPHONE (OUTPATIENT)
Dept: INTERNAL MEDICINE | Facility: CLINIC | Age: OVER 89
End: 2025-06-11
Payer: MEDICARE

## 2025-06-11 NOTE — TELEPHONE ENCOUNTER
Home Care is calling regarding an established patient with [facility].  Requesting orders from: Devon Ball RN APPROVED: RN able to provide verbal orders.  Home Care will send orders for signature.  RN will close encounter.  Is this a request for a temporary pause in the home care episode?  No    Orders Requested    Skilled Nursing  Request for continuation of care with no increase or decrease in frequency  Frequency: 1 time per week for 4 weeks  RN gave verbal order: Yes          Okay to leave a detailed message?: Yes    Contacts       Contact Date/Time Type Contact Phone/Fax    06/11/2025 01:49 PM CDT Phone (Incoming) Richard [phone]     [facility]. secure           Trent Nava RN

## 2025-06-15 ENCOUNTER — HEALTH MAINTENANCE LETTER (OUTPATIENT)
Age: OVER 89
End: 2025-06-15

## 2025-06-17 ENCOUNTER — TRANSFERRED RECORDS (OUTPATIENT)
Dept: HEALTH INFORMATION MANAGEMENT | Facility: CLINIC | Age: OVER 89
End: 2025-06-17
Payer: MEDICARE

## 2025-06-18 ENCOUNTER — ANTICOAGULATION THERAPY VISIT (OUTPATIENT)
Dept: ANTICOAGULATION | Facility: CLINIC | Age: OVER 89
End: 2025-06-18
Payer: MEDICARE

## 2025-06-18 DIAGNOSIS — I48.21 ATRIAL FIBRILLATION, PERMANENT (H): Primary | Chronic | ICD-10-CM

## 2025-06-18 DIAGNOSIS — Z79.01 ANTICOAGULATION MONITORING, INR RANGE 2-3: ICD-10-CM

## 2025-06-18 LAB — INR (EXTERNAL): 2.6

## 2025-06-18 NOTE — PROGRESS NOTES
ANTICOAGULATION MANAGEMENT     Reba Calderon 89 year old female is on warfarin with therapeutic INR result. (Goal INR 2.0-3.0)    Recent labs: (last 7 days)     06/18/25  0000   INR 2.6       ASSESSMENT     Source(s): Chart Review and Patient/Caregiver Call     Warfarin doses taken: Warfarin taken as instructed  Diet: No new diet changes identified  Medication/supplement changes: None noted  New illness, injury, or hospitalization: No  Signs or symptoms of bleeding or clotting: No  Previous result: Therapeutic last 2(+) visits  Additional findings: None       PLAN     Recommended plan for no diet, medication or health factor changes affecting INR     Dosing Instructions: Continue your current warfarin dose with next INR in 2 weeks       Summary  As of 6/18/2025      Full warfarin instructions:  2.5 mg every Fri; 3.75 mg all other days   Next INR check:  7/2/2025               Telephone call with [facility] nurse who verbalizes understanding and agrees to plan and who agrees to plan and repeated back plan correctly    Orders given to  Homecare nurse/facility to recheck    Education provided: Please call back if any changes to your diet, medications or how you've been taking warfarin    Plan made per [facility] anticoagulation protocol    Catracho Nolen RN  6/18/2025  Anticoagulation Clinic  [employer] for routing messages: p ANTICOAG HOME MONITORING  [facility] patient phone line: [phone]        _______________________________________________________________________     Anticoagulation Episode Summary       Current INR goal:  2.0-3.0   TTR:  73.4% (1 y)   Target end date:  7/15/2036   Send INR reminders to:  ANTICOAG HOME MONITORING    Indications    Atrial fibrillation  permanent (H) [I48.21]  Anticoagulation monitoring  INR range 2-3 [Z79.01]             Comments:  [employer] home monitor. Managed by exception.             Anticoagulation Care Providers       Provider Role Specialty Phone number    Devon Ball MD  Referring Internal Medicine [phone]    Leti Brower NP Referring Internal Medicine [phone]    Marion Rios MD Referring Family Medicine [phone]

## 2025-07-01 ENCOUNTER — ANTICOAGULATION THERAPY VISIT (OUTPATIENT)
Dept: ANTICOAGULATION | Facility: CLINIC | Age: OVER 89
End: 2025-07-01
Payer: MEDICARE

## 2025-07-01 DIAGNOSIS — I48.21 ATRIAL FIBRILLATION, PERMANENT (H): Primary | Chronic | ICD-10-CM

## 2025-07-01 DIAGNOSIS — Z79.01 ANTICOAGULATION MONITORING, INR RANGE 2-3: ICD-10-CM

## 2025-07-01 LAB — INR (EXTERNAL): 2.6

## 2025-07-01 NOTE — PROGRESS NOTES
ANTICOAGULATION MANAGEMENT     Reba Calderon 89 year old female is on warfarin with therapeutic INR result. (Goal INR 2.0-3.0)    Recent labs: (last 7 days)     07/01/25  0000   INR 2.6       ASSESSMENT     Source(s): Chart Review and Home Care/Facility Nurse     Warfarin doses taken: Warfarin taken as instructed  Diet: No new diet changes identified  Medication/supplement changes: None noted  New illness, injury, or hospitalization: No  Signs or symptoms of bleeding or clotting: No  Previous result: Therapeutic last 2(+) visits  Additional findings: Patient discharging from home care 7/11, she will use her home meter to recheck INR 7/15       PLAN     Recommended plan for no diet, medication or health factor changes affecting INR     Dosing Instructions: Continue your current warfarin dose with next INR in 2 weeks       Summary  As of 7/1/2025      Full warfarin instructions:  2.5 mg every Fri; 3.75 mg all other days   Next INR check:  7/15/2025               Telephone call with [facility] home care nurse who verbalizes understanding and agrees to plan    Patient to recheck with home meter    Education provided: Please call back if any changes to your diet, medications or how you've been taking warfarin  Contact [phone] with any changes, questions or concerns.     Plan made per [facility] anticoagulation protocol    Lynda Solis RN  7/1/2025  Anticoagulation Clinic  [employer] for routing messages: p ANTICOAG HOME MONITORING  [facility] patient phone line: [phone]        _______________________________________________________________________     Anticoagulation Episode Summary       Current INR goal:  2.0-3.0   TTR:  73.4% (1 y)   Target end date:  7/15/2036   Send INR reminders to:  ANTICOAG HOME MONITORING    Indications    Atrial fibrillation  permanent (H) [I48.21]  Anticoagulation monitoring  INR range 2-3 [Z79.01]             Comments:  [employer] home monitor. Managed by exception.             Anticoagulation  Care Providers       Provider Role Specialty Phone number    Devon Ball MD Referring Internal Medicine [phone]    Leti Brower NP Referring Internal Medicine [phone]    Marion Rios MD Referring Family Medicine [phone]

## 2025-07-02 ENCOUNTER — TELEPHONE (OUTPATIENT)
Dept: INTERNAL MEDICINE | Facility: CLINIC | Age: OVER 89
End: 2025-07-02
Payer: MEDICARE

## 2025-07-02 ENCOUNTER — MEDICAL CORRESPONDENCE (OUTPATIENT)
Dept: HEALTH INFORMATION MANAGEMENT | Facility: CLINIC | Age: OVER 89
End: 2025-07-02
Payer: MEDICARE

## 2025-07-02 DIAGNOSIS — Z53.9 DIAGNOSIS NOT YET DEFINED: Primary | ICD-10-CM

## 2025-07-02 PROCEDURE — G0179 MD RECERTIFICATION HHA PT: HCPCS | Performed by: NURSE PRACTITIONER

## 2025-07-02 NOTE — TELEPHONE ENCOUNTER
[facility]    Order number(s):  [id number]  [id number]  [id number]    Fax received and placed on [facility] Leti's desk

## 2025-07-08 ENCOUNTER — TELEPHONE (OUTPATIENT)
Dept: INTERNAL MEDICINE | Facility: CLINIC | Age: OVER 89
End: 2025-07-08
Payer: MEDICARE

## 2025-07-08 NOTE — TELEPHONE ENCOUNTER
[facility]    Order number(s):  [id number]  [id number]    Fax received and placed on PCP's desk

## 2025-07-10 ENCOUNTER — MEDICAL CORRESPONDENCE (OUTPATIENT)
Dept: HEALTH INFORMATION MANAGEMENT | Facility: CLINIC | Age: OVER 89
End: 2025-07-10
Payer: MEDICARE

## 2025-07-16 ENCOUNTER — ANTICOAGULATION THERAPY VISIT (OUTPATIENT)
Dept: ANTICOAGULATION | Facility: CLINIC | Age: OVER 89
End: 2025-07-16
Payer: MEDICARE

## 2025-07-16 ENCOUNTER — RESULTS FOLLOW-UP (OUTPATIENT)
Dept: ANTICOAGULATION | Facility: CLINIC | Age: OVER 89
End: 2025-07-16
Payer: MEDICARE

## 2025-07-16 DIAGNOSIS — Z79.01 ANTICOAGULATION MONITORING, INR RANGE 2-3: ICD-10-CM

## 2025-07-16 DIAGNOSIS — I48.21 ATRIAL FIBRILLATION, PERMANENT (H): Primary | Chronic | ICD-10-CM

## 2025-07-16 LAB — INR HOME MONITORING: 1.5 (ref 2–3)

## 2025-07-16 NOTE — PROGRESS NOTES
ANTICOAGULATION MANAGEMENT     Reba Calderon 89 year old female is on warfarin with subtherapeutic INR result. (Goal INR 2.0-3.0)    Recent labs: (last 7 days)     07/16/25  0000   INR 1.5*       ASSESSMENT     Source(s): Chart Review and Patient/Caregiver Call     Warfarin doses taken: Missed dose(s) may be affecting INR  Diet: No new diet changes identified  Medication/supplement changes: None noted  New illness, injury, or hospitalization: No  Signs or symptoms of bleeding or clotting: No  Previous result: Therapeutic last 2(+) visits  Additional findings: None       PLAN     Recommended plan for temporary change(s) affecting INR     Dosing Instructions: booster dose then continue your current warfarin dose with next INR in 2 weeks       Summary  As of 7/16/2025      Full warfarin instructions:  7/16: 5 mg; Otherwise 2.5 mg every Fri; 3.75 mg all other days   Next INR check:  7/30/2025               Telephone call with Reba who verbalizes understanding and agrees to plan and who agrees to plan and repeated back plan correctly    Patient to recheck with home meter    Education provided: Please call back if any changes to your diet, medications or how you've been taking warfarin    Plan made per [facility] anticoagulation protocol    Catracho Nolen, RN  7/16/2025  Anticoagulation Clinic  [employer] for routing messages: p ANTICOAG HOME MONITORING  [facility] patient phone line: [phone]        _______________________________________________________________________     Anticoagulation Episode Summary       Current INR goal:  2.0-3.0   TTR:  71.6% (1 y)   Target end date:  7/15/2036   Send INR reminders to:  ANTICOAG HOME MONITORING    Indications    Atrial fibrillation  permanent (H) [I48.21]  Anticoagulation monitoring  INR range 2-3 [Z79.01]             Comments:  [employer] home monitor. Managed by exception.             Anticoagulation Care Providers       Provider Role Specialty Phone number    Devon Ball MD  Referring Internal Medicine [phone]    Leti Brower NP Referring Internal Medicine [phone]    Marion Rios MD Referring Family Medicine [phone]

## 2025-07-22 NOTE — PATIENT INSTRUCTIONS
At your visit today, we discussed your risk for falls and preventive options.    Fall Prevention  Falls often occur due to slipping, tripping or losing your balance. Millions of people fall every year and injure themselves. Here are ways to reduce your risk of falling again.     Think about your fall, was there anything that caused your fall that can be fixed, removed, or replaced?    Make your home safe by keeping walkways clear of objects you may trip over, such as electric cords.    Use non-slip pads under rugs. Don't use area rugs or small throw rugs.    Use non-slip mats in bathtubs and showers.    Install handrails and lights on staircases. The handrails should be on both sides of the stairs.    Don't walk in poorly lit areas.    Don't stand on chairs or wobbly ladders.    Use caution when reaching overhead or looking upward. This position can cause a loss of balance.    Be sure your shoes fit properly, have non-slip bottoms and are in good condition.     Wear shoes both inside and out. Don't go barefoot or wear slippers.    Be cautious when going up and down stairs, curbs, and when walking on uneven sidewalks.    If your balance is poor, consider using a cane or walker.    If your fall was related to alcohol use, stop or limit alcohol intake.     If your fall was related to use of sleeping medicines, talk to your healthcare provider about this. You may need to reduce your dosage at bedtime if you awaken during the night to go to the bathroom.      To reduce the need for nighttime bathroom trips:  ? Don't drink fluids for several hours before going to bed  ? Empty your bladder before going to bed  ? Men can keep a urinal at the bedside    Stay as active as you can. Balance, flexibility, strength, and endurance all come from exercise. They all play a role in preventing falls. Ask your healthcare provider which types of activity are right for you.    Get your vision checked on a regular basis.    If you have  Pt was in office this morning    pets, know where they are before you stand up or walk so you don't trip over them.    Use night lights.    Go over all your medicines with a pharmacist or other healthcare provider to see if any of them could make you more likely to fall.  [employer] last reviewed this educational content on 4/1/2018 2000-2021 [employer]. All rights reserved. This information is not intended as a substitute for professional medical care. Always follow your healthcare professional's instructions.        Patient Education   Personalized Prevention Plan  You are due for the preventive services outlined below.  Your care team is available to assist you in scheduling these services.  If you have already completed any of these items, please share that information with your care team to update in your medical record.  Health Maintenance Due   Topic Date Due     ANNUAL REVIEW OF HM ORDERS  Never done     Thyroid Function Lab  09/12/2019     A1C Lab  01/19/2023     Basic Metabolic Panel  01/19/2023     Cholesterol Lab  01/25/2023     Complete Blood Count  01/25/2023     Hemoglobin  01/25/2023       Exercise for a Healthier Heart  You may wonder how you can improve the health of your heart. If you re thinking about exercise, you re on the right track. You don t need to become an athlete. But you do need a certain amount of brisk exercise to help strengthen your heart. If you have been diagnosed with a heart condition, your healthcare provider may advise exercise to help stabilize your condition. To help make exercise a habit, choose safe, fun activities.      Exercise with a friend. When activity is fun, you're more likely to stick with it.   Before you start  Check with your healthcare provider before starting an exercise program. This is especially important if you have not been active for a while. It's also important if you have a long-term (chronic) health problem such as heart disease, diabetes, or obesity. Or if you are  at high risk for having these problems.   Why exercise?  Exercising regularly offers many healthy rewards. It can help you do all of the following:     Improve your blood cholesterol level to help prevent further heart trouble    Lower your blood pressure to help prevent a stroke or heart attack    Control diabetes, or reduce your risk of getting this disease    Improve your heart and lung function    Reach and stay at a healthy weight    Make your muscles stronger so you can stay active    Prevent falls and fractures by slowing the loss of bone mass (osteoporosis)    Manage stress better    Reduce your blood pressure    Improve your sense of self and your body image  Exercise tips      Ease into your routine. Set small goals. Then build on them. If you are not sure what your activity level should be, talk with your healthcare provider first before starting an exercise routine.    Exercise on most days. Aim for a total of 150 minutes (2 hours and 30 minutes) or more of moderate-intensity aerobic activity each week. Or 75 minutes (1 hour and 15 minutes) or more of vigorous-intensity aerobic activity each week. Or try for a combination of both. Moderate activity means that you breathe heavier and your heart rate increases but you can still talk. Think about doing 40 minutes of moderate exercise, 3 to 4 times a week. For best results, activity should last for about 40 minutes to lower blood pressure and cholesterol. It's OK to work up to the 40-minute period over time. Examples of moderate-intensity activity are walking 1 mile in 15 minutes. Or doing 30 to 45 minutes of yard work.    Step up your daily activity level.  Along with your exercise program, try being more active the whole day. Walk instead of drive. Or park further away so that you take more steps each day. Do more household tasks or yard work. You may not be able to meet the advised mount of physical activity. But doing some moderate- or vigorous-intensity  aerobic activity can help reduce your risk for heart disease. Your healthcare provider can help you figure out what is best for you.    Choose 1 or more activities you enjoy.  Walking is one of the easiest things you can do. You can also try swimming, riding a bike, dancing, or taking an exercise class.    When to call your healthcare provider  Call your healthcare provider if you have any of these:     Chest pain or feel dizzy or lightheaded    Burning, tightness, pressure, or heaviness in your chest, neck, shoulders, back, or arms    Abnormal shortness of breath    More joint or muscle pain    A very fast or irregular heartbeat (palpitations)  [employer] last reviewed this educational content on 7/1/2019 2000-2021 [employer]. All rights reserved. This information is not intended as a substitute for professional medical care. Always follow your healthcare professional's instructions.          Understanding USDA MyPlate  The USDA has guidelines to help you make healthy food choices. These are called MyPlate. MyPlate shows the food groups that make up healthy meals using the image of a place setting. Before you eat, think about the healthiest choices for what to put on your plate or in your cup or bowl. To learn more about building a healthy plate, visit www.choosemyplate.gov.    The food groups    Fruits. Any fruit or 100% fruit juice counts as part of the Fruit Group. Fruits may be fresh, canned, frozen, or dried, and may be whole, cut-up, or pureed. Make 1/2 of your plate fruits and vegetables.    Vegetables. Any vegetable or 100% vegetable juice counts as a member of the Vegetable Group. Vegetables may be fresh, frozen, canned, or dried. They can be served raw or cooked and may be whole, cut-up, or mashed. Make 1/2 of your plate fruits and vegetables.    Grains. All foods made from grains are part of the Grains Group. These include wheat, rice, oats, cornmeal, and barley. Grains are often used to  make foods such as bread, pasta, oatmeal, cereal, tortillas, and grits. Grains should be no more than 1/4 of your plate. At least half of your grains should be whole grains.    Protein. This group includes meat, poultry, seafood, beans and peas, eggs, processed soy products (such as tofu), nuts (including nut butters), and seeds. Make protein choices no more than 1/4 of your plate. Meat and poultry choices should be lean or low fat.    Dairy. The Dairy Group includes all fluid milk products and foods made from milk that contain calcium, such as yogurt and cheese. (Foods that have little calcium, such as cream, butter, and cream cheese, are not part of this group.) Most dairy choices should be low-fat or fat-free.    Oils. Oils aren't a food group, but they do contain essential nutrients. However it's important to watch your intake of oils. These are fats that are liquid at room temperature. They include canola, corn, olive, soybean, vegetable, and sunflower oil. Foods that are mainly oil include mayonnaise, certain salad dressings, and soft margarines. You likely already get your daily oil allowance from the foods you eat.  Things to limit  Eating healthy also means limiting these things in your diet:       Salt (sodium). Many processed foods have a lot of sodium. To keep sodium intake down, eat fresh vegetables, meats, poultry, and seafood when possible. Purchase low-sodium, reduced-sodium, or no-salt-added food products at the store. And don't add salt to your meals at home. Instead, season them with herbs and spices such as dill, oregano, cumin, and paprika. Or try adding flavor with lemon or lime zest and juice.    Saturated fat. Saturated fats are most often found in animal products such as beef, pork, and chicken. They are often solid at room temperature, such as butter. To reduce your saturated fat intake, choose leaner cuts of meat and poultry. And try healthier cooking methods such as grilling, broiling,  roasting, or baking. For a simple lower-fat swap, use plain nonfat yogurt instead of mayonnaise when making potato salad or macaroni salad.    Added sugars. These are sugars added to foods. They are in foods such as ice cream, candy, soda, fruit drinks, sports drinks, energy drinks, cookies, pastries, jams, and syrups. Cut down on added sugars by sharing sweet treats with a family member or friend. You can also choose fruit for dessert, and drink water or other unsweetened beverages.     [employer] last reviewed this educational content on 6/1/2020 2000-2021 [employer]. All rights reserved. This information is not intended as a substitute for professional medical care. Always follow your healthcare professional's instructions.          Preventing Falls at Home  A person can fall for many reasons. Older adults may fall because reaction time slows down as we age. Your muscles and joints may get stiff, weak, or less flexible because of illness, medicines, or a physical condition.   Other health problems that make falls more likely include:     Arthritis    Dizziness or lightheadedness when you stand up (orthostatic hypotension)    History of a stroke    Dizziness    Anemia    Certain medicines taken for mental illness or to control blood pressure.    Problems with balance or gait    Bladder or urinary problems    History of falling    Changes in vision (vision impairment)    Changes in thinking skills and memory (cognitive impairment)  Injuries from a fall can include serious injuries such as broken bones, dislocated joints, internal bleeding and cuts. Injuries like these can limit your independence.   Prevention tips  To help prevent falls and fall-related injuries, follow the tips below.    Floors  To make floors safer:     Put nonskid pads under area rugs.    Remove small rugs.    Replace worn floor coverings.    Tack carpets firmly to each step on carpeted stairs. Put nonskid strips on the edges of  uncarpeted stairs.    Keep floors and stairs free of clutter and cords.    Arrange furniture so there are clear pathways.    Clean up any spills right away.  Bathrooms    To make bathrooms safer:     Install grab bars in the tub or shower.    Apply nonskid strips or put a nonskid rubber mat in the tub or shower.    Sit on a bath chair to bathe.    Use bathmats with nonskid backing.  Lighting  To improve visibility in your home:      Keep a flashlight in each room. Or put a lamp next to the bed within easy reach.    Put nightlights in the bedrooms, hallways, kitchen, and bathrooms.    Make sure all stairways have good lighting.    Take your time when going up and down stairs.    Put handrails on both sides of stairs and in walkways for more support. To prevent injury to your wrist or arm, don t use handrails to pull yourself up.    Install grab bars to pull yourself up.    Move or rearrange items that you use often. This will make them easier to find or reach.    Look at your home to find any safety hazards. Especially look at doorways, walkways, and the driveway. Remove or repair any safety problems that you find.  Other changes to make    Look around to find any safety hazards. Look closely at doorways, walkways, and the driveway. Remove or repair any safety problems that you find.    Wear shoes that fit well.    Take your time when going up and down stairs.    Put handrails on both sides of stairs and in walkways for more support. To prevent injury to your wrist or arm, don t use handrails to pull yourself up.    Install grab bars wherever needed to pull yourself up.    Arrange items that you use often. This will make them easier to find or reach.    [employer] last reviewed this educational content on 3/1/2020    2584-3608 [employer]. All rights reserved. This information is not intended as a substitute for professional medical care. Always follow your healthcare professional's instructions.

## 2025-08-04 ENCOUNTER — ANTICOAGULATION THERAPY VISIT (OUTPATIENT)
Dept: ANTICOAGULATION | Facility: CLINIC | Age: OVER 89
End: 2025-08-04
Payer: MEDICARE

## 2025-08-04 DIAGNOSIS — Z79.01 ANTICOAGULATION MONITORING, INR RANGE 2-3: ICD-10-CM

## 2025-08-04 DIAGNOSIS — I48.21 ATRIAL FIBRILLATION, PERMANENT (H): Primary | Chronic | ICD-10-CM

## 2025-08-04 LAB — INR HOME MONITORING: 2.3 (ref 2–3)

## 2025-08-17 ENCOUNTER — HEALTH MAINTENANCE LETTER (OUTPATIENT)
Age: OVER 89
End: 2025-08-17

## 2025-08-18 ENCOUNTER — ANTICOAGULATION THERAPY VISIT (OUTPATIENT)
Dept: ANTICOAGULATION | Facility: CLINIC | Age: OVER 89
End: 2025-08-18
Payer: MEDICARE

## 2025-08-18 ENCOUNTER — RESULTS FOLLOW-UP (OUTPATIENT)
Dept: ANTICOAGULATION | Facility: CLINIC | Age: OVER 89
End: 2025-08-18
Payer: MEDICARE

## 2025-08-18 DIAGNOSIS — I48.21 ATRIAL FIBRILLATION, PERMANENT (H): Primary | Chronic | ICD-10-CM

## 2025-08-18 DIAGNOSIS — Z79.01 ANTICOAGULATION MONITORING, INR RANGE 2-3: ICD-10-CM

## 2025-08-18 LAB — INR HOME MONITORING: 1.9 (ref 2–3)

## 2025-09-01 LAB — INR HOME MONITORING: 1.8 (ref 2–3)

## 2025-09-02 ENCOUNTER — RESULTS FOLLOW-UP (OUTPATIENT)
Dept: ANTICOAGULATION | Facility: CLINIC | Age: OVER 89
End: 2025-09-02
Payer: MEDICARE

## 2025-09-02 ENCOUNTER — ANTICOAGULATION THERAPY VISIT (OUTPATIENT)
Dept: ANTICOAGULATION | Facility: CLINIC | Age: OVER 89
End: 2025-09-02
Payer: MEDICARE

## 2025-09-02 DIAGNOSIS — Z79.01 ANTICOAGULATION MONITORING, INR RANGE 2-3: ICD-10-CM

## 2025-09-02 DIAGNOSIS — I48.21 ATRIAL FIBRILLATION, PERMANENT (H): Primary | Chronic | ICD-10-CM
